# Patient Record
Sex: FEMALE | Race: WHITE | NOT HISPANIC OR LATINO | Employment: OTHER | URBAN - METROPOLITAN AREA
[De-identification: names, ages, dates, MRNs, and addresses within clinical notes are randomized per-mention and may not be internally consistent; named-entity substitution may affect disease eponyms.]

---

## 2017-02-09 DIAGNOSIS — I10 ESSENTIAL HYPERTENSION, BENIGN: Primary | ICD-10-CM

## 2017-02-09 RX ORDER — TRIAMTERENE/HYDROCHLOROTHIAZID 37.5-25 MG
1 TABLET ORAL EVERY MORNING
Qty: 90 TABLET | Refills: 0 | Status: SHIPPED | OUTPATIENT
Start: 2017-02-09 | End: 2017-05-08

## 2017-02-09 NOTE — TELEPHONE ENCOUNTER
triamterene-hydrochlorothiazide (MAXZIDE-25) 37.5-25 MG per tablet      Last Written Prescription Date: 5/16/16  Last Fill Quantity: 90, # refills: 1  Last Office Visit with [facility], P or [facility] prescribing provider: 6/24/16       POTASSIUM   Date Value Ref Range Status   05/16/2016 3.8 3.4 - 5.3 mmol/L Final     CREATININE   Date Value Ref Range Status   05/16/2016 0.86 0.52 - 1.04 mg/dL Final     BP Readings from Last 3 Encounters:   09/29/16 135/81   06/24/16 128/82   06/16/16 137/89

## 2017-05-08 DIAGNOSIS — I10 ESSENTIAL HYPERTENSION, BENIGN: ICD-10-CM

## 2017-05-09 RX ORDER — TRIAMTERENE/HYDROCHLOROTHIAZID 37.5-25 MG
TABLET ORAL
Qty: 90 TABLET | Refills: 0 | Status: SHIPPED | OUTPATIENT
Start: 2017-05-09 | End: 2017-08-10

## 2017-05-09 NOTE — TELEPHONE ENCOUNTER
HCTZ      Last Written Prescription Date: 2/9/17  Last Fill Quantity: 90, # refills: 0  Last Office Visit with [facility], [facility] or [facility] prescribing provider: 6/24/16       Potassium   Date Value Ref Range Status   05/16/2016 3.8 3.4 - 5.3 mmol/L Final     Creatinine   Date Value Ref Range Status   05/16/2016 0.86 0.52 - 1.04 mg/dL Final     BP Readings from Last 3 Encounters:   09/29/16 135/81   06/24/16 128/82   06/16/16 137/89

## 2017-08-10 DIAGNOSIS — I10 ESSENTIAL HYPERTENSION, BENIGN: ICD-10-CM

## 2017-08-10 RX ORDER — TRIAMTERENE/HYDROCHLOROTHIAZID 37.5-25 MG
TABLET ORAL
Qty: 90 TABLET | Refills: 0 | Status: SHIPPED | OUTPATIENT
Start: 2017-08-10 | End: 2017-08-16

## 2017-08-10 NOTE — TELEPHONE ENCOUNTER
Writer called and assisted patient with setting up office visit because patient is past due.     Maxzide 37.5-25mg      Last Written Prescription Date: 5/9/17  Last Fill Quantity: 90, # refills: 0  Last Office Visit with G, P or [facility] prescribing provider: 6/24/116  Next 5 appointments (look out 90 days)     Aug 16, 2017 10:20 AM CDT   SHORT with Alan Almeida MD   [facility] ([facility])    [address] [place] [id number] [phone]                  Potassium   Date Value Ref Range Status   05/16/2016 3.8 3.4 - 5.3 mmol/L Final     Creatinine   Date Value Ref Range Status   05/16/2016 0.86 0.52 - 1.04 mg/dL Final     BP Readings from Last 3 Encounters:   09/29/16 135/81   06/24/16 128/82   06/16/16 137/89     Medication is being filled for 1 time refill only due to:  due for an office visit with PCP

## 2017-08-16 ENCOUNTER — OFFICE VISIT (OUTPATIENT)
Dept: INTERNAL MEDICINE | Facility: CLINIC | Age: 62
End: 2017-08-16
Payer: MEDICARE

## 2017-08-16 VITALS
OXYGEN SATURATION: 98 % | BODY MASS INDEX: 36.29 KG/M2 | HEART RATE: 78 BPM | DIASTOLIC BLOOD PRESSURE: 84 MMHG | WEIGHT: 245 LBS | SYSTOLIC BLOOD PRESSURE: 112 MMHG | TEMPERATURE: 98.2 F | HEIGHT: 69 IN

## 2017-08-16 DIAGNOSIS — E66.01 MORBID OBESITY, UNSPECIFIED OBESITY TYPE (H): ICD-10-CM

## 2017-08-16 DIAGNOSIS — Z12.11 SCREENING FOR COLON CANCER: ICD-10-CM

## 2017-08-16 DIAGNOSIS — Z00.00 WELCOME TO MEDICARE PREVENTIVE VISIT: Primary | ICD-10-CM

## 2017-08-16 DIAGNOSIS — Z12.4 SCREENING FOR CERVICAL CANCER: ICD-10-CM

## 2017-08-16 DIAGNOSIS — I48.0 PAROXYSMAL ATRIAL FIBRILLATION (H): ICD-10-CM

## 2017-08-16 DIAGNOSIS — Z13.6 CARDIOVASCULAR SCREENING; LDL GOAL LESS THAN 160: ICD-10-CM

## 2017-08-16 DIAGNOSIS — I10 ESSENTIAL HYPERTENSION, BENIGN: ICD-10-CM

## 2017-08-16 DIAGNOSIS — Z12.31 VISIT FOR SCREENING MAMMOGRAM: ICD-10-CM

## 2017-08-16 LAB
ANION GAP SERPL CALCULATED.3IONS-SCNC: 6 MMOL/L (ref 3–14)
BUN SERPL-MCNC: 18 MG/DL (ref 7–30)
CALCIUM SERPL-MCNC: 9 MG/DL (ref 8.5–10.1)
CHLORIDE SERPL-SCNC: 103 MMOL/L (ref 94–109)
CHOLEST SERPL-MCNC: 235 MG/DL
CO2 SERPL-SCNC: 30 MMOL/L (ref 20–32)
CREAT SERPL-MCNC: 0.93 MG/DL (ref 0.52–1.04)
GFR SERPL CREATININE-BSD FRML MDRD: 61 ML/MIN/1.7M2
GLUCOSE SERPL-MCNC: 102 MG/DL (ref 70–99)
HDLC SERPL-MCNC: 46 MG/DL
INR PPP: 0.99 (ref 0.86–1.14)
LDLC SERPL CALC-MCNC: 132 MG/DL
NONHDLC SERPL-MCNC: 189 MG/DL
POTASSIUM SERPL-SCNC: 3.8 MMOL/L (ref 3.4–5.3)
SODIUM SERPL-SCNC: 139 MMOL/L (ref 133–144)
TRIGL SERPL-MCNC: 283 MG/DL

## 2017-08-16 PROCEDURE — 36415 COLL VENOUS BLD VENIPUNCTURE: CPT | Performed by: INTERNAL MEDICINE

## 2017-08-16 PROCEDURE — 99213 OFFICE O/P EST LOW 20 MIN: CPT | Mod: 25 | Performed by: INTERNAL MEDICINE

## 2017-08-16 PROCEDURE — 85610 PROTHROMBIN TIME: CPT | Performed by: INTERNAL MEDICINE

## 2017-08-16 PROCEDURE — G0402 INITIAL PREVENTIVE EXAM: HCPCS | Performed by: INTERNAL MEDICINE

## 2017-08-16 PROCEDURE — 80061 LIPID PANEL: CPT | Performed by: INTERNAL MEDICINE

## 2017-08-16 PROCEDURE — 80048 BASIC METABOLIC PNL TOTAL CA: CPT | Performed by: INTERNAL MEDICINE

## 2017-08-16 RX ORDER — METOPROLOL TARTRATE 50 MG
50 TABLET ORAL 2 TIMES DAILY
Qty: 180 TABLET | Refills: 2 | Status: SHIPPED | OUTPATIENT
Start: 2017-08-16 | End: 2018-06-04

## 2017-08-16 RX ORDER — TRIAMTERENE/HYDROCHLOROTHIAZID 37.5-25 MG
TABLET ORAL
Qty: 90 TABLET | Refills: 3 | Status: SHIPPED | OUTPATIENT
Start: 2017-08-16 | End: 2018-06-25

## 2017-08-16 NOTE — PATIENT INSTRUCTIONS
Service Typically covered by medicare Recommended Completed   Vaccines    Pneumococcal     Once for patients after age 65       Influenza   Yearly       Hepatitis B           (if medium/high risk) Medium/high risk factors:    End Stage Kidney Disease    Hemophiliacs who received Factor XIII or IX concentrates    Clients of institutions for developmentally disabled    Persons who live in same house as a Hepatitis B carrier    Homosexual men    Illicit injectable drug users    Health care workers    Staff of institutions for developmentally disabled     Mammogram Covered: One-time screen between age 35-39, annually for age 40+     Pap and Pelvic Exam Covered: Annually if  high risk,  or childbearing age with abnormal Pap in last 3 years.  Q24 months for all other women     Prostate Cancer Screening    Digital rectal exam    PSA Covered: Annually for all men > age 50     Colorectal Cancer Screening Screening colonoscopy every 10 years, more often for high risk patients     Diabetes self-management training Requires referral by treating physician for patient with diabetes     Diabetes screening    Fasting blood sugar or glucose tolerance test Once yearly, twice yearly if prediabetic     Cardiovascular screening blood tests    Total cholesterol    HDL    Triglycerides Every 5 years     Medical nutrition therapy for diabetes or renal disease Requires referral by treating physician for patient with diabetes or kidney disease     Glaucoma screening Annually for patients with one of the following risk factors:    Diabetes mellitus    Family history of glaucoma    -American age 50 and over    -American age 65 and over     Bone mass measurement Every 24 months if one of the following risk factors:    Estrogen deficiency    Vertebral abnormalities on x-ray indicative of osteoporosis, osteopenia, or vertebral fracture    Receiving/expected to receive the equivalent of at least 5 mg of Prednisone per day for > 3  months.    Hyperparathyroidism    Patient being monitored for response to osteoporosis therapy     One-time AAA screen  Must be ordered as part of Medicare IPPE   Any patient with a family history of AAA    Males Age 65-75, with history of smoking at least 100 cigarettes in lifetime     Smoking Cessation Counseling Beneficiaries who use tobacco are eligible to receive 2 cessation attempts per year; each attempt includes maximum of 4 sessions.     HIV Screening Annually for beneficiaries at increased risk:       Increased risk for HIV infection is defined in the  National Coverage Determinations (NCD) Manual,  Publication 100-03 Sections 190.14 (diagnostic) and 210.7 (screening). See http://www.cms.gov/manuals/downloads/rjy007b2_Ncma2.pdf and http://www.cms.gov/manuals/downloads/zmn701n2_Xvkd1.pdf on the Internet.  Three times per pregnancy for beneficiaries who are pregnant.       Future Annual Wellness Visit Annually, for all beneficiaries.                   Preventive Health Recommendations  Female Ages 50 - 64    Yearly exam: See your health care provider every year in order to  o Review health changes.   o Discuss preventive care.    o Review your medicines if your doctor has prescribed any.      Get a Pap test every three years (unless you have an abnormal result and your provider advises testing more often).    If you get Pap tests with HPV test, you only need to test every 5 years, unless you have an abnormal result.     You do not need a Pap test if your uterus was removed (hysterectomy) and you have not had cancer.    You should be tested each year for STDs (sexually transmitted diseases) if you're at risk.     Have a mammogram every 1 to 2 years.    Have a colonoscopy at age 50, or have a yearly FIT test (stool test). These exams screen for colon cancer.      Have a cholesterol test every 5 years, or more often if advised.    Have a diabetes test (fasting glucose) every three years. If you are at risk for  diabetes, you should have this test more often.     If you are at risk for osteoporosis (brittle bone disease), think about having a bone density scan (DEXA).    Shots: Get a flu shot each year. Get a tetanus shot every 10 years.    Nutrition:     Eat at least 5 servings of fruits and vegetables each day.    Eat whole-grain bread, whole-wheat pasta and brown rice instead of white grains and rice.    Talk to your provider about Calcium and Vitamin D.     Lifestyle    Exercise at least 150 minutes a week (30 minutes a day, 5 days a week). This will help you control your weight and prevent disease.    Limit alcohol to one drink per day.    No smoking.     Wear sunscreen to prevent skin cancer.     See your dentist every six months for an exam and cleaning.    See your eye doctor every 1 to 2 years.    Preventive Health Recommendations    Female Ages 65 +    Yearly exam:     See your health care provider every year in order to  o Review health changes.   o Discuss preventive care.    o Review your medicines if your doctor has prescribed any.      You no longer need a yearly Pap test unless you've had an abnormal Pap test in the past 10 years. If you have vaginal symptoms, such as bleeding or discharge, be sure to talk with your provider about a Pap test.      Every 1 to 2 years, have a mammogram.  If you are over 69, talk with your health care provider about whether or not you want to continue having screening mammograms.      Every 10 years, have a colonoscopy. Or, have a yearly FIT test (stool test). These exams will check for colon cancer.       Have a cholesterol test every 5 years, or more often if your doctor advises it.       Have a diabetes test (fasting glucose) every three years. If you are at risk for diabetes, you should have this test more often.       At age 65, have a bone density scan (DEXA) to check for osteoporosis (brittle bone disease).    Shots:    Get a flu shot each year.    Get a tetanus shot  every 10 years.    Talk to your doctor about your pneumonia vaccines. There are now two you should receive - Pneumovax (PPSV 23) and Prevnar (PCV 13).    Talk to your doctor about the shingles vaccine.    Talk to your doctor about the hepatitis B vaccine.    Nutrition:     Eat at least 5 servings of fruits and vegetables each day.      Eat whole-grain bread, whole-wheat pasta and brown rice instead of white grains and rice.      Talk to your provider about Calcium and Vitamin D.     Lifestyle    Exercise at least 150 minutes a week (30 minutes a day, 5 days a week). This will help you control your weight and prevent disease.      Limit alcohol to one drink per day.      No smoking.       Wear sunscreen to prevent skin cancer.       See your dentist twice a year for an exam and cleaning.      See your eye doctor every 1 to 2 years to screen for conditions such as glaucoma, macular degeneration and cataracts.

## 2017-08-16 NOTE — NURSING NOTE
"Chief Complaint   Patient presents with     Physical       Initial /84  Pulse 78  Temp 98.2  F (36.8  C) (Oral)  Ht 5' 8.5\" (1.74 m)  Wt 245 lb (111.1 kg)  LMP 12/31/2012  SpO2 98%  BMI 36.71 kg/m2 Estimated body mass index is 36.71 kg/(m^2) as calculated from the following:    Height as of this encounter: 5' 8.5\" (1.74 m).    Weight as of this encounter: 245 lb (111.1 kg).  Medication Reconciliation: complete    "

## 2017-08-16 NOTE — MR AVS SNAPSHOT
After Visit Summary   8/16/2017    Amparo Mccray    MRN: [id number]           Patient Information     Date Of Birth          1955        Visit Information        Provider Department      8/16/2017 10:20 AM Alan Almeida MD [facility]        Today's Diagnoses     Encounter for routine adult medical exam with abnormal findings    -  1    Essential hypertension, benign        Morbid obesity, unspecified obesity type (H)        CARDIOVASCULAR SCREENING; LDL GOAL LESS THAN 160        Screening for colon cancer        Screening for cervical cancer        Visit for screening mammogram        Paroxysmal atrial fibrillation (H)          Care Instructions    Service Typically covered by medicare Recommended Completed   Vaccines    Pneumococcal     Once for patients after age 65       Influenza   Yearly       Hepatitis B           (if medium/high risk) Medium/high risk factors:    End Stage Kidney Disease    Hemophiliacs who received Factor XIII or IX concentrates    Clients of institutions for developmentally disabled    Persons who live in same house as a Hepatitis B carrier    Homosexual men    Illicit injectable drug users    Health care workers    Staff of institutions for developmentally disabled     Mammogram Covered: One-time screen between age 35-39, annually for age 40+     Pap and Pelvic Exam Covered: Annually if  high risk,  or childbearing age with abnormal Pap in last 3 years.  Q24 months for all other women     Prostate Cancer Screening    Digital rectal exam    PSA Covered: Annually for all men > age 50     Colorectal Cancer Screening Screening colonoscopy every 10 years, more often for high risk patients     Diabetes self-management training Requires referral by treating physician for patient with diabetes     Diabetes screening    Fasting blood sugar or glucose tolerance test Once yearly, twice yearly if prediabetic     Cardiovascular screening blood  tests    Total cholesterol    HDL    Triglycerides Every 5 years     Medical nutrition therapy for diabetes or renal disease Requires referral by treating physician for patient with diabetes or kidney disease     Glaucoma screening Annually for patients with one of the following risk factors:    Diabetes mellitus    Family history of glaucoma    -American age 50 and over    -American age 65 and over     Bone mass measurement Every 24 months if one of the following risk factors:    Estrogen deficiency    Vertebral abnormalities on x-ray indicative of osteoporosis, osteopenia, or vertebral fracture    Receiving/expected to receive the equivalent of at least 5 mg of Prednisone per day for > 3 months.    Hyperparathyroidism    Patient being monitored for response to osteoporosis therapy     One-time AAA screen  Must be ordered as part of Medicare IPPE   Any patient with a family history of AAA    Males Age 65-75, with history of smoking at least 100 cigarettes in lifetime     Smoking Cessation Counseling Beneficiaries who use tobacco are eligible to receive 2 cessation attempts per year; each attempt includes maximum of 4 sessions.     HIV Screening Annually for beneficiaries at increased risk:       Increased risk for HIV infection is defined in the  National Coverage Determinations (NCD) Manual,  Publication 100-03 Sections 190.14 (diagnostic) and 210.7 (screening). See http://www.cms.gov/manuals/downloads/rzd808m3_Qvul6.pdf and http://www.cms.gov/manuals/downloads/ysq123g8_Ixbz7.pdf on the Internet.  Three times per pregnancy for beneficiaries who are pregnant.       Future Annual Wellness Visit Annually, for all beneficiaries.                   Preventive Health Recommendations  Female Ages 50 - 64    Yearly exam: See your health care provider every year in order to  o Review health changes.   o Discuss preventive care.    o Review your medicines if your doctor has prescribed any.      Get a Pap test  every three years (unless you have an abnormal result and your provider advises testing more often).    If you get Pap tests with HPV test, you only need to test every 5 years, unless you have an abnormal result.     You do not need a Pap test if your uterus was removed (hysterectomy) and you have not had cancer.    You should be tested each year for STDs (sexually transmitted diseases) if you're at risk.     Have a mammogram every 1 to 2 years.    Have a colonoscopy at age 50, or have a yearly FIT test (stool test). These exams screen for colon cancer.      Have a cholesterol test every 5 years, or more often if advised.    Have a diabetes test (fasting glucose) every three years. If you are at risk for diabetes, you should have this test more often.     If you are at risk for osteoporosis (brittle bone disease), think about having a bone density scan (DEXA).    Shots: Get a flu shot each year. Get a tetanus shot every 10 years.    Nutrition:     Eat at least 5 servings of fruits and vegetables each day.    Eat whole-grain bread, whole-wheat pasta and brown rice instead of white grains and rice.    Talk to your provider about Calcium and Vitamin D.     Lifestyle    Exercise at least 150 minutes a week (30 minutes a day, 5 days a week). This will help you control your weight and prevent disease.    Limit alcohol to one drink per day.    No smoking.     Wear sunscreen to prevent skin cancer.     See your dentist every six months for an exam and cleaning.    See your eye doctor every 1 to 2 years.    Preventive Health Recommendations    Female Ages 65 +    Yearly exam:     See your health care provider every year in order to  o Review health changes.   o Discuss preventive care.    o Review your medicines if your doctor has prescribed any.      You no longer need a yearly Pap test unless you've had an abnormal Pap test in the past 10 years. If you have vaginal symptoms, such as bleeding or discharge, be sure to talk  with your provider about a Pap test.      Every 1 to 2 years, have a mammogram.  If you are over 69, talk with your health care provider about whether or not you want to continue having screening mammograms.      Every 10 years, have a colonoscopy. Or, have a yearly FIT test (stool test). These exams will check for colon cancer.       Have a cholesterol test every 5 years, or more often if your doctor advises it.       Have a diabetes test (fasting glucose) every three years. If you are at risk for diabetes, you should have this test more often.       At age 65, have a bone density scan (DEXA) to check for osteoporosis (brittle bone disease).    Shots:    Get a flu shot each year.    Get a tetanus shot every 10 years.    Talk to your doctor about your pneumonia vaccines. There are now two you should receive - Pneumovax (PPSV 23) and Prevnar (PCV 13).    Talk to your doctor about the shingles vaccine.    Talk to your doctor about the hepatitis B vaccine.    Nutrition:     Eat at least 5 servings of fruits and vegetables each day.      Eat whole-grain bread, whole-wheat pasta and brown rice instead of white grains and rice.      Talk to your provider about Calcium and Vitamin D.     Lifestyle    Exercise at least 150 minutes a week (30 minutes a day, 5 days a week). This will help you control your weight and prevent disease.      Limit alcohol to one drink per day.      No smoking.       Wear sunscreen to prevent skin cancer.       See your dentist twice a year for an exam and cleaning.      See your eye doctor every 1 to 2 years to screen for conditions such as glaucoma, macular degeneration and cataracts.          Follow-ups after your visit        Additional Services     INR CLINIC REFERRAL       Your provider has referred you to INR Services.    Please be aware that coverage of these services is subject to the terms and limitations of your health insurance plan.  Call member services at your health plan with any  "benefit or coverage questions.    Indication for Anticoagulation: Atrial Fibrillation  If nonstandard INR is desired, indicate goal range and explanation:   Expected Duration of Therapy: Lifetime                  Future tests that were ordered for you today     Open Future Orders        Priority Expected Expires Ordered    Fecal colorectal cancer screen FIT Routine 9/6/2017 11/8/2017 8/16/2017    Mammo Screening digital (bilat) Routine  8/16/2018 8/16/2017            Who to contact     If you have questions or need follow up information about today's clinic visit or your schedule please contact [facility] directly at [phone].  Normal or non-critical lab and imaging results will be communicated to you by [employer], letter or phone within 4 business days after the clinic has received the results. If you do not hear from us within 7 days, please contact the clinic through [employer] or phone. If you have a critical or abnormal lab result, we will notify you by phone as soon as possible.  Submit refill requests through [employer] or call your pharmacy and they will forward the refill request to us. Please allow 3 business days for your refill to be completed.          Additional Information About Your Visit        [employer] Information     [employer] gives you secure access to your electronic health record. If you see a primary care provider, you can also send messages to your care team and make appointments. If you have questions, please call your primary care clinic.  If you do not have a primary care provider, please call [phone] and they will assist you.        Care EveryWhere ID     This is your Care EveryWhere ID. This could be used by other organizations to access your [place] medical records  [id number]        Your Vitals Were     Pulse Temperature Height Last Period Pulse Oximetry BMI (Body Mass Index)    78 98.2  F (36.8  C) (Oral) 5' 8.5\" (1.74 m) 12/31/2012 98% 36.71 kg/m2       " Blood Pressure from Last 3 Encounters:   08/16/17 112/84   09/29/16 135/81   06/24/16 128/82    Weight from Last 3 Encounters:   08/16/17 245 lb (111.1 kg)   06/24/16 241 lb 4.8 oz (109.5 kg)   05/16/16 237 lb 8 oz (107.7 kg)              We Performed the Following     Basic metabolic panel     INR CLINIC REFERRAL     Lipid panel reflex to direct LDL        Primary Care Provider Office Phone # Fax #    Alan Almeida [phone]       [address] [facility] [address]        Equal Access to Services     [facility] CASPER : Hadii jermaine waters hadsathish Somarnie, waaxda luqadaha, qaybta kaalmada adealberyajohnny, hardeep myles . So [facility] [phone].    ATENCIÓN: Si habla español, tiene a reyes disposición servicios gratuitos de asistencia lingüística. [facility] [phone].    We comply with applicable federal civil rights laws and [place] laws. We do not discriminate on the basis of race, color, national origin, age, disability sex, sexual orientation or gender identity.            Thank you!     Thank you for choosing [facility]  for your care. Our goal is always to provide you with excellent care. Hearing back from our patients is one way we can continue to improve our services. Please take a few minutes to complete the written survey that you may receive in the mail after your visit with us. Thank you!             Your Updated Medication List - Protect others around you: Learn how to safely use, store and throw away your medicines at www.disposemymeds.org.          This list is accurate as of: 8/16/17 10:48 AM.  Always use your most recent med list.                   Brand Name Dispense Instructions for use Diagnosis    B-12 1000 MCG Tbcr      Take by mouth daily Oral        metoprolol 50 MG tablet    LOPRESSOR    180 tablet    Take 1 tablet (50 mg) by mouth 2 times daily    Essential hypertension, benign       Potassium Chloride CR 8 MEQ Cpcr     60  capsule    Take 1 tablet by mouth daily as needed For leg cramps    Hypokalemia       triamterene-hydrochlorothiazide 37.5-25 MG per tablet    MAXZIDE-25    90 tablet    TAKE 1 TABLET BY MOUTH EVERY MORNING.    Essential hypertension, benign

## 2017-08-23 ENCOUNTER — ANTICOAGULATION THERAPY VISIT (OUTPATIENT)
Dept: NURSING | Facility: CLINIC | Age: 62
End: 2017-08-23
Payer: MEDICARE

## 2017-08-23 DIAGNOSIS — I49.8 VENTRICULAR BIGEMINY: ICD-10-CM

## 2017-08-23 DIAGNOSIS — Z79.01 LONG-TERM (CURRENT) USE OF ANTICOAGULANTS: ICD-10-CM

## 2017-08-23 DIAGNOSIS — I48.0 PAROXYSMAL ATRIAL FIBRILLATION (H): ICD-10-CM

## 2017-08-23 PROCEDURE — 99207 ZZC NO CHARGE NURSE ONLY: CPT

## 2017-08-23 RX ORDER — WARFARIN SODIUM 2.5 MG/1
2.5 TABLET ORAL DAILY
Qty: 90 TABLET | Refills: 1 | Status: SHIPPED | OUTPATIENT
Start: 2017-08-23 | End: 2018-03-04

## 2017-08-23 NOTE — MR AVS SNAPSHOT
Amparo Whittenmicki Mccray   8/23/2017 11:15 AM   Anticoagulation Therapy Visit    Description:  61 year old female   Provider:  FM RN VISITS   Department:  [facility] Nurse           INR as of 8/23/2017     Today's INR No new INR was available at the time of this encounter.      Anticoagulation Summary as of 8/23/2017     INR goal 2.0-3.0   Today's INR No new INR was available at the time of this encounter.   Full instructions 8/23: 5 mg; 8/24: 2.5 mg; 8/25: 2.5 mg; Otherwise No maintenance plan   Next INR check 8/25/2017    Indications   Long-term (current) use of anticoagulants [Z79.01] [Z79.01]  Atrial fibrillation (H) [I48.91]  Ventricular bigeminy [I49.9]         Your next Anticoagulation Clinic appointment(s)     Aug 25, 2017  9:30 AM CDT   [facility]ticoagulation [facility] with [facility]   [facility]    [address] [place] [address], [address] [address] [phone]              Contact Numbers     Clinic Number:         August 2017 Details    Sun Mon Tue Wed Thu Fri Sat       1               2               3               4               5                 6               7               8               9               10               11               12                 13               14               15               16               17               18               19                 20               21               22               23      5 mg   See details      24      2.5 mg         25            26                 27               28               29               30               31                  Date Details   08/23 This INR check       Date of next INR:  8/25/2017         How to take your warfarin dose     To take:  2.5 mg Take 1 of the 2.5 mg tablets.    To take:  5 mg Take 2 of the 2.5 mg tablets.

## 2017-08-23 NOTE — PROGRESS NOTES
ANTICOAGULATION INITIAL CLINIC VISIT    Patient Name:  Amparo Mccray  Date:  8/23/2017  Referred by: Dr. Almeida  Contact Type:  Face to Face     PATIENT HAS BEEN ON COUMADIN THERAPY IN THE PAST.  STOPPED FOR 2 YEARS AND IS NOW JUST GOING BACK ON IT PER PROVIDERS ORDERS.    SUBJECTIVE:  Coumadin education was completed today.  Topics covered include:  -Introduction to coumadin  -Proper Administration  -INR Testing  -Sign/Symptoms of Bleeding  -Signs/Symptoms of Clot Formation or Stroke  -Dietary Intake of Vitamin K  -Drug Interactions  -Anticoagulation Identification (bracelet, necklace or wallet card)  -Future Surgery  -Effects of Alcohol, Tobacco, and Exercise on Coumadin    Coumadin Education Booklet and Coumadin Identification Wallet Card were given to the patient.         OBJECTIVE    INR   Date Value Ref Range Status   08/16/2017 0.99 0.86 - 1.14 Final       ASSESSMENT / PLAN  No question data found.  Anticoagulation Summary as of 8/23/2017     INR goal 2.0-3.0   Today's INR No new INR was available at the time of this encounter.   Maintenance plan No maintenance plan   Full instructions 8/23: 5 mg; 8/24: 2.5 mg; 8/25: 2.5 mg; Otherwise No maintenance plan   Next INR check 8/25/2017   Target end date     Indications   Long-term (current) use of anticoagulants [Z79.01] [Z79.01]  Atrial fibrillation (H) [I48.91]  Ventricular bigeminy [I49.9]         Anticoagulation Episode Summary     INR check location     Preferred lab     Send INR reminders to  TRIAGE POOL    Comments       Anticoagulation Care Providers     Provider Role Specialty Phone number    Alan Almeida MD [facility] Internal Medicine [phone]            See the Encounter Report to view Anticoagulation Flowsheet and Dosing Calendar (Go to Encounters tab in chart review, and find the Anticoagulation Therapy Visit)    Willow Ellison RN

## 2017-08-25 ENCOUNTER — ANTICOAGULATION THERAPY VISIT (OUTPATIENT)
Dept: NURSING | Facility: CLINIC | Age: 62
End: 2017-08-25
Payer: MEDICARE

## 2017-08-25 DIAGNOSIS — I49.8 VENTRICULAR BIGEMINY: ICD-10-CM

## 2017-08-25 DIAGNOSIS — I48.0 PAROXYSMAL ATRIAL FIBRILLATION (H): ICD-10-CM

## 2017-08-25 DIAGNOSIS — Z79.01 LONG-TERM (CURRENT) USE OF ANTICOAGULANTS: ICD-10-CM

## 2017-08-25 LAB — INR POINT OF CARE: 1.3 (ref 0.86–1.14)

## 2017-08-25 PROCEDURE — 36416 COLLJ CAPILLARY BLOOD SPEC: CPT

## 2017-08-25 PROCEDURE — 85610 PROTHROMBIN TIME: CPT | Mod: QW

## 2017-08-25 PROCEDURE — 99207 ZZC NO CHARGE NURSE ONLY: CPT

## 2017-08-25 NOTE — MR AVS SNAPSHOT
Amparo Whittenmicki Mccray   8/25/2017 9:30 AM   Anticoagulation Therapy Visit    Description:  61 year old female   Provider:  FM RN VISITS   Department:  Fm Nurse           INR as of 8/25/2017     Today's INR 1.3!      Anticoagulation Summary as of 8/25/2017     INR goal 2.0-3.0   Today's INR 1.3!   Full instructions 8/25: 5 mg; 8/26: 5 mg; 8/27: 5 mg; 8/28: 2.5 mg; Otherwise No maintenance plan   Next INR check 8/28/2017    Indications   Long-term (current) use of anticoagulants [Z79.01] [Z79.01]  Atrial fibrillation (H) [I48.91]  Ventricular bigeminy [I49.9]         Your next Anticoagulation Clinic appointment(s)     Aug 28, 2017 10:00 AM CDT   Anticoagulation Visit with FM RN VISITS   [facility]    [address] [place] [address], [facility] [address]  [facility] [address] [phone]              Contact Numbers     Clinic Number:         August 2017 Details    Sun Mon Tue Wed Thu Fri Sat       1               2               3               4               5                 6               7               8               9               10               11               12                 13               14               15               16               17               18               19                 20               21               22               23               24               25      5 mg   See details      26      5 mg           27      5 mg         28            29               30               31                  Date Details   08/25 This INR check       Date of next INR:  8/28/2017         How to take your warfarin dose     To take:  2.5 mg Take 1 of the 2.5 mg tablets.    To take:  5 mg Take 2 of the 2.5 mg tablets.

## 2017-08-25 NOTE — PROGRESS NOTES
ANTICOAGULATION FOLLOW-UP CLINIC VISIT    Patient Name:  Amparo Mccray  Date:  8/25/2017  Contact Type:  Face to Face    SUBJECTIVE:     Patient Findings     Positives Initiation of therapy           OBJECTIVE    INR Protime   Date Value Ref Range Status   08/25/2017 1.3 (A) 0.86 - 1.14 Final       ASSESSMENT / PLAN  INR assessment SUB    Recheck INR In: 3 DAYS    INR Location Clinic      Anticoagulation Summary as of 8/25/2017     INR goal 2.0-3.0   Today's INR 1.3!   Maintenance plan No maintenance plan   Full instructions 8/25: 5 mg; 8/26: 5 mg; 8/27: 5 mg; 8/28: 2.5 mg; Otherwise No maintenance plan   Next INR check 8/28/2017   Priority INR   Target end date     Indications   Long-term (current) use of anticoagulants [Z79.01] [Z79.01]  Atrial fibrillation (H) [I48.91]  Ventricular bigeminy [I49.9]         Anticoagulation Episode Summary     INR check location     Preferred lab     Send INR reminders to  TRIAGE POOL    Comments       Anticoagulation Care Providers     Provider Role Specialty Phone number    Alan Almeida MD [facility] Internal Medicine [phone]            See the Encounter Report to view Anticoagulation Flowsheet and Dosing Calendar (Go to Encounters tab in chart review, and find the Anticoagulation Therapy Visit)        Willow Ellison RN

## 2017-08-27 ENCOUNTER — HEALTH MAINTENANCE LETTER (OUTPATIENT)
Age: 62
End: 2017-08-27

## 2017-08-28 ENCOUNTER — ANTICOAGULATION THERAPY VISIT (OUTPATIENT)
Dept: NURSING | Facility: CLINIC | Age: 62
End: 2017-08-28
Payer: MEDICARE

## 2017-08-28 DIAGNOSIS — I48.0 PAROXYSMAL ATRIAL FIBRILLATION (H): ICD-10-CM

## 2017-08-28 DIAGNOSIS — Z79.01 LONG-TERM (CURRENT) USE OF ANTICOAGULANTS: ICD-10-CM

## 2017-08-28 DIAGNOSIS — I49.8 VENTRICULAR BIGEMINY: ICD-10-CM

## 2017-08-28 LAB — INR POINT OF CARE: 3.3 (ref 0.86–1.14)

## 2017-08-28 PROCEDURE — 36416 COLLJ CAPILLARY BLOOD SPEC: CPT

## 2017-08-28 PROCEDURE — 85610 PROTHROMBIN TIME: CPT | Mod: QW

## 2017-08-28 PROCEDURE — 99207 ZZC NO CHARGE NURSE ONLY: CPT

## 2017-08-28 NOTE — PROGRESS NOTES
ANTICOAGULATION FOLLOW-UP CLINIC VISIT    Patient Name:  Amparo Mccray  Date:  8/28/2017  Contact Type:  Face to Face    SUBJECTIVE:     Patient Findings     Positives Initiation of therapy           OBJECTIVE    INR Protime   Date Value Ref Range Status   08/28/2017 3.3 (A) 0.86 - 1.14 Final       ASSESSMENT / PLAN  INR assessment SUPRA    Recheck INR In: 8 DAYS    INR Location Clinic      Anticoagulation Summary as of 8/28/2017     INR goal 2.0-3.0   Today's INR 3.3!   Maintenance plan 2.5 mg (2.5 mg x 1) every day   Full instructions 8/28: 2.5 mg; Otherwise 2.5 mg every day   Weekly total 17.5 mg   Plan last modified Willow Ellison RN (8/28/2017)   Next INR check 9/5/2017   Priority INR   Target end date     Indications   Long-term (current) use of anticoagulants [Z79.01] [Z79.01]  Atrial fibrillation (H) [I48.91]  Ventricular bigeminy [I49.9]         Anticoagulation Episode Summary     INR check location     Preferred lab     Send INR reminders to  TRIAGE POOL    Comments       Anticoagulation Care Providers     Provider Role Specialty Phone number    Alan Almeida MD [facility] Internal Medicine [phone]            See the Encounter Report to view Anticoagulation Flowsheet and Dosing Calendar (Go to Encounters tab in chart review, and find the Anticoagulation Therapy Visit)    Willow Ellison RN

## 2017-08-28 NOTE — MR AVS SNAPSHOT
Amparo Whittenmicki Mccray   8/28/2017 10:00 AM   Anticoagulation Therapy Visit    Description:  61 year old female   Provider:  FM RN VISITS   Department:  Fm Nurse           INR as of 8/28/2017     Today's INR 3.3!      Anticoagulation Summary as of 8/28/2017     INR goal 2.0-3.0   Today's INR 3.3!   Full instructions 8/28: 2.5 mg; Otherwise 2.5 mg every day   Next INR check 9/5/2017    Indications   Long-term (current) use of anticoagulants [Z79.01] [Z79.01]  Atrial fibrillation (H) [I48.91]  Ventricular bigeminy [I49.9]         Your next Anticoagulation Clinic appointment(s)     Sep 05, 2017 10:15 AM CDT   Anticoagulation Visit with FM RN VISITS   [facility]    [address] [place] [address], [address]  [facility] [address] [phone]              Contact Numbers     Clinic Number:         August 2017 Details    Sun Mon Tue Wed Thu Fri Sat       1               2               3               4               5                 6               7               8               9               10               11               12                 13               14               15               16               17               18               19                 20               21               22               23               24               25               26                 27               28      2.5 mg   See details      29      2.5 mg         30      2.5 mg         31      2.5 mg            Date Details   08/28 This INR check               How to take your warfarin dose     To take:  2.5 mg Take 1 of the 2.5 mg tablets.           September 2017 Details    Sun Mon Tue Wed Thu Fri Sat          1      2.5 mg         2      2.5 mg           3      2.5 mg         4      2.5 mg         5            6               7               8               9                 10               11               12               13               14               15               16                  17               18               19               20               21               22               23                 24               25               26               27               28               29               30                Date Details   No additional details    Date of next INR:  9/5/2017         How to take your warfarin dose     To take:  2.5 mg Take 1 of the 2.5 mg tablets.

## 2017-09-05 ENCOUNTER — ANTICOAGULATION THERAPY VISIT (OUTPATIENT)
Dept: NURSING | Facility: CLINIC | Age: 62
End: 2017-09-05
Payer: MEDICARE

## 2017-09-05 DIAGNOSIS — I49.8 VENTRICULAR BIGEMINY: ICD-10-CM

## 2017-09-05 DIAGNOSIS — I48.0 PAROXYSMAL ATRIAL FIBRILLATION (H): ICD-10-CM

## 2017-09-05 DIAGNOSIS — Z79.01 LONG-TERM (CURRENT) USE OF ANTICOAGULANTS: ICD-10-CM

## 2017-09-05 LAB — INR POINT OF CARE: 3.2 (ref 0.86–1.14)

## 2017-09-05 PROCEDURE — 99207 ZZC NO CHARGE NURSE ONLY: CPT

## 2017-09-05 PROCEDURE — 36416 COLLJ CAPILLARY BLOOD SPEC: CPT

## 2017-09-05 PROCEDURE — 85610 PROTHROMBIN TIME: CPT | Mod: QW

## 2017-09-05 NOTE — MR AVS SNAPSHOT
Amparo Whittenmicki Mccray   9/5/2017 10:15 AM   Anticoagulation Therapy Visit    Description:  61 year old female   Provider:  FM RN VISITS   Department:  Fm Nurse           INR as of 9/5/2017     Today's INR 3.2!      Anticoagulation Summary as of 9/5/2017     INR goal 2.0-3.0   Today's INR 3.2!   Full instructions 2.5 mg every day   Next INR check 9/15/2017    Indications   Long-term (current) use of anticoagulants [Z79.01] [Z79.01]  Atrial fibrillation (H) [I48.91]  Ventricular bigeminy [I49.9]         Your next Anticoagulation Clinic appointment(s)     Sep 15, 2017 10:15 AM CDT   Anticoagulation Visit with FM RN VISITS   [facility]    [address] [place] [address], [facility] [address]  [facility] [id number] [phone]              Contact Numbers     Clinic Number:         September 2017 Details    Sun Mon Tue Wed Thu Fri Sat          1               2                 3               4               5      2.5 mg   See details      6      2.5 mg         7      2.5 mg         8      2.5 mg         9      2.5 mg           10      2.5 mg         11      2.5 mg         12      2.5 mg         13      2.5 mg         14      2.5 mg         15            16                 17               18               19               20               21               22               23                 24               25               26               27               28               29               30                Date Details   09/05 This INR check       Date of next INR:  9/15/2017         How to take your warfarin dose     To take:  2.5 mg Take 1 of the 2.5 mg tablets.

## 2017-09-05 NOTE — PROGRESS NOTES
ANTICOAGULATION FOLLOW-UP CLINIC VISIT    Patient Name:  Amparo Mccray  Date:  9/5/2017  Contact Type:  Face to Face    SUBJECTIVE:     Patient Findings     Positives Initiation of therapy           OBJECTIVE    INR Protime   Date Value Ref Range Status   09/05/2017 3.2 (A) 0.86 - 1.14 Final       ASSESSMENT / PLAN  INR assessment SUPRA    Recheck INR In: 10 DAYS    INR Location Clinic      Anticoagulation Summary as of 9/5/2017     INR goal 2.0-3.0   Today's INR 3.2!   Maintenance plan 2.5 mg (2.5 mg x 1) every day   Full instructions 2.5 mg every day   Weekly total 17.5 mg   Plan last modified Willow Ellison, RN (9/5/2017)   Next INR check 9/15/2017   Priority INR   Target end date     Indications   Long-term (current) use of anticoagulants [Z79.01] [Z79.01]  Atrial fibrillation (H) [I48.91]  Ventricular bigeminy [I49.9]         Anticoagulation Episode Summary     INR check location     Preferred lab     Send INR reminders to  TRIAGE POOL    Comments       Anticoagulation Care Providers     Provider Role Specialty Phone number    Alan Almeida MD [facility] Internal Medicine [phone]            See the Encounter Report to view Anticoagulation Flowsheet and Dosing Calendar (Go to Encounters tab in chart review, and find the Anticoagulation Therapy Visit)      Willow Ellison RN

## 2017-09-16 PROCEDURE — 82274 ASSAY TEST FOR BLOOD FECAL: CPT | Performed by: INTERNAL MEDICINE

## 2017-09-19 ENCOUNTER — ANTICOAGULATION THERAPY VISIT (OUTPATIENT)
Dept: NURSING | Facility: CLINIC | Age: 62
End: 2017-09-19
Payer: MEDICARE

## 2017-09-19 DIAGNOSIS — Z79.01 LONG-TERM (CURRENT) USE OF ANTICOAGULANTS: ICD-10-CM

## 2017-09-19 DIAGNOSIS — I49.8 VENTRICULAR BIGEMINY: ICD-10-CM

## 2017-09-19 DIAGNOSIS — I48.0 PAROXYSMAL ATRIAL FIBRILLATION (H): ICD-10-CM

## 2017-09-19 LAB — INR POINT OF CARE: 3.6 (ref 0.86–1.14)

## 2017-09-19 PROCEDURE — 85610 PROTHROMBIN TIME: CPT | Mod: QW

## 2017-09-19 PROCEDURE — 36416 COLLJ CAPILLARY BLOOD SPEC: CPT

## 2017-09-19 PROCEDURE — 99207 ZZC NO CHARGE NURSE ONLY: CPT

## 2017-09-19 NOTE — MR AVS SNAPSHOT
Amparo Whittenmicki Sukhdeeptreasure   9/19/2017 10:15 AM   Anticoagulation Therapy Visit    Description:  61 year old female   Provider:  FM RN VISITS   Department:  Fm Nurse           INR as of 9/19/2017     Today's INR 3.6!      Anticoagulation Summary as of 9/19/2017     INR goal 2.0-3.0   Today's INR 3.6!   Full instructions 0 mg on Wed; 2.5 mg all other days   Next INR check 10/3/2017    Indications   Long-term (current) use of anticoagulants [Z79.01] [Z79.01]  Atrial fibrillation (H) [I48.91]  Ventricular bigeminy [I49.9]         Description     Patient has noticed more bruising.      Your next Anticoagulation Clinic appointment(s)     Oct 03, 2017 10:00 AM CDT   Anticoagulation Visit with FM RN VISITS   [facility]    [address] [facility], [address]  [facility] [address] [phone]              Contact Numbers     Clinic Number:         September 2017 Details    Sun Mon Tue Wed Thu Fri Sat          1               2                 3               4               5               6               7               8               9                 10               11               12               13               14               15               16                 17               18               19      2.5 mg   See details      20      0 mg         21      2.5 mg         22      2.5 mg         23      2.5 mg           24      2.5 mg         25      2.5 mg         26      2.5 mg         27      0 mg         28      2.5 mg         29      2.5 mg         30      2.5 mg          Date Details   09/19 This INR check               How to take your warfarin dose     To take:  2.5 mg Take 1 of the 2.5 mg tablets.           October 2017 Details    Sun Mon Tue Wed Thu Fri Sat     1      2.5 mg         2      2.5 mg         3            4               5               6               7                 8               9               10               11               12                13               14                 15               16               17               18               19               20               21                 22               23               24               25               26               27               28                 29               30               31                    Date Details   No additional details    Date of next INR:  10/3/2017         How to take your warfarin dose     To take:  2.5 mg Take 1 of the 2.5 mg tablets.

## 2017-09-19 NOTE — PROGRESS NOTES
ANTICOAGULATION FOLLOW-UP CLINIC VISIT    Patient Name:  Amparo Mccray  Date:  9/19/2017  Contact Type:  Face to Face    SUBJECTIVE:     Patient Findings     Positives Initiation of therapy    Comments Patient has noticed more bruising.           OBJECTIVE    INR Protime   Date Value Ref Range Status   09/19/2017 3.6 (A) 0.86 - 1.14 Final       ASSESSMENT / PLAN  INR assessment SUPRA    Recheck INR In: 2 WEEKS    INR Location Clinic      Anticoagulation Summary as of 9/19/2017     INR goal 2.0-3.0   Today's INR 3.6!   Maintenance plan 0 mg on Wed; 2.5 mg (2.5 mg x 1) all other days   Full instructions 0 mg on Wed; 2.5 mg all other days   Weekly total 15 mg   Plan last modified Willow Ellison RN (9/19/2017)   Next INR check 10/3/2017   Priority INR   Target end date     Indications   Long-term (current) use of anticoagulants [Z79.01] [Z79.01]  Atrial fibrillation (H) [I48.91]  Ventricular bigeminy [I49.9]         Anticoagulation Episode Summary     INR check location     Preferred lab     Send INR reminders to  TRIAGE POOL    Comments       Anticoagulation Care Providers     Provider Role Specialty Phone number    Alan Almeida MD Responsible Internal Medicine [phone]            See the Encounter Report to view Anticoagulation Flowsheet and Dosing Calendar (Go to Encounters tab in chart review, and find the Anticoagulation Therapy Visit)      Willow Ellison RN

## 2017-09-22 DIAGNOSIS — Z12.11 SCREENING FOR COLON CANCER: ICD-10-CM

## 2017-09-22 LAB — HEMOCCULT STL QL IA: NEGATIVE

## 2017-10-03 ENCOUNTER — ANTICOAGULATION THERAPY VISIT (OUTPATIENT)
Dept: NURSING | Facility: CLINIC | Age: 62
End: 2017-10-03
Payer: MEDICARE

## 2017-10-03 DIAGNOSIS — Z79.01 LONG-TERM (CURRENT) USE OF ANTICOAGULANTS: ICD-10-CM

## 2017-10-03 DIAGNOSIS — I49.8 VENTRICULAR BIGEMINY: ICD-10-CM

## 2017-10-03 DIAGNOSIS — I48.0 PAROXYSMAL ATRIAL FIBRILLATION (H): ICD-10-CM

## 2017-10-03 LAB — INR POINT OF CARE: 3.2 (ref 0.86–1.14)

## 2017-10-03 PROCEDURE — 85610 PROTHROMBIN TIME: CPT | Mod: QW

## 2017-10-03 PROCEDURE — 36416 COLLJ CAPILLARY BLOOD SPEC: CPT

## 2017-10-03 PROCEDURE — 99207 ZZC NO CHARGE NURSE ONLY: CPT

## 2017-10-03 NOTE — MR AVS SNAPSHOT
Amparo Whittenmicki Mccray   10/3/2017 10:00 AM   Anticoagulation Therapy Visit    Description:  61 year old female   Provider:  FM RN VISITS   Department:  Fm Nurse           INR as of 10/3/2017     Today's INR 3.2!      Anticoagulation Summary as of 10/3/2017     INR goal 2.0-3.0   Today's INR 3.2!   Full instructions 1.25 mg on Wed; 2.5 mg all other days   Next INR check 10/17/2017    Indications   Long-term (current) use of anticoagulants [Z79.01] [Z79.01]  Atrial fibrillation (H) [I48.91]  Ventricular bigeminy [I49.9]         Your next Anticoagulation Clinic appointment(s)     Oct 17, 2017 10:00 AM CDT   Anticoagulation Visit with FM RN VISITS   [facility]    [address] [place] [address], [address]  [facility] [address] [phone]              Contact Numbers     Clinic Number:         October 2017 Details    Sun Mon Tue Wed Thu Fri Sat     1               2               3      2.5 mg   See details      4      1.25 mg         5      2.5 mg         6      2.5 mg         7      2.5 mg           8      2.5 mg         9      2.5 mg         10      2.5 mg         11      1.25 mg         12      2.5 mg         13      2.5 mg         14      2.5 mg           15      2.5 mg         16      2.5 mg         17            18               19               20               21                 22               23               24               25               26               27               28                 29               30               31                    Date Details   10/03 This INR check       Date of next INR:  10/17/2017         How to take your warfarin dose     To take:  1.25 mg Take 0.5 of a 2.5 mg tablet.    To take:  2.5 mg Take 1 of the 2.5 mg tablets.

## 2017-10-03 NOTE — PROGRESS NOTES
ANTICOAGULATION FOLLOW-UP CLINIC VISIT    Patient Name:  Amparo Mccray  Date:  10/3/2017  Contact Type:  Face to Face    SUBJECTIVE:     Patient Findings     Positives Initiation of therapy           OBJECTIVE    INR Protime   Date Value Ref Range Status   10/03/2017 3.2 (A) 0.86 - 1.14 Final       ASSESSMENT / PLAN  INR assessment SUPRA    Recheck INR In: 2 WEEKS    INR Location Clinic      Anticoagulation Summary as of 10/3/2017     INR goal 2.0-3.0   Today's INR 3.2!   Maintenance plan 1.25 mg (2.5 mg x 0.5) on Wed; 2.5 mg (2.5 mg x 1) all other days   Full instructions 1.25 mg on Wed; 2.5 mg all other days   Weekly total 16.25 mg   Plan last modified Willow Ellison RN (10/3/2017)   Next INR check 10/17/2017   Priority INR   Target end date     Indications   Long-term (current) use of anticoagulants [Z79.01] [Z79.01]  Atrial fibrillation (H) [I48.91]  Ventricular bigeminy [I49.9]         Anticoagulation Episode Summary     INR check location     Preferred lab     Send INR reminders to  TRIAGE POOL    Comments       Anticoagulation Care Providers     Provider Role Specialty Phone number    Alan Almeida MD Responsible Internal Medicine [phone]            See the Encounter Report to view Anticoagulation Flowsheet and Dosing Calendar (Go to Encounters tab in chart review, and find the Anticoagulation Therapy Visit)    Willow Ellison RN

## 2017-10-17 ENCOUNTER — ANTICOAGULATION THERAPY VISIT (OUTPATIENT)
Dept: NURSING | Facility: CLINIC | Age: 62
End: 2017-10-17
Payer: MEDICARE

## 2017-10-17 DIAGNOSIS — I48.0 PAROXYSMAL ATRIAL FIBRILLATION (H): ICD-10-CM

## 2017-10-17 DIAGNOSIS — I49.8 VENTRICULAR BIGEMINY: ICD-10-CM

## 2017-10-17 DIAGNOSIS — Z79.01 LONG-TERM (CURRENT) USE OF ANTICOAGULANTS: ICD-10-CM

## 2017-10-17 LAB — INR POINT OF CARE: 3.2 (ref 0.86–1.14)

## 2017-10-17 PROCEDURE — 36416 COLLJ CAPILLARY BLOOD SPEC: CPT

## 2017-10-17 PROCEDURE — 85610 PROTHROMBIN TIME: CPT | Mod: QW

## 2017-10-17 PROCEDURE — 99207 ZZC NO CHARGE NURSE ONLY: CPT

## 2017-10-17 NOTE — MR AVS SNAPSHOT
Amparo Whittenmicki Mccray   10/17/2017 10:00 AM   Anticoagulation Therapy Visit    Description:  61 year old female   Provider:  FM RN VISITS   Department:  Fm Nurse           INR as of 10/17/2017     Today's INR 3.2!      Anticoagulation Summary as of 10/17/2017     INR goal 2.0-3.0   Today's INR 3.2!   Full instructions 1.25 mg on Mon, Wed, Fri; 2.5 mg all other days   Next INR check 10/31/2017    Indications   Long-term (current) use of anticoagulants [Z79.01] [Z79.01]  Atrial fibrillation (H) [I48.91]  Ventricular bigeminy [I49.9]         Your next Anticoagulation Clinic appointment(s)     Oct 31, 2017 10:00 AM CDT   Anticoagulation Visit with FM RN VISITS   [facility]    [address] [place] [address], [address]  [facility] [id number] [phone]              Contact Numbers     Clinic Number:         October 2017 Details    Sun Mon Tue Wed Thu Fri Sat     1               2               3               4               5               6               7                 8               9               10               11               12               13               14                 15               16               17      2.5 mg   See details      18      1.25 mg         19      2.5 mg         20      1.25 mg         21      2.5 mg           22      2.5 mg         23      1.25 mg         24      2.5 mg         25      1.25 mg         26      2.5 mg         27      1.25 mg         28      2.5 mg           29      2.5 mg         30      1.25 mg         31                 Date Details   10/17 This INR check       Date of next INR:  10/31/2017         How to take your warfarin dose     To take:  1.25 mg Take 0.5 of a 2.5 mg tablet.    To take:  2.5 mg Take 1 of the 2.5 mg tablets.

## 2017-10-17 NOTE — PROGRESS NOTES
ANTICOAGULATION FOLLOW-UP CLINIC VISIT    Patient Name:  Amparo Mccray  Date:  10/17/2017  Contact Type:  Face to Face    SUBJECTIVE:        OBJECTIVE    INR Protime   Date Value Ref Range Status   10/17/2017 3.2 (A) 0.86 - 1.14 Final       ASSESSMENT / PLAN  INR assessment SUPRA    Recheck INR In: 2 WEEKS    INR Location Clinic      Anticoagulation Summary as of 10/17/2017     INR goal 2.0-3.0   Today's INR 3.2!   Maintenance plan 1.25 mg (2.5 mg x 0.5) on Mon, Wed, Fri; 2.5 mg (2.5 mg x 1) all other days   Full instructions 1.25 mg on Mon, Wed, Fri; 2.5 mg all other days   Weekly total 13.75 mg   Plan last modified Willow Ellison RN (10/17/2017)   Next INR check 10/31/2017   Priority INR   Target end date     Indications   Long-term (current) use of anticoagulants [Z79.01] [Z79.01]  Atrial fibrillation (H) [I48.91]  Ventricular bigeminy [I49.9]         Anticoagulation Episode Summary     INR check location     Preferred lab     Send INR reminders to  TRIAGE POOL    Comments       Anticoagulation Care Providers     Provider Role Specialty Phone number    Alan Almeida MD [facility] Internal Medicine [phone]            See the Encounter Report to view Anticoagulation Flowsheet and Dosing Calendar (Go to Encounters tab in chart review, and find the Anticoagulation Therapy Visit)      Willow Ellison RN

## 2017-10-27 ENCOUNTER — OFFICE VISIT (OUTPATIENT)
Dept: CARDIOLOGY | Facility: CLINIC | Age: 62
End: 2017-10-27
Attending: INTERNAL MEDICINE
Payer: MEDICARE

## 2017-10-27 VITALS
WEIGHT: 244 LBS | DIASTOLIC BLOOD PRESSURE: 70 MMHG | BODY MASS INDEX: 36.14 KG/M2 | SYSTOLIC BLOOD PRESSURE: 124 MMHG | HEART RATE: 72 BPM | HEIGHT: 69 IN

## 2017-10-27 DIAGNOSIS — R07.9 CHEST PAIN, UNSPECIFIED TYPE: ICD-10-CM

## 2017-10-27 DIAGNOSIS — I48.0 PAROXYSMAL A-FIB (H): Primary | ICD-10-CM

## 2017-10-27 PROCEDURE — 99215 OFFICE O/P EST HI 40 MIN: CPT | Performed by: INTERNAL MEDICINE

## 2017-10-27 PROCEDURE — 93000 ELECTROCARDIOGRAM COMPLETE: CPT | Performed by: INTERNAL MEDICINE

## 2017-10-27 NOTE — PROGRESS NOTES
"2017             Alan Almeida MD   [facility]    [address] [address]      RE: Amparo Mccray   MRN: [id number]   : 1955      Dear Dr. Almeida:       Thank you for allowing me to participate in the care of this very delightful patient.  As you know, Amparo is a 61-year-old female with a history of symptomatic paroxysmal atrial fibrillation refractory to flecainide, status post catheter-based ablation more than 4 years ago.  She has been doing quite well until the past several months where she had more episodes of palpitations along with chest discomfort.  These episodes were documented with \"a live cor\" application that was attached to her smart phone.  I was able to review all these tracings, and they suggested atrial fibrillation with controlled ventricular response.  She was started on warfarin at your urging.        Amparo mentioned that the chest pain can come on anytime.  She described it as very sharp.  She did have a stress echocardiogram done during 2014, which was unremarkable.  I reassured Amparo that the chest discomfort is unlikely from coronary ischemia and may be related to atrial fibrillation itself, but given that she does have risk factors of coronary artery disease, namely a family history and having hypertension and hyperlipidemia, I would recommend a stress test with an MRI to look at her pulmonary veins as well in preparation for a repeat ablation.  Amparo is quite interested in having a repeat procedure given that her atrial fibrillation was refractory to flecainide in the past.  Most of the time, the reason for recurrence is because of the gaps from the lines that we did.  I went over the procedure in detail with the risks including, but not limited to, vascular injury, cardiac perforation and CVA.  The cardiac MRI will be helpful for me to look at the left atrial appendage as well as demonstrate there are no clots prior to the " procedure.  In the meantime, I asked her to continue the warfarin for the procedure as well.      Sincerely,      MD JAROD De La Paz MD             D: 10/27/2017 08:55   T: 10/27/2017 11:35   MT: ellen      Name:     DANY SHELBY   MRN:      [id number]        Account:      [id number]   :      1955           Service Date: 10/27/2017      Document: [id number]

## 2017-10-27 NOTE — LETTER
"2017             Alan Almeida MD   [facility]    [address] [address]      RE: Amparo Mccray   MRN: [id number]   : 1955      Dear Dr. Almeida:       Thank you for allowing me to participate in the care of this very delightful patient.  As you know, Amparo is a 61-year-old female with a history of symptomatic paroxysmal atrial fibrillation refractory to flecainide, status post catheter-based ablation more than 4 years ago.  She has been doing quite well until the past several months where she had more episodes of palpitations along with chest discomfort.  These episodes were documented with \"a live cor\" application that was attached to her smart phone.  I was able to review all these tracings, and they suggested atrial fibrillation with controlled ventricular response.  She was started on warfarin at your urging.        Amparo mentioned that the chest pain can come on anytime.  She described it as very sharp.  She did have a stress echocardiogram done during 2014, which was unremarkable.  I reassured Amparo that the chest discomfort is unlikely from coronary ischemia and may be related to atrial fibrillation itself, but given that she does have risk factors of coronary artery disease, namely a family history and having hypertension and hyperlipidemia, I would recommend a stress test with an MRI to look at her pulmonary veins as well in preparation for a repeat ablation.  Amparo is quite interested in having a repeat procedure given that her atrial fibrillation was refractory to flecainide in the past.  Most of the time, the reason for recurrence is because of the gaps from the lines that we did.  I went over the procedure in detail with the risks including, but not limited to, vascular injury, cardiac perforation and CVA.  The cardiac MRI will be helpful for me to look at the left atrial appendage as well as demonstrate there are no clots prior to the " procedure.  In the meantime, I asked her to continue the warfarin for the procedure as well.      Sincerely,      Wilbert Thomas MD

## 2017-10-27 NOTE — MR AVS SNAPSHOT
After Visit Summary   10/27/2017    Amparo Mccray    MRN: [id number]           Patient Information     Date Of Birth          1955        Visit Information        Provider Department      10/27/2017 7:45 AM Wilbert Thomas MD [facility] [place]        Today's Diagnoses     Paroxysmal a-fib (H)    -  1    Chest pain, unspecified type           Follow-ups after your visit        Your next 10 appointments already scheduled     Oct 31, 2017 10:00 AM CDT   [facility]agulation Visit with FM RN VISITS   [facility]    [address] [place] [address], [address]  [facility] [address] [phone]            Oct 31, 2017  1:00 PM CDT   MR CARDIAC W CONTRAST AND STRESS with SCIMR1   [facility] ([facility] at [facility])    [address] [place] [address]  [address]  [facility] [address] [phone]           Take your medicines as usual, unless your doctor tells you not to.   If you take Viagra, Levitra or Cialis, stop taking it 48 hours before your test.   If you take Aggrenox or dipyridamole (Persantine, Permole), stop taking it 48 hours before your test.   If you take Theophylline or Aminophylline, stop taking it 12 hours before your test.   If you are diabetic and take oral hypoglycemics, do not take them on the day of your test.  The day before your exam, drink extra fluids at least six 8-ounce glasses (unless your doctor wants you to limit your fluids).  Stop all caffeine 12 hours before the test. This includes coffee, tea, soda, chocolate and certain medicines (such as Anacin, Excedrin and NoDoz). Also avoid decaf coffee and tea, as these contain small amounts of caffeine.  Do not eat or drink for 3 hours before your exam. You may drink water and take your morning medicines.  You may need a blood test (creatinine test) within 30 days of your exam. Follow your doctor s orders if this  is arranged before your exam.  If you are very claustrophobic, please let you doctor know. You may get a sedative medicine from your doctor before you arrive. Bring the medicine to the exam. Do not take it at home. Arrive one hour early. Bring someone who can take you home after the test. Your medicine will make you sleepy. After the exam, you may not drive, take a bus or take a taxi by yourself.  The MRI machine uses a strong magnet. Please wear clothes without metal (snaps, zippers). A sweatsuit works well, or we may give you a hospital gown.  Please remove any body piercings and hair extensions before you arrive. You will remove watches, jewelry, hairpins, wallets, dentures, partial dental plates and hearing aids. You may wear contact lenses, and you may be able to wear your rings. We have a safe place to keep your personal items, but it is safer to leave them at home.   **IMPORTANT** THE INSTRUCTIONS BELOW ARE ONLY FOR THOSE PATIENTS WHO HAVE BEEN TOLD THEY WILL RECEIVE SEDATION OR GENERAL ANESTHESIA DURING THEIR MRI PROCEDURE:  IF YOU WILL RECEIVE SEDATION (take medicine to help you relax during your exam):   You must get the medicine from your doctor before you arrive. Bring the medicine to the exam. Do not take it at home.   Arrive one hour early. Bring someone who can take you home after the test. Your medicine will make you sleepy. After the exam, you may not drive, take a bus or take a taxi by yourself.   No eating 8 hours before your exam. You may have clear liquids up until 4 hours before your exam. (Clear liquids include water, clear tea, black coffee and fruit juice without pulp.)  IF YOU WILL RECEIVE ANESTHESIA (be asleep for your exam):   Arrive 1 1/2 hours early. Bring someone who can take you home after the test. You may not drive, take a bus or take a taxi by yourself.   No eating 8 hours before your exam. You may have clear liquids up until 4 hours before your exam. (Clear liquids include water,  clear tea, black coffee and fruit juice without pulp.)  If you have any questions, please contact your Imaging Department exam site.            Nov 02, 2017  1:00 PM CDT   Ep 90 Minute with SHCVR4   [facility] ([facility] [place])    [address]  [place] [address] [phone]            Nov 10, 2017  1:30 PM CST   [facility] EP RETURN with Lala Le PA-C   [facility] [place] ([facility])    [address] [facility] [address]  [place] [address] [phone]              Future tests that were ordered for you today     Open Future Orders        Priority Expected Expires Ordered    EP Ablation Procedures Routine 11/3/2017 10/27/2018 10/27/2017    MRI Cardiac w/contrast and stress Routine 10/28/2017 10/27/2018 10/27/2017            Who to contact     If you have questions or need follow up information about today's clinic visit or your schedule please contact [facility] [place] directly at [phone].  Normal or non-critical lab and imaging results will be communicated to you by [employer], letter or phone within 4 business days after the clinic has received the results. If you do not hear from us within 7 days, please contact the clinic through [employer] or phone. If you have a critical or abnormal lab result, we will notify you by phone as soon as possible.  Submit refill requests through [employer] or call your pharmacy and they will forward the refill request to us. Please allow 3 business days for your refill to be completed.          Additional Information About Your Visit        [employer] Information     [employer] gives you secure access to your electronic health record. If you see a primary care provider, you can also send messages to your care team and make appointments. If you have questions, please call your primary care clinic.  If you do not have a primary care provider, please  "call [phone] and they will assist you.        Care EveryWhere ID     This is your Care EveryWhere ID. This could be used by other organizations to access your [facility] medical records  [id number]        Your Vitals Were     Pulse Height Last Period BMI (Body Mass Index)          72 1.74 m (5' 8.5\") 12/31/2012 36.56 kg/m2         Blood Pressure from Last 3 Encounters:   10/27/17 124/70   08/16/17 112/84   09/29/16 135/81    Weight from Last 3 Encounters:   10/27/17 110.7 kg (244 lb)   08/16/17 111.1 kg (245 lb)   06/24/16 109.5 kg (241 lb 4.8 oz)              We Performed the Following     EKG 12-lead complete w/read - Clinics (performed today)        Primary Care Provider Office Phone # Fax #    Alan Almeida [phone]       [address] [place] [address]        Equal Access to Services     JOAQUINA SHIRLEY : Hadii jermaine waters hadasho Soomaali, waaxda luqadaha, qaybta kaalmada adeegyada, hardeep myles . So [facility] [phone].    ATENCIÓN: Si habla español, tiene a reyes disposición servicios gratuitos de asistencia lingüística. Llame al [phone].    We comply with applicable federal civil rights laws and [place] laws. We do not discriminate on the basis of race, color, national origin, age, disability, sex, sexual orientation, or gender identity.            Thank you!     Thank you for choosing [facility] [place]  for your care. Our goal is always to provide you with excellent care. Hearing back from our patients is one way we can continue to improve our services. Please take a few minutes to complete the written survey that you may receive in the mail after your visit with us. Thank you!             Your Updated Medication List - Protect others around you: Learn how to safely use, store and throw away your medicines at www.disposemymeds.org.          This list is accurate as of: 10/27/17  8:24 AM.  Always use your most " recent med list.                   Brand Name Dispense Instructions for use Diagnosis    metoprolol 50 MG tablet    LOPRESSOR    180 tablet    Take 1 tablet (50 mg) by mouth 2 times daily    Essential hypertension, benign       Potassium Chloride CR 8 MEQ Cpcr     60 capsule    Take 1 tablet by mouth daily as needed For leg cramps    Hypokalemia       triamterene-hydrochlorothiazide 37.5-25 MG per tablet    MAXZIDE-25    90 tablet    TAKE 1 TABLET BY MOUTH EVERY MORNING.    Essential hypertension, benign       warfarin 2.5 MG tablet    COUMADIN    90 tablet    Take 1 tablet (2.5 mg) by mouth daily    Ventricular bigeminy, Long-term (current) use of anticoagulants, Paroxysmal atrial fibrillation (H)

## 2017-10-27 NOTE — PROGRESS NOTES
HPI and Plan:   See dictation  [id number]  Orders Placed This Encounter   Procedures     MRI Cardiac w/contrast and stress     EKG 12-lead complete w/read - Clinics (performed today)     EP Ablation Procedures       No orders of the defined types were placed in this encounter.      Medications Discontinued During This Encounter   Medication Reason     Cyanocobalamin (B-12) 1000 MCG TBCR Stopped by Patient         Encounter Diagnoses   Name Primary?     Paroxysmal a-fib (H) Yes     Chest pain, unspecified type        CURRENT MEDICATIONS:  Current Outpatient Prescriptions   Medication Sig Dispense Refill     warfarin (COUMADIN) 2.5 MG tablet Take 1 tablet (2.5 mg) by mouth daily 90 tablet 1     triamterene-hydrochlorothiazide (MAXZIDE-25) 37.5-25 MG per tablet TAKE 1 TABLET BY MOUTH EVERY MORNING. 90 tablet 3     metoprolol (LOPRESSOR) 50 MG tablet Take 1 tablet (50 mg) by mouth 2 times daily 180 tablet 2     Potassium Chloride CR 8 MEQ CPCR Take 1 tablet by mouth daily as needed For leg cramps 60 capsule 5       ALLERGIES     Allergies   Allergen Reactions     Celebrex [Celecoxib] Hives     Hands itching then hives     Ibuprofen Swelling     Face swelling       PAST MEDICAL HISTORY:  Past Medical History:   Diagnosis Date     Arthritis      Atrial fibrillation (H)      Chest pain, unspecified     neg stress test, put on PPI- no help     Essential hypertension, benign     Hypertension, Benign     Irregular heart beat     afib      Obesity (BMI 30-39.9) 7/1/13    BMI 33.6     Superficial thrombophlebitis      Thrombosis of leg     superficial not dvt's     Ventricular bigeminy     bigeminy- nrml LV fxn       PAST SURGICAL HISTORY:  Past Surgical History:   Procedure Laterality Date     ABDOMEN SURGERY       ARTHROPLASTY KNEE Right 3/2/2015    Procedure: ARTHROPLASTY KNEE;  Surgeon: Cuco Baugh MD;  Location:  OR     C NONSPECIFIC PROCEDURE      left hand      CARDIAC SURGERY  1/15/13    cardiac ablation  "    CL AFF SURGICAL PATHOLOGY  1976    HGSIL     ORTHOPEDIC SURGERY      l tka  and wrist      TUBAL LIGATION      Uterine fibroids removed       FAMILY HISTORY:  Family History   Problem Relation Age of Onset     C.A.ROBBI. Paternal Grandmother      Hypertension Paternal Grandmother      C.A.D. Paternal Grandfather      Connective Tissue Disorder Paternal Grandfather      Hypertension Paternal Grandfather      C.A.D. Father      Hypertension Father      CANCER Maternal Grandmother      uterine     Colon Cancer Maternal Grandmother      CANCER Maternal Grandfather      leukemia     Other Cancer Maternal Grandfather      CANCER Mother      uterine     GASTROINTESTINAL DISEASE Mother      ulcers     Hypertension Mother      Other - See Comments Sister      essential tremors     C.A.ROBBI. Brother      stent- 47     Hypertension Brother      Depression Daughter      Eye Disorder Son      Hypertension Brother      Depression Daughter      Asthma Son        SOCIAL HISTORY:  Social History     Social History     Marital status:      Spouse name: N/A     Number of children: N/A     Years of education: N/A     Social History Main Topics     Smoking status: Never Smoker     Smokeless tobacco: Never Used     Alcohol use Yes      Comment: 1-2 per month     Drug use: No     Sexual activity: Yes     Partners: Male     Birth control/ protection: None     Other Topics Concern     Parent/Sibling W/ Cabg, Mi Or Angioplasty Before 65f 55m? Yes     Social History Narrative       Review of Systems:  Skin:  Negative       Eyes:  Negative      ENT:  Negative      Respiratory:  Negative       Cardiovascular:    Positive for afib  Gastroenterology: Negative      Genitourinary:  Negative      Musculoskeletal:  Negative      Neurologic:  Negative      Psychiatric:  Negative      Heme/Lymph/Imm:  Negative      Endocrine:  Negative        Physical Exam:  Vitals: /70  Pulse 72  Ht 1.74 m (5' 8.5\")  Wt 110.7 kg (244 lb)  LMP 12/31/2012 "  BMI 36.56 kg/m2    Constitutional:  cooperative, alert and oriented, well developed, well nourished, in no acute distress overweight      Skin:  warm and dry to the touch, no apparent skin lesions or masses noted        Head:  normocephalic, no masses or lesions        Eyes:  pupils equal and round, conjunctivae and lids unremarkable, sclera white, no xanthalasma, EOMS intact, no nystagmus        ENT:  no pallor or cyanosis, dentition good        Neck:  carotid pulses are full and equal bilaterally, JVP normal, no carotid bruit, no thyromegaly        Chest:  normal breath sounds, clear to auscultation, normal A-P diameter, normal symmetry, normal respiratory excursion, no use of accessory muscles          Cardiac: regular rhythm, normal S1/S2, no S3 or S4, apical impulse not displaced, no murmurs, gallops or rubs     no presence of murmur            Abdomen:  abdomen soft, non-tender, BS normoactive, no mass, no HSM, no bruits        Vascular: pulses full and equal, no bruits auscultated                                        Extremities and Back:  no deformities, clubbing, cyanosis, erythema observed              Neurological:  affect appropriate, oriented to time, person and place              CC  Alan Almeida MD  [address] [place] [phone]

## 2017-11-01 ENCOUNTER — TELEPHONE (OUTPATIENT)
Dept: NURSING | Facility: CLINIC | Age: 62
End: 2017-11-01

## 2017-11-01 ENCOUNTER — HOSPITAL ENCOUNTER (OUTPATIENT)
Dept: CARDIOLOGY | Facility: CLINIC | Age: 62
Discharge: HOME | End: 2017-11-01
Attending: INTERNAL MEDICINE | Admitting: INTERNAL MEDICINE
Payer: MEDICARE

## 2017-11-01 ENCOUNTER — ANTICOAGULATION THERAPY VISIT (OUTPATIENT)
Dept: NURSING | Facility: CLINIC | Age: 62
End: 2017-11-01
Payer: MEDICARE

## 2017-11-01 ENCOUNTER — TELEPHONE (OUTPATIENT)
Dept: CARDIOLOGY | Facility: CLINIC | Age: 62
End: 2017-11-01

## 2017-11-01 VITALS — DIASTOLIC BLOOD PRESSURE: 57 MMHG | SYSTOLIC BLOOD PRESSURE: 110 MMHG | OXYGEN SATURATION: 97 % | HEART RATE: 77 BPM

## 2017-11-01 DIAGNOSIS — I48.91 ATRIAL FIBRILLATION (H): ICD-10-CM

## 2017-11-01 DIAGNOSIS — Z79.01 LONG-TERM (CURRENT) USE OF ANTICOAGULANTS: ICD-10-CM

## 2017-11-01 DIAGNOSIS — Z79.01 LONG-TERM (CURRENT) USE OF ANTICOAGULANTS: Primary | ICD-10-CM

## 2017-11-01 DIAGNOSIS — I49.8 VENTRICULAR BIGEMINY: ICD-10-CM

## 2017-11-01 LAB
CREAT BLD-MCNC: 0.9 MG/DL (ref 0.52–1.04)
GFR SERPL CREATININE-BSD FRML MDRD: 64 ML/MIN/1.7M2
INR PPP: 1.6 (ref 0.86–1.14)

## 2017-11-01 PROCEDURE — 75563 CARD MRI W/STRESS IMG & DYE: CPT

## 2017-11-01 PROCEDURE — A9585 GADOBUTROL INJECTION: HCPCS | Performed by: INTERNAL MEDICINE

## 2017-11-01 PROCEDURE — 99207 ZZC NO CHARGE NURSE ONLY: CPT | Performed by: FAMILY MEDICINE

## 2017-11-01 PROCEDURE — 75563 CARD MRI W/STRESS IMG & DYE: CPT | Mod: 26 | Performed by: INTERNAL MEDICINE

## 2017-11-01 PROCEDURE — 25000128 H RX IP 250 OP 636: Performed by: INTERNAL MEDICINE

## 2017-11-01 PROCEDURE — 71555 MRI ANGIO CHEST W OR W/O DYE: CPT | Mod: 26 | Performed by: INTERNAL MEDICINE

## 2017-11-01 PROCEDURE — 93016 CV STRESS TEST SUPVJ ONLY: CPT | Performed by: INTERNAL MEDICINE

## 2017-11-01 PROCEDURE — 36416 COLLJ CAPILLARY BLOOD SPEC: CPT | Performed by: FAMILY MEDICINE

## 2017-11-01 PROCEDURE — 82565 ASSAY OF CREATININE: CPT

## 2017-11-01 PROCEDURE — 93018 CV STRESS TEST I&R ONLY: CPT | Performed by: INTERNAL MEDICINE

## 2017-11-01 PROCEDURE — 93017 CV STRESS TEST TRACING ONLY: CPT

## 2017-11-01 PROCEDURE — 85610 PROTHROMBIN TIME: CPT | Performed by: FAMILY MEDICINE

## 2017-11-01 RX ORDER — ALBUTEROL SULFATE 90 UG/1
2 AEROSOL, METERED RESPIRATORY (INHALATION) EVERY 5 MIN PRN
Status: DISCONTINUED | OUTPATIENT
Start: 2017-11-01 | End: 2017-11-02 | Stop reason: HOSPADM

## 2017-11-01 RX ORDER — SODIUM CHLORIDE 450 MG/100ML
INJECTION, SOLUTION INTRAVENOUS CONTINUOUS
Status: CANCELLED | OUTPATIENT
Start: 2017-11-01

## 2017-11-01 RX ORDER — ACYCLOVIR 200 MG/1
0-1 CAPSULE ORAL
Status: DISCONTINUED | OUTPATIENT
Start: 2017-11-01 | End: 2017-11-02 | Stop reason: HOSPADM

## 2017-11-01 RX ORDER — GADOBUTROL 604.72 MG/ML
5-65 INJECTION INTRAVENOUS ONCE
Status: COMPLETED | OUTPATIENT
Start: 2017-11-01 | End: 2017-11-01

## 2017-11-01 RX ORDER — LIDOCAINE 40 MG/G
CREAM TOPICAL
Status: CANCELLED | OUTPATIENT
Start: 2017-11-01

## 2017-11-01 RX ORDER — AMINOPHYLLINE 25 MG/ML
50-100 INJECTION, SOLUTION INTRAVENOUS
Status: COMPLETED | OUTPATIENT
Start: 2017-11-01 | End: 2017-11-01

## 2017-11-01 RX ORDER — REGADENOSON 0.08 MG/ML
0.4 INJECTION, SOLUTION INTRAVENOUS ONCE
Status: COMPLETED | OUTPATIENT
Start: 2017-11-01 | End: 2017-11-01

## 2017-11-01 RX ADMIN — GADOBUTROL 36 ML: 604.72 INJECTION INTRAVENOUS at 12:51

## 2017-11-01 RX ADMIN — AMINOPHYLLINE 100 MG: 25 INJECTION, SOLUTION INTRAVENOUS at 12:15

## 2017-11-01 RX ADMIN — REGADENOSON 0.4 MG: 0.08 INJECTION, SOLUTION INTRAVENOUS at 12:12

## 2017-11-01 NOTE — TELEPHONE ENCOUNTER
Spoke to patient to review instructions for afib ablation scheduled for 11/2 .  Patient aware that she is to be NPO after midnight and may take sips of water with am medications. Instructed patient to hold maxiide.  Patient to arrive at 11:00am.   Patient aware that she will be staying overnight after procedure.  SHAKA Miller

## 2017-11-01 NOTE — TELEPHONE ENCOUNTER
Message left with patient to review and answer any question regarding afib ablation procedure scheduled for 11/2/17.  Awaiting return call.  SHAKA Miller

## 2017-11-01 NOTE — MR AVS SNAPSHOT
Amparo Chan Shazia   11/1/2017   Anticoagulation Therapy Visit    Description:  61 year old female   Provider:  Alan Almeida MD   Department:   Nurse           INR as of 11/1/2017     Today's INR 1.60!      Anticoagulation Summary as of 11/1/2017     INR goal 2.0-3.0   Today's INR 1.60!   Full instructions 11/1: 3.75 mg; Otherwise 1.25 mg on Mon, Wed, Fri; 2.5 mg all other days   Next INR check 11/16/2017    Indications   Long-term (current) use of anticoagulants [Z79.01] [Z79.01]  Atrial fibrillation (H) [I48.91]  Ventricular bigeminy [I49.9]         Description     Patient having ablation 11/2/2017.  Unexplained reason for low INR.      Contact Numbers     Clinic Number:         November 2017 Details    Sun Mon Tue Wed Thu Fri Sat        1      3.75 mg   See details      2      2.5 mg         3      1.25 mg         4      2.5 mg           5      2.5 mg         6      1.25 mg         7      2.5 mg         8      1.25 mg         9      2.5 mg         10      1.25 mg         11      2.5 mg           12      2.5 mg         13      1.25 mg         14      2.5 mg         15      1.25 mg         16            17               18                 19               20               21               22               23               24               25                 26               27               28               29               30                  Date Details   11/01 This INR check       Date of next INR:  11/16/2017         How to take your warfarin dose     To take:  1.25 mg Take 0.5 of a 2.5 mg tablet.    To take:  2.5 mg Take 1 of the 2.5 mg tablets.    To take:  3.75 mg Take 1.5 of the 2.5 mg tablets.

## 2017-11-02 ENCOUNTER — ANESTHESIA (OUTPATIENT)
Dept: SURGERY | Facility: CLINIC | Age: 62
End: 2017-11-02
Payer: MEDICARE

## 2017-11-02 ENCOUNTER — SURGERY (OUTPATIENT)
Age: 62
End: 2017-11-02

## 2017-11-02 ENCOUNTER — APPOINTMENT (OUTPATIENT)
Dept: CARDIOLOGY | Facility: CLINIC | Age: 62
End: 2017-11-02
Attending: INTERNAL MEDICINE
Payer: MEDICARE

## 2017-11-02 ENCOUNTER — HOSPITAL ENCOUNTER (OUTPATIENT)
Facility: CLINIC | Age: 62
Discharge: HOME | End: 2017-11-03
Attending: INTERNAL MEDICINE | Admitting: INTERNAL MEDICINE
Payer: MEDICARE

## 2017-11-02 ENCOUNTER — ANESTHESIA EVENT (OUTPATIENT)
Dept: SURGERY | Facility: CLINIC | Age: 62
End: 2017-11-02
Payer: MEDICARE

## 2017-11-02 DIAGNOSIS — I48.0 PAROXYSMAL A-FIB (H): ICD-10-CM

## 2017-11-02 LAB
ANION GAP SERPL CALCULATED.3IONS-SCNC: 10 MMOL/L (ref 3–14)
BUN SERPL-MCNC: 12 MG/DL (ref 7–30)
CALCIUM SERPL-MCNC: 9 MG/DL (ref 8.5–10.1)
CHLORIDE SERPL-SCNC: 100 MMOL/L (ref 94–109)
CO2 SERPL-SCNC: 28 MMOL/L (ref 20–32)
CREAT SERPL-MCNC: 0.87 MG/DL (ref 0.52–1.04)
ERYTHROCYTE [DISTWIDTH] IN BLOOD BY AUTOMATED COUNT: 13.1 % (ref 10–15)
GFR SERPL CREATININE-BSD FRML MDRD: 67 ML/MIN/1.7M2
GLUCOSE SERPL-MCNC: 86 MG/DL (ref 70–99)
HCT VFR BLD AUTO: 39.3 % (ref 35–47)
HGB BLD-MCNC: 13.7 G/DL (ref 11.7–15.7)
INR PPP: 1.55 (ref 0.86–1.14)
KCT BLD-ACNC: 149 SEC (ref 105–167)
KCT BLD-ACNC: 303 SEC (ref 105–167)
KCT BLD-ACNC: 316 SEC (ref 105–167)
MCH RBC QN AUTO: 30.8 PG (ref 26.5–33)
MCHC RBC AUTO-ENTMCNC: 34.9 G/DL (ref 31.5–36.5)
MCV RBC AUTO: 88 FL (ref 78–100)
PLATELET # BLD AUTO: 222 10E9/L (ref 150–450)
POTASSIUM SERPL-SCNC: 3.7 MMOL/L (ref 3.4–5.3)
RBC # BLD AUTO: 4.45 10E12/L (ref 3.8–5.2)
SODIUM SERPL-SCNC: 138 MMOL/L (ref 133–144)
WBC # BLD AUTO: 4.9 10E9/L (ref 4–11)

## 2017-11-02 PROCEDURE — 93656 COMPRE EP EVAL ABLTJ ATR FIB: CPT

## 2017-11-02 PROCEDURE — 93655 ICAR CATH ABLTJ DSCRT ARRHYT: CPT | Performed by: INTERNAL MEDICINE

## 2017-11-02 PROCEDURE — 02583ZZ DESTRUCTION OF CONDUCTION MECHANISM, PERCUTANEOUS APPROACH: ICD-10-PCS | Performed by: INTERNAL MEDICINE

## 2017-11-02 PROCEDURE — 36415 COLL VENOUS BLD VENIPUNCTURE: CPT | Performed by: INTERNAL MEDICINE

## 2017-11-02 PROCEDURE — 93656 COMPRE EP EVAL ABLTJ ATR FIB: CPT | Performed by: INTERNAL MEDICINE

## 2017-11-02 PROCEDURE — 40000936 ZZH STATISTIC OUTPATIENT (NON-OBS) NIGHT

## 2017-11-02 PROCEDURE — 93623 PRGRMD STIMJ&PACG IV RX NFS: CPT

## 2017-11-02 PROCEDURE — 02K83ZZ MAP CONDUCTION MECHANISM, PERCUTANEOUS APPROACH: ICD-10-PCS | Performed by: INTERNAL MEDICINE

## 2017-11-02 PROCEDURE — 93662 INTRACARDIAC ECG (ICE): CPT | Mod: 26 | Performed by: INTERNAL MEDICINE

## 2017-11-02 PROCEDURE — 40000065 ZZH STATISTIC EKG NON-CHARGEABLE

## 2017-11-02 PROCEDURE — 25000125 ZZHC RX 250: Performed by: NURSE ANESTHETIST, CERTIFIED REGISTERED

## 2017-11-02 PROCEDURE — 40000852 ZZH STATISTIC HEART CATH LAB OR EP LAB

## 2017-11-02 PROCEDURE — 93655 ICAR CATH ABLTJ DSCRT ARRHYT: CPT

## 2017-11-02 PROCEDURE — 40000935 ZZH STATISTIC OUTPATIENT (NON-OBS) EVE

## 2017-11-02 PROCEDURE — 27211287 ZZ H NEEDLE BRK TRANSEPTAL CR10

## 2017-11-02 PROCEDURE — C1732 CATH, EP, DIAG/ABL, 3D/VECT: HCPCS

## 2017-11-02 PROCEDURE — 25000128 H RX IP 250 OP 636: Performed by: NURSE ANESTHETIST, CERTIFIED REGISTERED

## 2017-11-02 PROCEDURE — 25000128 H RX IP 250 OP 636: Performed by: INTERNAL MEDICINE

## 2017-11-02 PROCEDURE — 4A023FZ MEASUREMENT OF CARDIAC RHYTHM, PERCUTANEOUS APPROACH: ICD-10-PCS | Performed by: INTERNAL MEDICINE

## 2017-11-02 PROCEDURE — 25000566 ZZH SEVOFLURANE, EA 15 MIN

## 2017-11-02 PROCEDURE — 71000012 ZZH RECOVERY PHASE 1 LEVEL 1 FIRST HR

## 2017-11-02 PROCEDURE — 25000132 ZZH RX MED GY IP 250 OP 250 PS 637: Mod: GY | Performed by: INTERNAL MEDICINE

## 2017-11-02 PROCEDURE — C1759 CATH, INTRA ECHOCARDIOGRAPHY: HCPCS

## 2017-11-02 PROCEDURE — 80048 BASIC METABOLIC PNL TOTAL CA: CPT | Performed by: INTERNAL MEDICINE

## 2017-11-02 PROCEDURE — 93005 ELECTROCARDIOGRAM TRACING: CPT

## 2017-11-02 PROCEDURE — 93613 INTRACARDIAC EPHYS 3D MAPG: CPT

## 2017-11-02 PROCEDURE — 93010 ELECTROCARDIOGRAM REPORT: CPT | Performed by: INTERNAL MEDICINE

## 2017-11-02 PROCEDURE — B246ZZZ ULTRASONOGRAPHY OF RIGHT AND LEFT HEART: ICD-10-PCS | Performed by: INTERNAL MEDICINE

## 2017-11-02 PROCEDURE — 85610 PROTHROMBIN TIME: CPT | Performed by: INTERNAL MEDICINE

## 2017-11-02 PROCEDURE — C1893 INTRO/SHEATH, FIXED,NON-PEEL: HCPCS

## 2017-11-02 PROCEDURE — 27210782 ZZH KIT EP TOTES DISP CR7

## 2017-11-02 PROCEDURE — 93613 INTRACARDIAC EPHYS 3D MAPG: CPT | Performed by: INTERNAL MEDICINE

## 2017-11-02 PROCEDURE — 3E033KZ INTRODUCTION OF OTHER DIAGNOSTIC SUBSTANCE INTO PERIPHERAL VEIN, PERCUTANEOUS APPROACH: ICD-10-PCS | Performed by: INTERNAL MEDICINE

## 2017-11-02 PROCEDURE — 27210795 ZZH PAD DEFIB QUICK CR4

## 2017-11-02 PROCEDURE — 27210964 ZZH ACCESS EP PROC CR8

## 2017-11-02 PROCEDURE — 93662 INTRACARDIAC ECG (ICE): CPT

## 2017-11-02 PROCEDURE — 27210796 ZZH PAD EXTRNAL REFRENCE CARDIAC MAPPING CR14

## 2017-11-02 PROCEDURE — A9270 NON-COVERED ITEM OR SERVICE: HCPCS | Mod: GY | Performed by: INTERNAL MEDICINE

## 2017-11-02 PROCEDURE — 37000008 ZZH ANESTHESIA TECHNICAL FEE, 1ST 30 MIN

## 2017-11-02 PROCEDURE — 85027 COMPLETE CBC AUTOMATED: CPT | Performed by: INTERNAL MEDICINE

## 2017-11-02 PROCEDURE — 40000235 ZZH STATISTIC TELEMETRY

## 2017-11-02 PROCEDURE — 27210995 ZZH RX 272: Performed by: INTERNAL MEDICINE

## 2017-11-02 PROCEDURE — 25000125 ZZHC RX 250: Performed by: INTERNAL MEDICINE

## 2017-11-02 PROCEDURE — 85347 COAGULATION TIME ACTIVATED: CPT | Mod: 91

## 2017-11-02 PROCEDURE — 37000009 ZZH ANESTHESIA TECHNICAL FEE, EACH ADDTL 15 MIN

## 2017-11-02 RX ORDER — NEOSTIGMINE METHYLSULFATE 1 MG/ML
VIAL (ML) INJECTION PRN
Status: DISCONTINUED | OUTPATIENT
Start: 2017-11-02 | End: 2017-11-02

## 2017-11-02 RX ORDER — IBUTILIDE FUMARATE 1 MG/10ML
1 INJECTION, SOLUTION INTRAVENOUS
Status: DISCONTINUED | OUTPATIENT
Start: 2017-11-02 | End: 2017-11-02 | Stop reason: HOSPADM

## 2017-11-02 RX ORDER — KETOROLAC TROMETHAMINE 30 MG/ML
15-30 INJECTION, SOLUTION INTRAMUSCULAR; INTRAVENOUS
Status: DISCONTINUED | OUTPATIENT
Start: 2017-11-02 | End: 2017-11-02 | Stop reason: HOSPADM

## 2017-11-02 RX ORDER — FENTANYL CITRATE 50 UG/ML
25-50 INJECTION, SOLUTION INTRAMUSCULAR; INTRAVENOUS
Status: DISCONTINUED | OUTPATIENT
Start: 2017-11-02 | End: 2017-11-02 | Stop reason: HOSPADM

## 2017-11-02 RX ORDER — DOBUTAMINE HYDROCHLORIDE 200 MG/100ML
5-40 INJECTION INTRAVENOUS CONTINUOUS PRN
Status: DISCONTINUED | OUTPATIENT
Start: 2017-11-02 | End: 2017-11-02 | Stop reason: HOSPADM

## 2017-11-02 RX ORDER — ATROPINE SULFATE 0.1 MG/ML
.5-1 INJECTION INTRAVENOUS
Status: DISCONTINUED | OUTPATIENT
Start: 2017-11-02 | End: 2017-11-02 | Stop reason: HOSPADM

## 2017-11-02 RX ORDER — ONDANSETRON 2 MG/ML
4 INJECTION INTRAMUSCULAR; INTRAVENOUS EVERY 4 HOURS PRN
Status: DISCONTINUED | OUTPATIENT
Start: 2017-11-02 | End: 2017-11-02 | Stop reason: HOSPADM

## 2017-11-02 RX ORDER — TRIAMTERENE/HYDROCHLOROTHIAZID 37.5-25 MG
1 TABLET ORAL DAILY
Status: DISCONTINUED | OUTPATIENT
Start: 2017-11-03 | End: 2017-11-03 | Stop reason: HOSPADM

## 2017-11-02 RX ORDER — NALOXONE HYDROCHLORIDE 0.4 MG/ML
.1-.4 INJECTION, SOLUTION INTRAMUSCULAR; INTRAVENOUS; SUBCUTANEOUS
Status: DISCONTINUED | OUTPATIENT
Start: 2017-11-02 | End: 2017-11-03 | Stop reason: HOSPADM

## 2017-11-02 RX ORDER — ONDANSETRON 2 MG/ML
4 INJECTION INTRAMUSCULAR; INTRAVENOUS EVERY 30 MIN PRN
Status: DISCONTINUED | OUTPATIENT
Start: 2017-11-02 | End: 2017-11-02 | Stop reason: HOSPADM

## 2017-11-02 RX ORDER — ADENOSINE 3 MG/ML
6-12 INJECTION, SOLUTION INTRAVENOUS EVERY 5 MIN PRN
Status: DISCONTINUED | OUTPATIENT
Start: 2017-11-02 | End: 2017-11-02 | Stop reason: HOSPADM

## 2017-11-02 RX ORDER — DEXAMETHASONE SODIUM PHOSPHATE 4 MG/ML
INJECTION, SOLUTION INTRA-ARTICULAR; INTRALESIONAL; INTRAMUSCULAR; INTRAVENOUS; SOFT TISSUE PRN
Status: DISCONTINUED | OUTPATIENT
Start: 2017-11-02 | End: 2017-11-02

## 2017-11-02 RX ORDER — LIDOCAINE HYDROCHLORIDE 20 MG/ML
INJECTION, SOLUTION INFILTRATION; PERINEURAL PRN
Status: DISCONTINUED | OUTPATIENT
Start: 2017-11-02 | End: 2017-11-02

## 2017-11-02 RX ORDER — FLUMAZENIL 0.1 MG/ML
0.2 INJECTION, SOLUTION INTRAVENOUS
Status: DISCONTINUED | OUTPATIENT
Start: 2017-11-02 | End: 2017-11-02 | Stop reason: HOSPADM

## 2017-11-02 RX ORDER — NALOXONE HYDROCHLORIDE 0.4 MG/ML
0.4 INJECTION, SOLUTION INTRAMUSCULAR; INTRAVENOUS; SUBCUTANEOUS EVERY 5 MIN PRN
Status: DISCONTINUED | OUTPATIENT
Start: 2017-11-02 | End: 2017-11-02 | Stop reason: HOSPADM

## 2017-11-02 RX ORDER — PROPOFOL 10 MG/ML
INJECTION, EMULSION INTRAVENOUS PRN
Status: DISCONTINUED | OUTPATIENT
Start: 2017-11-02 | End: 2017-11-02

## 2017-11-02 RX ORDER — ONDANSETRON 4 MG/1
4 TABLET, ORALLY DISINTEGRATING ORAL EVERY 30 MIN PRN
Status: DISCONTINUED | OUTPATIENT
Start: 2017-11-02 | End: 2017-11-02 | Stop reason: HOSPADM

## 2017-11-02 RX ORDER — FENTANYL CITRATE 50 UG/ML
50-100 INJECTION, SOLUTION INTRAMUSCULAR; INTRAVENOUS
Status: DISCONTINUED | OUTPATIENT
Start: 2017-11-02 | End: 2017-11-02

## 2017-11-02 RX ORDER — PROTAMINE SULFATE 10 MG/ML
5-40 INJECTION, SOLUTION INTRAVENOUS EVERY 10 MIN PRN
Status: DISCONTINUED | OUTPATIENT
Start: 2017-11-02 | End: 2017-11-02 | Stop reason: HOSPADM

## 2017-11-02 RX ORDER — HEPARIN SODIUM 1000 [USP'U]/ML
1000-10000 INJECTION, SOLUTION INTRAVENOUS; SUBCUTANEOUS EVERY 5 MIN PRN
Status: DISCONTINUED | OUTPATIENT
Start: 2017-11-02 | End: 2017-11-02 | Stop reason: HOSPADM

## 2017-11-02 RX ORDER — FUROSEMIDE 10 MG/ML
20-100 INJECTION INTRAMUSCULAR; INTRAVENOUS
Status: DISCONTINUED | OUTPATIENT
Start: 2017-11-02 | End: 2017-11-02 | Stop reason: HOSPADM

## 2017-11-02 RX ORDER — PROMETHAZINE HYDROCHLORIDE 25 MG/ML
6.25-25 INJECTION, SOLUTION INTRAMUSCULAR; INTRAVENOUS EVERY 4 HOURS PRN
Status: DISCONTINUED | OUTPATIENT
Start: 2017-11-02 | End: 2017-11-02 | Stop reason: HOSPADM

## 2017-11-02 RX ORDER — ACETAMINOPHEN 325 MG/1
650 TABLET ORAL EVERY 4 HOURS PRN
Status: DISCONTINUED | OUTPATIENT
Start: 2017-11-02 | End: 2017-11-03 | Stop reason: HOSPADM

## 2017-11-02 RX ORDER — ONDANSETRON 2 MG/ML
INJECTION INTRAMUSCULAR; INTRAVENOUS PRN
Status: DISCONTINUED | OUTPATIENT
Start: 2017-11-02 | End: 2017-11-02

## 2017-11-02 RX ORDER — LORAZEPAM 2 MG/ML
.5-2 INJECTION INTRAMUSCULAR EVERY 10 MIN PRN
Status: DISCONTINUED | OUTPATIENT
Start: 2017-11-02 | End: 2017-11-02 | Stop reason: HOSPADM

## 2017-11-02 RX ORDER — HYDROMORPHONE HYDROCHLORIDE 1 MG/ML
.3-.5 INJECTION, SOLUTION INTRAMUSCULAR; INTRAVENOUS; SUBCUTANEOUS EVERY 5 MIN PRN
Status: DISCONTINUED | OUTPATIENT
Start: 2017-11-02 | End: 2017-11-02 | Stop reason: HOSPADM

## 2017-11-02 RX ORDER — PROTAMINE SULFATE 10 MG/ML
1-5 INJECTION, SOLUTION INTRAVENOUS
Status: COMPLETED | OUTPATIENT
Start: 2017-11-02 | End: 2017-11-02

## 2017-11-02 RX ORDER — BUPIVACAINE HYDROCHLORIDE 2.5 MG/ML
10-30 INJECTION, SOLUTION EPIDURAL; INFILTRATION; INTRACAUDAL
Status: DISCONTINUED | OUTPATIENT
Start: 2017-11-02 | End: 2017-11-02 | Stop reason: HOSPADM

## 2017-11-02 RX ORDER — METOPROLOL TARTRATE 50 MG
50 TABLET ORAL 2 TIMES DAILY
Status: DISCONTINUED | OUTPATIENT
Start: 2017-11-02 | End: 2017-11-03 | Stop reason: HOSPADM

## 2017-11-02 RX ORDER — FENTANYL CITRATE 50 UG/ML
INJECTION, SOLUTION INTRAMUSCULAR; INTRAVENOUS PRN
Status: DISCONTINUED | OUTPATIENT
Start: 2017-11-02 | End: 2017-11-02

## 2017-11-02 RX ORDER — DIPHENHYDRAMINE HYDROCHLORIDE 50 MG/ML
25-50 INJECTION INTRAMUSCULAR; INTRAVENOUS
Status: DISCONTINUED | OUTPATIENT
Start: 2017-11-02 | End: 2017-11-02 | Stop reason: HOSPADM

## 2017-11-02 RX ORDER — LIDOCAINE HYDROCHLORIDE 10 MG/ML
10-30 INJECTION, SOLUTION EPIDURAL; INFILTRATION; INTRACAUDAL; PERINEURAL
Status: DISCONTINUED | OUTPATIENT
Start: 2017-11-02 | End: 2017-11-02 | Stop reason: HOSPADM

## 2017-11-02 RX ORDER — OXYCODONE AND ACETAMINOPHEN 5; 325 MG/1; MG/1
1 TABLET ORAL EVERY 4 HOURS PRN
Status: DISCONTINUED | OUTPATIENT
Start: 2017-11-02 | End: 2017-11-03 | Stop reason: HOSPADM

## 2017-11-02 RX ORDER — IBUTILIDE FUMARATE 1 MG/10ML
0.01 INJECTION, SOLUTION INTRAVENOUS
Status: DISCONTINUED | OUTPATIENT
Start: 2017-11-02 | End: 2017-11-02 | Stop reason: HOSPADM

## 2017-11-02 RX ORDER — SODIUM CHLORIDE 450 MG/100ML
INJECTION, SOLUTION INTRAVENOUS CONTINUOUS
Status: DISCONTINUED | OUTPATIENT
Start: 2017-11-02 | End: 2017-11-02 | Stop reason: HOSPADM

## 2017-11-02 RX ORDER — MORPHINE SULFATE 2 MG/ML
1-2 INJECTION, SOLUTION INTRAMUSCULAR; INTRAVENOUS EVERY 5 MIN PRN
Status: DISCONTINUED | OUTPATIENT
Start: 2017-11-02 | End: 2017-11-02 | Stop reason: HOSPADM

## 2017-11-02 RX ORDER — WARFARIN SODIUM 2.5 MG/1
2.5 TABLET ORAL DAILY
Status: DISCONTINUED | OUTPATIENT
Start: 2017-11-03 | End: 2017-11-03

## 2017-11-02 RX ORDER — SODIUM CHLORIDE, SODIUM LACTATE, POTASSIUM CHLORIDE, CALCIUM CHLORIDE 600; 310; 30; 20 MG/100ML; MG/100ML; MG/100ML; MG/100ML
INJECTION, SOLUTION INTRAVENOUS CONTINUOUS
Status: DISCONTINUED | OUTPATIENT
Start: 2017-11-02 | End: 2017-11-02 | Stop reason: HOSPADM

## 2017-11-02 RX ORDER — LIDOCAINE 40 MG/G
CREAM TOPICAL
Status: DISCONTINUED | OUTPATIENT
Start: 2017-11-02 | End: 2017-11-03 | Stop reason: HOSPADM

## 2017-11-02 RX ORDER — GLYCOPYRROLATE 0.2 MG/ML
INJECTION, SOLUTION INTRAMUSCULAR; INTRAVENOUS PRN
Status: DISCONTINUED | OUTPATIENT
Start: 2017-11-02 | End: 2017-11-02

## 2017-11-02 RX ORDER — LIDOCAINE 40 MG/G
CREAM TOPICAL
Status: DISCONTINUED | OUTPATIENT
Start: 2017-11-02 | End: 2017-11-02 | Stop reason: HOSPADM

## 2017-11-02 RX ORDER — LIDOCAINE HYDROCHLORIDE 10 MG/ML
10-30 INJECTION, SOLUTION EPIDURAL; INFILTRATION; INTRACAUDAL; PERINEURAL
Status: COMPLETED | OUTPATIENT
Start: 2017-11-02 | End: 2017-11-02

## 2017-11-02 RX ORDER — LIDOCAINE HYDROCHLORIDE AND EPINEPHRINE 10; 10 MG/ML; UG/ML
10-30 INJECTION, SOLUTION INFILTRATION; PERINEURAL
Status: DISCONTINUED | OUTPATIENT
Start: 2017-11-02 | End: 2017-11-02 | Stop reason: HOSPADM

## 2017-11-02 RX ADMIN — SODIUM CHLORIDE: 4.5 INJECTION, SOLUTION INTRAVENOUS at 11:15

## 2017-11-02 RX ADMIN — PROTAMINE SULFATE 70 MG: 10 INJECTION, SOLUTION INTRAVENOUS at 14:45

## 2017-11-02 RX ADMIN — HEPARIN SODIUM 200 UNITS: 200 INJECTION, SOLUTION INTRAVENOUS at 14:03

## 2017-11-02 RX ADMIN — PHENYLEPHRINE HYDROCHLORIDE 0.3 MCG/KG/MIN: 10 INJECTION, SOLUTION INTRAMUSCULAR; INTRAVENOUS; SUBCUTANEOUS at 13:18

## 2017-11-02 RX ADMIN — ROCURONIUM BROMIDE 50 MG: 10 INJECTION INTRAVENOUS at 13:10

## 2017-11-02 RX ADMIN — Medication 10 MCG/MIN: at 14:14

## 2017-11-02 RX ADMIN — NEOSTIGMINE METHYLSULFATE 5 MG: 1 INJECTION INTRAMUSCULAR; INTRAVENOUS; SUBCUTANEOUS at 15:08

## 2017-11-02 RX ADMIN — METOPROLOL TARTRATE 50 MG: 50 TABLET ORAL at 19:27

## 2017-11-02 RX ADMIN — MIDAZOLAM HYDROCHLORIDE 2 MG: 1 INJECTION, SOLUTION INTRAMUSCULAR; INTRAVENOUS at 13:04

## 2017-11-02 RX ADMIN — Medication 10000 UNITS: at 13:52

## 2017-11-02 RX ADMIN — ONDANSETRON 4 MG: 2 INJECTION INTRAMUSCULAR; INTRAVENOUS at 15:04

## 2017-11-02 RX ADMIN — FENTANYL CITRATE 100 MCG: 50 INJECTION, SOLUTION INTRAMUSCULAR; INTRAVENOUS at 13:10

## 2017-11-02 RX ADMIN — PROTAMINE SULFATE 5 MG: 10 INJECTION, SOLUTION INTRAVENOUS at 14:39

## 2017-11-02 RX ADMIN — DEXAMETHASONE SODIUM PHOSPHATE 4 MG: 4 INJECTION, SOLUTION INTRA-ARTICULAR; INTRALESIONAL; INTRAMUSCULAR; INTRAVENOUS; SOFT TISSUE at 13:20

## 2017-11-02 RX ADMIN — GLYCOPYRROLATE 0.8 MG: 0.2 INJECTION, SOLUTION INTRAMUSCULAR; INTRAVENOUS at 15:08

## 2017-11-02 RX ADMIN — Medication 4000 UNITS: at 14:02

## 2017-11-02 RX ADMIN — ACETAMINOPHEN 650 MG: 325 TABLET, FILM COATED ORAL at 22:24

## 2017-11-02 RX ADMIN — Medication 1 LOZENGE: at 22:42

## 2017-11-02 RX ADMIN — PROPOFOL 200 MG: 10 INJECTION, EMULSION INTRAVENOUS at 13:10

## 2017-11-02 RX ADMIN — LIDOCAINE HYDROCHLORIDE 100 MG: 20 INJECTION, SOLUTION INFILTRATION; PERINEURAL at 13:10

## 2017-11-02 RX ADMIN — LIDOCAINE HYDROCHLORIDE 200 MG: 10 INJECTION, SOLUTION EPIDURAL; INFILTRATION; INTRACAUDAL; PERINEURAL at 14:00

## 2017-11-02 ASSESSMENT — ENCOUNTER SYMPTOMS: DYSRHYTHMIAS: 1

## 2017-11-02 ASSESSMENT — COPD QUESTIONNAIRES: COPD: 0

## 2017-11-02 ASSESSMENT — LIFESTYLE VARIABLES: TOBACCO_USE: 0

## 2017-11-02 NOTE — ANESTHESIA POSTPROCEDURE EVALUATION
Patient: Amparo Mccray    Procedure(s):  AFIB ABLATION    Diagnosis:AFIB   Diagnosis Additional Information: No value filed.    Anesthesia Type:  General, ETT    Note:  Anesthesia Post Evaluation    Patient location during evaluation: PACU  Patient participation: Able to fully participate in evaluation  Level of consciousness: awake and alert  Pain management: adequate  Airway patency: patent  Cardiovascular status: acceptable  Respiratory status: acceptable  Hydration status: acceptable  PONV: none     Anesthetic complications: None          Last vitals:  Vitals:    11/02/17 1620 11/02/17 1630 11/02/17 1640   BP: 136/78 143/81 133/79   Pulse:      Resp: 16 13 11   Temp: 36.9  C (98.4  F) 36.5  C (97.7  F) 36.7  C (98.1  F)   SpO2: 98% 98% 98%         Electronically Signed By: Joe Jernigan MD  November 2, 2017  5:14 PM

## 2017-11-02 NOTE — IP AVS SNAPSHOT
[facility] Observation Unit    [address] [place] [address]    Phone:  [phone]                                       After Visit Summary   11/2/2017    Amparo Mccray    MRN: [id number]           After Visit Summary Signature Page     I have received my discharge instructions, and my questions have been answered. I have discussed any challenges I see with this plan with the nurse or doctor.    ..........................................................................................................................................  Patient/Patient Representative Signature      ..........................................................................................................................................  Patient Representative Print Name and Relationship to Patient    ..................................................               ................................................  Date                                            Time    ..........................................................................................................................................  Reviewed by Signature/Title    ...................................................              ..............................................  Date                                                            Time

## 2017-11-02 NOTE — ANESTHESIA CARE TRANSFER NOTE
Patient: Amparo Mccray    Procedure(s):  AFIB ABLATION    Diagnosis: AFIB   Diagnosis Additional Information: No value filed.    Anesthesia Type:   General, ETT     Note:  Airway :Face Mask  Patient transferred to:PACU  Comments: Anesthesia Care Note    Patient: Amparo Mccray    Transferred to: PACU    Patient vital signs: Stable    Airway:FM    Monitors placed. VSS. PIV patent. No change in dentition. Report given to DON Flores CRNA   11/2/2017  Handoff Report: Identifed the Patient, Identified the Reponsible Provider, Reviewed the pertinent medical history, Discussed the surgical course, Reviewed Intra-OP anesthesia mangement and issues during anesthesia, Set expectations for post-procedure period and Allowed opportunity for questions and acknowledgement of understanding      Vitals: (Last set prior to Anesthesia Care Transfer)    CRNA VITALS  11/2/2017 1452 - 11/2/2017 1529      11/2/2017             Resp Rate (set): 10                Electronically Signed By: ARMIN Sullivan CRNA  November 2, 2017  3:29 PM

## 2017-11-02 NOTE — PROGRESS NOTES
ANTICOAGULATION FOLLOW-UP CLINIC VISIT    Patient Name:  Amparo Mccray  Date:  11/2/2017  Contact Type:  Lab only appointment.  INR nurse out of office.    SUBJECTIVE:     Patient Findings     Positives Unexplained INR or factor level change           OBJECTIVE    INR   Date Value Ref Range Status   11/01/2017 1.60 (H) 0.86 - 1.14 Final     Comment:     This test is intended for monitoring Coumadin therapy.  Results are not   accurate in patients with prolonged INR due to factor deficiency.         ASSESSMENT / PLAN  INR assessment SUB    Recheck INR In: 2 WEEKS    INR Location Clinic      Anticoagulation Summary as of 11/1/2017     INR goal 2.0-3.0   Today's INR 1.60!   Maintenance plan 1.25 mg (2.5 mg x 0.5) on Mon, Wed, Fri; 2.5 mg (2.5 mg x 1) all other days   Full instructions 11/1: 3.75 mg; Otherwise 1.25 mg on Mon, Wed, Fri; 2.5 mg all other days   Weekly total 13.75 mg   Plan last modified Willow Ellison RN (10/17/2017)   Next INR check 11/16/2017   Priority INR   Target end date     Indications   Long-term (current) use of anticoagulants [Z79.01] [Z79.01]  Atrial fibrillation (H) [I48.91]  Ventricular bigeminy [I49.9]         Anticoagulation Episode Summary     INR check location     Preferred lab     Send INR reminders to FM TRIAGE POOL    Comments       Anticoagulation Care Providers     Provider Role Specialty Phone number    Alan Almeida MD [facility] Internal Medicine [phone]            See the Encounter Report to view Anticoagulation Flowsheet and Dosing Calendar (Go to Encounters tab in chart review, and find the Anticoagulation Therapy Visit)      Willow Ellison RN

## 2017-11-02 NOTE — ANESTHESIA PREPROCEDURE EVALUATION
Anesthesia Evaluation     . Pt has had prior anesthetic.     No history of anesthetic complications          ROS/MED HX    ENT/Pulmonary:      (-) tobacco use, asthma, COPD and sleep apnea   Neurologic:       Cardiovascular:     (+) hypertension----. : . . . :. dysrhythmias a-fib, Irregular Heartbeat/Palpitations, .      (-) CAD   METS/Exercise Tolerance:  >4 METS   Hematologic: Comments: No DVT's, just thrombophlebitis    (+) History of blood clots -      Musculoskeletal:         GI/Hepatic:        (-) GERD and liver disease   Renal/Genitourinary:      (-) renal disease   Endo:     (+) Obesity, .   (-) Type I DM and Type II DM   Psychiatric:         Infectious Disease:         Malignancy:         Other:                     Physical Exam  Normal systems: dental    Airway   Mallampati: II  TM distance: >3 FB  Neck ROM: full    Dental     Cardiovascular   Rhythm and rate: regular and normal  (-) no murmur    Pulmonary    breath sounds clear to auscultation                    Anesthesia Plan      History & Physical Review  History and physical reviewed and following examination; no interval change.    ASA Status:  2 .    NPO Status:  > 8 hours    Plan for General and ETT with Intravenous induction. Maintenance will be Inhalation.    PONV prophylaxis:  Ondansetron (or other 5HT-3)       Postoperative Care  Postoperative pain management:  IV analgesics.      Consents  Anesthetic plan, risks, benefits and alternatives discussed with:  Patient..                          .

## 2017-11-02 NOTE — PROGRESS NOTES
All pvs were still isolated from previous ablation except the left superior on which was re-isolated. There was a small branch nearby the RIPV. Ablation was targeted at the perimeter of this branch. No inducible atrial arrhythmias post ablation. SVC was still isolated from before. Conduction across CTI present, however. Ablation was targeted at the CTI with isthmus block demonstrated. Resume warfarin and observe overnight.

## 2017-11-02 NOTE — IP AVS SNAPSHOT
MRN:[id number]                      After Visit Summary   11/2/2017    Amparo Mccray    MRN: [id number]           Thank you!     Thank you for choosing [facility] for your care. Our goal is always to provide you with excellent care. Hearing back from our patients is one way we can continue to improve our services. Please take a few minutes to complete the written survey that you may receive in the mail after you visit with us. Thank you!        Patient Information     Date Of Birth          1955        About your hospital stay     You were [facility] on:  November 2, 2017 [facility]    You were discharged on:  November 3, 2017       Who to Call     For medical emergencies, please call 911.  For non-urgent questions about your medical care, please call your primary care provider or clinic, [phone]  For questions related to your surgery, please call your surgery clinic        Attending Provider     Provider Wilbert Butler MD Cardiology       Primary Care Provider Office Phone # Fax #    Alan Almeida [phone]      Follow-up Appointments     Follow-up and recommended labs and tests        INR Monday. Please make the appointment. Needs to stay >2 for 1 month                  Your next 10 appointments already scheduled     Nov 10, 2017  1:30 PM CST   P EP RETURN with Lala Le PA-C   [facility] ([facility])    [address] [place] [id number] [phone]            Dec 14, 2017  3:15 PM CST   UMP EP RETURN with Wilbert Hernandez MD   [facility] ([facility])    [address] [place] [id number] [phone]              Further instructions from your care team       A Fib Ablation Discharge Instructions  Take 2.5 mg of warfarin daily until Monday. INR  Monday    After you go home:    Have an adult stay with you until tomorrow.    You may eat your normal diet, unless your doctor tells you otherwise.    Relax and take it easy for 3 days.        For 24-48 hours (due to the sedation you received):    DO NOT DRIVE FOR 2 DAYS!     Do NOT make any important or legal decisions.    Do NOT drive or operate machines at home or at work.    Do NOT drink alcohol.    Care of Puncture Site:    Check the puncture site every 1-2 hours while awake.    For 2-3 days, when you cough, sneeze, laugh or move your bowels, hold your hand over the puncture site and press firmly.    Remove the band aid after 24 hours. If there is minor oozing, apply another band aid and remove it after 12 hours.    It is normal to have a small bruise or pea size lump at the site.    You may shower. Do NOT take a bath, or use a hot tub or pool until groin site heals, which may take up to a week.  Do NOT scrub the site. Do not use lotion or powder near the puncture site.    Activity:    Do NOT lift, push or pull more than 10 pounds for 3 days.    NO repetitive motions such as loading  or vacuuming.     Bleeding:    If you start bleeding from the groin site, lie down flat and press firmly on the site for 10 minutes or until bleeding stops.     Once bleeding stops, lay flat for 2 hours.    Call your A Fib nurse if bleeding does not stop or after hours will need to go to ER.       Go to ER or Call 911 right away if you have heavy bleeding or bleeding that does not stop.    Medications:    Take your medications, including blood thinners, unless your doctor tells you not to.    If you have stopped any other medicines, check with your nurse or provider about when to restart them.    If you have pain, you may take Advil (ibuprofen) or Tylenol (acetaminophen).    Call the A Fib RN if:    Chest pain not relieved by tylenol or ibuprofen    Difficulty swallowing and/or coughing up blood    Shortness of  "breath    increased pain or a large or growing hard lump around the site.    Groin site is red, swollen, hot or tender.    Blood or fluid is draining from the groin site.    You have chills or a fever greater than 101 F (38 C).    Your leg feels numb, cool or changes color.    If groin pain is not relieved by Tylenol or Ibuprofen.    Recurrent irregular or fast heart rate lasting over 2-3 hours.    Any questions or concerns.    Heart rhythms:  You may have some irregular heartbeats. These feel very strong. They may make you feel that the A Fib is going to start again.  Give it time. The irregular beats should occur less often.    Follow Up Appointments:  11/10 Friday Gely 1:30 Janelle Le    12/14 Thursday Gely 3:15 Dr. Thomas     [facility]: A Fib clinic RN's Ira Casanova [phone] (Mon-Fri, 8:00-5:00)                                                                                          [phone] (7 days a week) after hours for on call Cardiologist.       Pending Results     Date and Time Order Name Status Description    11/2/2017 1101 EKG 12-lead, tracing only Preliminary             Statement of Approval     Ordered          11/03/17 0949  I have reviewed and agree with all the recommendations and orders detailed in this document.  EFFECTIVE NOW     Approved and electronically signed by:  Casie Garcia APRN CNP             Admission Information     Date & Time Provider Department Dept. Phone    11/2/2017 Wilbert Thomas MD [facility] [phone]      Your Vitals Were     Blood Pressure Pulse Temperature Respirations Height Weight    126/67 (BP Location: Right arm) 70 96.4  F (35.8  C) (Oral) 16 1.753 m (5' 9\") 109.7 kg (241 lb 14.4 oz)    Last Period Pulse Oximetry BMI (Body Mass Index)             12/31/2012 97% 35.72 kg/m2         MyChart Information     [employer] gives you secure access to your electronic health record. If you see a primary care provider, " you can also send messages to your care team and make appointments. If you have questions, please call your primary care clinic.  If you do not have a primary care provider, please call [phone] and they will assist you.        Care EveryWhere ID     This is your Care EveryWhere ID. This could be used by other organizations to access your [place] medical records  [id number]        Equal Access to Services     RAUL SHIRLEY AH: Hadii aad ku hadasho Soomaali, waaxda luqadaha, qaybta kaalmada adealbermarthada, hardeep frenchjoseomar العراقي. So [facility] [phone].    ATENCIÓN: Si habla español, tiene a reyes disposición servicios gratuitos de asistencia lingüística. Ortega al [phone].    We comply with applicable federal civil rights laws and [place] laws. We do not discriminate on the basis of race, color, national origin, age, disability, sex, sexual orientation, or gender identity.               Review of your medicines      CONTINUE these medicines which have NOT CHANGED        Dose / Directions    metoprolol 50 MG tablet   Commonly known as:  LOPRESSOR   Used for:  Essential hypertension, benign        Dose:  50 mg   Take 1 tablet (50 mg) by mouth 2 times daily   Quantity:  180 tablet   Refills:  2       Potassium Chloride CR 8 MEQ Cpcr   Used for:  Hypokalemia        Dose:  1 tablet   Take 1 tablet by mouth daily as needed For leg cramps   Quantity:  60 capsule   Refills:  5       triamterene-hydrochlorothiazide 37.5-25 MG per tablet   Commonly known as:  MAXZIDE-25   Used for:  Essential hypertension, benign        TAKE 1 TABLET BY MOUTH EVERY MORNING.   Quantity:  90 tablet   Refills:  3       warfarin 2.5 MG tablet   Commonly known as:  COUMADIN   Used for:  Ventricular bigeminy, Long-term (current) use of anticoagulants, Paroxysmal atrial fibrillation (H)        Dose:  2.5 mg   Take 1 tablet (2.5 mg) by mouth daily   Quantity:  90 tablet   Refills:  1                Protect others around you: Learn  how to safely use, store and throw away your medicines at www.disposemymeds.org.             Medication List: This is a list of all your medications and when to take them. Check marks below indicate your daily home schedule. Keep this list as a reference.      Medications           Morning Afternoon Evening Bedtime As Needed    metoprolol 50 MG tablet   Commonly known as:  LOPRESSOR   Take 1 tablet (50 mg) by mouth 2 times daily   Last time this was given:  50 mg on 11/3/2017  7:51 AM   Next Dose Due:  Tomorrow, 11/4 in the morning                                   Potassium Chloride CR 8 MEQ Cpcr   Take 1 tablet by mouth daily as needed For leg cramps   Next Dose Due:  Resume home schedule                                triamterene-hydrochlorothiazide 37.5-25 MG per tablet   Commonly known as:  MAXZIDE-25   TAKE 1 TABLET BY MOUTH EVERY MORNING.   Last time this was given:  1 tablet on 11/3/2017  7:51 AM   Next Dose Due:  Tomorrow, 11/4 in the morning                                   warfarin 2.5 MG tablet   Commonly known as:  COUMADIN   Take 1 tablet (2.5 mg) by mouth daily   Next Dose Due:  Take 2.5 mg today, 11/3

## 2017-11-03 VITALS
HEART RATE: 70 BPM | SYSTOLIC BLOOD PRESSURE: 126 MMHG | WEIGHT: 241.9 LBS | OXYGEN SATURATION: 97 % | BODY MASS INDEX: 35.83 KG/M2 | DIASTOLIC BLOOD PRESSURE: 67 MMHG | TEMPERATURE: 96.4 F | HEIGHT: 69 IN | RESPIRATION RATE: 16 BRPM

## 2017-11-03 LAB — INR PPP: 1.81 (ref 0.86–1.14)

## 2017-11-03 PROCEDURE — 99213 OFFICE O/P EST LOW 20 MIN: CPT | Performed by: NURSE PRACTITIONER

## 2017-11-03 PROCEDURE — 40000934 ZZH STATISTIC OUTPATIENT (NON-OBS) DAY

## 2017-11-03 PROCEDURE — 85610 PROTHROMBIN TIME: CPT | Performed by: INTERNAL MEDICINE

## 2017-11-03 PROCEDURE — 36415 COLL VENOUS BLD VENIPUNCTURE: CPT | Performed by: INTERNAL MEDICINE

## 2017-11-03 PROCEDURE — 25000132 ZZH RX MED GY IP 250 OP 250 PS 637: Mod: GY | Performed by: INTERNAL MEDICINE

## 2017-11-03 RX ADMIN — Medication 2 LOZENGE: at 10:02

## 2017-11-03 RX ADMIN — METOPROLOL TARTRATE 50 MG: 50 TABLET ORAL at 07:51

## 2017-11-03 RX ADMIN — Medication 1 LOZENGE: at 07:51

## 2017-11-03 RX ADMIN — TRIAMTERENE AND HYDROCHLOROTHIAZIDE 1 TABLET: 37.5; 25 TABLET ORAL at 07:51

## 2017-11-03 NOTE — PROGRESS NOTES
"EP Progress Note          Assessment and Plan:   This is a 61 year-old patient with symptomatic paroxysmal atrial fibrillation s/p ablation more than 4  years ago and has been doing quite well until this past several months when she's been experiencing recurrent palpitations due to AF as documented by the [email or web address] marah. Patient opted for repeat EP study and ablation. MRI performed before the procedure showed no clots in the LISSY.     1. PAF  All PV were still isolated except the left superior PV which was re-isolated. CTI ablation was completed and demonstrated block post procedure.  INR 1.8 this morning. Instructed to take 2.5 mg of warfarin daily until Monday. (was recently decreased to taking 1.25 mg 3 times a week). INR Monday    Wt stable. Restrictions and appointments reviewed.  OK to d/c home    Casie Garcia CNP        Interval History:   Pt feeling well and offers no complaints at this time. VSSA, voiding since galeano removed. Tele shows sinus.       Medications:       triamterene-hydrochlorothiazide  1 tablet Oral Daily     metoprolol  50 mg Oral BID     sodium chloride (PF)  3 mL Intracatheter Q8H             Review of Systems: If done, described below  The Review of Systems is negative other than noted in the HPI         Physical Exam:   Blood pressure 126/67, pulse 70, temperature 96.4  F (35.8  C), temperature source Oral, resp. rate 16, height 1.753 m (5' 9\"), weight 109.7 kg (241 lb 14.4 oz), last menstrual period 2012, SpO2 97 %, not currently breastfeeding.        Vital Sign Ranges  Temperature Temp  Av.7  F (36.5  C)  Min: 95.5  F (35.3  C)  Max: 98.4  F (36.9  C)   Blood pressure Systolic (24hrs), Av , Min:126 , Max:172        Diastolic (24hrs), Av, Min:59, Max:96      Pulse Pulse  Av  Min: 70  Max: 70   Respirations Resp  Av.6  Min: 9  Max: 18   Pulse oximetry SpO2  Av.9 %  Min: 96 %  Max: 100 %         Intake/Output Summary (Last 24 hours) at 17 0907  Last " data filed at 11/03/17 0134   Gross per 24 hour   Intake             1153 ml   Output             1500 ml   Net             -347 ml       Constitutional:   in no apparent distress. Redness noted on back from patches.     Lungs:   symmetric, clear to auscultation     Cardiovascular:   regular rate and rhythm, normal S1 and S2, no S3, no S4 and no murmur noted     Extremities and Back:   No edema, bilateral femoral sites without hematoma or bruit              Data:     Lab Results   Component Value Date    WBC 4.9 11/02/2017    HGB 13.7 11/02/2017    HCT 39.3 11/02/2017     11/02/2017     11/02/2017    POTASSIUM 3.7 11/02/2017    CHLORIDE 100 11/02/2017    CO2 28 11/02/2017    BUN 12 11/02/2017    CR 0.87 11/02/2017    GLC 86 11/02/2017    SED 10 12/11/2014    DD 0.3 04/15/2013    NTBNPI 168 12/05/2012    TROPONIN 0.01 07/23/2014    TROPI <0.012 07/23/2014    AST 15 12/11/2014    ALT 27 12/11/2014    ALKPHOS 81 12/11/2014    BILITOTAL 0.4 12/11/2014    INR 1.81 (H) 11/03/2017

## 2017-11-03 NOTE — DISCHARGE INSTRUCTIONS
A Fib Ablation Discharge Instructions  Take 2.5 mg of warfarin daily until Monday. INR Monday    After you go home:    Have an adult stay with you until tomorrow.    You may eat your normal diet, unless your doctor tells you otherwise.    Relax and take it easy for 3 days.        For 24-48 hours (due to the sedation you received):    DO NOT DRIVE FOR 2 DAYS!     Do NOT make any important or legal decisions.    Do NOT drive or operate machines at home or at work.    Do NOT drink alcohol.    Care of Puncture Site:    Check the puncture site every 1-2 hours while awake.    For 2-3 days, when you cough, sneeze, laugh or move your bowels, hold your hand over the puncture site and press firmly.    Remove the band aid after 24 hours. If there is minor oozing, apply another band aid and remove it after 12 hours.    It is normal to have a small bruise or pea size lump at the site.    You may shower. Do NOT take a bath, or use a hot tub or pool until groin site heals, which may take up to a week.  Do NOT scrub the site. Do not use lotion or powder near the puncture site.    Activity:    Do NOT lift, push or pull more than 10 pounds for 3 days.    NO repetitive motions such as loading  or vacuuming.     Bleeding:    If you start bleeding from the groin site, lie down flat and press firmly on the site for 10 minutes or until bleeding stops.     Once bleeding stops, lay flat for 2 hours.    Call your A Fib nurse if bleeding does not stop or after hours will need to go to ER.       Go to ER or Call 911 right away if you have heavy bleeding or bleeding that does not stop.    Medications:    Take your medications, including blood thinners, unless your doctor tells you not to.    If you have stopped any other medicines, check with your nurse or provider about when to restart them.    If you have pain, you may take Advil (ibuprofen) or Tylenol (acetaminophen).    Call the A Fib RN if:    Chest pain not relieved by tylenol  or ibuprofen    Difficulty swallowing and/or coughing up blood    Shortness of breath    increased pain or a large or growing hard lump around the site.    Groin site is red, swollen, hot or tender.    Blood or fluid is draining from the groin site.    You have chills or a fever greater than 101 F (38 C).    Your leg feels numb, cool or changes color.    If groin pain is not relieved by Tylenol or Ibuprofen.    Recurrent irregular or fast heart rate lasting over 2-3 hours.    Any questions or concerns.    Heart rhythms:  You may have some irregular heartbeats. These feel very strong. They may make you feel that the A Fib is going to start again.  Give it time. The irregular beats should occur less often.    Follow Up Appointments:  11/10 Friday Gely 1:30 Janelle Le    12/14 Thursday Gely 3:15 Dr. Thomas     [facility]: A Fib clinic RN's Ira Casanova [phone] (Mon-Fri, 8:00-5:00)                                                                                          [phone] (7 days a week) after hours for on call Cardiologist.

## 2017-11-03 NOTE — PLAN OF CARE
"Problem: Patient Care Overview  Goal: Discharge Needs Assessment  Outcome: Improving  A fib ablation. A&Ox4, VSS on RA. Denies pain or numbness. Complaints of tingling on hands up to elbow and the tops of feet. Hx of sciatica and pinched nerve that has been \"going on for 3 years.\" Pt stated she gets tingling on occasion but has never been this bad. Pt states tingling ins relieved when she is walking and may be d/t being in bed. Groin site Incisions CDI. CMS intact, pedal pulses 2+. Reg diet, voiding adequately. Tele NSR. IV SL. Up independently. Plan to DC 11/3/17. Will continue to monitor.       "

## 2017-11-03 NOTE — PROGRESS NOTES
Spoke to patient regarding discharge following afib ablation. I made her aware of appointments and phone numbers. I reminded her that she is not to drive for two days and no lifting, pulling or pushing of more than 10#. I advised her to call if she has problems swallowing,coughing up blood groin swelling or a fever greater than 101. I also advised her to call if she gets an afib episode that lasts longer than 2 hours. She is feeling fine and voiding w/o difficulty. She had no questions for me at this time. Sami

## 2017-11-03 NOTE — PROVIDER NOTIFICATION
MD Notification    Notified Person:  MD Dodd    Notified Persons Name: MD Dodd    Notification Date/Time: 11/2/2017      Notification Interaction:  Phone    Purpose of Notification: Pt headache and needs to take Tylenol, refuses narcs    Orders Received: Awaiting for orders    Comments:

## 2017-11-03 NOTE — PROGRESS NOTES
VSS, tele NSR. Groin sites CDI. CMS intact. Up independently, voiding in BR. Reg diet. Discharged home with son.

## 2017-11-03 NOTE — PLAN OF CARE
Problem: Patient Care Overview  Goal: Plan of Care/Patient Progress Review  Arrived from PACU around 1700. Groin sites clean, dry and flat, CMS intact. A&O.  VSS. Tele NSR. Tolerating regular diet. Has been walking to bathroom since off bedrest at 1930 with standby assist. Lyons removed at 2030, voiding without problem. Got order for tylenol for headache.

## 2017-11-04 LAB — INTERPRETATION ECG - MUSE: NORMAL

## 2017-11-07 ENCOUNTER — ANTICOAGULATION THERAPY VISIT (OUTPATIENT)
Dept: NURSING | Facility: CLINIC | Age: 62
End: 2017-11-07
Payer: MEDICARE

## 2017-11-07 DIAGNOSIS — Z79.01 LONG-TERM (CURRENT) USE OF ANTICOAGULANTS: ICD-10-CM

## 2017-11-07 DIAGNOSIS — I49.8 VENTRICULAR BIGEMINY: ICD-10-CM

## 2017-11-07 DIAGNOSIS — I48.91 ATRIAL FIBRILLATION (H): ICD-10-CM

## 2017-11-07 LAB — INR POINT OF CARE: 2.3 (ref 0.86–1.14)

## 2017-11-07 PROCEDURE — 36416 COLLJ CAPILLARY BLOOD SPEC: CPT

## 2017-11-07 PROCEDURE — 85610 PROTHROMBIN TIME: CPT | Mod: QW

## 2017-11-07 PROCEDURE — 99207 ZZC NO CHARGE NURSE ONLY: CPT

## 2017-11-07 NOTE — MR AVS SNAPSHOT
Amparo Whittenmicki Mccray   11/7/2017 10:30 AM   Anticoagulation Therapy Visit    Description:  61 year old female   Provider:  FM RN VISITS   Department:   Nurse           INR as of 11/7/2017     Today's INR 2.3      Anticoagulation Summary as of 11/7/2017     INR goal 2.0-3.0   Today's INR 2.3   Full instructions 1.25 mg on Wed, Sat; 2.5 mg all other days   Next INR check 11/22/2017    Indications   Long-term (current) use of anticoagulants [Z79.01] [Z79.01]  Atrial fibrillation (H) [I48.91]  Ventricular bigeminy [I49.9]         Your next Anticoagulation Clinic appointment(s)     Nov 22, 2017 10:30 AM CST   Anticoagulation Visit with FM RN VISITS   [facility]    [address] [place] [address], [address]  [facility] [id number] [phone]              Contact Numbers     Clinic Number:         November 2017 Details    Sun Mon Tue Wed Thu Fri Sat        1               2               3               4                 5               6               7      2.5 mg   See details      8      1.25 mg         9      2.5 mg         10      2.5 mg         11      1.25 mg           12      2.5 mg         13      2.5 mg         14      2.5 mg         15      1.25 mg         16      2.5 mg         17      2.5 mg         18      1.25 mg           19      2.5 mg         20      2.5 mg         21      2.5 mg         22            23               24               25                 26               27               28               29               30                  Date Details   11/07 This INR check       Date of next INR:  11/22/2017         How to take your warfarin dose     To take:  1.25 mg Take 0.5 of a 2.5 mg tablet.    To take:  2.5 mg Take 1 of the 2.5 mg tablets.

## 2017-11-07 NOTE — PROGRESS NOTES
ANTICOAGULATION FOLLOW-UP CLINIC VISIT    Patient Name:  Amparo Mccray  Date:  11/7/2017  Contact Type:  Face to Face    SUBJECTIVE:        OBJECTIVE    INR Protime   Date Value Ref Range Status   11/07/2017 2.3 (A) 0.86 - 1.14 Final       ASSESSMENT / PLAN  INR assessment THER    Recheck INR In: 2 WEEKS    INR Location Clinic      Anticoagulation Summary as of 11/7/2017     INR goal 2.0-3.0   Today's INR 2.3   Maintenance plan 1.25 mg (2.5 mg x 0.5) on Wed, Sat; 2.5 mg (2.5 mg x 1) all other days   Full instructions 1.25 mg on Wed, Sat; 2.5 mg all other days   Weekly total 15 mg   Plan last modified Willow Ellison RN (11/7/2017)   Next INR check 11/22/2017   Priority INR   Target end date     Indications   Long-term (current) use of anticoagulants [Z79.01] [Z79.01]  Atrial fibrillation (H) [I48.91]  Ventricular bigeminy [I49.9]         Anticoagulation Episode Summary     INR check location     Preferred lab     Send INR reminders to  TRIAGE POOL    Comments       Anticoagulation Care Providers     Provider Role Specialty Phone number    Alan Almeida MD Responsible Internal Medicine [phone]            See the Encounter Report to view Anticoagulation Flowsheet and Dosing Calendar (Go to Encounters tab in chart review, and find the Anticoagulation Therapy Visit)      Willow Ellison RN

## 2017-11-10 ENCOUNTER — OFFICE VISIT (OUTPATIENT)
Dept: CARDIOLOGY | Facility: CLINIC | Age: 62
End: 2017-11-10
Payer: MEDICARE

## 2017-11-10 VITALS
SYSTOLIC BLOOD PRESSURE: 124 MMHG | BODY MASS INDEX: 35.4 KG/M2 | HEIGHT: 69 IN | HEART RATE: 85 BPM | WEIGHT: 239 LBS | DIASTOLIC BLOOD PRESSURE: 74 MMHG

## 2017-11-10 DIAGNOSIS — I10 ESSENTIAL HYPERTENSION, BENIGN: ICD-10-CM

## 2017-11-10 DIAGNOSIS — I48.0 PAROXYSMAL A-FIB (H): Primary | ICD-10-CM

## 2017-11-10 PROCEDURE — 99213 OFFICE O/P EST LOW 20 MIN: CPT | Mod: 25 | Performed by: PHYSICIAN ASSISTANT

## 2017-11-10 PROCEDURE — 93000 ELECTROCARDIOGRAM COMPLETE: CPT | Performed by: PHYSICIAN ASSISTANT

## 2017-11-10 NOTE — MR AVS SNAPSHOT
After Visit Summary   11/10/2017    Amparo Mccray    MRN: [id number]           Patient Information     Date Of Birth          1955        Visit Information        Provider Department      11/10/2017 1:30 PM Lala Le PA-C [facility]        Today's Diagnoses     Paroxysmal a-fib (H)    -  1    Essential hypertension, benign        Atrial fibrillation (H)          Care Instructions    1. Reviewed repeat ablation - things went well!    2. EKG today looks super!    3. Cardiac MRI showed normal pumping function and     4. OK to get back into exercise gradually! Stop if you feel your heart is going too fast or having issues with more skipped beats.    5. Keep track of BP - initially high but then dropped nicely          Follow-ups after your visit        Your next 10 appointments already scheduled     Nov 22, 2017 10:30 AM CST   [facility] with [facility] RN [facility]ISITS   [facility]    [address] [place] [address], [address]  [facility] [id number] [phone]            Dec 14, 2017  3:15 PM CST   [facility] EP RETURN with Wilbert Hernandez MD   [facility] ([facility])    [address] [place] [address] [address]  [facility] [id number] [phone]              Who to contact     If you have questions or need follow up information about today's clinic visit or your schedule please contact [facility] directly at [phone].  Normal or non-critical lab and imaging results will be communicated to you by MyChart, letter or phone within 4 business days after the clinic has received the results. If you do not hear from us within 7 days, please contact the clinic through MyChart or phone. If you have a critical or abnormal lab result, we will notify you by phone as soon as possible.  Submit refill requests through [employer] or call your  "pharmacy and they will forward the refill request to us. Please allow 3 business days for your refill to be completed.          Additional Information About Your Visit        MyChart Information     [employer] gives you secure access to your electronic health record. If you see a primary care provider, you can also send messages to your care team and make appointments. If you have questions, please call your primary care clinic.  If you do not have a primary care provider, please call [phone] and they will assist you.        Care EveryWhere ID     This is your Care EveryWhere ID. This could be used by other organizations to access your [facility] medical records  [id number]        Your Vitals Were     Pulse Height Last Period BMI (Body Mass Index)          85 1.74 m (5' 8.5\") 12/31/2012 35.81 kg/m2         Blood Pressure from Last 3 Encounters:   11/10/17 124/74   11/03/17 126/67   11/01/17 110/57    Weight from Last 3 Encounters:   11/10/17 108.4 kg (239 lb)   11/03/17 109.7 kg (241 lb 14.4 oz)   10/27/17 110.7 kg (244 lb)              We Performed the Following     EKG 12-lead complete w/read - Clinics (performed today)        Primary Care Provider Office Phone # Fax #    Alan Almeida [phone]       [address] [place] [address]        Equal Access to Services     [facility]: Hadii jermaine waters hadasho Sochristianaali, waaxda luqadaha, qaybta kaalmada lavelle, hardeep myles . So [facility] [phone].    ATENCIÓN: Si habla español, tiene a reyes disposición servicios gratuitos de asistencia lingüística. Llame al [phone].    We comply with applicable federal civil rights laws and [place] laws. We do not discriminate on the basis of race, color, national origin, age, disability, sex, sexual orientation, or gender identity.            Thank you!     Thank you for choosing [facility]  for your care. Our goal is always to " provide you with excellent care. Hearing back from our patients is one way we can continue to improve our services. Please take a few minutes to complete the written survey that you may receive in the mail after your visit with us. Thank you!             Your Updated Medication List - Protect others around you: Learn how to safely use, store and throw away your medicines at www.disposemymeds.org.          This list is accurate as of: 11/10/17  1:43 PM.  Always use your most recent med list.                   Brand Name Dispense Instructions for use Diagnosis    metoprolol 50 MG tablet    LOPRESSOR    180 tablet    Take 1 tablet (50 mg) by mouth 2 times daily    Essential hypertension, benign       Potassium Chloride CR 8 MEQ Cpcr     60 capsule    Take 1 tablet by mouth daily as needed For leg cramps    Hypokalemia       triamterene-hydrochlorothiazide 37.5-25 MG per tablet    MAXZIDE-25    90 tablet    TAKE 1 TABLET BY MOUTH EVERY MORNING.    Essential hypertension, benign       warfarin 2.5 MG tablet    COUMADIN    90 tablet    Take 1 tablet (2.5 mg) by mouth daily    Ventricular bigeminy, Long-term (current) use of anticoagulants, Paroxysmal atrial fibrillation (H)

## 2017-11-10 NOTE — PROGRESS NOTES
"HPI:   I had the pleasure of seeing Amparo today when she came for follow-up of her recent repeat pulmonary vein isolation. She is a pleasant 61-year-old who underwent her first pulmonary vein isolation 1/2013 after flecainide failed to maintain sinus rhythm. She saw Dr. Thomas 10/2017 and was noted to have increasing palpitations, and an marah on her phone showed tracing is consistent with atrial fibrillation. Dr. Thomas with her 10/27 and recommended cardiac MRI with stress for episodes of chest discomfort and for evaluation of atrial appendage to rule out clot, as well as pulmonary vein sizes.    She underwent repeat ablation 11/2/17 with Dr. Thomas. All pulmonary veins remained ablated with the exception of the left superior pulmonary vein. She also had an accessory pulmonary vein near the right in inferior pulmonary vein, which was also ablated. Potentials in the SVC area were still ablated. Cavotricuspid isthmus ablation was redone.    She was discharged home in sinus rhythm on her PTA medications. Since then, Amparo tells me she is\" really well.\" She denies any prolonged palpitations, but notes an occasional \"skipped beat.\" She continues to use the marah on her phone and she has not had any problems with arrhythmias. She denies chest pain, pressure or tightness. She denies trouble swallowing, right headedness, fever, chills, groin site discomfort or edema.    EKG today showed sinus rhythm at 80 bpm with nonspecific ST-T wave changes.    Stress CMR showed EF 61% with no WMA. No evidence of ischemia.    Assessment & Plan:    1. Recurrent persistent atrial fibrillation - she is now status post repeat ablation, with 3 isolation of the left superior pulmonary vein, ablation of an accessory pulmonary vein near the right inferior pulmonary vein and redo CTI. EKG today   * Continue metoprolol tartrate 50 mg BID   * Continue warfarin. Last INR 11/7 was therapeutic at 2.3      2. HTN - BP was initially quite high today, but I " rechecked this and it looks great. It has always been under good control while on Maxzide 37.5/25 and lopressor 50 mg BID.   * No changes. Keep track at home.    She will see Dr. Thomas as previously arranged, but will contact us in the interim with any issues.    Janelle Le PA-C, MSPAS      Orders Placed This Encounter   Procedures     EKG 12-lead complete w/read - Clinics (performed today)     No orders of the defined types were placed in this encounter.    There are no discontinued medications.      Encounter Diagnoses   Name Primary?     Paroxysmal a-fib (H) Yes     Essential hypertension, benign      Atrial fibrillation (H)        CURRENT MEDICATIONS:  Current Outpatient Prescriptions   Medication Sig Dispense Refill     warfarin (COUMADIN) 2.5 MG tablet Take 1 tablet (2.5 mg) by mouth daily 90 tablet 1     triamterene-hydrochlorothiazide (MAXZIDE-25) 37.5-25 MG per tablet TAKE 1 TABLET BY MOUTH EVERY MORNING. 90 tablet 3     metoprolol (LOPRESSOR) 50 MG tablet Take 1 tablet (50 mg) by mouth 2 times daily 180 tablet 2     Potassium Chloride CR 8 MEQ CPCR Take 1 tablet by mouth daily as needed For leg cramps 60 capsule 5       ALLERGIES     Allergies   Allergen Reactions     Celebrex [Celecoxib] Hives     Hands itching then hives     Ibuprofen Swelling     Face swelling       PAST MEDICAL HISTORY:  Past Medical History:   Diagnosis Date     Arthritis      Atrial fibrillation (H)      Chest pain, unspecified     neg stress test, put on PPI- no help     Essential hypertension, benign     Hypertension, Benign     Irregular heart beat     afib      Obesity (BMI 30-39.9) 7/1/13    BMI 33.6     S/P ablation of atrial fibrillation 11/02/2017     Superficial thrombophlebitis      Thrombosis of leg     superficial not dvt's     Ventricular bigeminy     bigeminy- nrml LV fxn       PAST SURGICAL HISTORY:  Past Surgical History:   Procedure Laterality Date     ABDOMEN SURGERY       ARTHROPLASTY KNEE Right 3/2/2015     Procedure: ARTHROPLASTY KNEE;  Surgeon: Cuco Baugh MD;  Location: [facility] OR     C NONSPECIFIC PROCEDURE      left hand      CARDIAC SURGERY  1/15/13    cardiac ablation     [facility]FF SURGICAL PATHOLOGY  1976    HGSIL     ORTHOPEDIC SURGERY      l tka  and wrist      TUBAL LIGATION      Uterine fibroids removed       FAMILY HISTORY:  Family History   Problem Relation Age of Onset     C.A.D. Paternal Grandmother      Hypertension Paternal Grandmother      C.A.D. Paternal Grandfather      Connective Tissue Disorder Paternal Grandfather      Hypertension Paternal Grandfather      C.A.D. Father      Hypertension Father      CANCER Maternal Grandmother      uterine     Colon Cancer Maternal Grandmother      CANCER Maternal Grandfather      leukemia     Other Cancer Maternal Grandfather      CANCER Mother      uterine     GASTROINTESTINAL DISEASE Mother      ulcers     Hypertension Mother      Other - See Comments Sister      essential tremors     C.A.D. Brother      stent- 47     Hypertension Brother      Depression Daughter      Eye Disorder Son      Hypertension Brother      Depression Daughter      Asthma Son        SOCIAL HISTORY:  Social History     Social History     Marital status:      Spouse name: N/A     Number of children: N/A     Years of education: N/A     Social History Main Topics     Smoking status: Never Smoker     Smokeless tobacco: Never Used     Alcohol use Yes      Comment: once a year     Drug use: No     Sexual activity: Yes     Partners: Male     Birth control/ protection: None     Other Topics Concern     Parent/Sibling W/ Cabg, Mi Or Angioplasty Before 65f 55m? Yes     Social History Narrative       Review of Systems:  Skin:  Negative     Eyes:  Negative    ENT:  Negative    Respiratory:  Negative for shortness of breath;dyspnea on exertion;cough  Cardiovascular:  Negative for;palpitations;chest pain;dizziness;lightheadedness;edema;exercise intolerance;fatigue   "  Gastroenterology: Negative for heartburn;melena;hematochezia  Genitourinary:  Negative    Musculoskeletal:  Negative    Neurologic:  Negative    Psychiatric:  Negative    Heme/Lymph/Imm:  Negative    Endocrine:  Negative      Physical Exam:  Vitals: /74  Pulse 85  Ht 1.74 m (5' 8.5\")  Wt 108.4 kg (239 lb)  LMP 12/31/2012  BMI 35.81 kg/m2    Constitutional:  cooperative, alert and oriented, well developed, well nourished, in no acute distress overweight      Skin:  warm and dry to the touch        Head:  normocephalic, no masses or lesions        Eyes:  conjunctivae and lids unremarkable;sclera white;no xanthalasma;pupils equal and round        ENT:  no pallor or cyanosis, dentition good        Neck:           Chest:  normal breath sounds, clear to auscultation, normal A-P diameter, normal symmetry, normal respiratory excursion, no use of accessory muscles        Cardiac: regular rhythm;no murmurs, gallops or rubs detected                  Abdomen:  abdomen soft obese      Vascular: pulses full and equal   2+             2+             right femoral bruit (-)      Extremities and Back:  no deformities, clubbing, cyanosis, erythema observed;no edema        Neurological:  no gross motor deficits          Recent Lab Results:  LIPID RESULTS:  Lab Results   Component Value Date    CHOL 235 (H) 08/16/2017    HDL 46 (L) 08/16/2017     (H) 08/16/2017    TRIG 283 (H) 08/16/2017    CHOLHDLRATIO 4.9 07/24/2014       LIVER ENZYME RESULTS:  Lab Results   Component Value Date    AST 15 12/11/2014    ALT 27 12/11/2014       CBC RESULTS:  Lab Results   Component Value Date    WBC 4.9 11/02/2017    RBC 4.45 11/02/2017    HGB 13.7 11/02/2017    HCT 39.3 11/02/2017    MCV 88 11/02/2017    MCH 30.8 11/02/2017    MCHC 34.9 11/02/2017    RDW 13.1 11/02/2017     11/02/2017       BMP RESULTS:  Lab Results   Component Value Date     11/02/2017    POTASSIUM 3.7 11/02/2017    CHLORIDE 100 11/02/2017    CO2 28 " 11/02/2017    ANIONGAP 10 11/02/2017    GLC 86 11/02/2017    BUN 12 11/02/2017    CR 0.87 11/02/2017    GFRESTIMATED 67 11/02/2017    GFRESTBLACK 81 11/02/2017    AVILA 9.0 11/02/2017        A1C RESULTS:  Lab Results   Component Value Date    A1C 5.5 08/08/2006       INR RESULTS:  Lab Results   Component Value Date    INR 2.3 (A) 11/07/2017    INR 1.81 (H) 11/03/2017    INR 1.55 (H) 11/02/2017

## 2017-11-10 NOTE — LETTER
"11/10/2017    Alan Almeida MD  [address] [facility] [address]    RE: Amparo Mccray       Dear Colleague,    I had the pleasure of seeing Amparo Mccray in the [facility].  I had the pleasure of seeing Amparo today when she came for follow-up of her recent repeat pulmonary vein isolation. She is a pleasant 61-year-old who underwent her first pulmonary vein isolation 1/2013 after flecainide failed to maintain sinus rhythm. She saw Dr. Thomas 10/2017 and was noted to have increasing palpitations, and an marah on her phone showed tracing is consistent with atrial fibrillation. Dr. Thomas with her 10/27 and recommended cardiac MRI with stress for episodes of chest discomfort and for evaluation of atrial appendage to rule out clot, as well as pulmonary vein sizes.    She underwent repeat ablation 11/2/17 with Dr. Thomas. All pulmonary veins remained ablated with the exception of the left superior pulmonary vein. She also had an accessory pulmonary vein near the right in inferior pulmonary vein, which was also ablated. Potentials in the SVC area were still ablated. Cavotricuspid isthmus ablation was redone.    She was discharged home in sinus rhythm on her PTA medications. Since then, Amparo tells me she is\" really well.\" She denies any prolonged palpitations, but notes an occasional \"skipped beat.\" She continues to use the marah on her phone and she has not had any problems with arrhythmias. She denies chest pain, pressure or tightness. She denies trouble swallowing, right headedness, fever, chills, groin site discomfort or edema.    EKG today showed sinus rhythm at 80 bpm with nonspecific ST-T wave changes.    Stress CMR showed EF 61% with no WMA. No evidence of ischemia.    Assessment & Plan:    1. Recurrent persistent atrial fibrillation - she is now status post repeat ablation, with 3 isolation of the left superior pulmonary vein, ablation of an accessory pulmonary vein " near the right inferior pulmonary vein and redo CTI. EKG today   * Continue metoprolol tartrate 50 mg BID   * Continue warfarin. Last INR 11/7 was therapeutic at 2.3      2. HTN - BP was initially quite high today, but I rechecked this and it looks great. It has always been under good control while on Maxzide 37.5/25 and lopressor 50 mg BID.   * No changes. Keep track at home.    She will see Dr. Thomas as previously arranged, but will contact us in the interim with any issues.    Janelle Le PA-C, MSPAS      Orders Placed This Encounter   Procedures     EKG 12-lead complete w/read - Clinics (performed today)     No orders of the defined types were placed in this encounter.    There are no discontinued medications.      Encounter Diagnoses   Name Primary?     Paroxysmal a-fib (H) Yes     Essential hypertension, benign      Atrial fibrillation (H)        CURRENT MEDICATIONS:  Current Outpatient Prescriptions   Medication Sig Dispense Refill     warfarin (COUMADIN) 2.5 MG tablet Take 1 tablet (2.5 mg) by mouth daily 90 tablet 1     triamterene-hydrochlorothiazide (MAXZIDE-25) 37.5-25 MG per tablet TAKE 1 TABLET BY MOUTH EVERY MORNING. 90 tablet 3     metoprolol (LOPRESSOR) 50 MG tablet Take 1 tablet (50 mg) by mouth 2 times daily 180 tablet 2     Potassium Chloride CR 8 MEQ CPCR Take 1 tablet by mouth daily as needed For leg cramps 60 capsule 5       ALLERGIES     Allergies   Allergen Reactions     Celebrex [Celecoxib] Hives     Hands itching then hives     Ibuprofen Swelling     Face swelling       PAST MEDICAL HISTORY:  Past Medical History:   Diagnosis Date     Arthritis      Atrial fibrillation (H)      Chest pain, unspecified     neg stress test, put on PPI- no help     Essential hypertension, benign     Hypertension, Benign     Irregular heart beat     afib      Obesity (BMI 30-39.9) 7/1/13    BMI 33.6     S/P ablation of atrial fibrillation 11/02/2017     Superficial thrombophlebitis      Thrombosis of leg      superficial not dvt's     Ventricular bigeminy     bigeminy- nrml LV fxn       PAST SURGICAL HISTORY:  Past Surgical History:   Procedure Laterality Date     ABDOMEN SURGERY       ARTHROPLASTY KNEE Right 3/2/2015    Procedure: ARTHROPLASTY KNEE;  Surgeon: Cuco Baugh MD;  Location: [facility] OR     C NONSPECIFIC PROCEDURE      left hand      CARDIAC SURGERY  1/15/13    cardiac ablation     [facility] AFF SURGICAL PATHOLOGY  1976    HGSIL     ORTHOPEDIC SURGERY      l tka  and wrist      TUBAL LIGATION      Uterine fibroids removed       FAMILY HISTORY:  Family History   Problem Relation Age of Onset     C.A.D. Paternal Grandmother      Hypertension Paternal Grandmother      C.A.D. Paternal Grandfather      Connective Tissue Disorder Paternal Grandfather      Hypertension Paternal Grandfather      C.A.D. Father      Hypertension Father      CANCER Maternal Grandmother      uterine     Colon Cancer Maternal Grandmother      CANCER Maternal Grandfather      leukemia     Other Cancer Maternal Grandfather      CANCER Mother      uterine     GASTROINTESTINAL DISEASE Mother      ulcers     Hypertension Mother      Other - See Comments Sister      essential tremors     C.A.D. Brother      stent- 47     Hypertension Brother      Depression Daughter      Eye Disorder Son      Hypertension Brother      Depression Daughter      Asthma Son        SOCIAL HISTORY:  Social History     Social History     Marital status:      Spouse name: N/A     Number of children: N/A     Years of education: N/A     Social History Main Topics     Smoking status: Never Smoker     Smokeless tobacco: Never Used     Alcohol use Yes      Comment: once a year     Drug use: No     Sexual activity: Yes     Partners: Male     Birth control/ protection: None     Other Topics Concern     Parent/Sibling W/ Cabg, Mi Or Angioplasty Before 65f 55m? Yes     Social History Narrative       Review of Systems:  Skin:  Negative     Eyes:  Negative    ENT:   "Negative    Respiratory:  Negative for shortness of breath;dyspnea on exertion;cough  Cardiovascular:  Negative for;palpitations;chest pain;dizziness;lightheadedness;edema;exercise intolerance;fatigue    Gastroenterology: Negative for heartburn;melena;hematochezia  Genitourinary:  Negative    Musculoskeletal:  Negative    Neurologic:  Negative    Psychiatric:  Negative    Heme/Lymph/Imm:  Negative    Endocrine:  Negative      Physical Exam:  Vitals: /74  Pulse 85  Ht 1.74 m (5' 8.5\")  Wt 108.4 kg (239 lb)  LMP 12/31/2012  BMI 35.81 kg/m2    Constitutional:  cooperative, alert and oriented, well developed, well nourished, in no acute distress overweight      Skin:  warm and dry to the touch        Head:  normocephalic, no masses or lesions        Eyes:  conjunctivae and lids unremarkable;sclera white;no xanthalasma;pupils equal and round        ENT:  no pallor or cyanosis, dentition good        Neck:           Chest:  normal breath sounds, clear to auscultation, normal A-P diameter, normal symmetry, normal respiratory excursion, no use of accessory muscles        Cardiac: regular rhythm;no murmurs, gallops or rubs detected                  Abdomen:  abdomen soft obese      Vascular: pulses full and equal   2+             2+             right femoral bruit (-)      Extremities and Back:  no deformities, clubbing, cyanosis, erythema observed;no edema        Neurological:  no gross motor deficits          Recent Lab Results:  LIPID RESULTS:  Lab Results   Component Value Date    CHOL 235 (H) 08/16/2017    HDL 46 (L) 08/16/2017     (H) 08/16/2017    TRIG 283 (H) 08/16/2017    CHOLHDLRATIO 4.9 07/24/2014       LIVER ENZYME RESULTS:  Lab Results   Component Value Date    AST 15 12/11/2014    ALT 27 12/11/2014     CBC RESULTS:  Lab Results   Component Value Date    WBC 4.9 11/02/2017    RBC 4.45 11/02/2017    HGB 13.7 11/02/2017    HCT 39.3 11/02/2017    MCV 88 11/02/2017    MCH 30.8 11/02/2017    MCHC " 34.9 11/02/2017    RDW 13.1 11/02/2017     11/02/2017     BMP RESULTS:  Lab Results   Component Value Date     11/02/2017    POTASSIUM 3.7 11/02/2017    CHLORIDE 100 11/02/2017    CO2 28 11/02/2017    ANIONGAP 10 11/02/2017    GLC 86 11/02/2017    BUN 12 11/02/2017    CR 0.87 11/02/2017    GFRESTIMATED 67 11/02/2017    GFRESTBLACK 81 11/02/2017    AVILA 9.0 11/02/2017      A1C RESULTS:  Lab Results   Component Value Date    A1C 5.5 08/08/2006     INR RESULTS:  Lab Results   Component Value Date    INR 2.3 (A) 11/07/2017    INR 1.81 (H) 11/03/2017    INR 1.55 (H) 11/02/2017     Thank you for allowing me to participate in the care of your patient.    Sincerely,     Lala Le PA-C     [facility]

## 2017-11-10 NOTE — PATIENT INSTRUCTIONS
1. Reviewed repeat ablation - things went well!    2. EKG today looks super!    3. Cardiac MRI showed normal pumping function and     4. OK to get back into exercise gradually! Stop if you feel your heart is going too fast or having issues with more skipped beats.    5. Keep track of BP - initially high but then dropped nicely

## 2017-11-22 ENCOUNTER — ANTICOAGULATION THERAPY VISIT (OUTPATIENT)
Dept: NURSING | Facility: CLINIC | Age: 62
End: 2017-11-22
Payer: MEDICARE

## 2017-11-22 DIAGNOSIS — I49.8 VENTRICULAR BIGEMINY: ICD-10-CM

## 2017-11-22 DIAGNOSIS — I48.91 ATRIAL FIBRILLATION (H): ICD-10-CM

## 2017-11-22 DIAGNOSIS — Z79.01 LONG-TERM (CURRENT) USE OF ANTICOAGULANTS: ICD-10-CM

## 2017-11-22 LAB — INR POINT OF CARE: 2.4 (ref 0.86–1.14)

## 2017-11-22 PROCEDURE — 36416 COLLJ CAPILLARY BLOOD SPEC: CPT

## 2017-11-22 PROCEDURE — 99207 ZZC NO CHARGE NURSE ONLY: CPT

## 2017-11-22 PROCEDURE — 85610 PROTHROMBIN TIME: CPT | Mod: QW

## 2017-11-22 NOTE — PROGRESS NOTES
ANTICOAGULATION FOLLOW-UP CLINIC VISIT    Patient Name:  Amparo Mccray  Date:  11/22/2017  Contact Type:  Face to Face    SUBJECTIVE:     Patient Findings     Positives No Problem Findings    Comments Recent ablation           OBJECTIVE    INR Protime   Date Value Ref Range Status   11/22/2017 2.4 (A) 0.86 - 1.14 Final       ASSESSMENT / PLAN  INR assessment THER    Recheck INR In: 4 WEEKS    INR Location Clinic      Anticoagulation Summary as of 11/22/2017     INR goal 2.0-3.0   Today's INR 2.4   Maintenance plan 1.25 mg (2.5 mg x 0.5) on Wed, Sat; 2.5 mg (2.5 mg x 1) all other days   Full instructions 1.25 mg on Wed, Sat; 2.5 mg all other days   Weekly total 15 mg   No change documented Willow Ellison RN   Plan last modified Willow Ellison RN (11/7/2017)   Next INR check 12/20/2017   Priority INR   Target end date     Indications   Long-term (current) use of anticoagulants [Z79.01] [Z79.01]  Atrial fibrillation (H) [I48.91]  Ventricular bigeminy [I49.9]         Anticoagulation Episode Summary     INR check location     Preferred lab     Send INR reminders to FM TRIAGE POOL    Comments       Anticoagulation Care Providers     Provider Role Specialty Phone number    Alan Almeida MD [facility] Internal Medicine [phone]            See the Encounter Report to view Anticoagulation Flowsheet and Dosing Calendar (Go to Encounters tab in chart review, and find the Anticoagulation Therapy Visit)      Willow Ellison RN

## 2017-11-22 NOTE — MR AVS SNAPSHOT
Amparo Whittenmicki Mccray   11/22/2017 10:30 AM   Anticoagulation Therapy Visit    Description:  62 year old female   Provider:  FM RN VISITS   Department:  Fm Nurse           INR as of 11/22/2017     Today's INR 2.4      Anticoagulation Summary as of 11/22/2017     INR goal 2.0-3.0   Today's INR 2.4   Full instructions 1.25 mg on Wed, Sat; 2.5 mg all other days   Next INR check 12/20/2017    Indications   Long-term (current) use of anticoagulants [Z79.01] [Z79.01]  Atrial fibrillation (H) [I48.91]  Ventricular bigeminy [I49.9]         Your next Anticoagulation Clinic appointment(s)     Dec 20, 2017 10:30 AM CST   Anticoagulation Visit with FM RN VISITS   [facility]    [address] [place] [address], [address]  [facility] [address] [phone]              Contact Numbers     Clinic Number:         November 2017 Details    Sun Mon Tue Wed Thu Fri Sat        1               2               3               4                 5               6               7               8               9               10               11                 12               13               14               15               16               17               18                 19               20               21               22      1.25 mg   See details      23      2.5 mg         24      2.5 mg         25      1.25 mg           26      2.5 mg         27      2.5 mg         28      2.5 mg         29      1.25 mg         30      2.5 mg            Date Details   11/22 This INR check               How to take your warfarin dose     To take:  1.25 mg Take 0.5 of a 2.5 mg tablet.    To take:  2.5 mg Take 1 of the 2.5 mg tablets.           December 2017 Details    Sun Mon Tue Wed Thu Fri Sat          1      2.5 mg         2      1.25 mg           3      2.5 mg         4      2.5 mg         5      2.5 mg         6      1.25 mg         7      2.5 mg         8      2.5 mg         9      1.25 mg            10      2.5 mg         11      2.5 mg         12      2.5 mg         13      1.25 mg         14      2.5 mg         15      2.5 mg         16      1.25 mg           17      2.5 mg         18      2.5 mg         19      2.5 mg         20            21               22               23                 24               25               26               27               28               29               30                 31                      Date Details   No additional details    Date of next INR:  12/20/2017         How to take your warfarin dose     To take:  1.25 mg Take 0.5 of a 2.5 mg tablet.    To take:  2.5 mg Take 1 of the 2.5 mg tablets.

## 2017-12-20 ENCOUNTER — ANTICOAGULATION THERAPY VISIT (OUTPATIENT)
Dept: NURSING | Facility: CLINIC | Age: 62
End: 2017-12-20
Payer: MEDICARE

## 2017-12-20 DIAGNOSIS — I49.8 VENTRICULAR BIGEMINY: ICD-10-CM

## 2017-12-20 DIAGNOSIS — Z23 NEED FOR PROPHYLACTIC VACCINATION AND INOCULATION AGAINST INFLUENZA: Primary | ICD-10-CM

## 2017-12-20 DIAGNOSIS — I48.91 ATRIAL FIBRILLATION (H): ICD-10-CM

## 2017-12-20 DIAGNOSIS — Z79.01 LONG-TERM (CURRENT) USE OF ANTICOAGULANTS: ICD-10-CM

## 2017-12-20 LAB — INR POINT OF CARE: 2.6 (ref 0.86–1.14)

## 2017-12-20 PROCEDURE — 85610 PROTHROMBIN TIME: CPT | Mod: QW

## 2017-12-20 PROCEDURE — 99207 ZZC NO CHARGE NURSE ONLY: CPT

## 2017-12-20 PROCEDURE — 36416 COLLJ CAPILLARY BLOOD SPEC: CPT

## 2017-12-20 PROCEDURE — G0008 ADMIN INFLUENZA VIRUS VAC: HCPCS

## 2017-12-20 PROCEDURE — 90686 IIV4 VACC NO PRSV 0.5 ML IM: CPT

## 2017-12-20 NOTE — MR AVS SNAPSHOT
Amparo Chan Shazia   12/20/2017 10:30 AM   Anticoagulation Therapy Visit    Description:  62 year old female   Provider:  FM RN VISITS   Department:  Fm Nurse           INR as of 12/20/2017     Today's INR 2.6      Anticoagulation Summary as of 12/20/2017     INR goal 2.0-3.0   Today's INR 2.6   Full instructions 1.25 mg on Wed, Sat; 2.5 mg all other days   Next INR check 1/25/2018    Indications   Long-term (current) use of anticoagulants [Z79.01] [Z79.01]  Atrial fibrillation (H) [I48.91]  Ventricular bigeminy [I49.9]         Your next Anticoagulation Clinic appointment(s)     Jan 25, 2018 10:30 AM CST   Anticoagulation Visit with FM RN VISITS   [facility] ([facility])    [address] [place] [address], [address]  [facility] [address] [phone]              Contact Numbers     Clinic Number:         December 2017 Details    Sun Mon Tue Wed Thu Fri Sat          1               2                 3               4               5               6               7               8               9                 10               11               12               13               14               15               16                 17               18               19               20      1.25 mg   See details      21      2.5 mg         22      2.5 mg         23      1.25 mg           24      2.5 mg         25      2.5 mg         26      2.5 mg         27      1.25 mg         28      2.5 mg         29      2.5 mg         30      1.25 mg           31      2.5 mg                Date Details   12/20 This INR check               How to take your warfarin dose     To take:  1.25 mg Take 0.5 of a 2.5 mg tablet.    To take:  2.5 mg Take 1 of the 2.5 mg tablets.           January 2018 Details    Sun Mon Tue Wed Thu Fri Sat      1      2.5 mg         2      2.5 mg         3      1.25 mg         4      2.5 mg         5      2.5 mg         6      1.25 mg           7      2.5 mg         8       2.5 mg         9      2.5 mg         10      1.25 mg         11      2.5 mg         12      2.5 mg         13      1.25 mg           14      2.5 mg         15      2.5 mg         16      2.5 mg         17      1.25 mg         18      2.5 mg         19      2.5 mg         20      1.25 mg           21      2.5 mg         22      2.5 mg         23      2.5 mg         24      1.25 mg         25            26               27                 28               29               30               31                   Date Details   No additional details    Date of next INR:  1/25/2018         How to take your warfarin dose     To take:  1.25 mg Take 0.5 of a 2.5 mg tablet.    To take:  2.5 mg Take 1 of the 2.5 mg tablets.

## 2017-12-20 NOTE — PROGRESS NOTES
ANTICOAGULATION FOLLOW-UP CLINIC VISIT    Patient Name:  Amparo Mccray  Date:  12/20/2017  Contact Type:  Face to Face    SUBJECTIVE:     Patient Findings     Positives No Problem Findings           OBJECTIVE    INR Protime   Date Value Ref Range Status   12/20/2017 2.6 (A) 0.86 - 1.14 Final       ASSESSMENT / PLAN  No question data found.  Anticoagulation Summary as of 12/20/2017     INR goal 2.0-3.0   Today's INR 2.6   Maintenance plan 1.25 mg (2.5 mg x 0.5) on Wed, Sat; 2.5 mg (2.5 mg x 1) all other days   Full instructions 1.25 mg on Wed, Sat; 2.5 mg all other days   Weekly total 15 mg   No change documented Willow Ellison RN   Plan last modified Willow Ellison RN (11/7/2017)   Next INR check 1/25/2018   Priority INR   Target end date     Indications   Long-term (current) use of anticoagulants [Z79.01] [Z79.01]  Atrial fibrillation (H) [I48.91]  Ventricular bigeminy [I49.9]         Anticoagulation Episode Summary     INR check location     Preferred lab     Send INR reminders to  TRIAGE POOL    Comments       Anticoagulation Care Providers     Provider Role Specialty Phone number    Alan Almeida MD [facility] Internal Medicine [phone]            See the Encounter Report to view Anticoagulation Flowsheet and Dosing Calendar (Go to Encounters tab in chart review, and find the Anticoagulation Therapy Visit)      Willow Ellison RN               Injectable Influenza Immunization Documentation    1.  Is the person to be vaccinated sick today?   No    2. Does the person to be vaccinated have an allergy to a component   of the vaccine?   No  Egg Allergy Algorithm Link    3. Has the person to be vaccinated ever had a serious reaction   to influenza vaccine in the past?   No    4. Has the person to be vaccinated ever had Guillain-Barré syndrome?   No    Form completed by patient.    Willow Ellison RN

## 2018-01-24 ENCOUNTER — OFFICE VISIT (OUTPATIENT)
Dept: CARDIOLOGY | Facility: CLINIC | Age: 63
End: 2018-01-24
Payer: MEDICARE

## 2018-01-24 VITALS
HEIGHT: 69 IN | WEIGHT: 240.5 LBS | DIASTOLIC BLOOD PRESSURE: 86 MMHG | HEART RATE: 66 BPM | SYSTOLIC BLOOD PRESSURE: 142 MMHG | BODY MASS INDEX: 35.62 KG/M2

## 2018-01-24 DIAGNOSIS — I48.0 PAROXYSMAL A-FIB (H): Primary | ICD-10-CM

## 2018-01-24 PROCEDURE — 93000 ELECTROCARDIOGRAM COMPLETE: CPT | Performed by: INTERNAL MEDICINE

## 2018-01-24 PROCEDURE — 99213 OFFICE O/P EST LOW 20 MIN: CPT | Mod: 25 | Performed by: INTERNAL MEDICINE

## 2018-01-24 NOTE — MR AVS SNAPSHOT
After Visit Summary   1/24/2018    Amparo Mccray    MRN: [id number]           Patient Information     Date Of Birth          1955        Visit Information        Provider Department      1/24/2018 3:15 PM Wilbert Tohmas MD [facility]        Today's Diagnoses     Paroxysmal a-fib (H)    -  1       Follow-ups after your visit        Additional Services     Follow-Up with Cardiac Advanced Practice Provider                 Your next 10 appointments already scheduled     Jan 25, 2018 10:30 AM CST   Anticoagulation Visit with FM RN VISITS   [facility]    [address] [place] [address], [address]  [facility] [address] [phone]              Future tests that were ordered for you today     Open Future Orders        Priority Expected Expires Ordered    Follow-Up with Cardiac Advanced Practice Provider Routine 4/24/2018 1/24/2019 1/24/2018            Who to contact     If you have questions or need follow up information about today's clinic visit or your schedule please contact [facility] directly at [phone].  Normal or non-critical lab and imaging results will be communicated to you by [employer] MXRhart, letter or phone within 4 business days after the clinic has received the results. If you do not hear from us within 7 days, please contact the clinic through [employer] MXRhart or phone. If you have a critical or abnormal lab result, we will notify you by phone as soon as possible.  Submit refill requests through Get10 or call your pharmacy and they will forward the refill request to us. Please allow 3 business days for your refill to be completed.          Additional Information About Your Visit        MyChart Information     Get10 gives you secure access to your electronic health record. If you see a primary care provider, you can also send messages to your care team and make  "appointments. If you have questions, please call your primary care clinic.  If you do not have a primary care provider, please call [phone] and they will assist you.        Care EveryWhere ID     This is your Care EveryWhere ID. This could be used by other organizations to access your [facility] medical records  [id number]        Your Vitals Were     Pulse Height Last Period BMI (Body Mass Index)          66 1.74 m (5' 8.5\") 12/31/2012 36.03 kg/m2         Blood Pressure from Last 3 Encounters:   01/24/18 142/86   11/10/17 124/74   11/03/17 126/67    Weight from Last 3 Encounters:   01/24/18 109.1 kg (240 lb 8 oz)   11/10/17 108.4 kg (239 lb)   11/03/17 109.7 kg (241 lb 14.4 oz)              We Performed the Following     EKG 12-lead complete w/read - Clinics (performed today)        Primary Care Provider Office Phone # Fax #    Alan Almeida [phone]       [address] [place] [phone]        Equal Access to Services     [facility] : Hadii aad ku hadasho Sochristianaali, waaxda luqadaha, qaybta kaalmada adetasha, hardeep myles . So [facility] [phone].    ATENCIÓN: Si habla español, tiene a reyes disposición servicios gratuitos de asistencia lingüística. [facility] [phone].    We comply with applicable federal civil rights laws and [place] laws. We do not discriminate on the basis of race, color, national origin, age, disability, sex, sexual orientation, or gender identity.            Thank you!     Thank you for choosing [facility]  for your care. Our goal is always to provide you with excellent care. Hearing back from our patients is one way we can continue to improve our services. Please take a few minutes to complete the written survey that you may receive in the mail after your visit with us. Thank you!             Your Updated Medication List - Protect others around you: Learn how to safely use, store and " throw away your medicines at www.disposemymeds.org.          This list is accurate as of 1/24/18  3:26 PM.  Always use your most recent med list.                   Brand Name Dispense Instructions for use Diagnosis    metoprolol tartrate 50 MG tablet    LOPRESSOR    180 tablet    Take 1 tablet (50 mg) by mouth 2 times daily    Essential hypertension, benign       Potassium Chloride CR 8 MEQ Cpcr     60 capsule    Take 1 tablet by mouth daily as needed For leg cramps    Hypokalemia       triamterene-hydrochlorothiazide 37.5-25 MG per tablet    MAXZIDE-25    90 tablet    TAKE 1 TABLET BY MOUTH EVERY MORNING.    Essential hypertension, benign       warfarin 2.5 MG tablet    COUMADIN    90 tablet    Take 1 tablet (2.5 mg) by mouth daily    Ventricular bigeminy, Long-term (current) use of anticoagulants, Paroxysmal atrial fibrillation (H)

## 2018-01-24 NOTE — PROGRESS NOTES
HPI and Plan:   See dictation  [id number]  Orders Placed This Encounter   Procedures     Follow-Up with Cardiac Advanced Practice Provider     EKG 12-lead complete w/read - Clinics (performed today)       No orders of the defined types were placed in this encounter.      There are no discontinued medications.      Encounter Diagnosis   Name Primary?     Paroxysmal a-fib (H) Yes       CURRENT MEDICATIONS:  Current Outpatient Prescriptions   Medication Sig Dispense Refill     warfarin (COUMADIN) 2.5 MG tablet Take 1 tablet (2.5 mg) by mouth daily 90 tablet 1     triamterene-hydrochlorothiazide (MAXZIDE-25) 37.5-25 MG per tablet TAKE 1 TABLET BY MOUTH EVERY MORNING. 90 tablet 3     metoprolol (LOPRESSOR) 50 MG tablet Take 1 tablet (50 mg) by mouth 2 times daily 180 tablet 2     Potassium Chloride CR 8 MEQ CPCR Take 1 tablet by mouth daily as needed For leg cramps 60 capsule 5       ALLERGIES     Allergies   Allergen Reactions     Celebrex [Celecoxib] Hives     Hands itching then hives     Ibuprofen Swelling     Face swelling       PAST MEDICAL HISTORY:  Past Medical History:   Diagnosis Date     Arthritis      Chest pain, unspecified     neg stress test, put on PPI- no help     Essential hypertension, benign     Hypertension, Benign     Obesity (BMI 30-39.9) 7/1/13    BMI 33.6     Paroxysmal atrial fibrillation (H) 03/2010     Superficial thrombophlebitis      Thrombosis of leg     superficial not dvt's     Ventricular bigeminy     bigeminy- nrml LV fxn       PAST SURGICAL HISTORY:  Past Surgical History:   Procedure Laterality Date     ABDOMEN SURGERY       ARTHROPLASTY KNEE Right 3/2/2015    Procedure: ARTHROPLASTY KNEE;  Surgeon: Cuco Baugh MD;  Location: [facility] OR     C NONSPECIFIC PROCEDURE      left hand      [facility] AFF SURGICAL PATHOLOGY  1976    HGSIL     H ABLATION FOCAL AFIB  11/02/2017     H ABLATION FOCAL AFIB  01/15/2013     ORTHOPEDIC SURGERY      l tka  and wrist      TUBAL LIGATION      Uterine  "fibroids removed       FAMILY HISTORY:  Family History   Problem Relation Age of Onset     C.A.JEWELL Paternal Grandmother      Hypertension Paternal Grandmother      CTeraARAKESH Paternal Grandfather      Connective Tissue Disorder Paternal Grandfather      Hypertension Paternal Grandfather      [place] Father      Hypertension Father      CANCER Maternal Grandmother      uterine     Colon Cancer Maternal Grandmother      CANCER Maternal Grandfather      leukemia     Other Cancer Maternal Grandfather      CANCER Mother      uterine     GASTROINTESTINAL DISEASE Mother      ulcers     Hypertension Mother      Other - See Comments Sister      essential tremors     C.A.JEWELL Brother      stent- 47     Hypertension Brother      Depression Daughter      Eye Disorder Son      Hypertension Brother      Depression Daughter      Asthma Son        SOCIAL HISTORY:  Social History     Social History     Marital status:      Spouse name: N/A     Number of children: N/A     Years of education: N/A     Social History Main Topics     Smoking status: Never Smoker     Smokeless tobacco: Never Used     Alcohol use Yes      Comment: once a year     Drug use: No     Sexual activity: Yes     Partners: Male     Birth control/ protection: None     Other Topics Concern     Parent/Sibling W/ Cabg, Mi Or Angioplasty Before 65f 55m? Yes     Social History Narrative       Review of Systems:  Skin:  Negative       Eyes:  Negative      ENT:  Negative      Respiratory:  Negative for shortness of breath;dyspnea on exertion;cough     Cardiovascular:  Negative for;palpitations;chest pain;dizziness;lightheadedness;edema;exercise intolerance;fatigue      Gastroenterology: Negative for heartburn;melena;hematochezia    Genitourinary:  Negative      Musculoskeletal:  Negative      Neurologic:  Negative      Psychiatric:  Negative      Heme/Lymph/Imm:  Negative      Endocrine:  Negative        Physical Exam:  Vitals: /86  Pulse 66  Ht 1.74 m (5' 8.5\")  " Wt 109.1 kg (240 lb 8 oz)  LMP 12/31/2012  BMI 36.03 kg/m2    Constitutional:  cooperative, alert and oriented, well developed, well nourished, in no acute distress        Skin:  warm and dry to the touch, no apparent skin lesions or masses noted          Head:  normocephalic, no masses or lesions        Eyes:  pupils equal and round, conjunctivae and lids unremarkable, sclera white, no xanthalasma, EOMS intact, no nystagmus        Lymph:No Cervical lymphadenopathy present     ENT:           Neck:  carotid pulses are full and equal bilaterally, JVP normal, no carotid bruit        Respiratory:  normal breath sounds, clear to auscultation, normal A-P diameter, normal symmetry, normal respiratory excursion, no use of accessory muscles         Cardiac: regular rhythm, normal S1/S2, no S3 or S4, apical impulse not displaced, no murmurs, gallops or rubs                pulses full and equal, no bruits auscultated                                        GI:  abdomen soft, non-tender, BS normoactive, no mass, no HSM, no bruits        Extremities and Muscular Skeletal:  no deformities, clubbing, cyanosis, erythema observed              Neurological:  no gross motor deficits        Psych:  Alert and Oriented x 3        CC  Alan Almeida MD  [address] [place] [phone]

## 2018-01-24 NOTE — LETTER
1/24/2018      Alan Almeida MD  [address] [facility] [address]      RE: Amparo Mccray       Dear Colleague,    I had the pleasure of seeing Amparo Mccray in the [facility].    Service Date: 01/24/2018      HISTORY OF PRESENT ILLNESS:  Thank you for allowing me to participate in the care of your delightful patient.  As you know, Amparo is a 62-year-old female with a history of symptomatic paroxysmal atrial fibrillation refractory to flecainide and therefore underwent a catheter-based ablation initially more than 4 years ago.  She was doing quite well until this past year, when she had more episodes of similar palpitations.  She is very sensitive when she is in atrial fibrillation.  This was documented by an marah called [employer], which is actually quite accurate.  As she had tried atrial fibrillation ablation in the past without success, I recommended a repeat procedure which was performed 11/02 of last year.  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  I re-isolated these veins along with isolation of accessory pulmonary veins that we did not find in the past near the right inferior pulmonary vein.  Additionally, I also re-ablated the reconnected CTI.  The SVC was isolated from previously as well.      Since then, she has been doing quite well, free of any palpitations.  Overall, she is quite pleased with the results so far.  I told Amparo, unfortunately, it is still too early to know and I will wait for at least another 3 months to reassess.  I will have her continue warfarin at this point in time and may have another reassessment in about 3 months.  If she is doing quite well at that time, we may consider having her off warfarin at that time, but preferably I would like her to be on it at least a year.     Outpatient Encounter Prescriptions as of 1/24/2018   Medication Sig  Dispense Refill     warfarin (COUMADIN) 2.5 MG tablet Take 1 tablet (2.5 mg) by mouth daily 90 tablet 1     triamterene-hydrochlorothiazide (MAXZIDE-25) 37.5-25 MG per tablet TAKE 1 TABLET BY MOUTH EVERY MORNING. 90 tablet 3     metoprolol (LOPRESSOR) 50 MG tablet Take 1 tablet (50 mg) by mouth 2 times daily 180 tablet 2     Potassium Chloride CR 8 MEQ CPCR Take 1 tablet by mouth daily as needed For leg cramps 60 capsule 5     No facility-administered encounter medications on file as of 1/24/2018.        Again, thank you for allowing me to participate in the care of your patient.      Sincerely,    Wilbert Hernandez MD     [facility]

## 2018-01-25 ENCOUNTER — ANTICOAGULATION THERAPY VISIT (OUTPATIENT)
Dept: NURSING | Facility: CLINIC | Age: 63
End: 2018-01-25
Payer: MEDICARE

## 2018-01-25 DIAGNOSIS — I49.8 VENTRICULAR BIGEMINY: ICD-10-CM

## 2018-01-25 DIAGNOSIS — I48.91 ATRIAL FIBRILLATION (H): ICD-10-CM

## 2018-01-25 DIAGNOSIS — Z79.01 LONG-TERM (CURRENT) USE OF ANTICOAGULANTS: ICD-10-CM

## 2018-01-25 LAB — INR POINT OF CARE: 2.8 (ref 0.86–1.14)

## 2018-01-25 PROCEDURE — 36416 COLLJ CAPILLARY BLOOD SPEC: CPT

## 2018-01-25 PROCEDURE — 99207 ZZC NO CHARGE NURSE ONLY: CPT

## 2018-01-25 PROCEDURE — 85610 PROTHROMBIN TIME: CPT | Mod: QW

## 2018-01-25 NOTE — MR AVS SNAPSHOT
Amparo Whittenmicki Mccray   1/25/2018 10:30 AM   Anticoagulation Therapy Visit    Description:  62 year old female   Provider:  FM RN VISITS   Department:  Fm Nurse           INR as of 1/25/2018     Today's INR 2.8      Anticoagulation Summary as of 1/25/2018     INR goal 2.0-3.0   Today's INR 2.8   Full instructions 1.25 mg on Wed, Sat; 2.5 mg all other days   Next INR check 3/22/2018    Indications   Long-term (current) use of anticoagulants [Z79.01] [Z79.01]  Atrial fibrillation (H) [I48.91]  Ventricular bigeminy [I49.9]         Your next Anticoagulation Clinic appointment(s)     Mar 22, 2018 10:30 AM CDT   [facility] with FM RN VISITS   [facility]    [address] [place] [address], [address]  [facility] [address] [phone]              Contact Numbers     Clinic Number:         January 2018 Details    Sun Mon Tue Wed Thu Fri Sat      1               2               3               4               5               6                 7               8               9               10               11               12               13                 14               15               16               17               18               19               20                 21               22               23               24               25      2.5 mg   See details      26      2.5 mg         27      1.25 mg           28      2.5 mg         29      2.5 mg         30      2.5 mg         31      1.25 mg             Date Details   01/25 This INR check               How to take your warfarin dose     To take:  1.25 mg Take 0.5 of a 2.5 mg tablet.    To take:  2.5 mg Take 1 of the 2.5 mg tablets.           February 2018 Details    Sun Mon Tue Wed Thu Fri Sat         1      2.5 mg         2      2.5 mg         3      1.25 mg           4      2.5 mg         5      2.5 mg         6      2.5 mg         7      1.25 mg         8      2.5 mg         9      2.5 mg         10       1.25 mg           11      2.5 mg         12      2.5 mg         13      2.5 mg         14      1.25 mg         15      2.5 mg         16      2.5 mg         17      1.25 mg           18      2.5 mg         19      2.5 mg         20      2.5 mg         21      1.25 mg         22      2.5 mg         23      2.5 mg         24      1.25 mg           25      2.5 mg         26      2.5 mg         27      2.5 mg         28      1.25 mg             Date Details   No additional details            How to take your warfarin dose     To take:  1.25 mg Take 0.5 of a 2.5 mg tablet.    To take:  2.5 mg Take 1 of the 2.5 mg tablets.           March 2018 Details    Sun Mon Tue Wed Thu Fri Sat         1      2.5 mg         2      2.5 mg         3      1.25 mg           4      2.5 mg         5      2.5 mg         6      2.5 mg         7      1.25 mg         8      2.5 mg         9      2.5 mg         10      1.25 mg           11      2.5 mg         12      2.5 mg         13      2.5 mg         14      1.25 mg         15      2.5 mg         16      2.5 mg         17      1.25 mg           18      2.5 mg         19      2.5 mg         20      2.5 mg         21      1.25 mg         22            23               24                 25               26               27               28               29               30               31                Date Details   No additional details    Date of next INR:  3/22/2018         How to take your warfarin dose     To take:  1.25 mg Take 0.5 of a 2.5 mg tablet.    To take:  2.5 mg Take 1 of the 2.5 mg tablets.

## 2018-01-25 NOTE — PROGRESS NOTES
Service Date: 2018      HISTORY OF PRESENT ILLNESS:  Thank you for allowing me to participate in the care of your delightful patient.  As you know, Dany is a 62-year-old female with a history of symptomatic paroxysmal atrial fibrillation refractory to flecainide and therefore underwent a catheter-based ablation initially more than 4 years ago.  She was doing quite well until this past year, when she had more episodes of similar palpitations.  She is very sensitive when she is in atrial fibrillation.  This was documented by an marah called [employer], which is actually quite accurate.  As she had tried atrial fibrillation ablation in the past without success, I recommended a repeat procedure which was performed  of last year.  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  I re-isolated these veins along with isolation of accessory pulmonary veins that we did not find in the past near the right inferior pulmonary vein.  Additionally, I also re-ablated the reconnected CTI.  The SVC was isolated from previously as well.      Since then, she has been doing quite well, free of any palpitations.  Overall, she is quite pleased with the results so far.  I told Dany, unfortunately, it is still too early to know and I will wait for at least another 3 months to reassess.  I will have her continue warfarin at this point in time and may have another reassessment in about 3 months.  If she is doing quite well at that time, we may consider having her off warfarin at that time, but preferably I would like her to be on it at least a year.      cc:   Alan Almeida MD    [address]  [address]         JAROD MURRAY MD             D: 2018   T: 2018   MT: MERLY      Name:     DANY SHELBY   MRN:      [id number]        Account:      [id number]   :      1955           Service  Date: 01/24/2018      Document: [id number]

## 2018-01-25 NOTE — PROGRESS NOTES
ANTICOAGULATION FOLLOW-UP CLINIC VISIT    Patient Name:  Amparo Mccray  Date:  1/25/2018  Contact Type:  Face to Face    SUBJECTIVE:     Patient Findings     Positives No Problem Findings           OBJECTIVE    INR Protime   Date Value Ref Range Status   01/25/2018 2.8 (A) 0.86 - 1.14 Final       ASSESSMENT / PLAN  INR assessment THER    Recheck INR In: 8 WEEKS    INR Location Clinic      Anticoagulation Summary as of 1/25/2018     INR goal 2.0-3.0   Today's INR 2.8   Maintenance plan 1.25 mg (2.5 mg x 0.5) on Wed, Sat; 2.5 mg (2.5 mg x 1) all other days   Full instructions 1.25 mg on Wed, Sat; 2.5 mg all other days   Weekly total 15 mg   No change documented Willow Ellison RN   Plan last modified Willow Ellison RN (11/7/2017)   Next INR check 3/22/2018   Priority INR   Target end date     Indications   Long-term (current) use of anticoagulants [Z79.01] [Z79.01]  Atrial fibrillation (H) [I48.91]  Ventricular bigeminy [I49.9]         Anticoagulation Episode Summary     INR check location     Preferred lab     Send INR reminders to FM TRIAGE POOL    Comments       Anticoagulation Care Providers     Provider Role Specialty Phone number    Alan Almeida MD [facility] Internal Medicine [phone]            See the Encounter Report to view Anticoagulation Flowsheet and Dosing Calendar (Go to Encounters tab in chart review, and find the Anticoagulation Therapy Visit)        Willow Ellison RN

## 2018-03-04 DIAGNOSIS — I49.8 VENTRICULAR BIGEMINY: ICD-10-CM

## 2018-03-04 DIAGNOSIS — I48.0 PAROXYSMAL ATRIAL FIBRILLATION (H): ICD-10-CM

## 2018-03-04 DIAGNOSIS — Z79.01 LONG-TERM (CURRENT) USE OF ANTICOAGULANTS: ICD-10-CM

## 2018-03-05 RX ORDER — WARFARIN SODIUM 2.5 MG/1
TABLET ORAL
Qty: 90 TABLET | Refills: 0 | Status: SHIPPED | OUTPATIENT
Start: 2018-03-05 | End: 2018-06-04

## 2018-03-05 NOTE — TELEPHONE ENCOUNTER
"Requested Prescriptions   Pending Prescriptions Disp Refills     warfarin (COUMADIN) 2.5 MG tablet [Pharmacy Med Name: WARFARIN SOD 2.5MG TABLETS (GREEN)] 90 tablet 0      Last Written Prescription Date:  08/23/17  Last Fill Quantity: 90,  # refills: 1   Last office visit: 8/16/2017 with prescribing provider:  08/16/17   Future Office Visit:  0 Sig: TAKE 1 TABLET BY MOUTH DAILY    Vitamin K Antagonists Failed    3/4/2018 12:04 PM       Failed - INR is within goal in the past 6 weeks    Confirm INR is within goal in the past 6 weeks.     Recent Labs   Lab Test 01/25/18   INR  2.8*                      Passed - Recent (12 mo) or future (30 days) visit within the authorizing provider's specialty    Patient had office visit in the last year or has a visit in the next 30 days with authorizing provider.  See \"Patient Info\" tab in inbasket, or \"Choose Columns\" in Meds & Orders section of the refill encounter.            Passed - Patient is 18 years of age or older       Passed - Patient is not pregnant       Passed - No positive pregnancy on file in past 12 months        "

## 2018-03-06 NOTE — TELEPHONE ENCOUNTER
monica  Last MD visit 8/25/2017  Lat INR 1/25/18  Last refill 8/16/2017 #90 1 RF    Prescription approved per [facility] Refill Protocol.

## 2018-03-21 ENCOUNTER — ANTICOAGULATION THERAPY VISIT (OUTPATIENT)
Dept: NURSING | Facility: CLINIC | Age: 63
End: 2018-03-21
Payer: MEDICARE

## 2018-03-21 DIAGNOSIS — Z79.01 LONG-TERM (CURRENT) USE OF ANTICOAGULANTS: ICD-10-CM

## 2018-03-21 DIAGNOSIS — I48.91 ATRIAL FIBRILLATION (H): ICD-10-CM

## 2018-03-21 DIAGNOSIS — I49.8 VENTRICULAR BIGEMINY: ICD-10-CM

## 2018-03-21 LAB — INR POINT OF CARE: 2.5 (ref 0.86–1.14)

## 2018-03-21 PROCEDURE — 99207 ZZC NO CHARGE NURSE ONLY: CPT

## 2018-03-21 PROCEDURE — 85610 PROTHROMBIN TIME: CPT | Mod: QW

## 2018-03-21 PROCEDURE — 36416 COLLJ CAPILLARY BLOOD SPEC: CPT

## 2018-03-21 NOTE — MR AVS SNAPSHOT
Amparo Tarangotreasure   3/21/2018 10:30 AM   Anticoagulation Therapy Visit    Description:  62 year old female   Provider:  FM RN VISITS   Department:  Fm Nurse           INR as of 3/21/2018     Today's INR 2.5      Anticoagulation Summary as of 3/21/2018     INR goal 2.0-3.0   Today's INR 2.5   Full instructions 1.25 mg on Wed, Sat; 2.5 mg all other days   Next INR check 5/16/2018    Indications   Long-term (current) use of anticoagulants [Z79.01] [Z79.01]  Atrial fibrillation (H) [I48.91]  Ventricular bigeminy [I49.9]         Your next Anticoagulation Clinic appointment(s)     May 16, 2018 10:30 AM CDT   [facility]ticoagulation Visit with FM RN VISITS   [facility]    [address] [place] [address], [address]  [facility] [address] [phone]              Contact Numbers     Clinic Number:         March 2018 Details    Sun Mon Tue Wed Thu Fri Sat         1               2               3                 4               5               6               7               8               9               10                 11               12               13               14               15               16               17                 18               19               20               21      1.25 mg   See details      22      2.5 mg         23      2.5 mg         24      1.25 mg           25      2.5 mg         26      2.5 mg         27      2.5 mg         28      1.25 mg         29      2.5 mg         30      2.5 mg         31      1.25 mg          Date Details   03/21 This INR check               How to take your warfarin dose     To take:  1.25 mg Take 0.5 of a 2.5 mg tablet.    To take:  2.5 mg Take 1 of the 2.5 mg tablets.           April 2018 Details    Sun Mon Tue Wed Thu Fri Sat     1      2.5 mg         2      2.5 mg         3      2.5 mg         4      1.25 mg         5      2.5 mg         6      2.5 mg         7      1.25 mg           8      2.5 mg         9       2.5 mg         10      2.5 mg         11      1.25 mg         12      2.5 mg         13      2.5 mg         14      1.25 mg           15      2.5 mg         16      2.5 mg         17      2.5 mg         18      1.25 mg         19      2.5 mg         20      2.5 mg         21      1.25 mg           22      2.5 mg         23      2.5 mg         24      2.5 mg         25      1.25 mg         26      2.5 mg         27      2.5 mg         28      1.25 mg           29      2.5 mg         30      2.5 mg               Date Details   No additional details            How to take your warfarin dose     To take:  1.25 mg Take 0.5 of a 2.5 mg tablet.    To take:  2.5 mg Take 1 of the 2.5 mg tablets.           May 2018 Details    Sun Mon Tue Wed Thu Fri Sat       1      2.5 mg         2      1.25 mg         3      2.5 mg         4      2.5 mg         5      1.25 mg           6      2.5 mg         7      2.5 mg         8      2.5 mg         9      1.25 mg         10      2.5 mg         11      2.5 mg         12      1.25 mg           13      2.5 mg         14      2.5 mg         15      2.5 mg         16            17               18               19                 20               21               22               23               24               25               26                 27               28               29               30               31                  Date Details   No additional details    Date of next INR:  5/16/2018         How to take your warfarin dose     To take:  1.25 mg Take 0.5 of a 2.5 mg tablet.    To take:  2.5 mg Take 1 of the 2.5 mg tablets.

## 2018-03-21 NOTE — PROGRESS NOTES
ANTICOAGULATION FOLLOW-UP CLINIC VISIT    Patient Name:  Amparo Mccray  Date:  3/21/2018  Contact Type:  Face to Face    SUBJECTIVE:     Patient Findings     Positives No Problem Findings           OBJECTIVE    INR Protime   Date Value Ref Range Status   03/21/2018 2.5 (A) 0.86 - 1.14 Final       ASSESSMENT / PLAN  No question data found.  Anticoagulation Summary as of 3/21/2018     INR goal 2.0-3.0   Today's INR 2.5   Maintenance plan 1.25 mg (2.5 mg x 0.5) on Wed, Sat; 2.5 mg (2.5 mg x 1) all other days   Full instructions 1.25 mg on Wed, Sat; 2.5 mg all other days   Weekly total 15 mg   No change documented Willow Ellison RN   Plan last modified Willow Ellison RN (11/7/2017)   Next INR check 5/16/2018   Priority INR   Target end date     Indications   Long-term (current) use of anticoagulants [Z79.01] [Z79.01]  Atrial fibrillation (H) [I48.91]  Ventricular bigeminy [I49.9]         Anticoagulation Episode Summary     INR check location     Preferred lab     Send INR reminders to  TRIAGE POOL    Comments       Anticoagulation Care Providers     Provider Role Specialty Phone number    Alan Almeida MD [facility] Internal Medicine [phone]            See the Encounter Report to view Anticoagulation Flowsheet and Dosing Calendar (Go to Encounters tab in chart review, and find the Anticoagulation Therapy Visit)        Willow Ellison RN

## 2018-05-18 ENCOUNTER — ANTICOAGULATION THERAPY VISIT (OUTPATIENT)
Dept: NURSING | Facility: CLINIC | Age: 63
End: 2018-05-18
Payer: MEDICARE

## 2018-05-18 DIAGNOSIS — Z79.01 LONG-TERM (CURRENT) USE OF ANTICOAGULANTS: ICD-10-CM

## 2018-05-18 DIAGNOSIS — I48.91 ATRIAL FIBRILLATION (H): ICD-10-CM

## 2018-05-18 DIAGNOSIS — I49.8 VENTRICULAR BIGEMINY: ICD-10-CM

## 2018-05-18 LAB — INR POINT OF CARE: 2.2 (ref 0.86–1.14)

## 2018-05-18 PROCEDURE — 99207 ZZC NO CHARGE NURSE ONLY: CPT

## 2018-05-18 PROCEDURE — 36416 COLLJ CAPILLARY BLOOD SPEC: CPT

## 2018-05-18 PROCEDURE — 85610 PROTHROMBIN TIME: CPT | Mod: QW

## 2018-05-18 NOTE — MR AVS SNAPSHOT
Amparo Tarangotreasure   5/18/2018 10:30 AM   Anticoagulation Therapy Visit    Description:  62 year old female   Provider:  FM RN VISITS   Department:  Fm Nurse           INR as of 5/18/2018     Today's INR 2.2      Anticoagulation Summary as of 5/18/2018     INR goal 2.0-3.0   Today's INR 2.2   Full instructions 1.25 mg on Wed, Sat; 2.5 mg all other days   Next INR check 7/12/2018    Indications   Long-term (current) use of anticoagulants [Z79.01] [Z79.01]  Atrial fibrillation (H) [I48.91]  Ventricular bigeminy [I49.9]         Your next Anticoagulation Clinic appointment(s)     Jul 12, 2018 10:30 AM CDT   Anticoagulation Visit with FM RN VISITS   [facility]    [address] [place] [address], [address]  [facility] [address] [phone]              Contact Numbers     Clinic Number:         May 2018 Details    Sun Mon Tue Wed Thu Fri Sat       1               2               3               4               5                 6               7               8               9               10               11               12                 13               14               15               16               17               18      2.5 mg   See details      19      1.25 mg           20      2.5 mg         21      2.5 mg         22      2.5 mg         23      1.25 mg         24      2.5 mg         25      2.5 mg         26      1.25 mg           27      2.5 mg         28      2.5 mg         29      2.5 mg         30      1.25 mg         31      2.5 mg            Date Details   05/18 This INR check               How to take your warfarin dose     To take:  1.25 mg Take 0.5 of a 2.5 mg tablet.    To take:  2.5 mg Take 1 of the 2.5 mg tablets.           June 2018 Details    Sun Mon Tue Wed Thu Fri Sat          1      2.5 mg         2      1.25 mg           3      2.5 mg         4      2.5 mg         5      2.5 mg         6      1.25 mg         7      2.5 mg         8       2.5 mg         9      1.25 mg           10      2.5 mg         11      2.5 mg         12      2.5 mg         13      1.25 mg         14      2.5 mg         15      2.5 mg         16      1.25 mg           17      2.5 mg         18      2.5 mg         19      2.5 mg         20      1.25 mg         21      2.5 mg         22      2.5 mg         23      1.25 mg           24      2.5 mg         25      2.5 mg         26      2.5 mg         27      1.25 mg         28      2.5 mg         29      2.5 mg         30      1.25 mg          Date Details   No additional details            How to take your warfarin dose     To take:  1.25 mg Take 0.5 of a 2.5 mg tablet.    To take:  2.5 mg Take 1 of the 2.5 mg tablets.           July 2018 Details    Sun Mon Tue Wed Thu Fri Sat     1      2.5 mg         2      2.5 mg         3      2.5 mg         4      1.25 mg         5      2.5 mg         6      2.5 mg         7      1.25 mg           8      2.5 mg         9      2.5 mg         10      2.5 mg         11      1.25 mg         12            13               14                 15               16               17               18               19               20               21                 22               23               24               25               26               27               28                 29               30               31                    Date Details   No additional details    Date of next INR:  7/12/2018         How to take your warfarin dose     To take:  1.25 mg Take 0.5 of a 2.5 mg tablet.    To take:  2.5 mg Take 1 of the 2.5 mg tablets.

## 2018-05-18 NOTE — PROGRESS NOTES
ANTICOAGULATION FOLLOW-UP CLINIC VISIT    Patient Name:  Amparo Mccray  Date:  5/18/2018  Contact Type:  Face to Face    SUBJECTIVE:        OBJECTIVE    INR Protime   Date Value Ref Range Status   05/18/2018 2.2 (A) 0.86 - 1.14 Final       ASSESSMENT / PLAN  No question data found.  Anticoagulation Summary as of 5/18/2018     INR goal 2.0-3.0   Today's INR 2.2   Maintenance plan 1.25 mg (2.5 mg x 0.5) on Wed, Sat; 2.5 mg (2.5 mg x 1) all other days   Full instructions 1.25 mg on Wed, Sat; 2.5 mg all other days   Weekly total 15 mg   No change documented Willow Ellison RN   Plan last modified Willow Ellison RN (11/7/2017)   Next INR check 7/12/2018   Priority INR   Target end date     Indications   Long-term (current) use of anticoagulants [Z79.01] [Z79.01]  Atrial fibrillation (H) [I48.91]  Ventricular bigeminy [I49.9]         Anticoagulation Episode Summary     INR check location     Preferred lab     Send INR reminders to  TRIAGE POOL    Comments       Anticoagulation Care Providers     Provider Role Specialty Phone number    Alan Almeida MD Responsible Internal Medicine [phone]            See the Encounter Report to view Anticoagulation Flowsheet and Dosing Calendar (Go to Encounters tab in chart review, and find the Anticoagulation Therapy Visit)        Willow Ellison RN

## 2018-06-04 DIAGNOSIS — I49.8 VENTRICULAR BIGEMINY: ICD-10-CM

## 2018-06-04 DIAGNOSIS — Z79.01 LONG-TERM (CURRENT) USE OF ANTICOAGULANTS: ICD-10-CM

## 2018-06-04 DIAGNOSIS — I10 ESSENTIAL HYPERTENSION, BENIGN: ICD-10-CM

## 2018-06-04 DIAGNOSIS — I48.0 PAROXYSMAL ATRIAL FIBRILLATION (H): ICD-10-CM

## 2018-06-04 NOTE — LETTER
[facility]  [address] [address]  [phone]            Amparo Chan Shazia  [address] [place] [address]        June 6, 2018    Dear Amparo,    While refilling your metoprolol tartrate (LOPRESSOR) 50 MG tablet prescription today, we noticed that you are due for an appointment with your provider.  We will refill your prescription for 30 days, but a follow-up appointment must be made before any additional refills can be approved.     Taking care of your health is important to us and we look forward to seeing you in the near future.  Please call us at [phone] or [phone] (or use Wordlock) to schedule an appointment.     Please disregard this notice if you have already made an appointment.    Sincerely,        [facility]

## 2018-06-05 RX ORDER — METOPROLOL TARTRATE 50 MG
TABLET ORAL
Qty: 60 TABLET | Refills: 0 | Status: SHIPPED | OUTPATIENT
Start: 2018-06-05 | End: 2018-06-25

## 2018-06-05 RX ORDER — WARFARIN SODIUM 2.5 MG/1
TABLET ORAL
Qty: 90 TABLET | Refills: 0 | Status: SHIPPED | OUTPATIENT
Start: 2018-06-05 | End: 2018-09-06

## 2018-06-05 NOTE — TELEPHONE ENCOUNTER
Routing refill request to provider for review/approval because:  Elevated BP     Warfarin Prescription approved per [facility]ill Protocol.

## 2018-06-05 NOTE — TELEPHONE ENCOUNTER
"Requested Prescriptions   Pending Prescriptions Disp Refills     metoprolol tartrate (LOPRESSOR) 50 MG tablet [Pharmacy Med Name: METOPROLOL TARTRATE 50MG TABLETS] 180 tablet 0      Last Written Prescription Date:  08/16  Last Fill Quantity: 17,  # refills: 180   Last office visit: 8/16/2017 with prescribing provider:  2   Future Office Visit:  08/16/17 Sig: TAKE 1 TABLET BY MOUTH TWICE DAILY    Beta-Blockers Protocol Failed    6/4/2018  9:10 AM       Failed - Blood pressure under 140/90 in past 12 months    BP Readings from Last 3 Encounters:   01/24/18 142/86   11/10/17 124/74   11/03/17 126/67                Passed - Patient is age 6 or older       Passed - Recent (12 mo) or future (30 days) visit within the authorizing provider's specialty    Patient had office visit in the last 12 months or has a visit in the next 30 days with authorizing provider or within the authorizing provider's specialty.  See \"Patient Info\" tab in inbasket, or \"Choose Columns\" in Meds & Orders section of the refill encounter.            warfarin (COUMADIN) 2.5 MG tablet [Pharmacy Med Name: WARFARIN SOD 2.5MG TABLETS (GREEN)] 90 tablet 0      Last Written Prescription Date:  03/05/18  Last Fill Quantity: 90,  # refills: 0   Last office visit: 8/16/2017 with prescribing provider:  08/16/17   Future Office Visit:  antig appt 07/12/18 Sig: TAKE 1 TABLET BY MOUTH DAILY    Vitamin K Antagonists Failed    6/4/2018  9:10 AM       Failed - INR is within goal in the past 6 weeks    Confirm INR is within goal in the past 6 weeks.     Recent Labs   Lab Test 05/18/18   INR  2.2*                      Passed - Recent (12 mo) or future (30 days) visit within the authorizing provider's specialty    Patient had office visit in the last 12 months or has a visit in the next 30 days with authorizing provider or within the authorizing provider's specialty.  See \"Patient Info\" tab in inbasket, or \"Choose Columns\" in Meds & Orders section of the refill " encounter.           Passed - Patient is 18 years of age or older       Passed - Patient is not pregnant       Passed - No positive pregnancy on file in past 12 months

## 2018-06-25 ENCOUNTER — OFFICE VISIT (OUTPATIENT)
Dept: INTERNAL MEDICINE | Facility: CLINIC | Age: 63
End: 2018-06-25
Payer: MEDICARE

## 2018-06-25 VITALS
BODY MASS INDEX: 35.63 KG/M2 | RESPIRATION RATE: 20 BRPM | TEMPERATURE: 97.7 F | SYSTOLIC BLOOD PRESSURE: 142 MMHG | OXYGEN SATURATION: 98 % | HEART RATE: 72 BPM | DIASTOLIC BLOOD PRESSURE: 98 MMHG | WEIGHT: 237.8 LBS

## 2018-06-25 DIAGNOSIS — I10 ESSENTIAL HYPERTENSION, BENIGN: ICD-10-CM

## 2018-06-25 DIAGNOSIS — Z12.11 COLON CANCER SCREENING: ICD-10-CM

## 2018-06-25 DIAGNOSIS — I48.0 PAROXYSMAL ATRIAL FIBRILLATION (H): Primary | ICD-10-CM

## 2018-06-25 LAB
ANION GAP SERPL CALCULATED.3IONS-SCNC: 8 MMOL/L (ref 3–14)
BUN SERPL-MCNC: 17 MG/DL (ref 7–30)
CALCIUM SERPL-MCNC: 9 MG/DL (ref 8.5–10.1)
CHLORIDE SERPL-SCNC: 104 MMOL/L (ref 94–109)
CO2 SERPL-SCNC: 28 MMOL/L (ref 20–32)
CREAT SERPL-MCNC: 0.92 MG/DL (ref 0.52–1.04)
GFR SERPL CREATININE-BSD FRML MDRD: 62 ML/MIN/1.7M2
GLUCOSE SERPL-MCNC: 96 MG/DL (ref 70–99)
POTASSIUM SERPL-SCNC: 4.5 MMOL/L (ref 3.4–5.3)
SODIUM SERPL-SCNC: 140 MMOL/L (ref 133–144)

## 2018-06-25 PROCEDURE — 99214 OFFICE O/P EST MOD 30 MIN: CPT | Performed by: INTERNAL MEDICINE

## 2018-06-25 PROCEDURE — 80048 BASIC METABOLIC PNL TOTAL CA: CPT | Performed by: INTERNAL MEDICINE

## 2018-06-25 PROCEDURE — 36415 COLL VENOUS BLD VENIPUNCTURE: CPT | Performed by: INTERNAL MEDICINE

## 2018-06-25 RX ORDER — METOPROLOL TARTRATE 50 MG
50 TABLET ORAL 2 TIMES DAILY
Qty: 180 TABLET | Refills: 1 | Status: SHIPPED | OUTPATIENT
Start: 2018-06-25 | End: 2018-12-27

## 2018-06-25 RX ORDER — LISINOPRIL 10 MG/1
10 TABLET ORAL DAILY
Qty: 90 TABLET | Refills: 0 | Status: SHIPPED | OUTPATIENT
Start: 2018-06-25 | End: 2018-09-16

## 2018-06-25 RX ORDER — TRIAMTERENE/HYDROCHLOROTHIAZID 37.5-25 MG
TABLET ORAL
Qty: 90 TABLET | Refills: 1 | Status: SHIPPED | OUTPATIENT
Start: 2018-06-25 | End: 2019-04-24

## 2018-06-25 NOTE — MR AVS SNAPSHOT
After Visit Summary   6/25/2018    Amparo Mccray    MRN: [id number]           Patient Information     Date Of Birth          1955        Visit Information        Provider Department      6/25/2018 2:00 PM Alan Almeida MD [facility]        Today's Diagnoses     Essential hypertension, benign           Follow-ups after your visit        Your next 10 appointments already scheduled     Jul 12, 2018 10:30 AM CDT   Anticoagulation Visit with FM RN VISITS   [facility]    [address] [place] [address], [address]  [facility] [id number] [phone]            Aug 16, 2018 11:15 AM CDT   MyChart Dermatology General with Khang Agustin MD   [facility] ([facility])    [address] [address] [phone]           This appointment is used for a first time dermatology visit only to determine the best course of treatment.  There is not a guarantee the provider will be able to perform any procedures on the first visit.                Future tests that were ordered for you today     Open Future Orders        Priority Expected Expires Ordered    Basic metabolic panel Routine  6/25/2019 6/25/2018            Who to contact     If you have questions or need follow up information about today's clinic visit or your schedule please contact [facility] directly at [phone].  Normal or non-critical lab and imaging results will be communicated to you by MyChart, letter or phone within 4 business days after the clinic has received the results. If you do not hear from us within 7 days, please contact the clinic through MyChart or phone. If you have a critical or abnormal lab result, we will notify you by phone as soon as possible.  Submit refill requests through [employer] or call your pharmacy and they will forward the refill request  to us. Please allow 3 business days for your refill to be completed.          Additional Information About Your Visit        Alleantiahart Information     [employer] gives you secure access to your electronic health record. If you see a primary care provider, you can also send messages to your care team and make appointments. If you have questions, please call your primary care clinic.  If you do not have a primary care provider, please call [phone] and they will assist you.        Care EveryWhere ID     This is your Care EveryWhere ID. This could be used by other organizations to access your [place] medical records  [id number]        Your Vitals Were     Pulse Temperature Respirations Last Period Pulse Oximetry BMI (Body Mass Index)    72 97.7  F (36.5  C) (Oral) 20 12/31/2012 98% 35.63 kg/m2       Blood Pressure from Last 3 Encounters:   06/25/18 (!) 142/98   01/24/18 142/86   11/10/17 124/74    Weight from Last 3 Encounters:   06/25/18 237 lb 12.8 oz (107.9 kg)   01/24/18 240 lb 8 oz (109.1 kg)   11/10/17 239 lb (108.4 kg)              We Performed the Following     Basic metabolic panel          Today's Medication Changes          These changes are accurate as of 6/25/18  2:37 PM.  If you have any questions, ask your nurse or doctor.               Start taking these medicines.        Dose/Directions    lisinopril 10 MG tablet   Commonly known as:  PRINIVIL/ZESTRIL   Used for:  Essential hypertension, benign   Started by:  Alan Almeida MD        Dose:  10 mg   Take 1 tablet (10 mg) by mouth daily   Quantity:  90 tablet   Refills:  0         These medicines have changed or have updated prescriptions.        Dose/Directions    metoprolol tartrate 50 MG tablet   Commonly known as:  LOPRESSOR   This may have changed:  See the new instructions.   Used for:  Essential hypertension, benign   Changed by:  Alan Almeida MD        Dose:  50 mg   Take 1 tablet (50 mg) by mouth 2 times daily   Quantity:  180  tablet   Refills:  1         Stop taking these medicines if you haven't already. Please contact your care team if you have questions.     Potassium Chloride CR 8 MEQ Cpcr   Stopped by:  Alan Almeida MD                Where to get your medicines      These medications were sent to [facility] [address] [facility] [address] [address] [facility]  [address] [place], [facility] [address]     Phone:  [phone]     lisinopril 10 MG tablet    metoprolol tartrate 50 MG tablet    triamterene-hydrochlorothiazide 37.5-25 MG per tablet                Primary Care Provider Office Phone # Fax #    Alan Almeida [phone]       [address] [place] [address]        Equal Access to Services     [facility]: Hadii aad ku hadasho Soomaali, waaxda luqadaha, qaybta kaalmada adeegyada, waxchirag gaffney hayangel myles . So [facility] [phone].    ATENCIÓN: Si habla español, tiene a reyes disposición servicios gratuitos de asistencia lingüística. [facility] [phone].    We comply with applicable federal civil rights laws and [place] laws. We do not discriminate on the basis of race, color, national origin, age, disability, sex, sexual orientation, or gender identity.            Thank you!     Thank you for choosing [facility]  for your care. Our goal is always to provide you with excellent care. Hearing back from our patients is one way we can continue to improve our services. Please take a few minutes to complete the written survey that you may receive in the mail after your visit with us. Thank you!             Your Updated Medication List - Protect others around you: Learn how to safely use, store and throw away your medicines at www.disposemymeds.org.          This list is accurate as of 6/25/18  2:37 PM.  Always use your most recent med list.                   Brand Name Dispense Instructions for use Diagnosis     lisinopril 10 MG tablet    PRINIVIL/ZESTRIL    90 tablet    Take 1 tablet (10 mg) by mouth daily    Essential hypertension, benign       metoprolol tartrate 50 MG tablet    LOPRESSOR    180 tablet    Take 1 tablet (50 mg) by mouth 2 times daily    Essential hypertension, benign       triamterene-hydrochlorothiazide 37.5-25 MG per tablet    MAXZIDE-25    90 tablet    TAKE 1 TABLET BY MOUTH EVERY MORNING.    Essential hypertension, benign       warfarin 2.5 MG tablet    COUMADIN    90 tablet    Take one tablet daily except 1/2 tablets on Wed/Sat as directed by the anticoagulation clinic    Ventricular bigeminy, Long-term (current) use of anticoagulants, Paroxysmal atrial fibrillation (H)

## 2018-06-25 NOTE — PROGRESS NOTES
SUBJECTIVE:   Amparo Mccray is a 62 year old female who presents to clinic today for the following health issues:    Hypertension Follow-up  BP Readings from Last 3 Encounters:   06/25/18 (!) 142/98   01/24/18 142/86   11/10/17 124/74        Outpatient blood pressures are not being checked.    Low Salt Diet: low salt      Amount of exercise or physical activity: None    Problems taking medications regularly: No    Medication side effects: none    Diet: regular (no restrictions)    Problem list and histories reviewed & adjusted, as indicated.  Additional history: as documented    Labs reviewed in EPIC    Reviewed and updated as needed this visit by clinical staff  Allergies  Meds       Reviewed and updated as needed this visit by Provider         ROS:  Constitutional, HEENT, cardiovascular, pulmonary, gi and gu systems are negative, except as otherwise noted.    OBJECTIVE:                                                    BP (!) 142/98  Pulse 72  Temp 97.7  F (36.5  C) (Oral)  Resp 20  Wt 237 lb 12.8 oz (107.9 kg)  LMP 12/31/2012  SpO2 98%  BMI 35.63 kg/m2  Body mass index is 35.63 kg/(m^2).  GENERAL APPEARANCE: alert, no distress and over weight  HENT: nose and mouth without ulcers or lesions  NECK: no adenopathy, no asymmetry, masses, or scars and thyroid normal to palpation  RESP: lungs clear to auscultation - no rales, rhonchi or wheezes  CV: regular rates and rhythm, normal S1 S2, no S3 or S4 and no murmur, click or rub  MS: varicose veins bilaterally with some hyperpigmentation on the right lower extremityDiagnostic test results:  SKIN: no suspicious lesions or rashes    Diagnostic test results:  No results found for this or any previous visit (from the past 24 hour(s)).     ASSESSMENT/PLAN:                                                    1. Essential hypertension, benign  Add lisinopril  Follow-up in 1-2 months      The 10-year ASCVD risk score (Rebecca CAITY Jr, et al., 2013) is: 7.5%    Values  used to calculate the score:      Age: 62 years      Sex: Female      Is Non- : No      Diabetic: No      Tobacco smoker: No      Systolic Blood Pressure: 142 mmHg      Is BP treated: Yes      HDL Cholesterol: 46 mg/dL      Total Cholesterol: 235 mg/dL       Alan Almeida MD  [facility]

## 2018-07-12 ENCOUNTER — ANTICOAGULATION THERAPY VISIT (OUTPATIENT)
Dept: NURSING | Facility: CLINIC | Age: 63
End: 2018-07-12
Payer: MEDICARE

## 2018-07-12 DIAGNOSIS — I48.0 PAROXYSMAL ATRIAL FIBRILLATION (H): ICD-10-CM

## 2018-07-12 DIAGNOSIS — I49.8 VENTRICULAR BIGEMINY: ICD-10-CM

## 2018-07-12 DIAGNOSIS — Z79.01 LONG-TERM (CURRENT) USE OF ANTICOAGULANTS: ICD-10-CM

## 2018-07-12 LAB — INR POINT OF CARE: 2.3 (ref 0.86–1.14)

## 2018-07-12 PROCEDURE — 99207 ZZC NO CHARGE NURSE ONLY: CPT

## 2018-07-12 PROCEDURE — 85610 PROTHROMBIN TIME: CPT | Mod: QW

## 2018-07-12 PROCEDURE — 36416 COLLJ CAPILLARY BLOOD SPEC: CPT

## 2018-07-12 NOTE — MR AVS SNAPSHOT
Amparo Tarangotreasure   7/12/2018 2:00 PM   Anticoagulation Therapy Visit    Description:  62 year old female   Provider:  FM RN VISITS   Department:  Fm Nurse           INR as of 7/12/2018     Today's INR 2.3      Anticoagulation Summary as of 7/12/2018     INR goal 2.0-3.0   Today's INR 2.3   Full warfarin instructions 1.25 mg on Wed, Sat; 2.5 mg all other days   Next INR check 9/6/2018    Indications   Long-term (current) use of anticoagulants [Z79.01] [Z79.01]  Atrial fibrillation (H) [I48.91]  Ventricular bigeminy [I49.9]         Your next Anticoagulation Clinic appointment(s)     Sep 06, 2018  2:00 PM CDT   [facility] with FM RN [facility]ITS   [facility]    [address] [place] [address], [address]  [facility] [address] [phone]              Contact Numbers     Clinic Number:         July 2018 Details    Sun Mon Tue Wed Thu Fri Sat     1               2               3               4               5               6               7                 8               9               10               11               12      2.5 mg   See details      13      2.5 mg         14      1.25 mg           15      2.5 mg         16      2.5 mg         17      2.5 mg         18      1.25 mg         19      2.5 mg         20      2.5 mg         21      1.25 mg           22      2.5 mg         23      2.5 mg         24      2.5 mg         25      1.25 mg         26      2.5 mg         27      2.5 mg         28      1.25 mg           29      2.5 mg         30      2.5 mg         31      2.5 mg              Date Details   07/12 This INR check               How to take your warfarin dose     To take:  1.25 mg Take 0.5 of a 2.5 mg tablet.    To take:  2.5 mg Take 1 of the 2.5 mg tablets.           August 2018 Details    Sun Mon Tue Wed Thu Fri Sat        1      1.25 mg         2      2.5 mg         3      2.5 mg         4      1.25 mg           5      2.5 mg         6       2.5 mg         7      2.5 mg         8      1.25 mg         9      2.5 mg         10      2.5 mg         11      1.25 mg           12      2.5 mg         13      2.5 mg         14      2.5 mg         15      1.25 mg         16      2.5 mg         17      2.5 mg         18      1.25 mg           19      2.5 mg         20      2.5 mg         21      2.5 mg         22      1.25 mg         23      2.5 mg         24      2.5 mg         25      1.25 mg           26      2.5 mg         27      2.5 mg         28      2.5 mg         29      1.25 mg         30      2.5 mg         31      2.5 mg           Date Details   No additional details            How to take your warfarin dose     To take:  1.25 mg Take 0.5 of a 2.5 mg tablet.    To take:  2.5 mg Take 1 of the 2.5 mg tablets.           September 2018 Details    Sun Mon Tue Wed Thu Fri Sat           1      1.25 mg           2      2.5 mg         3      2.5 mg         4      2.5 mg         5      1.25 mg         6            7               8                 9               10               11               12               13               14               15                 16               17               18               19               20               21               22                 23               24               25               26               27               28               29                 30                      Date Details   No additional details    Date of next INR:  9/6/2018         How to take your warfarin dose     To take:  1.25 mg Take 0.5 of a 2.5 mg tablet.    To take:  2.5 mg Take 1 of the 2.5 mg tablets.

## 2018-07-12 NOTE — PROGRESS NOTES
ANTICOAGULATION FOLLOW-UP CLINIC VISIT    Patient Name:  Amparo Mccray  Date:  7/12/2018  Contact Type:  Face to Face    SUBJECTIVE:        OBJECTIVE    INR Protime   Date Value Ref Range Status   07/12/2018 2.3 (A) 0.86 - 1.14 Final       ASSESSMENT / PLAN  No question data found.  Anticoagulation Summary as of 7/12/2018     INR goal 2.0-3.0   Today's INR 2.3   Warfarin maintenance plan 1.25 mg (2.5 mg x 0.5) on Wed, Sat; 2.5 mg (2.5 mg x 1) all other days   Full warfarin instructions 1.25 mg on Wed, Sat; 2.5 mg all other days   Weekly warfarin total 15 mg   No change documented Willow Ellison RN   Plan last modified Willow Ellison RN (11/7/2017)   Next INR check 9/6/2018   Priority INR   Target end date     Indications   Long-term (current) use of anticoagulants [Z79.01] [Z79.01]  Atrial fibrillation (H) [I48.91]  Ventricular bigeminy [I49.9]         Anticoagulation Episode Summary     INR check location     Preferred lab     Send INR reminders to  TRIAGE POOL    Comments       Anticoagulation Care Providers     Provider Role Specialty Phone number    Alan Almeida MD [facility] Internal Medicine [phone]            See the Encounter Report to view Anticoagulation Flowsheet and Dosing Calendar (Go to Encounters tab in chart review, and find the Anticoagulation Therapy Visit)      Willow Ellison RN

## 2018-08-02 ENCOUNTER — TELEPHONE (OUTPATIENT)
Dept: INTERNAL MEDICINE | Facility: CLINIC | Age: 63
End: 2018-08-02

## 2018-08-16 ENCOUNTER — OFFICE VISIT (OUTPATIENT)
Dept: DERMATOLOGY | Facility: CLINIC | Age: 63
End: 2018-08-16
Payer: MEDICARE

## 2018-08-16 VITALS — SYSTOLIC BLOOD PRESSURE: 120 MMHG | OXYGEN SATURATION: 98 % | HEART RATE: 68 BPM | DIASTOLIC BLOOD PRESSURE: 77 MMHG

## 2018-08-16 DIAGNOSIS — Z85.828 HISTORY OF SKIN CANCER: Primary | ICD-10-CM

## 2018-08-16 DIAGNOSIS — L91.0 HYPERTROPHIC SCAR: ICD-10-CM

## 2018-08-16 PROCEDURE — 11900 INJECT SKIN LESIONS </W 7: CPT | Performed by: DERMATOLOGY

## 2018-08-16 PROCEDURE — 99212 OFFICE O/P EST SF 10 MIN: CPT | Mod: 25 | Performed by: DERMATOLOGY

## 2018-08-16 NOTE — MR AVS SNAPSHOT
After Visit Summary   8/16/2018    Amparo Mccray    MRN: [id number]           Patient Information     Date Of Birth          1955        Visit Information        Provider Department      8/16/2018 11:15 AM Khang Agustin MD [facility]        Today's Diagnoses     History of skin cancer    -  1    Hypertrophic scar           Follow-ups after your visit        Your next 10 appointments already scheduled     Aug 16, 2018 11:15 AM CDT   MyChart Dermatology General with Khang Agustin MD   [facility] ([facility])    [address] [id number] [phone]           This appointment is used for a first time dermatology visit only to determine the best course of treatment.  There is not a guarantee the provider will be able to perform any procedures on the first visit.              Sep 06, 2018  2:00 PM CDT   Anticoagulation Visit with FM RN VISITS   [facility]    [address] [place] [address], [address]  [facility] [address] [phone]              Who to contact     If you have questions or need follow up information about today's clinic visit or your schedule please contact [facility] directly at [phone].  Normal or non-critical lab and imaging results will be communicated to you by MyChart, letter or phone within 4 business days after the clinic has received the results. If you do not hear from us within 7 days, please contact the clinic through MyChart or phone. If you have a critical or abnormal lab result, we will notify you by phone as soon as possible.  Submit refill requests through [employer] or call your pharmacy and they will forward the refill request to us. Please allow 3 business days for your refill to be completed.          Additional Information About Your Visit        MyChart Information      [employer] gives you secure access to your electronic health record. If you see a primary care provider, you can also send messages to your care team and make appointments. If you have questions, please call your primary care clinic.  If you do not have a primary care provider, please call [phone] and they will assist you.        Care EveryWhere ID     This is your Care EveryWhere ID. This could be used by other organizations to access your [place] medical records  [id number]        Your Vitals Were     Pulse Last Period Pulse Oximetry             68 12/31/2012 98%          Blood Pressure from Last 3 Encounters:   08/16/18 120/77   06/25/18 (!) 142/98   01/24/18 142/86    Weight from Last 3 Encounters:   06/25/18 107.9 kg (237 lb 12.8 oz)   01/24/18 109.1 kg (240 lb 8 oz)   11/10/17 108.4 kg (239 lb)              We Performed the Following     INJECTION INTO SKIN LESIONS <=7     TRIAMCINOLONE ACET INJ NOS        Primary Care Provider Office Phone # Fax #    Alan Almeida [phone]       [address] [facility] [address]        Equal Access to Services     RAUL SHIRLEY [facility]: Hadii aad ku hadasho Soomaali, waaxda luqadaha, qaybta kaalmada adeegyada, waxay yeimy morenon sofia العراقي. So [facility] [phone].    ATENCIÓN: Si habla español, tiene a reyes disposición servicios gratuitos de asistencia lingüística. [facility] [phone].    We comply with applicable federal civil rights laws and [place] laws. We do not discriminate on the basis of race, color, national origin, age, disability, sex, sexual orientation, or gender identity.            Thank you!     Thank you for choosing [facility]  for your care. Our goal is always to provide you with excellent care. Hearing back from our patients is one way we can continue to improve our services. Please take a few minutes to complete the written survey that you may receive in the mail after your visit with  us. Thank you!             Your Updated Medication List - Protect others around you: Learn how to safely use, store and throw away your medicines at www.disposemymeds.org.          This list is accurate as of 8/16/18 11:13 AM.  Always use your most recent med list.                   Brand Name Dispense Instructions for use Diagnosis    lisinopril 10 MG tablet    PRINIVIL/ZESTRIL    90 tablet    Take 1 tablet (10 mg) by mouth daily    Essential hypertension, benign       metoprolol tartrate 50 MG tablet    LOPRESSOR    180 tablet    Take 1 tablet (50 mg) by mouth 2 times daily    Essential hypertension, benign       triamterene-hydrochlorothiazide 37.5-25 MG per tablet    MAXZIDE-25    90 tablet    TAKE 1 TABLET BY MOUTH EVERY MORNING.    Essential hypertension, benign       warfarin 2.5 MG tablet    COUMADIN    90 tablet    Take one tablet daily except 1/2 tablets on Wed/Sat as directed by the anticoagulation clinic    Ventricular bigeminy, Long-term (current) use of anticoagulants, Paroxysmal atrial fibrillation (H)

## 2018-08-16 NOTE — PROGRESS NOTES
Amparo Mccray is a 62 year old year old female patient here today for hx of non-melanoma skin cancer.   . She notes spot on chest itching after surgery.  Patient has no other skin complaints today.  Remainder of the HPI, Meds, PMH, Allergies, FH, and SH was reviewed in chart.    Pertinent Hx:   Non-melanoma skin cancer   Past Medical History:   Diagnosis Date     Arthritis      Chest pain, unspecified     neg stress test, put on PPI- no help     Essential hypertension, benign     Hypertension, Benign     Obesity (BMI 30-39.9) 7/1/13    BMI 33.6     Paroxysmal atrial fibrillation (H) 03/2010     Superficial thrombophlebitis      Thrombosis of leg     superficial not dvt's     Ventricular bigeminy     bigeminy- nrml LV fxn       Past Surgical History:   Procedure Laterality Date     ABDOMEN SURGERY       ARTHROPLASTY KNEE Right 3/2/2015    Procedure: ARTHROPLASTY KNEE;  Surgeon: Cuco Baugh MD;  Location: [facility] OR     C NONSPECIFIC PROCEDURE      left hand      CL AFF SURGICAL PATHOLOGY  1976    HGSIL     H ABLATION FOCAL AFIB  11/02/2017     H ABLATION FOCAL AFIB  01/15/2013     ORTHOPEDIC SURGERY      l tka  and wrist      TUBAL LIGATION      Uterine fibroids removed        Family History   Problem Relation Age of Onset     C.A.D. Paternal Grandmother      Hypertension Paternal Grandmother      C.A.D. Paternal Grandfather      Connective Tissue Disorder Paternal Grandfather      Hypertension Paternal Grandfather      C.A.D. Father      Hypertension Father      Cancer Maternal Grandmother      uterine     Colon Cancer Maternal Grandmother      Cancer Maternal Grandfather      leukemia     Other Cancer Maternal Grandfather      Cancer Mother      uterine     GASTROINTESTINAL DISEASE Mother      ulcers     Hypertension Mother      Other - See Comments Sister      essential tremors     C.A.D. Brother      stent- 47     Hypertension Brother      Depression Daughter      Eye Disorder Son       Hypertension Brother      Depression Daughter      Asthma Son        Social History     Social History     Marital status:      Spouse name: N/A     Number of children: N/A     Years of education: N/A     Occupational History     Not on file.     Social History Main Topics     Smoking status: Never Smoker     Smokeless tobacco: Never Used     Alcohol use Yes      Comment: once a year     Drug use: No     Sexual activity: Yes     Partners: Male     Birth control/ protection: None     Other Topics Concern     Parent/Sibling W/ Cabg, Mi Or Angioplasty Before 65f 55m? Yes     Social History Narrative       Outpatient Encounter Prescriptions as of 8/16/2018   Medication Sig Dispense Refill     lisinopril (PRINIVIL/ZESTRIL) 10 MG tablet Take 1 tablet (10 mg) by mouth daily 90 tablet 0     metoprolol tartrate (LOPRESSOR) 50 MG tablet Take 1 tablet (50 mg) by mouth 2 times daily 180 tablet 1     triamterene-hydrochlorothiazide (MAXZIDE-25) 37.5-25 MG per tablet TAKE 1 TABLET BY MOUTH EVERY MORNING. 90 tablet 1     warfarin (COUMADIN) 2.5 MG tablet Take one tablet daily except 1/2 tablets on Wed/Sat as directed by the anticoagulation clinic 90 tablet 0     No facility-administered encounter medications on file as of 8/16/2018.              Review Of Systems  Skin: As above  Eyes: negative  Ears/Nose/Throat: negative  Respiratory: No shortness of breath, dyspnea on exertion, cough, or hemoptysis  Cardiovascular: negative  Gastrointestinal: negative  Genitourinary: negative  Musculoskeletal: negative  Neurologic: negative  Psychiatric: negative  Hematologic/Lymphatic/Immunologic: negative  Endocrine: negative      O:   NAD, WDWN, Alert & Oriented, Mood & Affect wnl, Vitals stable   Here today alone   /77  Pulse 68  LMP 12/31/2012  SpO2 98%   General appearance normal   Vitals stable   Alert, oriented and in no acute distress     Chest firm scar      Eyes: Conjunctivae/lids:Normal     ENT: Lips, buccal mucosa,  tongue: normal    MSK:Normal    Cardiovascular: peripheral edema none    Pulm: Breathing Normal    Neuro/Psych: Orientation:Normal; Mood/Affect:Normal      A/P:  1. Hx of non-melanoma skin cancer, hypertrophic scar  IL TAC: PGACAC discussed.  Risks including but not limited to injection site reaction, bruising, no resolution.  All questions answered and entertained to patient s satisfaction.  Informed consent obtained.  IL TAC in concentration of 40mg/ml was injected ID to chest.  Total injected was  0.1 ml.  Patient tolerated without complications and given wound care instructions, including not to move product around.  Return in 4 weeks for follow-up and possible additional IL TAC.    [id number]  1/2020    BENIGN LESIONS DISCUSSED WITH PATIENT:  I discussed the specifics of tumor, prognosis, and genetics of benign lesions.  I explained that treatment of these lesions would be purely cosmetic and not medically neccessary.  I discussed with patient different removal options including excision, cautery and /or laser.      Nature and genetics of benign skin lesions dicussed with patient.  Signs and Symptoms of skin cancer discussed with patient.  Patient encouraged to perform monthly skin exams.  UV precautions reviewed with patient.  Patient to follow up with Primary Care provider regarding elevated blood pressure.  Skin care regimen reviewed with patient: Eliminate harsh soaps, i.e. Dial, zest, irsih spring; Mild soaps such as Cetaphil or Dove sensitive skin, avoid hot or cold showers, aggressive use of emollients including vanicream, cetaphil or cerave discussed with patient.    Risks of non-melanoma skin cancer discussed with patient   Return to clinic 6 months

## 2018-08-16 NOTE — LETTER
8/16/2018         RE: Amparo Mccray  [address]  [facility] [address]        Dear Colleague,    Thank you for referring your patient, Amparo Mccray, to the [facility]. Please see a copy of my visit note below.    Amparo Mccray is a 62 year old year old female patient here today for hx of non-melanoma skin cancer.   . She notes spot on chest itching after surgery.  Patient has no other skin complaints today.  Remainder of the HPI, Meds, PMH, Allergies, FH, and SH was reviewed in chart.    Pertinent Hx:   Non-melanoma skin cancer   Past Medical History:   Diagnosis Date     Arthritis      Chest pain, unspecified     neg stress test, put on PPI- no help     Essential hypertension, benign     Hypertension, Benign     Obesity (BMI 30-39.9) 7/1/13    BMI 33.6     Paroxysmal atrial fibrillation (H) 03/2010     Superficial thrombophlebitis      Thrombosis of leg     superficial not dvt's     Ventricular bigeminy     bigeminy- nrml LV fxn       Past Surgical History:   Procedure Laterality Date     ABDOMEN SURGERY       ARTHROPLASTY KNEE Right 3/2/2015    Procedure: ARTHROPLASTY KNEE;  Surgeon: Cuco Baugh MD;  Location: [facility]     C NONSPECIFIC PROCEDURE      left hand      CL AFF SURGICAL PATHOLOGY  1976    HGSIL     H ABLATION FOCAL AFIB  11/02/2017     H ABLATION FOCAL AFIB  01/15/2013     ORTHOPEDIC SURGERY      l tka  and wrist      TUBAL LIGATION      Uterine fibroids removed        Family History   Problem Relation Age of Onset     C.A.D. Paternal Grandmother      Hypertension Paternal Grandmother      C.A.D. Paternal Grandfather      Connective Tissue Disorder Paternal Grandfather      Hypertension Paternal Grandfather      C.A.D. Father      Hypertension Father      Cancer Maternal Grandmother      uterine     Colon Cancer Maternal Grandmother      Cancer Maternal Grandfather      leukemia     Other Cancer Maternal Grandfather      Cancer Mother       uterine     GASTROINTESTINAL DISEASE Mother      ulcers     Hypertension Mother      Other - See Comments Sister      essential tremors     C.A.D. Brother      stent- 47     Hypertension Brother      Depression Daughter      Eye Disorder Son      Hypertension Brother      Depression Daughter      Asthma Son        Social History     Social History     Marital status:      Spouse name: N/A     Number of children: N/A     Years of education: N/A     Occupational History     Not on file.     Social History Main Topics     Smoking status: Never Smoker     Smokeless tobacco: Never Used     Alcohol use Yes      Comment: once a year     Drug use: No     Sexual activity: Yes     Partners: Male     Birth control/ protection: None     Other Topics Concern     Parent/Sibling W/ Cabg, Mi Or Angioplasty Before 65f 55m? Yes     Social History Narrative       Outpatient Encounter Prescriptions as of 8/16/2018   Medication Sig Dispense Refill     lisinopril (PRINIVIL/ZESTRIL) 10 MG tablet Take 1 tablet (10 mg) by mouth daily 90 tablet 0     metoprolol tartrate (LOPRESSOR) 50 MG tablet Take 1 tablet (50 mg) by mouth 2 times daily 180 tablet 1     triamterene-hydrochlorothiazide (MAXZIDE-25) 37.5-25 MG per tablet TAKE 1 TABLET BY MOUTH EVERY MORNING. 90 tablet 1     warfarin (COUMADIN) 2.5 MG tablet Take one tablet daily except 1/2 tablets on Wed/Sat as directed by the anticoagulation clinic 90 tablet 0     No facility-administered encounter medications on file as of 8/16/2018.              Review Of Systems  Skin: As above  Eyes: negative  Ears/Nose/Throat: negative  Respiratory: No shortness of breath, dyspnea on exertion, cough, or hemoptysis  Cardiovascular: negative  Gastrointestinal: negative  Genitourinary: negative  Musculoskeletal: negative  Neurologic: negative  Psychiatric: negative  Hematologic/Lymphatic/Immunologic: negative  Endocrine: negative      O:   NAD, WDWN, Alert & Oriented, Mood & Affect wnl, Vitals  stable   Here today alone   /77  Pulse 68  LMP 12/31/2012  SpO2 98%   General appearance normal   Vitals stable   Alert, oriented and in no acute distress     Chest firm scar      Eyes: Conjunctivae/lids:Normal     ENT: Lips, buccal mucosa, tongue: normal    MSK:Normal    Cardiovascular: peripheral edema none    Pulm: Breathing Normal    Neuro/Psych: Orientation:Normal; Mood/Affect:Normal      A/P:  1. Hx of non-melanoma skin cancer, hypertrophic scar  IL TAC: PGACAC discussed.  Risks including but not limited to injection site reaction, bruising, no resolution.  All questions answered and entertained to patient s satisfaction.  Informed consent obtained.  IL TAC in concentration of 40mg/ml was injected ID to chest.  Total injected was  0.1 ml.  Patient tolerated without complications and given wound care instructions, including not to move product around.  Return in 4 weeks for follow-up and possible additional IL TAC.    [id number]  1/2020    BENIGN LESIONS DISCUSSED WITH PATIENT:  I discussed the specifics of tumor, prognosis, and genetics of benign lesions.  I explained that treatment of these lesions would be purely cosmetic and not medically neccessary.  I discussed with patient different removal options including excision, cautery and /or laser.      Nature and genetics of benign skin lesions dicussed with patient.  Signs and Symptoms of skin cancer discussed with patient.  Patient encouraged to perform monthly skin exams.  UV precautions reviewed with patient.  Patient to follow up with Primary Care provider regarding elevated blood pressure.  Skin care regimen reviewed with patient: Eliminate harsh soaps, i.e. Dial, zest, irsih spring; Mild soaps such as Cetaphil or Dove sensitive skin, avoid hot or cold showers, aggressive use of emollients including vanicream, cetaphil or cerave discussed with patient.    Risks of non-melanoma skin cancer discussed with patient   Return to clinic 6  months      Again, thank you for allowing me to participate in the care of your patient.        Sincerely,        Khang Agustin MD

## 2018-09-03 ENCOUNTER — HEALTH MAINTENANCE LETTER (OUTPATIENT)
Age: 63
End: 2018-09-03

## 2018-09-06 ENCOUNTER — ANTICOAGULATION THERAPY VISIT (OUTPATIENT)
Dept: NURSING | Facility: CLINIC | Age: 63
End: 2018-09-06
Payer: MEDICARE

## 2018-09-06 DIAGNOSIS — I49.8 VENTRICULAR BIGEMINY: ICD-10-CM

## 2018-09-06 DIAGNOSIS — Z79.01 LONG-TERM (CURRENT) USE OF ANTICOAGULANTS: ICD-10-CM

## 2018-09-06 DIAGNOSIS — I48.0 PAROXYSMAL ATRIAL FIBRILLATION (H): ICD-10-CM

## 2018-09-06 LAB — INR POINT OF CARE: 2.5 (ref 0.86–1.14)

## 2018-09-06 PROCEDURE — 36416 COLLJ CAPILLARY BLOOD SPEC: CPT

## 2018-09-06 PROCEDURE — 85610 PROTHROMBIN TIME: CPT | Mod: QW

## 2018-09-06 PROCEDURE — 99207 ZZC NO CHARGE NURSE ONLY: CPT

## 2018-09-06 RX ORDER — WARFARIN SODIUM 2.5 MG/1
TABLET ORAL
Qty: 90 TABLET | Refills: 1 | Status: SHIPPED | OUTPATIENT
Start: 2018-09-06 | End: 2019-04-24

## 2018-09-06 NOTE — PROGRESS NOTES
ANTICOAGULATION FOLLOW-UP CLINIC VISIT    Patient Name:  Amparo Mccray  Date:  9/6/2018  Contact Type:  Face to Face    SUBJECTIVE:     Patient Findings     Positives No Problem Findings           OBJECTIVE    INR Protime   Date Value Ref Range Status   09/06/2018 2.5 (A) 0.86 - 1.14 Final       ASSESSMENT / PLAN  No question data found.  Anticoagulation Summary as of 9/6/2018     INR goal 2.0-3.0   Today's INR 2.5   Warfarin maintenance plan 1.25 mg (2.5 mg x 0.5) on Wed, Sat; 2.5 mg (2.5 mg x 1) all other days   Full warfarin instructions 1.25 mg on Wed, Sat; 2.5 mg all other days   Weekly warfarin total 15 mg   No change documented Willow lElison RN   Plan last modified Willow Ellison RN (11/7/2017)   Next INR check 11/1/2018   Priority INR   Target end date     Indications   Long-term (current) use of anticoagulants [Z79.01] [Z79.01]  Atrial fibrillation (H) [I48.91]  Ventricular bigeminy [I49.9]         Anticoagulation Episode Summary     INR check location     Preferred lab     Send INR reminders to  TRIAGE POOL    Comments       Anticoagulation Care Providers     Provider Role Specialty Phone number    Alan Almeida MD [facility] Internal Medicine [phone]            See the Encounter Report to view Anticoagulation Flowsheet and Dosing Calendar (Go to Encounters tab in chart review, and find the Anticoagulation Therapy Visit)      Willow Ellison RN

## 2018-09-06 NOTE — MR AVS SNAPSHOT
Amparo Tarangotreasure   9/6/2018 2:00 PM   Anticoagulation Therapy Visit    Description:  62 year old female   Provider:  FM RN VISITS   Department:  Fm Nurse           INR as of 9/6/2018     Today's INR 2.5      Anticoagulation Summary as of 9/6/2018     INR goal 2.0-3.0   Today's INR 2.5   Full warfarin instructions 1.25 mg on Wed, Sat; 2.5 mg all other days   Next INR check 11/1/2018    Indications   Long-term (current) use of anticoagulants [Z79.01] [Z79.01]  Atrial fibrillation (H) [I48.91]  Ventricular bigeminy [I49.9]         Your next Anticoagulation Clinic appointment(s)     Nov 01, 2018  2:00 PM CDT   Anticoagulation Visit with FM RN VISITS   [facility]    [address] [place] [address], [address]  [facility] [address] [phone]              Contact Numbers     Clinic Number:         September 2018 Details    Sun Mon Tue Wed Thu Fri Sat           1                 2               3               4               5               6      2.5 mg   See details      7      2.5 mg         8      1.25 mg           9      2.5 mg         10      2.5 mg         11      2.5 mg         12      1.25 mg         13      2.5 mg         14      2.5 mg         15      1.25 mg           16      2.5 mg         17      2.5 mg         18      2.5 mg         19      1.25 mg         20      2.5 mg         21      2.5 mg         22      1.25 mg           23      2.5 mg         24      2.5 mg         25      2.5 mg         26      1.25 mg         27      2.5 mg         28      2.5 mg         29      1.25 mg           30      2.5 mg                Date Details   09/06 This INR check               How to take your warfarin dose     To take:  1.25 mg Take 0.5 of a 2.5 mg tablet.    To take:  2.5 mg Take 1 of the 2.5 mg tablets.           October 2018 Details    Sun Mon Tue Wed Thu Fri Sat      1      2.5 mg         2      2.5 mg         3      1.25 mg         4      2.5 mg         5       2.5 mg         6      1.25 mg           7      2.5 mg         8      2.5 mg         9      2.5 mg         10      1.25 mg         11      2.5 mg         12      2.5 mg         13      1.25 mg           14      2.5 mg         15      2.5 mg         16      2.5 mg         17      1.25 mg         18      2.5 mg         19      2.5 mg         20      1.25 mg           21      2.5 mg         22      2.5 mg         23      2.5 mg         24      1.25 mg         25      2.5 mg         26      2.5 mg         27      1.25 mg           28      2.5 mg         29      2.5 mg         30      2.5 mg         31      1.25 mg             Date Details   No additional details            How to take your warfarin dose     To take:  1.25 mg Take 0.5 of a 2.5 mg tablet.    To take:  2.5 mg Take 1 of the 2.5 mg tablets.           November 2018 Details    Sun Mon Tue Wed Thu Fri Sat         1            2               3                 4               5               6               7               8               9               10                 11               12               13               14               15               16               17                 18               19               20               21               22               23               24                 25               26               27               28               29               30                 Date Details   No additional details    Date of next INR:  11/1/2018         How to take your warfarin dose     To take:  2.5 mg Take 1 of the 2.5 mg tablets.

## 2018-09-16 DIAGNOSIS — I10 ESSENTIAL HYPERTENSION, BENIGN: ICD-10-CM

## 2018-09-16 NOTE — TELEPHONE ENCOUNTER
"Requested Prescriptions   Pending Prescriptions Disp Refills     lisinopril (PRINIVIL/ZESTRIL) 10 MG tablet [Pharmacy Med Name: LISINOPRIL 10MG TABLETS] 90 tablet 0    Last Written Prescription Date:  6/25/2018  Last Fill Quantity: 90,  # refills: 0   Last office visit: 6/25/2018 with prescribing provider:  6/25/2018   Future Office Visit:     Sig: TAKE 1 TABLET(10 MG) BY MOUTH DAILY    ACE Inhibitors (Including Combos) Protocol Passed    9/16/2018 12:22 PM       Passed - Blood pressure under 140/90 in past 12 months    BP Readings from Last 3 Encounters:   08/16/18 120/77   06/25/18 (!) 142/98   01/24/18 142/86                Passed - Recent (12 mo) or future (30 days) visit within the authorizing provider's specialty    Patient had office visit in the last 12 months or has a visit in the next 30 days with authorizing provider or within the authorizing provider's specialty.  See \"Patient Info\" tab in inbasket, or \"Choose Columns\" in Meds & Orders section of the refill encounter.           Passed - Patient is age 18 or older       Passed - No active pregnancy on record       Passed - Normal serum creatinine on file in past 12 months    Recent Labs   Lab Test  06/25/18   1438   11/01/17   1059   CR  0.92   < >   --    CREAT   --    --   0.9    < > = values in this interval not displayed.            Passed - Normal serum potassium on file in past 12 months    Recent Labs   Lab Test  06/25/18   1438   POTASSIUM  4.5            Passed - No positive pregnancy test in past 12 months          "

## 2018-09-18 RX ORDER — LISINOPRIL 10 MG/1
TABLET ORAL
Qty: 90 TABLET | Refills: 2 | Status: SHIPPED | OUTPATIENT
Start: 2018-09-18 | End: 2019-06-19

## 2018-11-01 ENCOUNTER — ANTICOAGULATION THERAPY VISIT (OUTPATIENT)
Dept: NURSING | Facility: CLINIC | Age: 63
End: 2018-11-01
Payer: MEDICARE

## 2018-11-01 DIAGNOSIS — I48.0 PAROXYSMAL ATRIAL FIBRILLATION (H): ICD-10-CM

## 2018-11-01 DIAGNOSIS — I49.8 VENTRICULAR BIGEMINY: ICD-10-CM

## 2018-11-01 DIAGNOSIS — Z79.01 LONG TERM CURRENT USE OF ANTICOAGULANTS WITH INR GOAL OF 2.0-3.0: ICD-10-CM

## 2018-11-01 LAB — INR POINT OF CARE: 2.4 (ref 0.86–1.14)

## 2018-11-01 PROCEDURE — 85610 PROTHROMBIN TIME: CPT | Mod: QW

## 2018-11-01 PROCEDURE — 36416 COLLJ CAPILLARY BLOOD SPEC: CPT

## 2018-11-01 NOTE — PROGRESS NOTES
ANTICOAGULATION FOLLOW-UP CLINIC VISIT    Patient Name:  Amparo Mccray  Date:  11/1/2018  Contact Type:  Face to Face    SUBJECTIVE:     Patient Findings     Positives No Problem Findings           OBJECTIVE    INR Protime   Date Value Ref Range Status   11/01/2018 2.4 (A) 0.86 - 1.14 Final       ASSESSMENT / PLAN  No question data found.  Anticoagulation Summary as of 11/1/2018     INR goal 2.0-3.0   Today's INR 2.4   Warfarin maintenance plan 1.25 mg (2.5 mg x 0.5) on Wed, Sat; 2.5 mg (2.5 mg x 1) all other days   Full warfarin instructions 1.25 mg on Wed, Sat; 2.5 mg all other days   Weekly warfarin total 15 mg   No change documented Willow Ellison RN   Plan last modified Willow Ellison RN (11/7/2017)   Next INR check 12/27/2018   Priority INR   Target end date     Indications   Atrial fibrillation (H) [I48.91]  Ventricular bigeminy [I49.9]  Long term current use of anticoagulants with INR goal of 2.0-3.0 [Z79.01]         Anticoagulation Episode Summary     INR check location     Preferred lab     Send INR reminders to  TRIAGE POOL    Comments       Anticoagulation Care Providers     Provider Role Specialty Phone number    Alan Almeida MD [facility] Internal Medicine [phone]            See the Encounter Report to view Anticoagulation Flowsheet and Dosing Calendar (Go to Encounters tab in chart review, and find the Anticoagulation Therapy Visit)      Willow Ellison RN

## 2018-11-01 NOTE — MR AVS SNAPSHOT
Amparo Tarangotreasure   11/1/2018 2:00 PM   Anticoagulation Therapy Visit    Description:  62 year old female   Provider:  FM RN VISITS   Department:  Fm Nurse           INR as of 11/1/2018     Today's INR 2.4      Anticoagulation Summary as of 11/1/2018     INR goal 2.0-3.0   Today's INR 2.4   Full warfarin instructions 1.25 mg on Wed, Sat; 2.5 mg all other days   Next INR check 12/27/2018    Indications   Atrial fibrillation (H) [I48.91]  Ventricular bigeminy [I49.9]  Long term current use of anticoagulants with INR goal of 2.0-3.0 [Z79.01]         Your next Anticoagulation Clinic appointment(s)     Dec 27, 2018  2:00 PM CST   Anticoagulation Visit with FM RN VISITS   [facility]    [address] [place] [address], [facility] [address]  [facility] [address] [phone]              Contact Numbers     Clinic Number:         November 2018 Details    Sun Mon Tue Wed Thu Fri Sat         1      2.5 mg   See details      2      2.5 mg         3      1.25 mg           4      2.5 mg         5      2.5 mg         6      2.5 mg         7      1.25 mg         8      2.5 mg         9      2.5 mg         10      1.25 mg           11      2.5 mg         12      2.5 mg         13      2.5 mg         14      1.25 mg         15      2.5 mg         16      2.5 mg         17      1.25 mg           18      2.5 mg         19      2.5 mg         20      2.5 mg         21      1.25 mg         22      2.5 mg         23      2.5 mg         24      1.25 mg           25      2.5 mg         26      2.5 mg         27      2.5 mg         28      1.25 mg         29      2.5 mg         30      2.5 mg           Date Details   11/01 This INR check               How to take your warfarin dose     To take:  1.25 mg Take 0.5 of a 2.5 mg tablet.    To take:  2.5 mg Take 1 of the 2.5 mg tablets.           December 2018 Details    Sun Mon Tue Wed Thu Fri Sat           1      1.25 mg           2      2.5 mg         3       2.5 mg         4      2.5 mg         5      1.25 mg         6      2.5 mg         7      2.5 mg         8      1.25 mg           9      2.5 mg         10      2.5 mg         11      2.5 mg         12      1.25 mg         13      2.5 mg         14      2.5 mg         15      1.25 mg           16      2.5 mg         17      2.5 mg         18      2.5 mg         19      1.25 mg         20      2.5 mg         21      2.5 mg         22      1.25 mg           23      2.5 mg         24      2.5 mg         25      2.5 mg         26      1.25 mg         27            28               29                 30               31                     Date Details   No additional details    Date of next INR:  12/27/2018         How to take your warfarin dose     To take:  1.25 mg Take 0.5 of a 2.5 mg tablet.    To take:  2.5 mg Take 1 of the 2.5 mg tablets.

## 2018-12-27 DIAGNOSIS — I10 ESSENTIAL HYPERTENSION, BENIGN: ICD-10-CM

## 2018-12-27 NOTE — TELEPHONE ENCOUNTER
"Requested Prescriptions   Pending Prescriptions Disp Refills     metoprolol tartrate (LOPRESSOR) 50 MG tablet [Pharmacy Med Name: METOPROLOL TARTRATE 50MG TABLETS] 180 tablet 0      Last Written Prescription Date:  06/25/18  Last Fill Quantity: 180,  # refills: 1   Last office visit: 6/25/2018 with prescribing provider:  06/25/18   Future Office Visit:  0 Sig: TAKE 1 TABLET(50 MG) BY MOUTH TWICE DAILY    Beta-Blockers Protocol Passed - 12/27/2018  8:44 AM       Passed - Blood pressure under 140/90 in past 12 months    BP Readings from Last 3 Encounters:   08/16/18 120/77   06/25/18 (!) 142/98   01/24/18 142/86                Passed - Patient is age 6 or older       Passed - Recent (12 mo) or future (30 days) visit within the authorizing provider's specialty    Patient had office visit in the last 12 months or has a visit in the next 30 days with authorizing provider or within the authorizing provider's specialty.  See \"Patient Info\" tab in inbasket, or \"Choose Columns\" in Meds & Orders section of the refill encounter.              "

## 2019-01-02 RX ORDER — METOPROLOL TARTRATE 50 MG
TABLET ORAL
Qty: 180 TABLET | Refills: 1 | Status: SHIPPED | OUTPATIENT
Start: 2019-01-02 | End: 2019-06-19

## 2019-01-03 ENCOUNTER — ANTICOAGULATION THERAPY VISIT (OUTPATIENT)
Dept: NURSING | Facility: CLINIC | Age: 64
End: 2019-01-03
Payer: MEDICARE

## 2019-01-03 DIAGNOSIS — I48.91 ATRIAL FIBRILLATION (H): ICD-10-CM

## 2019-01-03 DIAGNOSIS — Z79.01 LONG TERM CURRENT USE OF ANTICOAGULANTS WITH INR GOAL OF 2.0-3.0: ICD-10-CM

## 2019-01-03 DIAGNOSIS — I49.8 VENTRICULAR BIGEMINY: ICD-10-CM

## 2019-01-03 LAB — INR POINT OF CARE: 1.4 (ref 0.86–1.14)

## 2019-01-03 PROCEDURE — 85610 PROTHROMBIN TIME: CPT | Mod: QW

## 2019-01-03 PROCEDURE — 36416 COLLJ CAPILLARY BLOOD SPEC: CPT

## 2019-01-03 PROCEDURE — 99207 ZZC NO CHARGE NURSE ONLY: CPT

## 2019-01-03 NOTE — PROGRESS NOTES
ANTICOAGULATION FOLLOW-UP CLINIC VISIT    Patient Name:  Amparo Mccray  Date:  1/3/2019  Contact Type:  Face to Face    SUBJECTIVE:     Patient Findings     Positives:   Missed doses    Comments:   Patient missed 2 doses because she did not  her refill in time and the pharmacy was closed for the holidays.             OBJECTIVE    INR Protime   Date Value Ref Range Status   11/01/2018 2.4 (A) 0.86 - 1.14 Final       ASSESSMENT / PLAN  No question data found.  Anticoagulation Summary  As of 1/3/2019    INR goal:   2.0-3.0   TTR:   87.3 % (1.2 y)   INR used for dosing:      Warfarin maintenance plan:   1.25 mg (2.5 mg x 0.5) every Wed, Sat; 2.5 mg (2.5 mg x 1) all other days   Full warfarin instructions:   1/3: 5 mg; 1/5: 2.5 mg; Otherwise 1.25 mg every Wed, Sat; 2.5 mg all other days   Weekly warfarin total:   15 mg   Plan last modified:   Willow Ellison RN (11/7/2017)   Next INR check:   2/28/2019   Priority:   INR   Target end date:       Indications    Atrial fibrillation (H) [I48.91]  Ventricular bigeminy [I49.9]  Long term current use of anticoagulants with INR goal of 2.0-3.0 [Z79.01]             Anticoagulation Episode Summary     INR check location:       Preferred lab:       Send INR reminders to:    TRIAGE POOL    Comments:         Anticoagulation Care Providers     Provider Role Specialty Phone number    Alan Almeida MD [facility] [phone]            See the Encounter Report to view Anticoagulation Flowsheet and Dosing Calendar (Go to Encounters tab in chart review, and find the Anticoagulation Therapy Visit)    Dosage adjustment made based on physician directed care plan.    Willow Ellison RN

## 2019-02-28 ENCOUNTER — OFFICE VISIT (OUTPATIENT)
Dept: INTERNAL MEDICINE | Facility: CLINIC | Age: 64
End: 2019-02-28
Payer: MEDICARE

## 2019-02-28 ENCOUNTER — ANTICOAGULATION THERAPY VISIT (OUTPATIENT)
Dept: NURSING | Facility: CLINIC | Age: 64
End: 2019-02-28
Payer: MEDICARE

## 2019-02-28 VITALS
TEMPERATURE: 97.8 F | OXYGEN SATURATION: 98 % | SYSTOLIC BLOOD PRESSURE: 130 MMHG | HEART RATE: 75 BPM | HEIGHT: 69 IN | DIASTOLIC BLOOD PRESSURE: 84 MMHG | WEIGHT: 242 LBS | RESPIRATION RATE: 16 BRPM | BODY MASS INDEX: 35.84 KG/M2

## 2019-02-28 DIAGNOSIS — Z79.01 LONG TERM CURRENT USE OF ANTICOAGULANTS WITH INR GOAL OF 2.0-3.0: ICD-10-CM

## 2019-02-28 DIAGNOSIS — R13.12 OROPHARYNGEAL DYSPHAGIA: Primary | ICD-10-CM

## 2019-02-28 DIAGNOSIS — Z23 NEED FOR PROPHYLACTIC VACCINATION AND INOCULATION AGAINST INFLUENZA: ICD-10-CM

## 2019-02-28 DIAGNOSIS — I49.8 VENTRICULAR BIGEMINY: ICD-10-CM

## 2019-02-28 DIAGNOSIS — I48.91 ATRIAL FIBRILLATION (H): ICD-10-CM

## 2019-02-28 LAB — INR POINT OF CARE: 2.8 (ref 0.86–1.14)

## 2019-02-28 PROCEDURE — 99214 OFFICE O/P EST MOD 30 MIN: CPT | Performed by: INTERNAL MEDICINE

## 2019-02-28 PROCEDURE — 36416 COLLJ CAPILLARY BLOOD SPEC: CPT

## 2019-02-28 PROCEDURE — 99207 ZZC NO CHARGE NURSE ONLY: CPT

## 2019-02-28 PROCEDURE — 90682 RIV4 VACC RECOMBINANT DNA IM: CPT | Performed by: INTERNAL MEDICINE

## 2019-02-28 PROCEDURE — 85610 PROTHROMBIN TIME: CPT | Mod: QW

## 2019-02-28 PROCEDURE — G0008 ADMIN INFLUENZA VIRUS VAC: HCPCS | Performed by: INTERNAL MEDICINE

## 2019-02-28 ASSESSMENT — MIFFLIN-ST. JEOR: SCORE: 1726.04

## 2019-02-28 NOTE — PROGRESS NOTES
"  SUBJECTIVE:   Amparo Mccray is a 63 year old female who presents to clinic today for the following health issues:    Concern - Swallowing problems  Onset: 4 years but lately in last 2 weeks has gotten worse    Description:   Seeing a neurologist for this  Was no new symptoms for years but in the past 2-3 months has gotten worse  Food and liquids get stuck in throat  Lungs will start burning    Progression of Symptoms:  worsening    intermittent memory, recall problems  Muscle cramping  Jumbled words    Feels this is quite similar to symptoms she had several yrs ago and was seen in consulation by Dr. Miner. A large w/u was negative though the pt put off and never underwent any swallowing evaluation.   Sx seemed to be less noticeable last year or so until recently.     Therapies Tried and outcome: Augmentin - helps     Problem list and histories reviewed & adjusted, as indicated.  Additional history: as documented    Labs reviewed in EPIC    Reviewed and updated as needed this visit by clinical staff  Tobacco  Allergies  Meds  Med Hx  Surg Hx  Fam Hx  Soc Hx      Reviewed and updated as needed this visit by Provider         ROS:  Constitutional, HEENT, cardiovascular, pulmonary, gi and gu systems are negative, except as otherwise noted.    OBJECTIVE:                                                    /84   Pulse 75   Temp 97.8  F (36.6  C) (Oral)   Resp 16   Ht 1.759 m (5' 9.25\")   Wt 109.8 kg (242 lb)   LMP 12/31/2012   SpO2 98%   Breastfeeding? No   BMI 35.48 kg/m    Body mass index is 35.48 kg/m .  GENERAL APPEARANCE: alert, no distress and over weight  HENT: nose and mouth without ulcers or lesions, oral mucous membranes moist, oropharynx clear and normal cephalic/atraumatic  NECK: no adenopathy, no asymmetry, masses, or scars and thyroid normal to palpation  RESP: lungs clear to auscultation - no rales, rhonchi or wheezes  CV: regular rates and rhythm, normal S1 S2, no S3 or S4 and " no murmur, click or rub  ABDOMEN: soft, nontender, without hepatosplenomegaly or masses and bowel sounds normal  NEURO: Normal strength and tone, mentation intact and speech normal    Diagnostic test results:  none      ASSESSMENT/PLAN:                                                    1. Oropharyngeal dysphagia  Some of her sx seems very much like MG- though w/u in past was negative for this.   Get swallow eval first. Maybe useful then to have neuro see here with the results of the swallow eval in hand to help guide w/u further.   - SPEECH THERAPY REFERRAL; Future  - NEUROLOGY ADULT REFERRAL    2. Need for prophylactic vaccination and inoculation against influenza  - FLU VACCINE, (RIV4) RECOMBINANT HA  , IM (FluBlok, egg free) [12394]- >18 YRS ([facility] recommended  50-64 YRS)  - Vaccine Administration, Initial [85988]      Alan Almeida MD  [facility]

## 2019-02-28 NOTE — PROGRESS NOTES
ANTICOAGULATION FOLLOW-UP CLINIC VISIT    Patient Name:  Amparo Mccray  Date:  2019  Contact Type:  Face to Face    SUBJECTIVE:     Patient Findings     Positives:   No Problem Findings           OBJECTIVE    INR Protime   Date Value Ref Range Status   2019 2.8 (A) 0.86 - 1.14 Final       ASSESSMENT / PLAN  No question data found.  Anticoagulation Summary  As of 2019    INR goal:   2.0-3.0   TTR:   78.7 % (1.5 y)   INR used for dosin.8 (2019)   Warfarin maintenance plan:   1.25 mg (2.5 mg x 0.5) every Wed, Sat; 2.5 mg (2.5 mg x 1) all other days   Full warfarin instructions:   1.25 mg every Wed, Sat; 2.5 mg all other days   Weekly warfarin total:   15 mg   No change documented:   Willow Ellison RN   Plan last modified:   Willow Ellison RN (2017)   Next INR check:   2019   Priority:   INR   Target end date:       Indications    Atrial fibrillation (H) [I48.91]  Ventricular bigeminy [I49.9]  Long term current use of anticoagulants with INR goal of 2.0-3.0 [Z79.01]             Anticoagulation Episode Summary     INR check location:       Preferred lab:       Send INR reminders to:    TRIAGE POOL    Comments:         Anticoagulation Care Providers     Provider Role Specialty Phone number    Alan Almeida MD [facility] [phone]            See the Encounter Report to view Anticoagulation Flowsheet and Dosing Calendar (Go to Encounters tab in chart review, and find the Anticoagulation Therapy Visit)      Willow Ellison RN

## 2019-02-28 NOTE — PROGRESS NOTES
Injectable Influenza Immunization Documentation    1.  Is the person to be vaccinated sick today?   No    2. Does the person to be vaccinated have an allergy to a component   of the vaccine?   No  Egg Allergy Algorithm Link    3. Has the person to be vaccinated ever had a serious reaction   to influenza vaccine in the past?   No    4. Has the person to be vaccinated ever had Guillain-Barré syndrome?   No    Form completed by Lindsay Meadows, [facility]

## 2019-03-04 ENCOUNTER — DOCUMENTATION ONLY (OUTPATIENT)
Dept: CARE COORDINATION | Facility: CLINIC | Age: 64
End: 2019-03-04

## 2019-03-08 ENCOUNTER — TRANSFERRED RECORDS (OUTPATIENT)
Dept: HEALTH INFORMATION MANAGEMENT | Facility: CLINIC | Age: 64
End: 2019-03-08

## 2019-03-08 ENCOUNTER — HOSPITAL ENCOUNTER (OUTPATIENT)
Dept: SPEECH THERAPY | Facility: CLINIC | Age: 64
Setting detail: THERAPIES SERIES
End: 2019-03-08
Attending: INTERNAL MEDICINE
Payer: MEDICARE

## 2019-03-08 ENCOUNTER — HOSPITAL ENCOUNTER (OUTPATIENT)
Dept: GENERAL RADIOLOGY | Facility: CLINIC | Age: 64
Discharge: HOME | End: 2019-03-08
Attending: INTERNAL MEDICINE | Admitting: INTERNAL MEDICINE
Payer: MEDICARE

## 2019-03-08 DIAGNOSIS — R13.12 OROPHARYNGEAL DYSPHAGIA: ICD-10-CM

## 2019-03-08 DIAGNOSIS — R13.12 DYSPHAGIA, OROPHARYNGEAL PHASE: ICD-10-CM

## 2019-03-08 PROCEDURE — 74230 X-RAY XM SWLNG FUNCJ C+: CPT

## 2019-03-08 PROCEDURE — 92611 MOTION FLUOROSCOPY/SWALLOW: CPT | Mod: GN

## 2019-03-08 NOTE — PROGRESS NOTES
"Video Swallow Study:      03/08/19 1100   General Information   Type Of Visit Initial   Start Of Care Date 03/08/19   Referring Physician Alan Almeida MD   Orders Evaluate And Treat   Medical Diagnosis Dysphagia, oropharyngeal phase (R13.12)   Respiratory Status Room air   General Information Comments Pt referred for a video swallow study by PCP as pt has been complaining of difficulty swallowing. She reports this has been occurring for ~4-5 years however has gotten worse over the past 2-3 months. She reports foods/liquids sticking in throat, feeling of airway blockage at times, lungs \"burning,\" and difficulty swallowing occurring more so at end of meals vs beginning. Pt had similar symptoms several years ago (2015?) where she had neurological work up (no findings) and was referred for a video swallow study however did not have it completed as it was not covered by insurance. MD also consulted neurology as he is concerned Myastenia Gravis. Pt reported her brother was recently diagnosed with ALS.   VFSS Eval: Thin Liquid Texture Trial   Mode of Presentation, Thin Liquid cup;straw;self-fed   Order of Presentation 1, 2, 3, 7   Oral Phase, Thin Liquid Premature pharyngeal entry   Pharyngeal Phase, Thin Liquid Delayed swallow reflex  (trigger at pyriform sinuses)   Rosenbek's Penetration Aspiration Scale: Thin Liquid Trial Results 2 - contrast enters airway, remains above the vocal cords, no residue remains (penetration)   VFSS Eval: Puree Solid Texture Trial   Mode of Presentation, Puree spoon;self-fed   Order of Presentation 4   Oral Phase, Puree Premature pharyngeal entry   Pharyngeal Phase, Puree Delayed swallow reflex  (trigger just over epiglottis)   Rosenbek's Penetration Aspiration Scale: Puree Food Trial Results 1 - no aspiration, contrast does not enter airway   VFSS Eval: Solid Food Texture Trial   Mode of Presentation, Solid self-fed   Order of Presentation 5, 6   Oral Phase, Solid Premature pharyngeal " entry   Pharyngeal Phase, Solid Delayed swallow reflex  (vallecular/just over tip of epiglottis x1)   Rosenbek's Penetration Aspiration Scale: Solid Food Trial Results 1 - no aspiration, contrast does not enter airway   Swallow Eval: Clinical Impressions   Skilled Criteria for Therapy Intervention No problems identified which require skilled intervention   Functional Assessment Scale (FAS) 6   Treatment Diagnosis functional swallow   Diet texture recommendations Regular diet;Thin liquids  (softer/moister as needed)   Recommended Feeding/Eating Techniques alternate between small bites and sips of food/liquid;maintain upright posture during/after eating for 30 mins;small sips/bites   Anticipated Discharge Disposition home   Risks and Benefits of Treatment have been explained. Yes   Patient, family and/or staff in agreement with Plan of Care Yes   Clinical Impression Comments Video swallow evaluation completed with thin liquids (images 1, 2, 3, 7), puree solids (image 4), general solids (images 5, 6). Pt currently presents with funcitonal oropharyngeal swallow, minimal-mild deficits noted. Oral phase generally intact. Piecemeal swallows observed with all chewable solid trials. Premature spillage to the pyrifroms observed with liquids. Premature spillage occuring to the valleculae with 3/5 trials of solids, however on 2/5 bolus slipped over epiglottis - it is possible that at times this could cause bolus to propel towards airway but that was not observed during this study. Base of tongue retraction judged mildly reduced however no phayrngeal residuals observed. Hyolaryngeal elevation/excursion intact, epiglottic inversion intact though at times mildly delayed. Flash penetration noted with thin liquids via rapid consecutive sips, no other penetration or aspiration noted. Recommend: General solids (extra moisture, softer solids as needed), thin liquids. Strategies: small bites/sips, slow pace, alternate between  solids/liquids. She may also benefit from 5-6 smaller meals a day vs 3 large as she reports increased difficulty as meal progresses (MD questioning possible MG dx, neurology consulted - fatigue would be a factor with same).     Pt also reporting food moving slow, pointing to lower sternal region, and may benefit from a follow up esophagram for imaging for full esophagus/motility.     If sx persist and/or worsen, fiberoptic endoscopic evaluation of swallowing (FEES) may be beneficial to trial PO textures over a longer, more functional period of time (e.g., a  meal).     Pt educated on all, all questions answered.    Total Session Time   SLP Eval: VideoFluoroscopic Swallow function Minutes (05045) 40   Total Evaluation Time 40

## 2019-03-19 ENCOUNTER — OFFICE VISIT (OUTPATIENT)
Dept: NEUROLOGY | Facility: CLINIC | Age: 64
End: 2019-03-19
Attending: INTERNAL MEDICINE
Payer: MEDICARE

## 2019-03-19 ENCOUNTER — DOCUMENTATION ONLY (OUTPATIENT)
Dept: NEUROLOGY | Facility: CLINIC | Age: 64
End: 2019-03-19

## 2019-03-19 VITALS
HEART RATE: 69 BPM | WEIGHT: 242.6 LBS | BODY MASS INDEX: 35.93 KG/M2 | SYSTOLIC BLOOD PRESSURE: 138 MMHG | OXYGEN SATURATION: 99 % | DIASTOLIC BLOOD PRESSURE: 73 MMHG | TEMPERATURE: 97.9 F | HEIGHT: 69 IN

## 2019-03-19 DIAGNOSIS — G62.9 NEUROPATHY: ICD-10-CM

## 2019-03-19 DIAGNOSIS — M62.81 GENERALIZED MUSCLE WEAKNESS: ICD-10-CM

## 2019-03-19 DIAGNOSIS — R13.10 DYSPHAGIA, UNSPECIFIED TYPE: ICD-10-CM

## 2019-03-19 DIAGNOSIS — R25.1 TREMOR: ICD-10-CM

## 2019-03-19 DIAGNOSIS — R41.89 COGNITIVE CHANGES: ICD-10-CM

## 2019-03-19 DIAGNOSIS — R25.1 TREMOR: Primary | ICD-10-CM

## 2019-03-19 LAB
CK SERPL-CCNC: 98 U/L (ref 30–225)
CRP SERPL-MCNC: 6.1 MG/L (ref 0–8)
TSH SERPL DL<=0.005 MIU/L-ACNC: 3.58 MU/L (ref 0.4–4)
VIT B12 SERPL-MCNC: 385 PG/ML (ref 193–986)

## 2019-03-19 PROCEDURE — 84165 PROTEIN E-PHORESIS SERUM: CPT | Performed by: PSYCHIATRY & NEUROLOGY

## 2019-03-19 PROCEDURE — 00000402 ZZHCL STATISTIC TOTAL PROTEIN: Performed by: PSYCHIATRY & NEUROLOGY

## 2019-03-19 PROCEDURE — 82390 ASSAY OF CERULOPLASMIN: CPT | Performed by: PSYCHIATRY & NEUROLOGY

## 2019-03-19 ASSESSMENT — MIFFLIN-ST. JEOR: SCORE: 1728.77

## 2019-03-19 ASSESSMENT — ENCOUNTER SYMPTOMS
SINUS CONGESTION: 0
LOSS OF CONSCIOUSNESS: 0
SEIZURES: 0
SINUS PAIN: 0
HEADACHES: 0
TROUBLE SWALLOWING: 1
SMELL DISTURBANCE: 0
LEG PAIN: 0
BOWEL INCONTINENCE: 1
BACK PAIN: 1
NUMBNESS: 1
DIARRHEA: 1
TINGLING: 1
MYALGIAS: 1
ABDOMINAL PAIN: 0
TASTE DISTURBANCE: 0
POOR WOUND HEALING: 0
HYPERTENSION: 0
JAUNDICE: 0
HEARTBURN: 0
SPEECH CHANGE: 1
EXERCISE INTOLERANCE: 0
HEMATURIA: 0
DIZZINESS: 1
PARALYSIS: 0
NAIL CHANGES: 0
ARTHRALGIAS: 1
ORTHOPNEA: 0
TREMORS: 1
JOINT SWELLING: 0
NECK PAIN: 0
SORE THROAT: 0
RECTAL PAIN: 0
MEMORY LOSS: 1
NAUSEA: 0
WEAKNESS: 1
PALPITATIONS: 1
BLOATING: 0
DIFFICULTY URINATING: 0
HYPOTENSION: 0
SKIN CHANGES: 0
SLEEP DISTURBANCES DUE TO BREATHING: 0
MUSCLE WEAKNESS: 1
NECK MASS: 0
DYSURIA: 0
BLOOD IN STOOL: 0
SYNCOPE: 0
VOMITING: 0
LIGHT-HEADEDNESS: 1
DISTURBANCES IN COORDINATION: 1
MUSCLE CRAMPS: 1
HOARSE VOICE: 0
STIFFNESS: 1
CONSTIPATION: 1
FLANK PAIN: 0

## 2019-03-19 ASSESSMENT — PAIN SCALES - GENERAL: PAINLEVEL: NO PAIN (0)

## 2019-03-19 NOTE — NURSING NOTE
Chief Complaint   Patient presents with     New Patient     [facility] NEW OROPHARYNGEAL DYSPHAGIA     Memory Loss     BEEN PROGRESSIVE LAST 6 MONTH       Argentina Scott LPN

## 2019-03-19 NOTE — PROGRESS NOTES
[facility]    Amparo Mccray MRN# [id number]   Age: 63 year old YOB: 1955     Requesting physician: Alan Jackson     Chief Complaint:  Referred by Dr. Almeida for repeat assessment of dysphagia.    History of Present Illness:  Amparo Mccray is a 63-year-old woman who had previously seen back in 2015 at [facility], [facility] for numerous complaints including myalgias, cramping, dysphagia and cognitive problems.  She had an extensive workup at the time that really yielded no clear etiology for all her symptoms.  Because of the dysphagia she was recommended to undergoa video swallow study which she did not ever complete because initially she was told insurance would not pay for it.    The patient reports that her symptoms have never resolved and they have continued to progress.  Over the past 6 months or so they have become even more bothersome to her.  Her main concern right now is the dysphagia.  She reports that she notices as she completes a meal her swallowing difficulties become more obvious to her.  She does not think there is any worsening as the day goes on.  She does not think she has any vocal changes associated with her speech.  She has noticed swallowing problems with both solids and liquids and does not recognize that one is more problematic than the other.  She has had several choking episodes where the food seems to be blocking her airway including one while driving.  She was apparently eating a piece of toast and when she swallowed it went down the wrong way she could not breathe.  She had to pull over at the side of the road and eventually it dislodged itself.    The patient back in 2015 was complaining of cramping and myalgias.  She had she reports that those symptoms if anything are slightly better but she believes that is because she does much less activity.  She retired in 2015 and reports that she is quite in active on a  day-to-day basis.    The patient has noticed frequent visual floaters.  She has had eye examinations and ruled out retinal detachments.  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    The patient reports concern about her memory.  She is managing on a day-to-day basis but reports several instances of being disoriented.  For instance she cites one episode where she was driving in the middle of the intersection she could not remember where she was or where she was going to or even why she was trying to travel.  She is also been asked questions by people such as her address and cannot tell them.  It is not all the time but overall she does think it is getting worse.  Her mother has dementia with onset in her 80s.  It has not been further classified as to whether is Alzheimer's or not.    The patient continues to have a tremor.  This has been labeled an essential tremor.  She has a sister also with an essential tremor.  She has tried beta-blockers and is currently on metoprolol without much benefit.  She notes that the tremor can fluctuate and sometimes is really severe.  She has a video on her cell phone of her laying in a hospital bed after knee surgery and having full body shaking during which time she does not have much control.  These will happen for a few minutes and then come back down. Gabapentin was tried in the past and was of no help so she stopped.    Past work up included myasthenic antibodies (binding, blocking, modulating), MRI brain and EMG which was all essentially normal.    Past Medical History:   Diagnosis Date     Arthritis      Chest pain, unspecified     neg stress test, put on PPI- no help     Essential hypertension, benign     Hypertension, Benign     Obesity (BMI 30-39.9) 7/1/13    BMI 33.6     Paroxysmal atrial fibrillation (H) 03/2010     Superficial thrombophlebitis      Thrombosis of leg     superficial not dvt's     Ventricular bigeminy     bigeminy- nrml LV fxn       Patient Active  Problem List   Diagnosis     Essential hypertension, benign     CARDIOVASCULAR SCREENING; LDL GOAL LESS THAN 160     Atrial fibrillation (H)     Ventricular bigeminy     Chest pain     Aftercare following joint replacement     Knee joint replacement by other means     ACP (advance care planning)     Paroxysmal A-fib (H)     Morbid obesity (H)     Knee joint replacement status     Health Care Home     Long-term (current) use of anticoagulants [Z79.01]     Long term current use of anticoagulants with INR goal of 2.0-3.0       Past Surgical History:   Procedure Laterality Date     ABDOMEN SURGERY       ARTHROPLASTY KNEE Right 3/2/2015    Procedure: ARTHROPLASTY KNEE;  Surgeon: Cuco Baugh MD;  Location: [facility] OR     C NONSPECIFIC PROCEDURE      left hand      [facility] AFF SURGICAL PATHOLOGY  1976    HGSIL     H ABLATION FOCAL AFIB  11/02/2017     H ABLATION FOCAL AFIB  01/15/2013     ORTHOPEDIC SURGERY      l tka  and wrist      TUBAL LIGATION      Uterine fibroids removed       Social History     Socioeconomic History     Marital status:      Spouse name: Not on file     Number of children: Not on file     Years of education: Not on file     Highest education level: Not on file   Occupational History     Not on file   Social Needs     Financial resource strain: Not on file     Food insecurity:     Worry: Not on file     Inability: Not on file     Transportation needs:     Medical: Not on file     Non-medical: Not on file   Tobacco Use     Smoking status: Never Smoker     Smokeless tobacco: Never Used   Substance and Sexual Activity     Alcohol use: Yes     Comment: once a year     Drug use: No     Sexual activity: Yes     Partners: Male     Birth control/protection: None   Lifestyle     Physical activity:     Days per week: Not on file     Minutes per session: Not on file     Stress: Not on file   Relationships     Social connections:     Talks on phone: Not on file     Gets together: Not on file      Attends Jewish service: Not on file     Active member of club or organization: Not on file     Attends meetings of clubs or organizations: Not on file     Relationship status: Not on file     Intimate partner violence:     Fear of current or ex partner: Not on file     Emotionally abused: Not on file     Physically abused: Not on file     Forced sexual activity: Not on file   Other Topics Concern     Parent/sibling w/ CABG, MI or angioplasty before 65F 55M? Yes   Social History Narrative     Not on file       Family History   Problem Relation Age of Onset     C.A.D. Paternal Grandmother      Hypertension Paternal Grandmother      C.A.D. Paternal Grandfather      Connective Tissue Disorder Paternal Grandfather      Hypertension Paternal Grandfather      C.A.D. Father      Hypertension Father      Cancer Maternal Grandmother         uterine     Colon Cancer Maternal Grandmother      Cancer Maternal Grandfather         leukemia     Other Cancer Maternal Grandfather      Cancer Mother         uterine     Gastrointestinal Disease Mother         ulcers     Hypertension Mother      Other - See Comments Sister         essential tremors     C.A.D. Brother         stent- 47     Hypertension Brother      Depression Daughter      Eye Disorder Son      Hypertension Brother      Depression Daughter      Asthma Son      MATERNAL AUNT-PARKINSONS  BROTHER ALS DIAGNOSED    Current Outpatient Medications   Medication Sig     lisinopril (PRINIVIL/ZESTRIL) 10 MG tablet TAKE 1 TABLET(10 MG) BY MOUTH DAILY     metoprolol tartrate (LOPRESSOR) 50 MG tablet TAKE 1 TABLET(50 MG) BY MOUTH TWICE DAILY     triamterene-hydrochlorothiazide (MAXZIDE-25) 37.5-25 MG per tablet TAKE 1 TABLET BY MOUTH EVERY MORNING.     warfarin (COUMADIN) 2.5 MG tablet Take one tablet daily except 1/2 tablets on Wed/Sat as directed by the anticoagulation clinic     No current facility-administered medications for this visit.           Allergies   Allergen Reactions  "    Celebrex [Celecoxib] Hives     Hands itching then hives     Ibuprofen Swelling     Face swelling       ROS: Please see HPI all other systems review and negative.    Physical Examination:  /73 (BP Location: Right arm, Patient Position: Sitting, Cuff Size: Adult Large)   Pulse 69   Temp 97.9  F (36.6  C) (Oral)   Ht 1.759 m (5' 9.25\")   Wt 110 kg (242 lb 9.6 oz)   LMP 12/31/2012   SpO2 99%   BMI 35.57 kg/m    General Appearance:  The patient is well groomed and cooperative with examination. No acute distress.  Neurological Examination  Cognition: oriented x3, attention and recall intact. No aphasia or dysarthria. No deficits on Mini-cog  Cranial Nerves: 2-12 intact. Gag is intact and tongue strength is normal. No clear fasciculations present in tongue. Funduscopic examination is normal with sharp disc margins bilaterally.   General Motor Survey: Normal muscle bulk, tone and strength in all four ext. No fatigable weakness. Tremor is present in both hands and head, action tremor, fairly coarse.   Coordination: Finger to nose impaired by tremor. Heel knee shin is difficult for patient due to past knee surgeries and poor range of motion. Finger taps are normal speed  Reflexes: Upper extremity reflexes are normal range bilaterally. Knees are both post-surgical and no reflexes are elicited. Ankles jerks are absent bilaterally with reinforcement. Plantar responses are flexor bilaterally  Sensory Examination:   Vibration: absent at great toe, early extinction at ankles bilat. Normal at knee and in hands   Pinprick:Reduced from knee down to toes on left and right   Joint Position Sense: normal in all four ext   Light Touch: subjective decrease in feet bilaterally  Gait: Somewhat of an antalgic gait, post surgical knees and she has valgus deviation of knees. Unable to push up on toes, heels strength ok, difficulty with tandem walking  Cardiovascular Examination:  Heart is regular in rate and rhythm to " auscultation. No significant murmurs. No carotid bruits. No significant peripheral edema. Pedal pulses are palpable bilaterally.     Musculoskeletal Examination:  Neck is supple with full range of motion. No tenderness to palpation.        Impression/Recommendations:  Amparo presents with an unusual constellation of symptoms that have been progressive over the past 4-5 years. Those symptoms include myalgias, dysphagia, memory loss, and tremor. We will re-assess with further testing as follows. I can't tie all her symptom complaints together into one neurological disease. She does have findings of essential tremor and neuropathy on examination. Nothing clearly explains her dysphagia.  EMG-to rule out motor neuron disease in light of her brothers recent diagnosis, also rule out NMJ disorders such as MG  Lab work up to look for metabolic causes of neuropathy: B12, SPEP  Lab work up of tremor: TSH, CMP  Lab work up for weakness/dysphagia: repeat MG antibody screen, CK level, MuSK  Neuropsychological Testing for memory complaints  Movement disorder consult for tremor  GI referral to assess dysphagia from a GI perspective. Esophagram recommended by Speech.        Nikole Miner MD [facility]  Department of Neurology  Pager [phone]      Answers for HPI/ROS submitted by the patient on 3/19/2019   General Symptoms: No  Skin Symptoms: Yes  HENT Symptoms: Yes  EYE SYMPTOMS: No  HEART SYMPTOMS: Yes  LUNG SYMPTOMS: No  INTESTINAL SYMPTOMS: Yes  URINARY SYMPTOMS: Yes  GYNECOLOGIC SYMPTOMS: No  BREAST SYMPTOMS: No  SKELETAL SYMPTOMS: Yes  BLOOD SYMPTOMS: No  NERVOUS SYSTEM SYMPTOMS: Yes  MENTAL HEALTH SYMPTOMS: No  Changes in hair: No  Changes in moles/birth marks: No  Itching: Yes  Rashes: No  Changes in nails: No  Acne: No  Hair in places you don't want it: No  Change in facial hair: No  Warts: No  Non-healing sores: No  Scarring: No  Flaking of skin: No  Color changes of hands/feet in cold : No  Sun sensitivity: No  Skin  thickening: No  Ear pain: No  Ear discharge: No  Hearing loss: No  Tinnitus: No  Nosebleeds: No  Congestion: No  Sinus pain: No  Trouble swallowing: Yes   Voice hoarseness: No  Mouth sores: No  Sore throat: No  Tooth pain: No  Gum tenderness: No  Bleeding gums: No  Change in taste: No  Change in sense of smell: No  Dry mouth: No  Hearing aid used: No  Neck lump: No  Chest pain or pressure: No  Fast or irregular heartbeat: Yes  Pain in legs with walking: No  Trouble breathing while lying down: No  Fingers or toes appear blue: No  High blood pressure: No  Low blood pressure: No  Fainting: No  Murmurs: No  Pacemaker: No  Varicose veins: No  Edema or swelling: No  Wake up at night with shortness of breath: No  Light-headedness: Yes  Exercise intolerance: No  Heart burn or indigestion: No  Nausea: No  Vomiting: No  Abdominal pain: No  Bloating: No  Constipation: Yes  Diarrhea: Yes  Blood in stool: No  Black stools: No  Rectal or Anal pain: No  Fecal incontinence: Yes  Yellowing of skin or eyes: No  Vomit with blood: No  Change in stools: No  Trouble holding urine or incontinence: Yes  Pain or burning: No  Trouble starting or stopping: Yes  Increased frequency of urination: No  Blood in urine: No  Decreased frequency of urination: No  Frequent nighttime urination: No  Flank pain: No  Difficulty emptying bladder: No  Back pain: Yes  Muscle aches: Yes  Neck pain: No  Swollen joints: No  Joint pain: Yes  Bone pain: No  Muscle cramps: Yes  Muscle weakness: Yes  Joint stiffness: Yes  Bone fracture: No  Trouble with coordination: Yes  Dizziness or trouble with balance: Yes  Fainting or black-out spells: No  Memory loss: Yes  Headache: No  Seizures: No  Speech problems: Yes  Tingling: Yes  Tremor: Yes  Weakness: Yes  Difficulty walking: No  Paralysis: No  Numbness: Yes

## 2019-03-19 NOTE — LETTER
RE: Amparo Mccray  [address]  [address]  [place] [address]     Dear Colleague,    Thank you for referring your patient, Amparo Mccray, to the [facility]UROLOG[facility] at [facility]. Please see a copy of my visit note below.    [facility]    Amparo Mccray MRN# [id number]   Age: 63 year old YOB: 1955     Requesting physician: Alan Jackson     Chief Complaint:  Referred by Dr. Almeida for repeat assessment of dysphagia.    History of Present Illness:  Amparo Mccray is a 63-year-old woman who had previously seen back in 2015 at [facility], [facility] for numerous complaints including myalgias, cramping, dysphagia and cognitive problems.  She had an extensive workup at the time that really yielded no clear etiology for all her symptoms.  Because of the dysphagia she was recommended to undergoa video swallow study which she did not ever complete because initially she was told insurance would not pay for it.    The patient reports that her symptoms have never resolved and they have continued to progress.  Over the past 6 months or so they have become even more bothersome to her.  Her main concern right now is the dysphagia.  She reports that she notices as she completes a meal her swallowing difficulties become more obvious to her.  She does not think there is any worsening as the day goes on.  She does not think she has any vocal changes associated with her speech.  She has noticed swallowing problems with both solids and liquids and does not recognize that one is more problematic than the other.  She has had several choking episodes where the food seems to be blocking her airway including one while driving.  She was apparently eating a piece of toast and when she swallowed it went down the wrong way she could not breathe.  She had to pull over at the side of the road and eventually it dislodged itself.    The  patient back in 2015 was complaining of cramping and myalgias.  She had she reports that those symptoms if anything are slightly better but she believes that is because she does much less activity.  She retired in 2015 and reports that she is quite in active on a day-to-day basis.    The patient has noticed frequent visual floaters.  She has had eye examinations and ruled out retinal detachments.  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    The patient reports concern about her memory.  She is managing on a day-to-day basis but reports several instances of being disoriented.  For instance she cites one episode where she was driving in the middle of the intersection she could not remember where she was or where she was going to or even why she was trying to travel.  She is also been asked questions by people such as her address and cannot tell them.  It is not all the time but overall she does think it is getting worse.  Her mother has dementia with onset in her 80s.  It has not been further classified as to whether is Alzheimer's or not.    The patient continues to have a tremor.  This has been labeled an essential tremor.  She has a sister also with an essential tremor.  She has tried beta-blockers and is currently on metoprolol without much benefit.  She notes that the tremor can fluctuate and sometimes is really severe.  She has a video on her cell phone of her laying in a hospital bed after knee surgery and having full body shaking during which time she does not have much control.  These will happen for a few minutes and then come back down. Gabapentin was tried in the past and was of no help so she stopped.    Past work up included myasthenic antibodies (binding, blocking, modulating), MRI brain and EMG which was all essentially normal.    Past Medical History:   Diagnosis Date     Arthritis      Chest pain, unspecified     neg stress test, put on PPI- no help     Essential hypertension, benign      Hypertension, Benign     Obesity (BMI 30-39.9) 7/1/13    BMI 33.6     Paroxysmal atrial fibrillation (H) 03/2010     Superficial thrombophlebitis      Thrombosis of leg     superficial not dvt's     Ventricular bigeminy     bigeminy- nrml LV fxn       Patient Active Problem List   Diagnosis     Essential hypertension, benign     CARDIOVASCULAR SCREENING; LDL GOAL LESS THAN 160     Atrial fibrillation (H)     Ventricular bigeminy     Chest pain     Aftercare following joint replacement     Knee joint replacement by other means     ACP (advance care planning)     Paroxysmal A-fib (H)     Morbid obesity (H)     Knee joint replacement status     Health Care Home     Long-term (current) use of anticoagulants [Z79.01]     Long term current use of anticoagulants with INR goal of 2.0-3.0       Past Surgical History:   Procedure Laterality Date     ABDOMEN SURGERY       ARTHROPLASTY KNEE Right 3/2/2015    Procedure: ARTHROPLASTY KNEE;  Surgeon: Cuco Baugh MD;  Location: [facility] OR     C NONSPECIFIC PROCEDURE      left hand      [facility] AFF SURGICAL PATHOLOGY  1976    HGSIL     H ABLATION FOCAL AFIB  11/02/2017     H ABLATION FOCAL AFIB  01/15/2013     ORTHOPEDIC SURGERY      l tka  and wrist      TUBAL LIGATION      Uterine fibroids removed       Social History     Socioeconomic History     Marital status:      Spouse name: Not on file     Number of children: Not on file     Years of education: Not on file     Highest education level: Not on file   Occupational History     Not on file   Social Needs     Financial resource strain: Not on file     Food insecurity:     Worry: Not on file     Inability: Not on file     Transportation needs:     Medical: Not on file     Non-medical: Not on file   Tobacco Use     Smoking status: Never Smoker     Smokeless tobacco: Never Used   Substance and Sexual Activity     Alcohol use: Yes     Comment: once a year     Drug use: No     Sexual activity: Yes     Partners: Male      Birth control/protection: None   Lifestyle     Physical activity:     Days per week: Not on file     Minutes per session: Not on file     Stress: Not on file   Relationships     Social connections:     Talks on phone: Not on file     Gets together: Not on file     Attends Quaker service: Not on file     Active member of club or organization: Not on file     Attends meetings of clubs or organizations: Not on file     Relationship status: Not on file     Intimate partner violence:     Fear of current or ex partner: Not on file     Emotionally abused: Not on file     Physically abused: Not on file     Forced sexual activity: Not on file   Other Topics Concern     Parent/sibling w/ CABG, MI or angioplasty before 65F 55M? Yes   Social History Narrative     Not on file       Family History   Problem Relation Age of Onset     C.A.D. Paternal Grandmother      Hypertension Paternal Grandmother      C.A.D. Paternal Grandfather      Connective Tissue Disorder Paternal Grandfather      Hypertension Paternal Grandfather      C.A.D. Father      Hypertension Father      Cancer Maternal Grandmother         uterine     Colon Cancer Maternal Grandmother      Cancer Maternal Grandfather         leukemia     Other Cancer Maternal Grandfather      Cancer Mother         uterine     Gastrointestinal Disease Mother         ulcers     Hypertension Mother      Other - See Comments Sister         essential tremors     C.A.D. Brother         stent- 47     Hypertension Brother      Depression Daughter      Eye Disorder Son      Hypertension Brother      Depression Daughter      Asthma Son      MATERNAL AUNT-PARKINSONS  BROTHER ALS DIAGNOSED    Current Outpatient Medications   Medication Sig     lisinopril (PRINIVIL/ZESTRIL) 10 MG tablet TAKE 1 TABLET(10 MG) BY MOUTH DAILY     metoprolol tartrate (LOPRESSOR) 50 MG tablet TAKE 1 TABLET(50 MG) BY MOUTH TWICE DAILY     triamterene-hydrochlorothiazide (MAXZIDE-25) 37.5-25 MG per tablet TAKE 1  "TABLET BY MOUTH EVERY MORNING.     warfarin (COUMADIN) 2.5 MG tablet Take one tablet daily except 1/2 tablets on Wed/Sat as directed by the anticoagulation clinic     No current facility-administered medications for this visit.      Allergies   Allergen Reactions     Celebrex [Celecoxib] Hives     Hands itching then hives     Ibuprofen Swelling     Face swelling     Physical Examination:  /73 (BP Location: Right arm, Patient Position: Sitting, Cuff Size: Adult Large)   Pulse 69   Temp 97.9  F (36.6  C) (Oral)   Ht 1.759 m (5' 9.25\")   Wt 110 kg (242 lb 9.6 oz)   LMP 12/31/2012   SpO2 99%   BMI 35.57 kg/m     General Appearance:  The patient is well groomed and cooperative with examination. No acute distress.  Neurological Examination  Cognition: oriented x3, attention and recall intact. No aphasia or dysarthria. No deficits on Mini-cog  Cranial Nerves: 2-12 intact. Gag is intact and tongue strength is normal. No clear fasciculations present in tongue. Funduscopic examination is normal with sharp disc margins bilaterally.   General Motor Survey: Normal muscle bulk, tone and strength in all four ext. No fatigable weakness. Tremor is present in both hands and head, action tremor, fairly coarse.   Coordination: Finger to nose impaired by tremor. Heel knee shin is difficult for patient due to past knee surgeries and poor range of motion. Finger taps are normal speed  Reflexes: Upper extremity reflexes are normal range bilaterally. Knees are both post-surgical and no reflexes are elicited. Ankles jerks are absent bilaterally with reinforcement. Plantar responses are flexor bilaterally  Sensory Examination:   Vibration: absent at great toe, early extinction at ankles bilat. Normal at knee and in hands   Pinprick:Reduced from knee down to toes on left and right   Joint Position Sense: normal in all four ext   Light Touch: subjective decrease in feet bilaterally  Gait: Somewhat of an antalgic gait, post surgical " knees and she has valgus deviation of knees. Unable to push up on toes, heels strength ok, difficulty with tandem walking  Cardiovascular Examination:  Heart is regular in rate and rhythm to auscultation. No significant murmurs. No carotid bruits. No significant peripheral edema. Pedal pulses are palpable bilaterally.     Musculoskeletal Examination:  Neck is supple with full range of motion. No tenderness to palpation.        Impression/Recommendations:  Apmaro presents with an unusual constellation of symptoms that have been progressive over the past 4-5 years. Those symptoms include myalgias, dysphagia, memory loss, and tremor. We will re-assess with further testing as follows. I can't tie all her symptom complaints together into one neurological disease. She does have findings of essential tremor and neuropathy on examination. Nothing clearly explains her dysphagia.  EMG-to rule out motor neuron disease in light of her brothers recent diagnosis, also rule out NMJ disorders such as MG  Lab work up to look for metabolic causes of neuropathy: B12, SPEP  Lab work up of tremor: TSH, CMP  Lab work up for weakness/dysphagia: repeat MG antibody screen, CK level, MuSK  Neuropsychological Testing for memory complaints  Movement disorder consult for tremor  GI referral to assess dysphagia from a GI perspective. Esophagram recommended by Speech.    Nikole Miner MD [facility]  Department of Neurology  Pager [phone]

## 2019-03-19 NOTE — PROGRESS NOTES
Phone Call:     Contact Name [facility]   Outcome Spoke with Julissa at [facility], no records request was received on their end. Faxed new records request to [facility]. Notified Julissa that patient's appt is today and that we need records STAT.

## 2019-03-20 LAB
ALBUMIN SERPL ELPH-MCNC: 4.4 G/DL (ref 3.7–5.1)
ALPHA1 GLOB SERPL ELPH-MCNC: 0.3 G/DL (ref 0.2–0.4)
ALPHA2 GLOB SERPL ELPH-MCNC: 0.7 G/DL (ref 0.5–0.9)
B-GLOBULIN SERPL ELPH-MCNC: 0.9 G/DL (ref 0.6–1)
GAMMA GLOB SERPL ELPH-MCNC: 1 G/DL (ref 0.7–1.6)
M PROTEIN SERPL ELPH-MCNC: 0 G/DL
PROT PATTERN SERPL ELPH-IMP: NORMAL

## 2019-03-21 ENCOUNTER — TELEPHONE (OUTPATIENT)
Dept: GASTROENTEROLOGY | Facility: CLINIC | Age: 64
End: 2019-03-21

## 2019-03-21 LAB
ACHR BIND AB SER-SCNC: 0 NMOL/L (ref 0–0.4)
ACHR BLOCK AB/ACHR TOTAL SFR SER: 24 % (ref 0–26)
ACHR MOD AB/ACHR TOTAL SFR SER: 0 %
CERULOPLASMIN SERPL-MCNC: 29 MG/DL (ref 23–53)

## 2019-03-21 NOTE — TELEPHONE ENCOUNTER
Called and left message for patient reminding of appointment scheduled on 3/29/19 at 0800 with [place] [facility]. Patient to arrive 15 min early. If need to be reschedule, patient to call [phone].    DAMON Fonseca

## 2019-03-22 NOTE — TELEPHONE ENCOUNTER
FUTURE VISIT INFORMATION      FUTURE VISIT INFORMATION:    Date: 3/29/19    Time: 8AM    [place] [facility]  REFERRAL INFORMATION:    Referring provider:  Dr. Nikole Miner    Referring providers clinic:  [facility]    Reason for visit/diagnosis:  Dysphagia, unspecified type     NOTES STATUS DETAILS   OFFICE NOTE from referring provider  Internal 3/19/19   OFFICE NOTE from other specialist   Internal 2/28/19   DISCHARGE SUMMARY FROM HOSPITAL N/A    DISCHARGE REPORT FROM ED Internal    OPERATIVE REPORT  N/A    PFC REPORT N/A    MEDICATION LIST Internal    LABS     FIT/STOOL TESTING Internal FIT test 6/25/18, 9/16/17, and 5/19/16   PERTINENT LABS Internal    PATHOLOGY REPORTS RELATED TO DIAGNOSIS N/A    DIAGNOSTIC PROCEDURES     COLONOSCOPY N/A    ENDOSCOPY (EGD) N/A    ERCP N/A    EUS N/A    FLEX SIGMOIDOSCOPY Received 6/2/04   IMAGING & REPORT      CT, MRI, US, XR Internal

## 2019-03-23 LAB
STRIA MUS IGG SER QL IF: DETECTED
STRIA MUS IGG TITR SER IF: ABNORMAL {TITER}

## 2019-03-25 LAB — MUSK AB SER-SCNC: 0 NMOL/L (ref 0–0.02)

## 2019-03-28 NOTE — TELEPHONE ENCOUNTER
RECORDS RECEIVED FROM: Internal - Dr. Isidra VAUGHAN [facility]   DATE RECEIVED: 4/3/19   NOTES (FOR ALL VISITS) STATUS DETAILS   OFFICE NOTE from referring provider Internal 3/19/19   OFFICE NOTE from other specialist Received [facility]:  5/22/15  1/29/15    Neuropsych @ [facility]:  5/10/19 *scheduled*     DISCHARGE SUMMARY from hospital N/A    DISCHARGE REPORT from the ER N/A    OPERATIVE REPORT N/A    MEDICATION LIST N/A    IMAGING  (FOR ALL VISITS)     EMG In process 4/8/19 *scheduled*   EEG N/A    ECT Internal 1/24/18   MRI (HEAD, NECK, SPINE) Internal [facility]:  MRI Brain 7/16/13   CT (HEAD, NECK, SPINE) N/A

## 2019-03-29 ENCOUNTER — PRE VISIT (OUTPATIENT)
Dept: GASTROENTEROLOGY | Facility: CLINIC | Age: 64
End: 2019-03-29

## 2019-03-29 ENCOUNTER — TELEPHONE (OUTPATIENT)
Dept: GASTROENTEROLOGY | Facility: CLINIC | Age: 64
End: 2019-03-29

## 2019-03-29 ENCOUNTER — OFFICE VISIT (OUTPATIENT)
Dept: GASTROENTEROLOGY | Facility: CLINIC | Age: 64
End: 2019-03-29
Payer: MEDICARE

## 2019-03-29 VITALS
OXYGEN SATURATION: 98 % | SYSTOLIC BLOOD PRESSURE: 126 MMHG | BODY MASS INDEX: 35.6 KG/M2 | DIASTOLIC BLOOD PRESSURE: 76 MMHG | WEIGHT: 240.4 LBS | HEART RATE: 69 BPM | HEIGHT: 69 IN

## 2019-03-29 DIAGNOSIS — R13.10 DYSPHAGIA, UNSPECIFIED TYPE: Primary | ICD-10-CM

## 2019-03-29 ASSESSMENT — ENCOUNTER SYMPTOMS
TINGLING: 1
JAUNDICE: 0
MEMORY LOSS: 1
SMELL DISTURBANCE: 0
VOMITING: 0
EYE REDNESS: 0
TASTE DISTURBANCE: 0
HEADACHES: 0
WHEEZING: 0
NECK MASS: 0
SPEECH CHANGE: 1
DYSPNEA ON EXERTION: 0
DOUBLE VISION: 0
DIARRHEA: 1
BLOATING: 0
SORE THROAT: 0
SPUTUM PRODUCTION: 0
NECK PAIN: 0
POSTURAL DYSPNEA: 0
SHORTNESS OF BREATH: 0
MUSCLE WEAKNESS: 1
RECTAL PAIN: 0
NAUSEA: 0
SNORES LOUDLY: 0
ABDOMINAL PAIN: 0
COUGH: 1
NUMBNESS: 1
HOARSE VOICE: 0
JOINT SWELLING: 0
ARTHRALGIAS: 1
HEMOPTYSIS: 0
HEARTBURN: 0
DIZZINESS: 1
SINUS PAIN: 0
MUSCLE CRAMPS: 1
TREMORS: 1
TROUBLE SWALLOWING: 1
EYE PAIN: 0
BLOOD IN STOOL: 0
SEIZURES: 0
BOWEL INCONTINENCE: 0
CONSTIPATION: 0
PARALYSIS: 0
MYALGIAS: 1
BACK PAIN: 1
DISTURBANCES IN COORDINATION: 1
EYE WATERING: 1
EYE IRRITATION: 0
STIFFNESS: 1
SINUS CONGESTION: 0
LOSS OF CONSCIOUSNESS: 0
WEAKNESS: 1
COUGH DISTURBING SLEEP: 0

## 2019-03-29 ASSESSMENT — PAIN SCALES - GENERAL: PAINLEVEL: NO PAIN (0)

## 2019-03-29 ASSESSMENT — MIFFLIN-ST. JEOR: SCORE: 1718.79

## 2019-03-29 NOTE — PROGRESS NOTES
I performed a history and physical examination of the above patient and discussed the management with Dr. Terrazas on 3/29/2019. I reviewed the note and there are no changes to the past medical, family or social history.  A complete 10 point review of systems was obtained. Please see the HPI for pertinent positives and negatives. All other systems were reviewed and were found to be negative. I agree with the documented findings and plan of care as outlined.    Stewart Moralez MD  GI Attending  Pager: [phone]

## 2019-03-29 NOTE — NURSING NOTE
"Chief Complaint   Patient presents with     New Patient     New consult, dysphagia       Vitals:    03/29/19 0744   BP: 126/76   Pulse: 69   SpO2: 98%   Weight: 109 kg (240 lb 6.4 oz)   Height: 1.759 m (5' 9.25\")       Body mass index is 35.25 kg/m .    Charity Camargo CMA    "

## 2019-03-29 NOTE — PATIENT INSTRUCTIONS
It was a pleasure taking care of you today.  I've included a brief summary of our discussion and care plan from today's visit below.  Please review this information with your primary care provider.  _______________________________________________________________________    My recommendations are summarized as follows:    -- Upper endoscopy (EGD) to evaluate esophagus for cause of difficulty swallowing  -- Esophageal manometry (pressure testing) to evaluate muscles of esophagus   -- If you do not hear by next Wednesday to schedule appointment, call [phone]      Will wait to schedule follow-up until after testing, if testing is normal - no need for follow-up unless as needed.    _______________________________________________________________________    Who do I call with any questions after my visit?  Please be in touch if there are any further questions that arise following today's visit.  There are multiple ways to contact your gastroenterology care team.        During business hours, you may reach a Gastroenterology nurse at [phone].       To schedule or reschedule an appointment, please call [phone].       You can always send a secure message through [employer].  Okeo messages are answered by your nurse or doctor typically within 24 hours.  Please allow extra time on weekends and holidays.        For urgent/emergent questions after business hours, you may reach the on-call GI Fellow by contacting the [facility] at [phone].     How will I get the results of any tests ordered?    You will receive all of your results.  If you have signed up for [employer], any tests ordered at your visit will be available to you after your physician reviews them.  Typically this takes 1-2 weeks.  If there are urgent results that require a change in your care plan, your physician or nurse will call you to discuss the next steps.      What is [employer]o?  Okeo is a secure way for you to access all  of your healthcare records from the [facility].  It is a web based computer program, so you can sign on to it from any location.  It also allows you to send secure messages to your care team.  I recommend signing up for [employer]Ch[employer] access if you have not already done so and are comfortable with using a computer.      How to I schedule a follow-up visit?  If you did not schedule a follow-up visit today, please call [phone] to schedule a follow-up office visit.        Sincerely,    Hansa Terrazas MD  Gastroenterology Fellow  [facility]

## 2019-03-29 NOTE — LETTER
3/29/2019       RE: Amparo Mccray  [address]  [facility] [id number]     Dear Colleague,    Thank you for referring your patient, Amparo Mccray, to the [facility] GASTROENTEROLOGY AND IBD CLINIC at [facility]. Please see a copy of my visit note below.    GI CLINIC VISIT  03/29/19    CC/REFERRING MD:  Nikole Miner  REASON FOR CONSULTATION: Dysphagia    ASSESSMENT/PLAN:  Amparo Mccray is a 63 year old female with past medical history of hypertension and atrial fibrillation on warfarin anti-coagulation presenting for evaluation of dysphagia.     1. Dysphagia  Onset five years ago and progressively more frequent. Both liquids and solids, but worse with foods that require more mastication. Overall clinical symptoms consistent with oropharyngeal dysphagia. Tiring out of at end of meal consistent with underlying neuromuscular disorder. Ongoing evaluation with neurology. Less likely, esophageal dysphagia, however, cannot rule this out. Video swallow looked at cervical esophagus, but has not had full anatomic evaluation of esophagus. DDx of dysphagia can include neuromuscular disorders, peptic stricture, mass, EoE, motility disorder. Recommend EGD for further evaluation.    -- Upper endoscopy (EGD) to evaluate esophagus for cause of difficulty swallowing  -- Esophageal manometry (pressure testing) to evaluate muscles of esophagus   -- If you do not hear by next Wednesday to schedule appointment, call [phone]    RTC: After testing - if normal, no need for follow-up    Thank you for this consultation.  It was a pleasure to participate in the care of this patient; please contact us with any further questions.      Plan of care discussed with Dr. Moralez, gastroenterology attending physician.     Hansa Terrazas MD  Gastroenterology Fellow  Division of Gastroenterology, Hepatology and Nutrition  [facility]    HPI:  Amparo Mccray is a  63 year old female with past medical history of hypertension and atrial fibrillation on warfarin anti-coagulation presenting for evaluation of dysphagia.     Patient reports issues with swallowing dating back to 2014. Prior to this she does not remember having symptoms. She reports the symptoms were sporadic, happening about twice per week, sometimes just one monthly. She never felt like she choked on anything at that point, but did feel difficulty initiating the swallow and occasionally feeling like food or liquids would get held up in her chest (more rarely). She reports these symptoms have gotten worse over the past 4-5 months and describes an episode when she was driving while eating a piece of toast and it became lodged in her airway. She could not breathe and eventually the food dislodged. She ended up with a subsequent lung infection she attributes to the aspiration.     She continues to have daily symptoms with both liquids and solids. She notes the symptoms are worse with breads and biscuit-type foods and definitely worse later in the meal when she feels she has used the muscles too much such as in the second half of a sandwich.     She denies odynophagia, chest pain, shortness of breath, fevers, chills, heartburn, acid reflux, weight loss, night sweats, abdominal pain. She has one formed bowel movement daily, no hematochezia or melena. She has never had an upper endoscopy or colonoscopy. She undergoes yearly FIT screening for colorectal cancer.     ROS:    No fevers or chills  No weight loss  No blurry vision, double vision or change in vision  No sore throat  No lymphadenopathy  No headache, paraesthesias, or weakness in a limb  No shortness of breath or wheezing  No chest pain or pressure  No arthralgias or myalgias  No rashes or skin changes  No odynophagia or dysphagia  No BRBPR, hematochezia, melena  No dysuria, frequency or urgency  No hot/cold intolerance or polyria  No anxiety or  depression    PROBLEM LIST  Patient Active Problem List    Diagnosis Date Noted     Atrial fibrillation (H)      Priority: High     questionable TIA- now on coumadin       Essential hypertension, benign 03/20/2006     Priority: High     Long term current use of anticoagulants with INR goal of 2.0-3.0 11/01/2018     Priority: Medium     Long-term (current) use of anticoagulants [Z79.01] 08/23/2017     Priority: Medium     Health Care Home 03/06/2015     Priority: Medium     Status:  Declined    See Letters for Emergency  Care Plan  Date:  March 6, 2015         Knee joint replacement status 03/02/2015     Priority: Medium     Morbid obesity (H) 07/01/2013     Priority: Medium     BMI 33.6       Paroxysmal A-fib (H) 12/05/2012     Priority: Medium     ACP (advance care planning) 08/23/2012     Priority: Medium     Discussed advance care planning with patient; information given to patient to review. 8/23/2012          Aftercare following joint replacement 06/08/2011     Priority: Medium     Knee joint replacement by other means 06/08/2011     Priority: Medium     Ventricular bigeminy      Priority: Medium     bigeminy- nrml LV fxn       Chest pain      Priority: Medium     neg stress test, put on PPI- no help  Problem list name updated by automated process. Provider to review       CARDIOVASCULAR SCREENING; LDL GOAL LESS THAN 160 10/31/2010     Priority: Medium       PERTINENT PAST MEDICAL HISTORY:  Past Medical History:   Diagnosis Date     Arthritis      Chest pain, unspecified     neg stress test, put on PPI- no help     Essential hypertension, benign     Hypertension, Benign     Obesity (BMI 30-39.9) 7/1/13    BMI 33.6     Paroxysmal atrial fibrillation (H) 03/2010     Superficial thrombophlebitis      Thrombosis of leg     superficial not dvt's     Ventricular bigeminy     bigeminy- nrml LV fxn       PREVIOUS SURGERIES:  Past Surgical History:   Procedure Laterality Date     ABDOMEN SURGERY       ARTHROPLASTY KNEE  Right 3/2/2015    Procedure: ARTHROPLASTY KNEE;  Surgeon: Cuco Baugh MD;  Location: [facility] OR     C NONSPECIFIC PROCEDURE      left hand      CL AFF SURGICAL PATHOLOGY  1976    HGSIL     H ABLATION FOCAL AFIB  11/02/2017     H ABLATION FOCAL AFIB  01/15/2013     ORTHOPEDIC SURGERY      l tka  and wrist      TUBAL LIGATION      Uterine fibroids removed       PREVIOUS ENDOSCOPY:  Flex sig in 2004    ALLERGIES:     Allergies   Allergen Reactions     Celebrex [Celecoxib] Hives     Hands itching then hives     Ibuprofen Swelling     Face swelling       PERTINENT MEDICATIONS:  Current Outpatient Medications:      lisinopril (PRINIVIL/ZESTRIL) 10 MG tablet, TAKE 1 TABLET(10 MG) BY MOUTH DAILY, Disp: 90 tablet, Rfl: 2     metoprolol tartrate (LOPRESSOR) 50 MG tablet, TAKE 1 TABLET(50 MG) BY MOUTH TWICE DAILY, Disp: 180 tablet, Rfl: 1     triamterene-hydrochlorothiazide (MAXZIDE-25) 37.5-25 MG per tablet, TAKE 1 TABLET BY MOUTH EVERY MORNING., Disp: 90 tablet, Rfl: 1     warfarin (COUMADIN) 2.5 MG tablet, Take one tablet daily except 1/2 tablets on Wed/Sat as directed by the anticoagulation clinic, Disp: 90 tablet, Rfl: 1    SOCIAL HISTORY:  Social History     Socioeconomic History     Marital status:      Spouse name: Not on file     Number of children: Not on file     Years of education: Not on file     Highest education level: Not on file   Occupational History     Not on file   Social Needs     Financial resource strain: Not on file     Food insecurity:     Worry: Not on file     Inability: Not on file     Transportation needs:     Medical: Not on file     Non-medical: Not on file   Tobacco Use     Smoking status: Never Smoker     Smokeless tobacco: Never Used   Substance and Sexual Activity     Alcohol use: Yes     Comment: once a year     Drug use: No     Sexual activity: Yes     Partners: Male     Birth control/protection: None   Lifestyle     Physical activity:     Days per week: Not on file      Minutes per session: Not on file     Stress: Not on file   Relationships     Social connections:     Talks on phone: Not on file     Gets together: Not on file     Attends Synagogue service: Not on file     Active member of club or organization: Not on file     Attends meetings of clubs or organizations: Not on file     Relationship status: Not on file     Intimate partner violence:     Fear of current or ex partner: Not on file     Emotionally abused: Not on file     Physically abused: Not on file     Forced sexual activity: Not on file   Other Topics Concern     Parent/sibling w/ CABG, MI or angioplasty before 65F 55M? Yes   Social History Narrative     Not on file   Non-smoker (never has). Rare alcohol use (once yearly). No recreational drugs.     FAMILY HISTORY:  Family History   Problem Relation Age of Onset     C.A.D. Paternal Grandmother      Hypertension Paternal Grandmother      C.A.D. Paternal Grandfather      Connective Tissue Disorder Paternal Grandfather      Hypertension Paternal Grandfather      C.A.D. Father      Hypertension Father      Cancer Maternal Grandmother         uterine     Colon Cancer Maternal Grandmother      Cancer Maternal Grandfather         leukemia     Other Cancer Maternal Grandfather      Cancer Mother         uterine     Gastrointestinal Disease Mother         ulcers     Hypertension Mother      Other - See Comments Sister         essential tremors     C.A.D. Brother         stent- 47     Hypertension Brother      Depression Daughter      Eye Disorder Son      Hypertension Brother      Depression Daughter      Asthma Son    Grandmother had colorectal cancer at age 50, brother recently diagnosed with ALS - now on ventilator.     Past/family/social history reviewed and no changes    PHYSICAL EXAMINATION:  Vitals reviewed: LMP 12/31/2012   Wt:   Wt Readings from Last 2 Encounters:   03/19/19 110 kg (242 lb 9.6 oz)   02/28/19 109.8 kg (242 lb)   Constitutional: aaox3, cooperative,  pleasant, not dyspneic/diaphoretic, no acute distress  Eyes: Sclera anicteric/injected  Ears/nose/mouth/throat: Normal oropharynx without ulcers or exudate, mucus membranes moist, hearing intact  Neck: supple, thyroid normal size  CV: No edema  Respiratory: Unlabored breathing  Lymph: No axillary, submandibular, supraclavicular lymphadenopathy  Abd: Nondistended, +bs, no hepatosplenomegaly, nontender, no peritoneal signs  Skin: warm, perfused, no jaundice  Psych: Normal affect  MSK: Normal gait      PERTINENT STUDIES:  Most recent CBC:  Recent Labs   Lab Test 11/02/17  1111 03/05/15  0559 03/04/15  0619  12/11/14  1225   WBC 4.9  --   --   --  4.3   HGB 13.7  --  11.0*   < > 13.1   HCT 39.3  --   --   --  40.9    159  --    < > 222    < > = values in this interval not displayed.     Most recent hepatic panel:  Recent Labs   Lab Test 12/11/14  1225 12/05/12  1715   ALT 27 27   AST 15 28     Most recent creatinine:  Recent Labs   Lab Test 06/25/18  1438 11/02/17  1111   CR 0.92 0.87     XR Video Swallow 3/2019  1. Mild flash penetration was observed when the patient took  consecutive swallows of thin barium from a cup.  2. Otherwise unremarkable video fluoroscopic swallowing examination.  No evidence for aspiration.     I performed a history and physical examination of the above patient and discussed the management with Dr. Terrazas on 3/29/2019. I reviewed the note and there are no changes to the past medical, family or social history.  A complete 10 point review of systems was obtained. Please see the HPI for pertinent positives and negatives. All other systems were reviewed and were found to be negative. I agree with the documented findings and plan of care as outlined.    Stewart Moralez MD  GI Attending  Pager: [phone]

## 2019-03-29 NOTE — PROGRESS NOTES
GI CLINIC VISIT  03/29/19    CC/REFERRING MD:  Nikole Miner  REASON FOR CONSULTATION: Dysphagia    ASSESSMENT/PLAN:  Amparo Mccray is a 63 year old female with past medical history of hypertension and atrial fibrillation on warfarin anti-coagulation presenting for evaluation of dysphagia.     1. Dysphagia  Onset five years ago and progressively more frequent. Both liquids and solids, but worse with foods that require more mastication. Overall clinical symptoms consistent with oropharyngeal dysphagia. Tiring out of at end of meal consistent with underlying neuromuscular disorder. Ongoing evaluation with neurology. Less likely, esophageal dysphagia, however, cannot rule this out. Video swallow looked at cervical esophagus, but has not had full anatomic evaluation of esophagus. DDx of dysphagia can include neuromuscular disorders, peptic stricture, mass, EoE, motility disorder. Recommend EGD for further evaluation.    -- Upper endoscopy (EGD) to evaluate esophagus for cause of difficulty swallowing  -- Esophageal manometry (pressure testing) to evaluate muscles of esophagus   -- If you do not hear by next Wednesday to schedule appointment, call [phone]    RTC: After testing - if normal, no need for follow-up    Thank you for this consultation.  It was a pleasure to participate in the care of this patient; please contact us with any further questions.      Plan of care discussed with Dr. Moralez, gastroenterology attending physician.     Hansa Terrazas MD  Gastroenterology Fellow  Division of Gastroenterology, Hepatology and Nutrition  [facility]    HPI:  Amparo Mccray is a 63 year old female with past medical history of hypertension and atrial fibrillation on warfarin anti-coagulation presenting for evaluation of dysphagia.     Patient reports issues with swallowing dating back to 2014. Prior to this she does not remember having symptoms. She reports the symptoms were sporadic,  happening about twice per week, sometimes just one monthly. She never felt like she choked on anything at that point, but did feel difficulty initiating the swallow and occasionally feeling like food or liquids would get held up in her chest (more rarely). She reports these symptoms have gotten worse over the past 4-5 months and describes an episode when she was driving while eating a piece of toast and it became lodged in her airway. She could not breathe and eventually the food dislodged. She ended up with a subsequent lung infection she attributes to the aspiration.     She continues to have daily symptoms with both liquids and solids. She notes the symptoms are worse with breads and biscuit-type foods and definitely worse later in the meal when she feels she has used the muscles too much such as in the second half of a sandwich.     She denies odynophagia, chest pain, shortness of breath, fevers, chills, heartburn, acid reflux, weight loss, night sweats, abdominal pain. She has one formed bowel movement daily, no hematochezia or melena. She has never had an upper endoscopy or colonoscopy. She undergoes yearly FIT screening for colorectal cancer.     ROS:    No fevers or chills  No weight loss  No blurry vision, double vision or change in vision  No sore throat  No lymphadenopathy  No headache, paraesthesias, or weakness in a limb  No shortness of breath or wheezing  No chest pain or pressure  No arthralgias or myalgias  No rashes or skin changes  No odynophagia or dysphagia  No BRBPR, hematochezia, melena  No dysuria, frequency or urgency  No hot/cold intolerance or polyria  No anxiety or depression    PROBLEM LIST  Patient Active Problem List    Diagnosis Date Noted     Atrial fibrillation (H)      Priority: High     questionable TIA- now on coumadin       Essential hypertension, benign 03/20/2006     Priority: High     Long term current use of anticoagulants with INR goal of 2.0-3.0 11/01/2018     Priority:  Medium     Long-term (current) use of anticoagulants [Z79.01] 08/23/2017     Priority: Medium     Health Care Home 03/06/2015     Priority: Medium     Status:  Declined    See Letters for Emergency  Care Plan  Date:  March 6, 2015         Knee joint replacement status 03/02/2015     Priority: Medium     Morbid obesity (H) 07/01/2013     Priority: Medium     BMI 33.6       Paroxysmal A-fib (H) 12/05/2012     Priority: Medium     ACP (advance care planning) 08/23/2012     Priority: Medium     Discussed advance care planning with patient; information given to patient to review. 8/23/2012          Aftercare following joint replacement 06/08/2011     Priority: Medium     Knee joint replacement by other means 06/08/2011     Priority: Medium     Ventricular bigeminy      Priority: Medium     bigeminy- nrml LV fxn       Chest pain      Priority: Medium     neg stress test, put on PPI- no help  Problem list name updated by automated process. Provider to review       CARDIOVASCULAR SCREENING; LDL GOAL LESS THAN 160 10/31/2010     Priority: Medium       PERTINENT PAST MEDICAL HISTORY:  Past Medical History:   Diagnosis Date     Arthritis      Chest pain, unspecified     neg stress test, put on PPI- no help     Essential hypertension, benign     Hypertension, Benign     Obesity (BMI 30-39.9) 7/1/13    BMI 33.6     Paroxysmal atrial fibrillation (H) 03/2010     Superficial thrombophlebitis      Thrombosis of leg     superficial not dvt's     Ventricular bigeminy     bigeminy- nrml LV fxn       PREVIOUS SURGERIES:  Past Surgical History:   Procedure Laterality Date     ABDOMEN SURGERY       ARTHROPLASTY KNEE Right 3/2/2015    Procedure: ARTHROPLASTY KNEE;  Surgeon: Cuco Baugh MD;  Location: [facility] OR     C NONSPECIFIC PROCEDURE      left hand      CL AFF SURGICAL PATHOLOGY  1976    HGSIL     H ABLATION FOCAL AFIB  11/02/2017     H ABLATION FOCAL AFIB  01/15/2013     ORTHOPEDIC SURGERY      l tka  and wrist      TUBAL  LIGATION      Uterine fibroids removed       PREVIOUS ENDOSCOPY:  Flex sig in 2004    ALLERGIES:     Allergies   Allergen Reactions     Celebrex [Celecoxib] Hives     Hands itching then hives     Ibuprofen Swelling     Face swelling       PERTINENT MEDICATIONS:    Current Outpatient Medications:      lisinopril (PRINIVIL/ZESTRIL) 10 MG tablet, TAKE 1 TABLET(10 MG) BY MOUTH DAILY, Disp: 90 tablet, Rfl: 2     metoprolol tartrate (LOPRESSOR) 50 MG tablet, TAKE 1 TABLET(50 MG) BY MOUTH TWICE DAILY, Disp: 180 tablet, Rfl: 1     triamterene-hydrochlorothiazide (MAXZIDE-25) 37.5-25 MG per tablet, TAKE 1 TABLET BY MOUTH EVERY MORNING., Disp: 90 tablet, Rfl: 1     warfarin (COUMADIN) 2.5 MG tablet, Take one tablet daily except 1/2 tablets on Wed/Sat as directed by the anticoagulation clinic, Disp: 90 tablet, Rfl: 1    SOCIAL HISTORY:  Social History     Socioeconomic History     Marital status:      Spouse name: Not on file     Number of children: Not on file     Years of education: Not on file     Highest education level: Not on file   Occupational History     Not on file   Social Needs     Financial resource strain: Not on file     Food insecurity:     Worry: Not on file     Inability: Not on file     Transportation needs:     Medical: Not on file     Non-medical: Not on file   Tobacco Use     Smoking status: Never Smoker     Smokeless tobacco: Never Used   Substance and Sexual Activity     Alcohol use: Yes     Comment: once a year     Drug use: No     Sexual activity: Yes     Partners: Male     Birth control/protection: None   Lifestyle     Physical activity:     Days per week: Not on file     Minutes per session: Not on file     Stress: Not on file   Relationships     Social connections:     Talks on phone: Not on file     Gets together: Not on file     Attends [employer] service: Not on file     Active member of club or organization: Not on file     Attends meetings of clubs or organizations: Not on file      Relationship status: Not on file     Intimate partner violence:     Fear of current or ex partner: Not on file     Emotionally abused: Not on file     Physically abused: Not on file     Forced sexual activity: Not on file   Other Topics Concern     Parent/sibling w/ CABG, MI or angioplasty before 65F 55M? Yes   Social History Narrative     Not on file   Non-smoker (never has). Rare alcohol use (once yearly). No recreational drugs.     FAMILY HISTORY:  Family History   Problem Relation Age of Onset     C.A.D. Paternal Grandmother      Hypertension Paternal Grandmother      C.A.D. Paternal Grandfather      Connective Tissue Disorder Paternal Grandfather      Hypertension Paternal Grandfather      C.A.D. Father      Hypertension Father      Cancer Maternal Grandmother         uterine     Colon Cancer Maternal Grandmother      Cancer Maternal Grandfather         leukemia     Other Cancer Maternal Grandfather      Cancer Mother         uterine     Gastrointestinal Disease Mother         ulcers     Hypertension Mother      Other - See Comments Sister         essential tremors     C.A.D. Brother         stent- 47     Hypertension Brother      Depression Daughter      Eye Disorder Son      Hypertension Brother      Depression Daughter      Asthma Son    Grandmother had colorectal cancer at age 50, brother recently diagnosed with ALS - now on ventilator.     Past/family/social history reviewed and no changes    PHYSICAL EXAMINATION:  Vitals reviewed: LMP 12/31/2012   Wt:   Wt Readings from Last 2 Encounters:   03/19/19 110 kg (242 lb 9.6 oz)   02/28/19 109.8 kg (242 lb)   Constitutional: aaox3, cooperative, pleasant, not dyspneic/diaphoretic, no acute distress  Eyes: Sclera anicteric/injected  Ears/nose/mouth/throat: Normal oropharynx without ulcers or exudate, mucus membranes moist, hearing intact  Neck: supple, thyroid normal size  CV: No edema  Respiratory: Unlabored breathing  Lymph: No axillary, submandibular,  supraclavicular lymphadenopathy  Abd: Nondistended, +bs, no hepatosplenomegaly, nontender, no peritoneal signs  Skin: warm, perfused, no jaundice  Psych: Normal affect  MSK: Normal gait      PERTINENT STUDIES:  Most recent CBC:  Recent Labs   Lab Test 11/02/17  1111 03/05/15  0559 03/04/15  0619  12/11/14  1225   WBC 4.9  --   --   --  4.3   HGB 13.7  --  11.0*   < > 13.1   HCT 39.3  --   --   --  40.9    159  --    < > 222    < > = values in this interval not displayed.     Most recent hepatic panel:  Recent Labs   Lab Test 12/11/14  1225 12/05/12  1715   ALT 27 27   AST 15 28     Most recent creatinine:  Recent Labs   Lab Test 06/25/18  1438 11/02/17  1111   CR 0.92 0.87     XR Video Swallow 3/2019  1. Mild flash penetration was observed when the patient took  consecutive swallows of thin barium from a cup.  2. Otherwise unremarkable video fluoroscopic swallowing examination.  No evidence for aspiration.

## 2019-03-30 ASSESSMENT — ENCOUNTER SYMPTOMS
SEIZURES: 0
SMELL DISTURBANCE: 0
TASTE DISTURBANCE: 0
WEAKNESS: 1
SPEECH CHANGE: 1
HOARSE VOICE: 0
PARALYSIS: 0
MEMORY LOSS: 1
MUSCLE WEAKNESS: 1
EYE WATERING: 0
NECK PAIN: 0
MUSCLE CRAMPS: 1
DISTURBANCES IN COORDINATION: 1
NECK MASS: 0
ARTHRALGIAS: 1
NUMBNESS: 1
MYALGIAS: 1
SORE THROAT: 0
STIFFNESS: 1
JOINT SWELLING: 0
TROUBLE SWALLOWING: 1
TREMORS: 1
SINUS CONGESTION: 0
BACK PAIN: 1
DIZZINESS: 1
EYE IRRITATION: 0
TINGLING: 1
EYE PAIN: 0
DOUBLE VISION: 0
SINUS PAIN: 0
HEADACHES: 0
LOSS OF CONSCIOUSNESS: 0
EYE REDNESS: 0

## 2019-04-03 ENCOUNTER — PRE VISIT (OUTPATIENT)
Dept: NEUROLOGY | Facility: CLINIC | Age: 64
End: 2019-04-03

## 2019-04-03 ENCOUNTER — OFFICE VISIT (OUTPATIENT)
Dept: NEUROLOGY | Facility: CLINIC | Age: 64
End: 2019-04-03
Attending: PSYCHIATRY & NEUROLOGY
Payer: MEDICARE

## 2019-04-03 VITALS
OXYGEN SATURATION: 98 % | BODY MASS INDEX: 35.84 KG/M2 | WEIGHT: 242 LBS | HEART RATE: 70 BPM | SYSTOLIC BLOOD PRESSURE: 136 MMHG | HEIGHT: 69 IN | DIASTOLIC BLOOD PRESSURE: 67 MMHG

## 2019-04-03 DIAGNOSIS — G25.0 ESSENTIAL TREMOR: Primary | ICD-10-CM

## 2019-04-03 ASSESSMENT — MIFFLIN-ST. JEOR: SCORE: 1726.04

## 2019-04-03 ASSESSMENT — PAIN SCALES - GENERAL: PAINLEVEL: NO PAIN (0)

## 2019-04-03 NOTE — LETTER
4/3/2019       RE: Amparo Mccray  [address]  [address]  [facility] [address]     Dear Colleague,    Thank you for referring your patient, Amparo Mccray, to the [facility] NEUROLOGY at [facility]. Please see a copy of my visit note below.    [facility] Disorders [facility] - New Patient    CC: Tremor, referred by Dr. Miner    HPI: Amparo Mccray is a 63 year old woman who presents with longstanding tremor that started in her 40s. She reports her tremor is mostly in her hands, and occasionally will spread to her legs. No tremor in head or voice. Her tremor has gradually worsened over time. It is present every day, but is worse especially after exercise or if she has not eaten in a long time. For example, yesterday she was cleaning a bathroom and afterwards she had severe hand tremor. Beta blockers make her tremor better. She does not believe alcohol makes her tremor better - she rarely drinks alcohol. She has a half sister (related through her mom), who has essential tremor that affects her sister's head and voice.     Of note, she also reports she is seeing Dr. Miner for trouble swallowing, cognitive changes, episodes of memory loss, and hand and leg weakness. She is wondering if the tremor is part of a larger neurological diagnosis.       Past Medical History:   Diagnosis Date     Arthritis      Chest pain, unspecified     neg stress test, put on PPI- no help     Essential hypertension, benign     Hypertension, Benign     Obesity (BMI 30-39.9) 7/1/13    BMI 33.6     Paroxysmal atrial fibrillation (H) 03/2010     Superficial thrombophlebitis      Thrombosis of leg     superficial not dvt's     Ventricular bigeminy     bigeminy- nrml LV fxn     Past Surgical History:   Procedure Laterality Date     ABDOMEN SURGERY       ARTHROPLASTY KNEE Right 3/2/2015    Procedure: ARTHROPLASTY KNEE;  Surgeon: Cuco Baugh MD;  Location:  OR       "NONSPECIFIC PROCEDURE      left hand      [facility] AFF SURGICAL PATHOLOGY  1976    HGSIL     H ABLATION FOCAL AFIB  11/02/2017     H ABLATION FOCAL AFIB  01/15/2013     ORTHOPEDIC SURGERY      l tka  and wrist      TUBAL LIGATION      Uterine fibroids removed       Current Outpatient Medications   Medication     lisinopril (PRINIVIL/ZESTRIL) 10 MG tablet     metoprolol tartrate (LOPRESSOR) 50 MG tablet     triamterene-hydrochlorothiazide (MAXZIDE-25) 37.5-25 MG per tablet     warfarin (COUMADIN) 2.5 MG tablet     No current facility-administered medications for this visit.        Social History     Social History Narrative     Not on file       Family History   Problem Relation Age of Onset     C.A.D. Paternal Grandmother      Hypertension Paternal Grandmother      C.A.D. Paternal Grandfather      Connective Tissue Disorder Paternal Grandfather      Hypertension Paternal Grandfather      C.A.D. Father      Hypertension Father      Cancer Maternal Grandmother         uterine     Colon Cancer Maternal Grandmother      Cancer Maternal Grandfather         leukemia     Other Cancer Maternal Grandfather      Cancer Mother         uterine     Gastrointestinal Disease Mother         ulcers     Hypertension Mother      Other - See Comments Sister         essential tremors     C.A.D. Brother         stent- 47     Hypertension Brother      Depression Daughter      Eye Disorder Son      Hypertension Brother      Depression Daughter      Asthma Son        Remainder of complete ROS negative    Exam:  /67 (BP Location: Left arm, Patient Position: Sitting, Cuff Size: Adult Large)   Pulse 70   Ht 1.759 m (5' 9.25\")   Wt 109.8 kg (242 lb)   LMP 12/31/2012   SpO2 98%   BMI 35.48 kg/m     No acute distress  Breathing comfortably  No peripheral edema    Neurological Examination:  Mental Status: Awake, alert. Fluent. Affect normal.  Cranial Nerves: Visual fields intact to confrontation. Extraocular movements full. Tongue protrudes " midline.  Motor: Symmetric, low amplitude, medium frequency postural tremor in hands with a regular rhythm. No resting tremor. Pronator drift was absent. Finger tapping and toe tapping without bradykinesia or hesitations. Tone was normal. Strength was full in the upper and lower extremities.  Sensory: Light touch intact in all four extremities. Romberg negative.  Reflexes: Reflexes 2+ throughout. Toes were downgoing bilaterally.  Coordination: Finger to nose and heel to shin without dysmetria.  Gait: Can rise from chair with arms crossed. Gait narrow-based with good posture, arm swing, and stride length. Does have valgus deformity. Can heel and toe walk. Tandem intact.     Assessment:   63 year old woman with longstanding tremor which is consistent with essential tremor. Although her symptoms do fluctuate, particularly with exercise, her tremor is most consistent with essential tremor. She responds to metoprolol and has a positive family history. Explained that catecholamine release from exercise or recent surgery can worsen the tremor. It is unlikely that her essential tremor is contributing to or part of her other neurological symptoms.     Recommendations:   -Follow up with her cardiologist regarding increasing her metoprolol for better symptomatic management of tremor. If she is able to tolerate an increase of her metoprolol, then this would improve her tremor.   -Follow up with movement disorders as needed.   -Can consider adding primidone if her essential tremor cannot be managed with metoprolol      Milton Richter MS4    I, Sheldon Johnson, personally interviewed, examined and evaluated this patient on 4/3/2019.  I discussed the patient with BAR Arellano and agree with the assessment, examiantion and plan of care documented by Mr. Richter.  I personally reviewed the vital signs, medications and labs/imaging.  I reviewed the documentation above which accurately describes the services that I provided and  corrected it where necessary.      This pleasant patient has long time bilateral upper extremity tremor with action.  It is not disabling.  It is worse with exercise.  She takes metoprolol and noticed when she stopped this her tremor was worse.  A half-sister has essential tremor.  She has no alcohol effect.    We feel she has essential tremor.  The best course would be increasing metoprolol to 75 mg twice a day if tolerated and approved by her cardiologist.    If beta blockade is not effective she will call us and we institute therapy with Primidone.    Again, thank you for allowing me to participate in the care of your patient.      Sincerely,    Sheldon Johnson MD

## 2019-04-03 NOTE — NURSING NOTE
Chief Complaint   Patient presents with     RECHECK     COMPLETED MV TREMOR       Preventive Care:    Breast Cancer Screening: During our visit today, we discussed that it is recommended you receive breast cancer screening. Please call or make an appointment with your primary care provider to discuss this with them. You may also call the [facility] scheduling line ([phone]) to set up a mammography appointment at the Breast [facility] within the [facility].      Alexandria Vazquez MA

## 2019-04-03 NOTE — PROGRESS NOTES
[facility] Movement Disorders Clinic - New Patient    CC: Tremor, referred by Dr. Miner    HPI: Amparo Mccray is a 63 year old woman who presents with longstanding tremor that started in her 40s. She reports her tremor is mostly in her hands, and occasionally will spread to her legs. No tremor in head or voice. Her tremor has gradually worsened over time. It is present every day, but is worse especially after exercise or if she has not eaten in a long time. For example, yesterday she was cleaning a bathroom and afterwards she had severe hand tremor. Beta blockers make her tremor better. She does not believe alcohol makes her tremor better - she rarely drinks alcohol. She has a half sister (related through her mom), who has essential tremor that affects her sister's head and voice.     Of note, she also reports she is seeing Dr. Miner for trouble swallowing, cognitive changes, episodes of memory loss, and hand and leg weakness. She is wondering if the tremor is part of a larger neurological diagnosis.       Past Medical History:   Diagnosis Date     Arthritis      Chest pain, unspecified     neg stress test, put on PPI- no help     Essential hypertension, benign     Hypertension, Benign     Obesity (BMI 30-39.9) 7/1/13    BMI 33.6     Paroxysmal atrial fibrillation (H) 03/2010     Superficial thrombophlebitis      Thrombosis of leg     superficial not dvt's     Ventricular bigeminy     bigeminy- nrml LV fxn     Past Surgical History:   Procedure Laterality Date     ABDOMEN SURGERY       ARTHROPLASTY KNEE Right 3/2/2015    Procedure: ARTHROPLASTY KNEE;  Surgeon: Cuco Baugh MD;  Location: [facility] OR     C NONSPECIFIC PROCEDURE      left hand      [facility] AFF SURGICAL PATHOLOGY  1976    HGSIL     H ABLATION FOCAL AFIB  11/02/2017     H ABLATION FOCAL AFIB  01/15/2013     ORTHOPEDIC SURGERY      l tka  and wrist      TUBAL LIGATION      Uterine fibroids removed       Current Outpatient Medications    Medication     lisinopril (PRINIVIL/ZESTRIL) 10 MG tablet     metoprolol tartrate (LOPRESSOR) 50 MG tablet     triamterene-hydrochlorothiazide (MAXZIDE-25) 37.5-25 MG per tablet     warfarin (COUMADIN) 2.5 MG tablet     No current facility-administered medications for this visit.        Social History     Social History Narrative     Not on file       Family History   Problem Relation Age of Onset     C.A.D. Paternal Grandmother      Hypertension Paternal Grandmother      C.A.D. Paternal Grandfather      Connective Tissue Disorder Paternal Grandfather      Hypertension Paternal Grandfather      C.A.D. Father      Hypertension Father      Cancer Maternal Grandmother         uterine     Colon Cancer Maternal Grandmother      Cancer Maternal Grandfather         leukemia     Other Cancer Maternal Grandfather      Cancer Mother         uterine     Gastrointestinal Disease Mother         ulcers     Hypertension Mother      Other - See Comments Sister         essential tremors     C.A.ROBBI. Brother         stent- 47     Hypertension Brother      Depression Daughter      Eye Disorder Son      Hypertension Brother      Depression Daughter      Asthma Son        Remainder of complete ROS negative  Answers for HPI/ROS submitted by the patient on 3/30/2019   General Symptoms: No  Skin Symptoms: No  HENT Symptoms: Yes  EYE SYMPTOMS: Yes  HEART SYMPTOMS: No  LUNG SYMPTOMS: No  INTESTINAL SYMPTOMS: No  URINARY SYMPTOMS: No  GYNECOLOGIC SYMPTOMS: No  BREAST SYMPTOMS: No  SKELETAL SYMPTOMS: Yes  BLOOD SYMPTOMS: No  NERVOUS SYSTEM SYMPTOMS: Yes  MENTAL HEALTH SYMPTOMS: No  Ear pain: No  Ear discharge: No  Hearing loss: No  Tinnitus: No  Nosebleeds: No  Congestion: No  Sinus pain: No  Trouble swallowing: Yes   Voice hoarseness: No  Mouth sores: No  Sore throat: No  Tooth pain: No  Gum tenderness: No  Bleeding gums: No  Change in taste: No  Change in sense of smell: No  Dry mouth: No  Hearing aid used: No  Neck lump: No  Eye pain:  "No  Dry eyes: No  Watery eyes: No  Eye bulging: No  Double vision: No  Flashing of lights: No  Spots: No  Floaters: Yes  Redness: No  Crossed eyes: No  Tunnel Vision: No  Yellowing of eyes: No  Eye irritation: No  Back pain: Yes  Muscle aches: Yes  Neck pain: No  Swollen joints: No  Joint pain: Yes  Bone pain: No  Muscle cramps: Yes  Muscle weakness: Yes  Joint stiffness: Yes  Bone fracture: No  Trouble with coordination: Yes  Dizziness or trouble with balance: Yes  Fainting or black-out spells: No  Memory loss: Yes  Headache: No  Seizures: No  Speech problems: Yes  Tingling: Yes  Tremor: Yes  Weakness: Yes  Difficulty walking: No  Paralysis: No  Numbness: Yes    Exam:  /67 (BP Location: Left arm, Patient Position: Sitting, Cuff Size: Adult Large)   Pulse 70   Ht 1.759 m (5' 9.25\")   Wt 109.8 kg (242 lb)   LMP 12/31/2012   SpO2 98%   BMI 35.48 kg/m    No acute distress  Breathing comfortably  No peripheral edema    Neurological Examination:  Mental Status: Awake, alert. Fluent. Affect normal.  Cranial Nerves: Visual fields intact to confrontation. Extraocular movements full. Tongue protrudes midline.  Motor: Symmetric, low amplitude, medium frequency postural tremor in hands with a regular rhythm. No resting tremor. Pronator drift was absent. Finger tapping and toe tapping without bradykinesia or hesitations. Tone was normal. Strength was full in the upper and lower extremities.  Sensory: Light touch intact in all four extremities. Romberg negative.  Reflexes: Reflexes 2+ throughout. Toes were downgoing bilaterally.  Coordination: Finger to nose and heel to shin without dysmetria.  Gait: Can rise from chair with arms crossed. Gait narrow-based with good posture, arm swing, and stride length. Does have valgus deformity. Can heel and toe walk. Tandem intact.     Assessment:   63 year old woman with longstanding tremor which is consistent with essential tremor. Although her symptoms do fluctuate, " particularly with exercise, her tremor is most consistent with essential tremor. She responds to metoprolol and has a positive family history. Explained that catecholamine release from exercise or recent surgery can worsen the tremor. It is unlikely that her essential tremor is contributing to or part of her other neurological symptoms.     Recommendations:   -Follow up with her cardiologist regarding increasing her metoprolol for better symptomatic management of tremor. If she is able to tolerate an increase of her metoprolol, then this would improve her tremor.   -Follow up with movement disorders as needed.   -Can consider adding primidone if her essential tremor cannot be managed with metoprolol      Milton Richter, MS4    I, Sheldon Johnson, personally interviewed, examined and evaluated this patient on 4/3/2019.  I discussed the patient with Milton Richter MS4 and agree with the assessment, examiantion and plan of care documented by Mr. Richter.  I personally reviewed the vital signs, medications and labs/imaging.  I reviewed the documentation above which accurately describes the services that I provided and corrected it where necessary.      This pleasant patient has long time bilateral upper extremity tremor with action.  It is not disabling.  It is worse with exercise.  She takes metoprolol and noticed when she stopped this her tremor was worse.  A half-sister has essential tremor.  She has no alcohol effect.    We feel she has essential tremor.  The best course would be increasing metoprolol to 75 mg twice a day if tolerated and approved by her cardiologist.    If beta blockade is not effective she will call us and we institute therapy with Primidone.    Sheldon Johnson MD

## 2019-04-08 ENCOUNTER — OFFICE VISIT (OUTPATIENT)
Dept: NEUROLOGY | Facility: CLINIC | Age: 64
End: 2019-04-08
Attending: PSYCHIATRY & NEUROLOGY
Payer: MEDICARE

## 2019-04-08 DIAGNOSIS — M62.81 GENERALIZED MUSCLE WEAKNESS: ICD-10-CM

## 2019-04-08 DIAGNOSIS — G62.9 NEUROPATHY: ICD-10-CM

## 2019-04-08 NOTE — LETTER
4/8/2019       RE: Amparo Mccray  [address]  [facility] [place] [address]     Dear Colleague,    Thank you for referring your patient, Amparo Mccray, to the [facility] EMG at [facility]. Please see a copy of my visit note below.        [facility]  Physical Medicine and Rehabilitation Clinic      Nerve Conduction & EMG Report      Patient:       Amparo Mccray  Patient ID:    [id number]  Gender:        Female  YOB: 1955  Age:           63 Years 4 Months    History & Examination:  Dr Miner referred the patient for EMG/NCS to look into symptoms of dysphagia with fatigue. Symptoms have been present for 4-5 years progressively getting worse. Evaluation for neuromuscular junction disorder.    Techniques: Sensory and motor conduction studies were done with surface recording electrodes. EMG was done with a concentric needle electrode.     Results:  Sensory studies:  Median and  ulnar antidromic studies in the left upper extremity are within normal limits.  Left sural and left superficial peroneal studies are within normal limits.    Motor studies:  Left median and ulnar motor nerve conduction studies are within normal limits.  Left deep peroneal motor study recording over the extensor digitorum brevis reveals a normal distal latency, low amplitude and normal conduction velocity.  Left tibial motor study is within normal limits.    Left tibial and ulnar F waves are within normal limits.    Repetitive nerve stimulation of both the left abductor digiti minimi and left nasalis muscle are within normal limits.    Needle examination reveals some mild neurogenic changes distally in the left lower extremity that normalized proximally.  No other abnormalities are seen.    Interpretation:    The EMG is abnormal.  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  There is no evidence of neuromuscular junction disorder.    EMG Physician:      Nikole Miner MD FAAN    Sensory NCS      Nerve / Sites Rec. Site Onset Peak NP Amp Ref. PP Amp Dist Yonny Ref. Temp     ms ms  V  V  V cm m/s m/s  C   L MEDIAN - Dig II Anti      Wrist Dig II 2.29 3.39 23.4 10.0 33.2 14 61.1 48.0 32.1   L ULNAR - Dig V Anti      Wrist Dig V 2.40 3.28 20.0 8.0 40.2 12.5 52.2 48.0 32.1   L SURAL - Lat Mall 60      Calf Ankle 2.40 3.07 13.3 5.0 10.6 12.5 52.2 38.0 30.2   L SUP PERONEAL      Lat Leg Arellano 2.66 3.49 3.5  5.1 12.5 47.1 38.0 29.9       Motor NCS      Nerve / Sites Rec. Site Lat Ref. Amp Ref. Rel Amp Dist Yonny Ref. Dur. Area Temp.     ms ms mV mV % cm m/s m/s ms %  C   L MEDIAN - APB      Wrist APB 3.70 4.40 5.4 5.0 100 8   5.89 100 32.2      Elbow APB 8.02  4.9  90.1 24 55.5 48.0 6.72 98.8 32.1   L ULNAR - ADM      Wrist ADM 2.92 3.50 12.4 5.0 100 8   7.66 100 31.9      B.Elbow ADM 6.88  9.8  78.9 22 55.6 48.0 7.19 82.1 32      A.Elbow ADM 8.28  9.2  74.7 8 56.9 48.0 6.82 79.3 32.2   L DEEP PERONEAL - EDB 60      Ankle EDB 4.74 6.00 0.3 2.0 100 8   5.31 100 30.4      FibHead EDB 12.76  0.3  121 36 44.9 38.0 7.19 1427 30.4      Pop Fos EDB 14.22  0.3  117 8 54.9 38.0 7.50 75.1 30.4   L TIBIAL - AH      Ankle AH 3.85 6.00 4.1 4.0 100 8   6.82 100 30.2      Pop Fos AH 14.11  3.6  87.5 41 40.0 38.0 8.18 78.9 30.2       F  Wave      Nerve Min F Lat Max F Lat Mean FLat Temp.    ms ms ms  C   L TIBIAL 56.41 58.12 57.69 30.2   L ULNAR 27.34 32.71 29.79 32       Rep Nerve Stim 6      Muscle / Train Time Rate Amp 4-1 Fac Ampl Area 4-1 Fac Area     pps mV % % mVms % %   L ABD DIG MIN (UL)   Baseline 0:00:00 3 11.2 -1.6 100 33.2 -5.6 100   Post Exercise : 1 0:01:12 3 11.2 2.1 101 31.4 -3.3 94.4   2 0:02:18 3 11.4 -0.3 102 32.9 -4.3 98.9   3 0:03:22 3 11.4 -0.2 102 33.5 -5.7 101   4 0:04:33 3 11.3 -0.4 101 33.2 -5.6 100   L NASALIS   Baseline 0:00:00 3 3.0 -0 100 7.8 -0.3 100   Post Exercise 1 0:01:34 3 3.0 -2 102 7.9 -1.8 101   2 0:02:51 3 3.0 3.2 102 8.6 -5.6 110   3 0:04:10 3 3.1  -1.8 104 8.8 -6.5 113   4 0:05:34 3 3.1 0.1 104 9.2 -1.6 118       EMG Summary Table     Spontaneous MUAP Recruitment    IA Fib PSW Fasc H.F. Amp Dur. PPP Pattern   L. TIB ANTERIOR N None None None None N N N N   L. GASTROCN (MED) N None None None None 1+ 1+ N N   L. EXT ZABALA LONG N None None None None 1+ 1+ N Reduced   L. VAST LATERALIS N None None None None N N N N   L. T FASCIA SHAWNA N None None None None N N N N   L. GENIOGLOSSUS N None None None None N N N N                             Again, thank you for allowing me to participate in the care of your patient.      Sincerely,    Nikole Miner MD

## 2019-04-24 DIAGNOSIS — I48.0 PAROXYSMAL ATRIAL FIBRILLATION (H): ICD-10-CM

## 2019-04-24 DIAGNOSIS — I49.8 VENTRICULAR BIGEMINY: ICD-10-CM

## 2019-04-24 DIAGNOSIS — Z79.01 LONG TERM CURRENT USE OF ANTICOAGULANT THERAPY: ICD-10-CM

## 2019-04-24 DIAGNOSIS — I10 ESSENTIAL HYPERTENSION, BENIGN: ICD-10-CM

## 2019-04-24 RX ORDER — TRIAMTERENE/HYDROCHLOROTHIAZID 37.5-25 MG
TABLET ORAL
Qty: 90 TABLET | Refills: 0 | Status: SHIPPED | OUTPATIENT
Start: 2019-04-24 | End: 2019-07-31

## 2019-04-24 RX ORDER — WARFARIN SODIUM 2.5 MG/1
TABLET ORAL
Qty: 90 TABLET | Refills: 0 | Status: SHIPPED | OUTPATIENT
Start: 2019-04-24 | End: 2019-09-04

## 2019-04-24 NOTE — TELEPHONE ENCOUNTER
"Requested Prescriptions   Pending Prescriptions Disp Refills     triamterene-HCTZ (MAXZIDE-25) 37.5-25 MG tablet [Pharmacy Med Name: TRIAMTERENE 37.5MG/ HCTZ 25MG TABS] 90 tablet 0     Sig: TAKE 1 TABLET BY MOUTH EVERY MORNING       Diuretics (Including Combos) Protocol Passed - 4/24/2019  8:28 AM        Passed - Blood pressure under 140/90 in past 12 months     BP Readings from Last 3 Encounters:   04/03/19 136/67   03/29/19 126/76   03/19/19 138/73                 Passed - Recent (12 mo) or future (30 days) visit within the authorizing provider's specialty     Patient had office visit in the last 12 months or has a visit in the next 30 days with authorizing provider or within the authorizing provider's specialty.  See \"Patient Info\" tab in inbasket, or \"Choose Columns\" in Meds & Orders section of the refill encounter.              Passed - Medication is active on med list        Passed - Patient is age 18 or older        Passed - No active pregancy on record        Passed - Normal serum creatinine on file in past 12 months     Recent Labs   Lab Test 06/25/18  1438   CR 0.92              Passed - Normal serum potassium on file in past 12 months     Recent Labs   Lab Test 06/25/18  1438   POTASSIUM 4.5                    Passed - Normal serum sodium on file in past 12 months     Recent Labs   Lab Test 06/25/18  1438                 Passed - No positive pregnancy test in past 12 months        warfarin (COUMADIN) 2.5 MG tablet [Pharmacy Med Name: WARFARIN SOD 2.5MG TABLETS (GREEN)] 90 tablet 0     Sig: TAKE 1/2 TABLET BY MOUTH ON WEDNESDAY AND SATURDAY AND 1 TABLET BY MOUTH DAILY THE REST OF THE WEEK       Vitamin K Antagonists Failed - 4/24/2019  8:28 AM        Failed - INR is within goal in the past 6 weeks     Confirm INR is within goal in the past 6 weeks.     Recent Labs   Lab Test 02/28/19   INR 2.8*                       Passed - Recent (12 mo) or future (30 days) visit within the authorizing provider's " "specialty     Patient had office visit in the last 12 months or has a visit in the next 30 days with authorizing provider or within the authorizing provider's specialty.  See \"Patient Info\" tab in inbasket, or \"Choose Columns\" in Meds & Orders section of the refill encounter.              Passed - Medication is active on med list        Passed - Patient is 18 years of age or older        Passed - Patient is not pregnant        Passed - No positive pregnancy on file in past 12 months          "

## 2019-04-25 ENCOUNTER — ANTICOAGULATION THERAPY VISIT (OUTPATIENT)
Dept: NURSING | Facility: CLINIC | Age: 64
End: 2019-04-25
Payer: MEDICARE

## 2019-04-25 DIAGNOSIS — Z79.01 LONG TERM CURRENT USE OF ANTICOAGULANTS WITH INR GOAL OF 2.0-3.0: ICD-10-CM

## 2019-04-25 DIAGNOSIS — I48.91 ATRIAL FIBRILLATION (H): ICD-10-CM

## 2019-04-25 DIAGNOSIS — I49.8 VENTRICULAR BIGEMINY: ICD-10-CM

## 2019-04-25 LAB — INR POINT OF CARE: 2.9 (ref 0.86–1.14)

## 2019-04-25 PROCEDURE — 85610 PROTHROMBIN TIME: CPT | Mod: QW

## 2019-04-25 PROCEDURE — 99207 ZZC NO CHARGE NURSE ONLY: CPT

## 2019-04-25 PROCEDURE — 36416 COLLJ CAPILLARY BLOOD SPEC: CPT

## 2019-04-25 NOTE — PROGRESS NOTES
ANTICOAGULATION FOLLOW-UP CLINIC VISIT    Patient Name:  Amparo Mccray  Date:  2019  Contact Type:  Face to Face    SUBJECTIVE:     Patient Findings            OBJECTIVE    INR Protime   Date Value Ref Range Status   2019 2.9 (A) 0.86 - 1.14 Final       ASSESSMENT / PLAN  No question data found.  Anticoagulation Summary  As of 2019    INR goal:   2.0-3.0   TTR:   80.7 % (1.6 y)   INR used for dosin.9 (2019)   Warfarin maintenance plan:   1.25 mg (2.5 mg x 0.5) every Wed, Sat; 2.5 mg (2.5 mg x 1) all other days   Full warfarin instructions:   1.25 mg every Wed, Sat; 2.5 mg all other days   Weekly warfarin total:   15 mg   No change documented:   Willow Ellison RN   Plan last modified:   Willow Ellison RN (2017)   Next INR check:   2019   Priority:   INR   Target end date:       Indications    Atrial fibrillation (H) [I48.91]  Ventricular bigeminy [I49.9]  Long term current use of anticoagulants with INR goal of 2.0-3.0 [Z79.01]             Anticoagulation Episode Summary     INR check location:       Preferred lab:       Send INR reminders to:    TRIAGE POOL    Comments:         Anticoagulation Care Providers     Provider Role Specialty Phone number    Alan Almeida MD [facility] Internal Medicine [phone]            See the Encounter Report to view Anticoagulation Flowsheet and Dosing Calendar (Go to Encounters tab in chart review, and find the Anticoagulation Therapy Visit)      Willow Ellison RN

## 2019-05-10 ENCOUNTER — OFFICE VISIT (OUTPATIENT)
Dept: NEUROPSYCHOLOGY | Facility: CLINIC | Age: 64
End: 2019-05-10
Attending: PSYCHIATRY & NEUROLOGY
Payer: MEDICARE

## 2019-05-10 DIAGNOSIS — R41.3 MEMORY LOSS: Primary | ICD-10-CM

## 2019-05-10 NOTE — PROGRESS NOTES
The patient was seen for neuropsychological evaluation at the request of Nikole Miner MD for the purposes of diagnostic clarification and treatment planning.  202 minutes of test administration and scoring were provided by this writer.  Please see Dr. Angela Ortiz's report for a full interpretation of the findings.    Mary Lacey  Graduate Practicum Student

## 2019-05-13 ENCOUNTER — TELEPHONE (OUTPATIENT)
Dept: GASTROENTEROLOGY | Facility: CLINIC | Age: 64
End: 2019-05-13

## 2019-05-13 NOTE — TELEPHONE ENCOUNTER
Patient scheduled for   Lab draw at [facility] Waiting room 9:15 am   Esophageal Manometry 9:30 am   and Upper endoscopy 14:25    Indication for procedure. dysphagia    Referring Provider. Dr. Moralez    ? no    Arrival time verified? Thursday May 16th@9:15 am for Lab draw at [facility] waiting room. Then to Endoscopy clinic@ 9:30 am  Will tenatively return to clinic at 1325 for check in to have Upper endoscopy performed    Facility location verified? [address]; 1st floor    Instructions given regarding prep and procedure    Prep Type npo after midnoc    Are you taking any anticoagulants or blood thinners? Yes, coumadin and patient has been holding. No need for bridging as she is low risk     Instructions given? Yes, via e mail    Electronic implanted devices? no    Pre procedure teaching completed? Yes    Transportation from procedure? Yes, son will escort and remain with patient 6 hours after exam    H&P / Pre op physical completed? n/a

## 2019-05-16 ENCOUNTER — HOSPITAL ENCOUNTER (OUTPATIENT)
Facility: CLINIC | Age: 64
Discharge: HOME | End: 2019-05-16
Attending: INTERNAL MEDICINE | Admitting: INTERNAL MEDICINE
Payer: MEDICARE

## 2019-05-16 VITALS
RESPIRATION RATE: 20 BRPM | SYSTOLIC BLOOD PRESSURE: 127 MMHG | HEART RATE: 74 BPM | OXYGEN SATURATION: 96 % | DIASTOLIC BLOOD PRESSURE: 73 MMHG

## 2019-05-16 DIAGNOSIS — I10 ESSENTIAL HYPERTENSION, BENIGN: ICD-10-CM

## 2019-05-16 LAB — UPPER GI ENDOSCOPY: NORMAL

## 2019-05-16 PROCEDURE — 25000128 H RX IP 250 OP 636: Performed by: INTERNAL MEDICINE

## 2019-05-16 PROCEDURE — 25000125 ZZHC RX 250: Performed by: INTERNAL MEDICINE

## 2019-05-16 PROCEDURE — 25000132 ZZH RX MED GY IP 250 OP 250 PS 637: Mod: GY | Performed by: INTERNAL MEDICINE

## 2019-05-16 PROCEDURE — 27210814 ZZ H TUBE GASTRO CR12

## 2019-05-16 PROCEDURE — G0500 MOD SEDAT ENDO SERVICE >5YRS: HCPCS | Performed by: INTERNAL MEDICINE

## 2019-05-16 PROCEDURE — 43239 EGD BIOPSY SINGLE/MULTIPLE: CPT | Performed by: INTERNAL MEDICINE

## 2019-05-16 PROCEDURE — 40000104 ZZH STATISTIC MODERATE SEDATION < 10 MIN: Performed by: INTERNAL MEDICINE

## 2019-05-16 PROCEDURE — 88305 TISSUE EXAM BY PATHOLOGIST: CPT | Performed by: INTERNAL MEDICINE

## 2019-05-16 PROCEDURE — 91037 ESOPH IMPED FUNCTION TEST: CPT | Performed by: INTERNAL MEDICINE

## 2019-05-16 RX ORDER — FENTANYL CITRATE 50 UG/ML
INJECTION, SOLUTION INTRAMUSCULAR; INTRAVENOUS PRN
Status: DISCONTINUED | OUTPATIENT
Start: 2019-05-16 | End: 2019-05-16 | Stop reason: HOSPADM

## 2019-05-16 RX ORDER — SIMETHICONE
LIQUID (ML) MISCELLANEOUS PRN
Status: DISCONTINUED | OUTPATIENT
Start: 2019-05-16 | End: 2019-05-16 | Stop reason: HOSPADM

## 2019-05-16 NOTE — DISCHARGE INSTRUCTIONS
Discharge Instructions after  Upper Endoscopy (EGD)    Activity and Diet  You were given medicine for pain. You may be dizzy or sleepy.  For 24 hours:    Do not drive or use heavy equipment.    Do not make important decisions.    Do not drink any alcohol.  _x__ You may return to your regular diet.    Discomfort  You may have a sore throat for 2 to 3 days. It may help to:    Avoid hot liquids for 24 hours.    Use sore throat lozenges.    Gargle as needed with salt water up to 4 times a day. Mix 1 cup of warm water  with 1 teaspoon of salt. Do not swallow.      You may take Tylenol (acetaminophen) for pain unless your doctor has told you not to.    Do not take aspirin or ibuprofen (Advil, Motrin) or other NSAIDS  (anti-inflammatory drugs) for _3__ days.    Follow-up  _x__ We took small tissue samples for study. If you do not have a follow-up visit scheduled,  call your provider s office in 2 weeks for the results.    Other instructions________________________________________________________    When to call us:  Problems are rare. Call right away if you have:    Unusual throat pain or trouble swallowing    Unusual pain in belly or chest that is not relieved by belching or passing air    Black stools (tar-like looking bowel movement)    Temperature above 100.6  F. (37.5  C).    If you vomit blood or have severe pain, go to an emergency room.    If you have questions, call:  Monday to Friday, 7 a.m. to 4:30 p.m.: Endoscopy: [phone] (We may have to call you back)    After hours: Hospital: [phone] (Ask for the GI fellow on call)

## 2019-05-16 NOTE — DISCHARGE INSTRUCTIONS
Motility Discharge Instructions    1. Resume regular diet.  2. You may have a bloody nose or sore throat after the procedure.  3. If you have questions call [phone] from 7:00am-4:30pm.  For after hours questions call GI doctor on call at [phone].    Kasia Rangel RN

## 2019-05-17 LAB — COPATH REPORT: NORMAL

## 2019-05-22 ASSESSMENT — ENCOUNTER SYMPTOMS
POLYDIPSIA: 0
EYE IRRITATION: 0
FATIGUE: 1
ARTHRALGIAS: 1
SEIZURES: 0
PARALYSIS: 0
TREMORS: 1
INCREASED ENERGY: 0
SLEEP DISTURBANCES DUE TO BREATHING: 1
LEG PAIN: 0
COUGH: 1
SPUTUM PRODUCTION: 0
NIGHT SWEATS: 0
DIZZINESS: 0
NECK PAIN: 0
WEIGHT LOSS: 0
COUGH DISTURBING SLEEP: 0
JOINT SWELLING: 0
EYE REDNESS: 0
TINGLING: 0
LOSS OF CONSCIOUSNESS: 0
MUSCLE CRAMPS: 1
PALPITATIONS: 0
HEMOPTYSIS: 0
HYPERTENSION: 0
MEMORY LOSS: 1
HEADACHES: 0
FEVER: 0
WEAKNESS: 1
NUMBNESS: 0
CHILLS: 0
SNORES LOUDLY: 0
ORTHOPNEA: 1
DOUBLE VISION: 1
SHORTNESS OF BREATH: 1
SYNCOPE: 0
DECREASED APPETITE: 0
STIFFNESS: 1
DYSPNEA ON EXERTION: 0
POSTURAL DYSPNEA: 1
WEIGHT GAIN: 0
LIGHT-HEADEDNESS: 0
SPEECH CHANGE: 1
BACK PAIN: 1
HYPOTENSION: 0
HALLUCINATIONS: 0
MYALGIAS: 1
POLYPHAGIA: 0
DISTURBANCES IN COORDINATION: 0
WHEEZING: 0
MUSCLE WEAKNESS: 1
ALTERED TEMPERATURE REGULATION: 0
EYE WATERING: 1
EXERCISE INTOLERANCE: 1
EYE PAIN: 0

## 2019-05-28 ENCOUNTER — OFFICE VISIT (OUTPATIENT)
Dept: NEUROLOGY | Facility: CLINIC | Age: 64
End: 2019-05-28
Payer: MEDICARE

## 2019-05-28 VITALS
DIASTOLIC BLOOD PRESSURE: 83 MMHG | HEIGHT: 69 IN | BODY MASS INDEX: 35.84 KG/M2 | WEIGHT: 242 LBS | HEART RATE: 66 BPM | OXYGEN SATURATION: 98 % | SYSTOLIC BLOOD PRESSURE: 123 MMHG

## 2019-05-28 DIAGNOSIS — G60.9 IDIOPATHIC PERIPHERAL NEUROPATHY: ICD-10-CM

## 2019-05-28 DIAGNOSIS — G70.00 MYASTHENIA GRAVIS WITHOUT EXACERBATION (H): Primary | ICD-10-CM

## 2019-05-28 RX ORDER — PYRIDOSTIGMINE BROMIDE 60 MG/1
TABLET ORAL
Qty: 90 TABLET | Refills: 3 | Status: SHIPPED | OUTPATIENT
Start: 2019-05-28 | End: 2019-09-30

## 2019-05-28 ASSESSMENT — MIFFLIN-ST. JEOR: SCORE: 1726.04

## 2019-05-28 ASSESSMENT — PAIN SCALES - GENERAL: PAINLEVEL: NO PAIN (0)

## 2019-05-28 NOTE — LETTER
5/28/2019       RE: Amparo Mccray  [address]  [place] [address]     Dear Colleague,    Thank you for referring your patient, Amparo Mccray, to the [facility] NEUROLOGY at [facility]. Please see a copy of my visit note below.    [facility]    Amparo Mccray MRN# [id number]   Age: 63 year old YOB: 1955     Requesting physician: Alan Jackson            Assessment and Plan:   Assessment/Plan:  1. Essential Tremor- stay on current metoprolol for now. Could increase metoprolol up or start primidone in future.    2. Memory Complaints-Neuropsych within normal limits, suspect anxiety and poor sleep. Patient will work on those two things and observe.    3. Dysphagia- stable.  EGD with biopsy unrevealing on my review but she will check in with gastroenterology.  Minorly abnormal striated muscle antibody titer 1:40  Pt reports fatigue is limiting her life.  She would like to try mestinon to see if it would help. She was cautioned about side effects. I will see her back in 3 months.    4. Peripheral Neuropathy  Seen on EMG , not causing symptoms at this time.    30 minutes spent with the patient over 50% counseling.             History of Present Illness:   CC: Recheck    Amparo is a 63-year-old woman who has multiple year history of dysphagia.  She does also now reports some significant fatigue levels and feeling as though she gets weak with excess activity.  She has had 2 work-ups for myasthenia gravis.  All have been normal except for a single striated muscle antibody titer of 1:40.  EMG with repetitive stimulation has been normal.  She has been found to have a mild peripheral neuropathy which is felt to be an incidental finding.  She has complaints of tremor that are consistent with essential tremor.    The patient also was having complaints of cognitive changes.  She had neuropsychological testing with   "Diana which was again essentially normal.    GI has done a work-up for dysphagia without any clear abnormalities.  During the EGD there was some question of possible abnormal spots consistent with eosinophilic esophagitis but it appears to me that the biopsy came back negative.         Physical Exam:     /83   Pulse 66   Ht 1.759 m (5' 9.25\")   Wt 109.8 kg (242 lb)   LMP 12/31/2012   SpO2 98%   BMI 35.48 kg/m        Rest of the exam deferred today         Data:   All laboratory data reviewed  All imaging studies reviewed by me    DATA for DOCUMENTATION:         Past Medical History:     Patient Active Problem List   Diagnosis     Essential hypertension, benign     CARDIOVASCULAR SCREENING; LDL GOAL LESS THAN 160     Atrial fibrillation (H)     Ventricular bigeminy     Chest pain     Aftercare following joint replacement     Knee joint replacement by other means     ACP (advance care planning)     Paroxysmal A-fib (H)     Morbid obesity (H)     Knee joint replacement status     Health Care Home     Long-term (current) use of anticoagulants [Z79.01]     Long term current use of anticoagulants with INR goal of 2.0-3.0     Past Medical History:   Diagnosis Date     Arthritis      Chest pain, unspecified     neg stress test, put on PPI- no help     Essential hypertension, benign     Hypertension, Benign     Obesity (BMI 30-39.9) 7/1/13    BMI 33.6     Paroxysmal atrial fibrillation (H) 03/2010     Superficial thrombophlebitis      Thrombosis of leg     superficial not dvt's     Ventricular bigeminy     bigeminy- nrml LV fxn       Also see scanned health assessment forms.       Past Surgical History:     Past Surgical History:   Procedure Laterality Date     ABDOMEN SURGERY       ARTHROPLASTY KNEE Right 3/2/2015    Procedure: ARTHROPLASTY KNEE;  Surgeon: Cuco Baugh MD;  Location: [facility] OR     C NONSPECIFIC PROCEDURE      left hand      CL AFF SURGICAL PATHOLOGY  1976    HGSIL     ESOPHAGOSCOPY, " GASTROSCOPY, DUODENOSCOPY (EGD), COMBINED N/A 5/16/2019    Procedure: ESOPHAGOGASTRODUODENOSCOPY, WITH BIOPSY;  Surgeon: Liliane Montalvo MD;  Location: [facility]     H ABLATION FOCAL AFIB  11/02/2017     H ABLATION FOCAL AFIB  01/15/2013     ORTHOPEDIC SURGERY      l tka  and wrist      TUBAL LIGATION      Uterine fibroids removed            Social History:     Social History     Socioeconomic History     Marital status:      Spouse name: Not on file     Number of children: Not on file     Years of education: Not on file     Highest education level: Not on file   Occupational History     Not on file   Social Needs     Financial resource strain: Not on file     Food insecurity:     Worry: Not on file     Inability: Not on file     Transportation needs:     Medical: Not on file     Non-medical: Not on file   Tobacco Use     Smoking status: Never Smoker     Smokeless tobacco: Never Used   Substance and Sexual Activity     Alcohol use: Yes     Comment: once a year     Drug use: No     Sexual activity: Yes     Partners: Male     Birth control/protection: None   Lifestyle     Physical activity:     Days per week: Not on file     Minutes per session: Not on file     Stress: Not on file   Relationships     Social connections:     Talks on phone: Not on file     Gets together: Not on file     Attends Jain service: Not on file     Active member of club or organization: Not on file     Attends meetings of clubs or organizations: Not on file     Relationship status: Not on file     Intimate partner violence:     Fear of current or ex partner: Not on file     Emotionally abused: Not on file     Physically abused: Not on file     Forced sexual activity: Not on file   Other Topics Concern     Parent/sibling w/ CABG, MI or angioplasty before 65F 55M? Yes   Social History Narrative     Not on file     Family History:     Family History   Problem Relation Age of Onset     C.A.D. Paternal Grandmother       Hypertension Paternal Grandmother      C.A.D. Paternal Grandfather      Connective Tissue Disorder Paternal Grandfather      Hypertension Paternal Grandfather      C.A.D. Father      Hypertension Father      Cancer Maternal Grandmother         uterine     Colon Cancer Maternal Grandmother      Cancer Maternal Grandfather         leukemia     Other Cancer Maternal Grandfather      Cancer Mother         uterine     Gastrointestinal Disease Mother         ulcers     Hypertension Mother      Other - See Comments Sister         essential tremors     C.A.D. Brother         stent- 47     Hypertension Brother      Depression Daughter      Eye Disorder Son      Hypertension Brother      Depression Daughter      Asthma Son             Medications:     Current Outpatient Medications   Medication Sig     lisinopril (PRINIVIL/ZESTRIL) 10 MG tablet TAKE 1 TABLET(10 MG) BY MOUTH DAILY     metoprolol tartrate (LOPRESSOR) 50 MG tablet TAKE 1 TABLET(50 MG) BY MOUTH TWICE DAILY     triamterene-HCTZ (MAXZIDE-25) 37.5-25 MG tablet TAKE 1 TABLET BY MOUTH EVERY MORNING     warfarin (COUMADIN) 2.5 MG tablet TAKE 1/2 TABLET BY MOUTH ON WEDNESDAY AND SATURDAY AND 1 TABLET BY MOUTH DAILY THE REST OF THE WEEK     No current facility-administered medications for this visit.           Review of Systems:   A comprehensive 10 point review of systems (constitutional, ENT, cardiac, peripheral vascular, lymphatic, respiratory, GI, , Musculoskeletal, skin, Neurological) was performed and found to be negative except as described in this note.     See intake form completed by patient    Again, thank you for allowing me to participate in the care of your patient.      Sincerely,    Nikole Miner MD

## 2019-05-28 NOTE — NURSING NOTE
Chief Complaint   Patient presents with     RECHECK     UMP RETURN AFTER TESTING, NEW BREATHING ISSUES       Jahaira Antonio, EMT

## 2019-05-28 NOTE — PROGRESS NOTES
[facility]    Amparo Mccray MRN# [id number]   Age: 63 year old YOB: 1955     Requesting physician: Alan Jackson            Assessment and Plan:   Assessment/Plan:  1. Essential Tremor- stay on current metoprolol for now. Could increase metoprolol up or start primidone in future.    2. Memory Complaints-Neuropsych within normal limits, suspect anxiety and poor sleep. Patient will work on those two things and observe.    3. Dysphagia- stable.  EGD with biopsy unrevealing on my review but she will check in with gastroenterology.  Minorly abnormal striated muscle antibody titer 1:40  Pt reports fatigue is limiting her life.  She would like to try mestinon to see if it would help. She was cautioned about side effects. I will see her back in 3 months.    4. Peripheral Neuropathy  Seen on EMG , not causing symptoms at this time.    30 minutes spent with the patient over 50% counseling.             History of Present Illness:   CC: Recheck    Amparo is a 63-year-old woman who has multiple year history of dysphagia.  She does also now reports some significant fatigue levels and feeling as though she gets weak with excess activity.  She has had 2 work-ups for myasthenia gravis.  All have been normal except for a single striated muscle antibody titer of 1:40.  EMG with repetitive stimulation has been normal.  She has been found to have a mild peripheral neuropathy which is felt to be an incidental finding.  She has complaints of tremor that are consistent with essential tremor.    The patient also was having complaints of cognitive changes.  She had neuropsychological testing with Dr. Ortiz which was again essentially normal.    GI has done a work-up for dysphagia without any clear abnormalities.  During the EGD there was some question of possible abnormal spots consistent with eosinophilic esophagitis but it appears to me that the biopsy came back negative.          "Physical Exam:     /83   Pulse 66   Ht 1.759 m (5' 9.25\")   Wt 109.8 kg (242 lb)   LMP 12/31/2012   SpO2 98%   BMI 35.48 kg/m       Rest of the exam deferred today         Data:   All laboratory data reviewed  All imaging studies reviewed by me             DATA for DOCUMENTATION:         Past Medical History:     Patient Active Problem List   Diagnosis     Essential hypertension, benign     CARDIOVASCULAR SCREENING; LDL GOAL LESS THAN 160     Atrial fibrillation (H)     Ventricular bigeminy     Chest pain     Aftercare following joint replacement     Knee joint replacement by other means     ACP (advance care planning)     Paroxysmal A-fib (H)     Morbid obesity (H)     Knee joint replacement status     Health Care Home     Long-term (current) use of anticoagulants [Z79.01]     Long term current use of anticoagulants with INR goal of 2.0-3.0     Past Medical History:   Diagnosis Date     Arthritis      Chest pain, unspecified     neg stress test, put on PPI- no help     Essential hypertension, benign     Hypertension, Benign     Obesity (BMI 30-39.9) 7/1/13    BMI 33.6     Paroxysmal atrial fibrillation (H) 03/2010     Superficial thrombophlebitis      Thrombosis of leg     superficial not dvt's     Ventricular bigeminy     bigeminy- nrml LV fxn       Also see scanned health assessment forms.       Past Surgical History:     Past Surgical History:   Procedure Laterality Date     ABDOMEN SURGERY       ARTHROPLASTY KNEE Right 3/2/2015    Procedure: ARTHROPLASTY KNEE;  Surgeon: Cuco Baugh MD;  Location:  OR     C NONSPECIFIC PROCEDURE      left hand      [facility] AFF SURGICAL PATHOLOGY  1976    HGSIL     ESOPHAGOSCOPY, GASTROSCOPY, DUODENOSCOPY (EGD), COMBINED N/A 5/16/2019    Procedure: ESOPHAGOGASTRODUODENOSCOPY, WITH BIOPSY;  Surgeon: Liliane Montalvo MD;  Location: [facility]     H ABLATION FOCAL AFIB  11/02/2017     H ABLATION FOCAL AFIB  01/15/2013     ORTHOPEDIC SURGERY      l tka  " and wrist      TUBAL LIGATION      Uterine fibroids removed            Social History:     Social History     Socioeconomic History     Marital status:      Spouse name: Not on file     Number of children: Not on file     Years of education: Not on file     Highest education level: Not on file   Occupational History     Not on file   Social Needs     Financial resource strain: Not on file     Food insecurity:     Worry: Not on file     Inability: Not on file     Transportation needs:     Medical: Not on file     Non-medical: Not on file   Tobacco Use     Smoking status: Never Smoker     Smokeless tobacco: Never Used   Substance and Sexual Activity     Alcohol use: Yes     Comment: once a year     Drug use: No     Sexual activity: Yes     Partners: Male     Birth control/protection: None   Lifestyle     Physical activity:     Days per week: Not on file     Minutes per session: Not on file     Stress: Not on file   Relationships     Social connections:     Talks on phone: Not on file     Gets together: Not on file     Attends Congregational service: Not on file     Active member of club or organization: Not on file     Attends meetings of clubs or organizations: Not on file     Relationship status: Not on file     Intimate partner violence:     Fear of current or ex partner: Not on file     Emotionally abused: Not on file     Physically abused: Not on file     Forced sexual activity: Not on file   Other Topics Concern     Parent/sibling w/ CABG, MI or angioplasty before 65F 55M? Yes   Social History Narrative     Not on file              Family History:     Family History   Problem Relation Age of Onset     C.A.D. Paternal Grandmother      Hypertension Paternal Grandmother      C.A.D. Paternal Grandfather      Connective Tissue Disorder Paternal Grandfather      Hypertension Paternal Grandfather      C.A.D. Father      Hypertension Father      Cancer Maternal Grandmother         uterine     Colon Cancer Maternal  Grandmother      Cancer Maternal Grandfather         leukemia     Other Cancer Maternal Grandfather      Cancer Mother         uterine     Gastrointestinal Disease Mother         ulcers     Hypertension Mother      Other - See Comments Sister         essential tremors     C.A.D. Brother         stent- 47     Hypertension Brother      Depression Daughter      Eye Disorder Son      Hypertension Brother      Depression Daughter      Asthma Son             Medications:     Current Outpatient Medications   Medication Sig     lisinopril (PRINIVIL/ZESTRIL) 10 MG tablet TAKE 1 TABLET(10 MG) BY MOUTH DAILY     metoprolol tartrate (LOPRESSOR) 50 MG tablet TAKE 1 TABLET(50 MG) BY MOUTH TWICE DAILY     triamterene-HCTZ (MAXZIDE-25) 37.5-25 MG tablet TAKE 1 TABLET BY MOUTH EVERY MORNING     warfarin (COUMADIN) 2.5 MG tablet TAKE 1/2 TABLET BY MOUTH ON WEDNESDAY AND SATURDAY AND 1 TABLET BY MOUTH DAILY THE REST OF THE WEEK     No current facility-administered medications for this visit.               Review of Systems:   A comprehensive 10 point review of systems (constitutional, ENT, cardiac, peripheral vascular, lymphatic, respiratory, GI, , Musculoskeletal, skin, Neurological) was performed and found to be negative except as described in this note.     See intake form completed by patient  Answers for HPI/ROS submitted by the patient on 5/22/2019   General Symptoms: Yes  Skin Symptoms: No  HENT Symptoms: No  EYE SYMPTOMS: Yes  HEART SYMPTOMS: Yes  LUNG SYMPTOMS: Yes  INTESTINAL SYMPTOMS: No  URINARY SYMPTOMS: No  GYNECOLOGIC SYMPTOMS: No  BREAST SYMPTOMS: No  SKELETAL SYMPTOMS: Yes  BLOOD SYMPTOMS: No  NERVOUS SYSTEM SYMPTOMS: Yes  MENTAL HEALTH SYMPTOMS: No  Fever: No  Loss of appetite: No  Weight loss: No  Weight gain: No  Fatigue: Yes  Night sweats: No  Chills: No  Increased stress: No  Excessive hunger: No  Excessive thirst: No  Feeling hot or cold when others believe the temperature is normal: No  Loss of height:  No  Post-operative complications: No  Surgical site pain: No  Hallucinations: No  Change in or Loss of Energy: No  Hyperactivity: No  Confusion: No  Eye pain: No  Vision loss: No  Dry eyes: No  Watery eyes: Yes  Eye bulging: No  Double vision: Yes  Flashing of lights: No  Spots: No  Floaters: Yes  Redness: No  Crossed eyes: No  Tunnel Vision: No  Yellowing of eyes: No  Eye irritation: No  Cough: Yes  Sputum or phlegm: No  Coughing up blood: No  Difficulty breating or shortness of breath: Yes  Snoring: No  Wheezing: No  Difficulty breathing on exertion: No  Nighttime Cough: No  Difficulty breathing when lying flat: Yes  Chest pain or pressure: No  Fast or irregular heartbeat: No  Pain in legs with walking: No  Trouble breathing while lying down: Yes  Fingers or toes appear blue: No  High blood pressure: No  Low blood pressure: No  Fainting: No  Murmurs: No  Pacemaker: No  Varicose veins: Yes  Edema or swelling: No  Wake up at night with shortness of breath: Yes  Light-headedness: No  Exercise intolerance: Yes  Back pain: Yes  Muscle aches: Yes  Neck pain: No  Swollen joints: No  Joint pain: Yes  Bone pain: No  Muscle cramps: Yes  Muscle weakness: Yes  Joint stiffness: Yes  Bone fracture: No  Trouble with coordination: No  Dizziness or trouble with balance: No  Fainting or black-out spells: No  Memory loss: Yes  Headache: No  Seizures: No  Speech problems: Yes  Tingling: No  Tremor: Yes  Weakness: Yes  Difficulty walking: No  Paralysis: No  Numbness: No

## 2019-05-28 NOTE — PROGRESS NOTES
Name: Amparo Mccray MRN: [id number]  : 1955  GIANG: 5/10/2019  Staff: JANNETTE Tech: AK Age: 63  Sex: Female Hand: Right   Educ: 12  Occupation: disabled, retail    WAIS-IV     Raw SSa     Vocabulary  49 13     Block Design  50 13     Coding  66 11     Digit Span  25 9    WRAT4   SS %ile Grade Equiv.     Word Reading  97 42 12.5    WMS-Revised  Raw    MAS     Info & Orientation 14       LM I   24 9     LM II   18 9     VR I   37 13     VR II   33 11     VR Recognition  5    Scar AVLT   (30 item recognition)     Trial 1 2 3 4 5 B (6) 7              4 6 7 6 7 6 6       Raw MAS     30 minute recall   7 8     30 min Recognition  10 6       Intrusions   0     Learning Efficiency  67      % retention 100          MAS  13    SCAR-OSTERRIETH COMPLEX FIGURE    Raw    T %ile     Copy    33     >16    BOSTON NAMING TEST   Score: 55 MAS: 10  50%ile                    [ 54   w/o cues        2   w/phonemic cues]     COWAT (CFL)   Score: 25  MAS: 6  9%ile    SEMANTIC FLUENCY   Score: 49  MAS: 11  63%ile    CLOCK DRAWING     Command   3/3             Copy     3/3    TRAIL MAKING TEST    Raw         Err  MAS  %ile   A 38            0               8             25   B 62        0               12             75    STROOP   Raw + Stone  =   Total          MAS %ile    Word 77 +     --  = 77 6 9   Color  61 +     --= 61 8 25       Color/Word  40 +    --= 40 11 63     WCST (128 cards)    Raw   T/%ile   Categories: 6 >16   % Persev. Err.: 6 65   Concept. Resp.: 79 59   FTMS:  2    TEST OF SUSTAINED ATTENTION AND TRACKING   Time: 79  Err: 2    LANG JUDGMENT OF LINE ORIENTATION Form H   Raw: 30  MAS: 16  98%ile:    DEMENTIA RATING SCALE - 2 Standard       Raw       MAS            Raw      MAS  Attention  36  11        Concept   39           12  Init/Psv  37  11  Memory   22       7  Construct 6  10   Total     140/144     11     MMPI-2-RF  QUEST    MAS = Morgan Older Adult Normative Study Age Adj. Scaled Score

## 2019-05-28 NOTE — PROGRESS NOTES
NAME: Amparo Mccray  MR#: [id number]  YOB: 1955  DATE OF EXAM: 5/10/2019    Neuropsychology Laboratory  [facility]  [address]  [place]  [address] [phone]    NEUROPSYCHOLOGICAL EVALUATION    RELEVANT HISTORY AND REASON FOR REFERRAL    Amparo Mccray is a 63 year old right-handed, disabled  with 12 years of formal education. Information was obtained via interview with the patient and review of her medical records. Records indicate that Ms. Mccray has been followed by neurologists since 2015 for numerous complaints including myalgias, cramping, dysphagia, and cognitive problems. Extensive workup in 2015 yielded no clear etiology for her symptoms. She returned to see her neurologist on 3/19/2019, and reported that her symptoms had never resolved and continued to progress, particularly over the last six months. She is bothered primarily by dysphagia. She has also had concerns about her memory. She reports several instances of being disoriented including once when she was driving in the middle of the intersection and could not remember where she was or where she was going or why she was trying to travel. Sometimes she has not been able to recall her address when she is asked. She has tremor as well, which has been characterized as essential tremor. She was referred for neuropsychological evaluation by Nikole Miner M.D., for further categorization of any cognitive difficulties and to evaluate mood and personality.    CLINICAL INTERVIEW FINDINGS    Upon interview, Ms. Mccray stated that she had seen Dr. Miner at an outside clinic four or five years ago, and that her symptoms have been progressing since then. She is choking more frequently, but she has also noticed decline in cognition. She stated that her IQ was tested at the age of 36 and that it was high, and it feels like it is  3  now. Once, she was at a stoplight and for a few seconds she did  not know where she was or where to turn. She has made coffee and neglected to put the coffee cup there. Once she spent 15 minutes unable to get out of her house until she realized that she did not need the key from the inside. She has left the burner on her stove. 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 She sometimes forgets events even if they are in her calendar and she must set the alarm for herself. When she comes home from work she feels like she is in a brain fog. She stated that her children tell her that she has already said something, and there are times when she does not know if she has said something or just thought it. Generally she has not noticed difficulty with word finding, although sometimes the longer she talks the more her words come out jumbled. Attention and concentration have been a problem for her. She has so much trouble focusing when reading that she gives up, which is a major change for her. She denied difficulty with organization.    Ms. Mccray lives with her son. She manages her own finances, medications, and cooking, apparently without difficulty. She drives generally without difficulty, although beginning four years ago she noticed something was wrong with her depth perception, so she has learned to stay back farther than she thinks she needs to be. She handles her personal cares independently.    Ms. Mccray stated that in her 41 she had an episode of depression and thought about driving her car off of a bridge. Her primary care provider gave her an antidepressant, and these thoughts dissipated. She worked with a psychiatrist years ago for something that happened to her children. She has never been hospitalized for psychiatric care. She described her mood as usually upbeat, and it has not changed recently. She does not feel depressed or anxious. She is no more irritable than normal. She  stated that silly things make her cry. She takes a Tylenol PM to help her sleep, but sometimes lies there wide-awake especially over the last three or four years. She tries to get seven or eight hours of sleep, but usually get six or seven. Sometimes she wakes up for 30 minutes in the middle of the night. She has not been napping during the day because it makes her sleep worse at night. Her appetite has been somewhat lower but she has been gaining weight, which he attributes to lack of activity. Her energy level is much lower over the last four years, as the more she moves, the more her body hurts. Her interest level is low. She likes to read and quilt but she is not doing these activities now because it is too hard to stay focused. She denied visual or auditory hallucinations.    Twice a year, Ms. Mccray may consume 1   glasses of alcohol. She does not believe that alcohol has any effect on her tremor. She tried CBD oil a year ago which helped her tremors. She denied tobacco use. She has never been in any chemical dependency treatment programs. She goes to the [facility] once a month with her sister and denied compulsive gambling or any other compulsive behaviors.    In school, Ms. Mccray was reportedly a poor student, as she was bored and did not strive to get good grades. She was never in any special education classes, nor was she held back any grades. She graduated from high school. 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 She worked at [employer] until the fall of 2015, and stopped working at that point. 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 She is receiving disability. She has been  six times, with her longest marriage lusting nine years. Her last divorce was in 2011. She has five children.    In 2002, Ms. Mccray fell on ice and saw stars. She was not  hospitalized. She denied any history of seizure or stroke. Her balance is off, and if she closes her eyes she feels like she is moving. She had an episode when the right part of her face was numb. A stroke was apparently ruled out. This has happened on and off, and could be either side or either arm. She has had tremor since her early 40s, especially after physical exertion. This is primarily in her hands and arms and affects both sides equally. She is no longer experiencing headaches. She is bothered by muscle pains.      PAST MEDICAL HISTORY: Medical records indicate a history of atrial fibrillation, hypertension, morbid obesity, paroxysmal a fib.    CURRENT MEDICATIONS:  Include lisinopril, metoprolol, triamterene-HCTZ, and warfarin.    FAMILY MEDICAL HISTORY:  Significant for a brother with ALS diagnosed at 64, a maternal aunt with Parkinson s disease, and 86-year-old mother with memory problems, two sisters with depression and anxiety, one with panic disorder, and a maternal half sister with essential tremor.    BEHAVIORAL OBSERVATIONS    During the evaluation, Ms. Mccray was pleasant, cooperative, and seemed to understand the instructions. She was alert and oriented to person, place, and time. Tremors were observed clinically in both hands. Mood was euthymic. Speech was fluent, with normal articulation, volume, and rate. Spontaneous conversation was present and appropriate. Internal performance validity measures fell within normal limits. The results are believed to accurately reflect her current level of functioning.    MEASURES ADMINISTERED    The following measures were administered by a graduate level practicum student, under the direct supervision of a licensed psychologist:    Wechsler Abbreviated Scale of Intelligence - 2 (WASI-2); Subtests of the Wechsler Adult Intelligence Scale - 4; Reading subtest of the Wide Range Achievement Test - 4; subtests of the Wechsler Memory Scale - Revised; Scar Auditory  Verbal Learning Test; Scar-Osterrieth Complex Figure; Ellsworth Naming Test; Controlled Oral Word Association Test; Semantic Fluency; Clock Drawing; Trail Making Test; Stroop; Test of Sustained Atttention and Tracking; Wisconsin Card Sorting Test; Dementia Rating Scale - 2 (DRS-2); QUEST; [place] Multiphasic Personality Inventory - 2 - Restructured Form (MMPI-2-RF).    RESULTS AND INTERPRETATION    Overall intellectual functioning was estimated to fall in the high average range, marginally above premorbid estimates of average based on single word reading abilities. Performance on a screening measure dementia was average (DRS-2 Total Score = 140/144).    Confrontation naming was average for her age. Expressive vocabulary was high average. Letter fluency was mildly impaired, and generative naming to category was average.    Attention span was average for her age. Divided attention was high average. Performance on a measure of distractibility was average. Performance on a measure of sustained attention and tracking fell within normal limits. Visual search and scanning was average. Psychomotor processing speed was average.    Basic visual perception, including matching lines and angles, was above average for her age. Construction of a clock fell within normal limits. Construction of a complex design also fell within normal limits. Assembly of visual material was high average.     Novel problem-solving, including the ability to generate strategies and solutions, fell within normal limits for her age and level of education.    Immediate and 30 minute delayed recall of verbal narrative material was average. On a multiple trial list learning task, learning was moderately inefficient, but retention of the learned material (100%) following a 30 minute delay was high average. Immediate recall of visual material was high average, with average recall following a 30 minute delay.    On the MMPI-2-RF, a self-report measure of mood  and personality, Ms. Mccray responded to the items in a consistent and valid manner. Her level of emotional distress was low. She endorsed the item,  no one knows it but I have tried to kill myself.  On interview, she had mentioned considering suicide in her 40s, which prompted her to meet with her physician for treatment recommendations. She denied depressed mood or ideation, anxiety, or psychosis.    IMPRESSIONS AND RECOMMENDATIONS    Current results indicate performance that falls almost entirely within normal limits across cognitive domains. Phonemic fluency is mildly slowed, but there is no other indication of executive dysfunction. She has some difficulty learning new information on a list learning task, likely reflecting subtle variability in attention, but memory falls well within normal limits. Language, visual processing, and complex attentional processing all fall within normal limits. Personality assessment falls within normal limits.     This pattern of performance does not reflect dementia at this time, nor is it suggestive of focal or lateralized cerebral involvement. She has a subjective sense of substantial cognitive decline, which could be associated with nonrestorative sleep, or perhaps with mild anxiety. If not already considered, she may benefit from further evaluation of sleep and treatment recommendations. She may also benefit from employing strategies such as deep breathing or relaxation techniques, and to participate in exercise as much as she is able.    In terms of daily functioning, Ms. Mccray is not experiencing cognitive difficulties that might interfere with her ability to manage her instrumental activities of daily living, or to follow through with treatment recommendations. Given very subtle variability in attention, she may find it helpful to work in environments that are relatively free from distractions when completing a task, and to provide herself with frequent, brief breaks  when working on larger tasks.     It may be helpful to continue to follow her over time. Given her subjective sense of decline, repeated neuropsychological evaluation in one year may help to determine whether there are changes in cognitive functioning over time.    Angela Ortiz, Ph.D., ABPP  Licensed Psychologist, LP [id number]  Board Certified in Clinical Neuropsychology      Time spent: 65 minutes neurobehavioral status exam including interview, clinical assessment by licensed and board-certified neuropsychologist (CPT 45934). 60 minutes (1 unit) neuropsychological testing evaluation by licensed and board-certified neuropsychologist, including integration of patient data, interpretation of standardized test results and clinical data, clinical decision-making, treatment planning, report, and interactive feedback to the patient, first hour (CPT 59226). 185 minutes (3 units) of neuropsychological testing evaluation by licensed and board-certified neuropsychologist, including integration of patient data, interpretation of standardized test results and clinical data, clinical decision-making, treatment planning, report, and interactive feedback to the patient, subsequent hours (CPT 66022). ICD-10 Diagnoses: R41.3

## 2019-06-13 ENCOUNTER — OFFICE VISIT (OUTPATIENT)
Dept: URGENT CARE | Facility: URGENT CARE | Age: 64
End: 2019-06-13
Payer: MEDICARE

## 2019-06-13 ENCOUNTER — ANCILLARY PROCEDURE (OUTPATIENT)
Dept: GENERAL RADIOLOGY | Facility: CLINIC | Age: 64
End: 2019-06-13
Attending: PHYSICIAN ASSISTANT
Payer: MEDICARE

## 2019-06-13 VITALS — BODY MASS INDEX: 35.44 KG/M2 | TEMPERATURE: 97.6 F | HEART RATE: 76 BPM | WEIGHT: 241.7 LBS

## 2019-06-13 DIAGNOSIS — R05.9 COUGH: ICD-10-CM

## 2019-06-13 DIAGNOSIS — R06.00 PND (PAROXYSMAL NOCTURNAL DYSPNEA): ICD-10-CM

## 2019-06-13 DIAGNOSIS — Z79.01 LONG TERM CURRENT USE OF ANTICOAGULANTS WITH INR GOAL OF 2.0-3.0: ICD-10-CM

## 2019-06-13 DIAGNOSIS — R09.89 CHEST CONGESTION: ICD-10-CM

## 2019-06-13 DIAGNOSIS — R06.00 PND (PAROXYSMAL NOCTURNAL DYSPNEA): Primary | ICD-10-CM

## 2019-06-13 LAB
ALBUMIN SERPL-MCNC: 3.8 G/DL (ref 3.4–5)
ALP SERPL-CCNC: 84 U/L (ref 40–150)
ALT SERPL W P-5'-P-CCNC: 25 U/L (ref 0–50)
ANION GAP SERPL CALCULATED.3IONS-SCNC: 7 MMOL/L (ref 3–14)
AST SERPL W P-5'-P-CCNC: 19 U/L (ref 0–45)
BASOPHILS # BLD AUTO: 0 10E9/L (ref 0–0.2)
BASOPHILS NFR BLD AUTO: 0.3 %
BILIRUB SERPL-MCNC: 0.3 MG/DL (ref 0.2–1.3)
BUN SERPL-MCNC: 20 MG/DL (ref 7–30)
CALCIUM SERPL-MCNC: 8.5 MG/DL (ref 8.5–10.1)
CHLORIDE SERPL-SCNC: 105 MMOL/L (ref 94–109)
CO2 SERPL-SCNC: 27 MMOL/L (ref 20–32)
CREAT SERPL-MCNC: 0.87 MG/DL (ref 0.52–1.04)
DIFFERENTIAL METHOD BLD: NORMAL
EOSINOPHIL # BLD AUTO: 0.1 10E9/L (ref 0–0.7)
EOSINOPHIL NFR BLD AUTO: 2 %
ERYTHROCYTE [DISTWIDTH] IN BLOOD BY AUTOMATED COUNT: 12.7 % (ref 10–15)
GFR SERPL CREATININE-BSD FRML MDRD: 71 ML/MIN/{1.73_M2}
GLUCOSE SERPL-MCNC: 132 MG/DL (ref 70–99)
HCT VFR BLD AUTO: 39.4 % (ref 35–47)
HGB BLD-MCNC: 13 G/DL (ref 11.7–15.7)
LYMPHOCYTES # BLD AUTO: 1.3 10E9/L (ref 0.8–5.3)
LYMPHOCYTES NFR BLD AUTO: 20.9 %
MCH RBC QN AUTO: 30.7 PG (ref 26.5–33)
MCHC RBC AUTO-ENTMCNC: 33 G/DL (ref 31.5–36.5)
MCV RBC AUTO: 93 FL (ref 78–100)
MONOCYTES # BLD AUTO: 0.4 10E9/L (ref 0–1.3)
MONOCYTES NFR BLD AUTO: 5.6 %
NEUTROPHILS # BLD AUTO: 4.6 10E9/L (ref 1.6–8.3)
NEUTROPHILS NFR BLD AUTO: 71.2 %
PLATELET # BLD AUTO: 208 10E9/L (ref 150–450)
POTASSIUM SERPL-SCNC: 4.2 MMOL/L (ref 3.4–5.3)
PROT SERPL-MCNC: 7.3 G/DL (ref 6.8–8.8)
RBC # BLD AUTO: 4.24 10E12/L (ref 3.8–5.2)
SODIUM SERPL-SCNC: 139 MMOL/L (ref 133–144)
TROPONIN I SERPL-MCNC: <0.015 UG/L (ref 0–0.04)
WBC # BLD AUTO: 6.4 10E9/L (ref 4–11)

## 2019-06-13 PROCEDURE — 99215 OFFICE O/P EST HI 40 MIN: CPT | Mod: 25 | Performed by: PHYSICIAN ASSISTANT

## 2019-06-13 PROCEDURE — 80053 COMPREHEN METABOLIC PANEL: CPT | Performed by: PHYSICIAN ASSISTANT

## 2019-06-13 PROCEDURE — 94640 AIRWAY INHALATION TREATMENT: CPT | Performed by: PHYSICIAN ASSISTANT

## 2019-06-13 PROCEDURE — 85025 COMPLETE CBC W/AUTO DIFF WBC: CPT | Performed by: PHYSICIAN ASSISTANT

## 2019-06-13 PROCEDURE — 36415 COLL VENOUS BLD VENIPUNCTURE: CPT | Performed by: PHYSICIAN ASSISTANT

## 2019-06-13 PROCEDURE — 84484 ASSAY OF TROPONIN QUANT: CPT | Performed by: PHYSICIAN ASSISTANT

## 2019-06-13 PROCEDURE — 71046 X-RAY EXAM CHEST 2 VIEWS: CPT

## 2019-06-13 PROCEDURE — 93000 ELECTROCARDIOGRAM COMPLETE: CPT | Performed by: PHYSICIAN ASSISTANT

## 2019-06-13 RX ORDER — AZITHROMYCIN 250 MG/1
TABLET, FILM COATED ORAL
Qty: 6 TABLET | Refills: 0 | Status: SHIPPED | OUTPATIENT
Start: 2019-06-13 | End: 2019-09-30

## 2019-06-13 RX ORDER — ALBUTEROL SULFATE 0.83 MG/ML
2.5 SOLUTION RESPIRATORY (INHALATION) ONCE
Status: COMPLETED | OUTPATIENT
Start: 2019-06-13 | End: 2019-06-13

## 2019-06-13 RX ORDER — PREDNISONE 20 MG/1
20 TABLET ORAL 2 TIMES DAILY
Qty: 10 TABLET | Refills: 0 | Status: SHIPPED | OUTPATIENT
Start: 2019-06-13 | End: 2019-09-30

## 2019-06-13 RX ORDER — ALBUTEROL SULFATE 90 UG/1
2 AEROSOL, METERED RESPIRATORY (INHALATION) EVERY 6 HOURS
Qty: 8.5 G | Refills: 0 | Status: SHIPPED | OUTPATIENT
Start: 2019-06-13 | End: 2019-09-30

## 2019-06-13 RX ADMIN — ALBUTEROL SULFATE 2.5 MG: 0.83 SOLUTION RESPIRATORY (INHALATION) at 12:36

## 2019-06-14 NOTE — PROGRESS NOTES
SUBJECTIVE:   Amparo Mccray is a 63 year old female presenting with a chief complaint of chills, runny nose, stuffy nose, cough - non-productive and facial pain/pressure.  Onset of symptoms was 1 week(s) ago.  Course of illness is worsening.    Severity moderate  Current and Associated symptoms: runny nose, stuffy nose, cough - productive, wheezing and facial pain/pressure  Treatment measures tried include OTC Cough med.  Predisposing factors include recent illness .    Past Medical History:   Diagnosis Date     Arthritis      Chest pain, unspecified     neg stress test, put on PPI- no help     Essential hypertension, benign     Hypertension, Benign     Obesity (BMI 30-39.9) 7/1/13    BMI 33.6     Paroxysmal atrial fibrillation (H) 03/2010     Superficial thrombophlebitis      Thrombosis of leg     superficial not dvt's     Ventricular bigeminy     bigeminy- nrml LV fxn        Allergies   Allergen Reactions     Celebrex [Celecoxib] Hives     Hands itching then hives     Ibuprofen Swelling     Face swelling     Ibuprofen Hives     Family History   Problem Relation Age of Onset     C.A.D. Paternal Grandmother      Hypertension Paternal Grandmother      C.A.D. Paternal Grandfather      Connective Tissue Disorder Paternal Grandfather      Hypertension Paternal Grandfather      C.A.D. Father      Hypertension Father      Cancer Maternal Grandmother         uterine     Colon Cancer Maternal Grandmother      Cancer Maternal Grandfather         leukemia     Other Cancer Maternal Grandfather      Cancer Mother         uterine     Gastrointestinal Disease Mother         ulcers     Hypertension Mother      Other - See Comments Sister         essential tremors     C.A.D. Brother         stent- 47     Hypertension Brother      Depression Daughter      Eye Disorder Son      Hypertension Brother      Depression Daughter      Asthma Son          Social History     Tobacco Use     Smoking status: Never Smoker     Smokeless  tobacco: Never Used   Substance Use Topics     Alcohol use: Yes     Comment: once a year       ROS:  CONSTITUTIONAL:NEGATIVE for fever, chills, change in weight  INTEGUMENTARY/SKIN: NEGATIVE for worrisome rashes, moles or lesions  EYES: NEGATIVE for vision changes or irritation  ENT/MOUTH: NEGATIVE for ear, mouth and throat problems  RESP:POSITIVE for cough-productive and congestion  CV: NEGATIVE for chest pain, palpitations or peripheral edema  GI: NEGATIVE for nausea, abdominal pain, heartburn, or change in bowel habits  : negative for and dysuria  MUSCULOSKELETAL: NEGATIVE for significant arthralgias or myalgia  NEURO: NEGATIVE for weakness, dizziness or paresthesias    OBJECTIVE  :Pulse 76   Temp 97.6  F (36.4  C) (Tympanic)   Wt 109.6 kg (241 lb 11.2 oz)   LMP 12/31/2012   BMI 35.44 kg/m    GENERAL APPEARANCE: healthy, alert and no distress  EYES: EOMI,  PERRL, conjunctiva clear  HENT: ear canals and TM's normal.  Nose and mouth without ulcers, erythema or lesions  NECK: supple, nontender, no lymphadenopathy  RESP: lungs clear to auscultation - no rales, rhonchi or wheezes  CV: regular rates and rhythm, normal S1 S2, no murmur noted  NEURO: Normal strength and tone, sensory exam grossly normal,  normal speech and mentation  SKIN: no suspicious lesions or rashes    Chest xray Negative for acute findings, read by Jonah AQUINO at time of visit.    EKG normal sinus rhythm    Results for orders placed or performed in visit on 06/13/19   CBC with platelets differential   Result Value Ref Range    WBC 6.4 4.0 - 11.0 10e9/L    RBC Count 4.24 3.8 - 5.2 10e12/L    Hemoglobin 13.0 11.7 - 15.7 g/dL    Hematocrit 39.4 35.0 - 47.0 %    MCV 93 78 - 100 fl    MCH 30.7 26.5 - 33.0 pg    MCHC 33.0 31.5 - 36.5 g/dL    RDW 12.7 10.0 - 15.0 %    Platelet Count 208 150 - 450 10e9/L    % Neutrophils 71.2 %    % Lymphocytes 20.9 %    % Monocytes 5.6 %    % Eosinophils 2.0 %    % Basophils 0.3 %    Absolute Neutrophil  4.6 1.6 - 8.3 10e9/L    Absolute Lymphocytes 1.3 0.8 - 5.3 10e9/L    Absolute Monocytes 0.4 0.0 - 1.3 10e9/L    Absolute Eosinophils 0.1 0.0 - 0.7 10e9/L    Absolute Basophils 0.0 0.0 - 0.2 10e9/L    Diff Method Automated Method    Comprehensive metabolic panel   Result Value Ref Range    Sodium 139 133 - 144 mmol/L    Potassium 4.2 3.4 - 5.3 mmol/L    Chloride 105 94 - 109 mmol/L    Carbon Dioxide 27 20 - 32 mmol/L    Anion Gap 7 3 - 14 mmol/L    Glucose 132 (H) 70 - 99 mg/dL    Urea Nitrogen 20 7 - 30 mg/dL    Creatinine 0.87 0.52 - 1.04 mg/dL    GFR Estimate 71 >60 mL/min/[1.73_m2]    GFR Estimate If Black 83 >60 mL/min/[1.73_m2]    Calcium 8.5 8.5 - 10.1 mg/dL    Bilirubin Total 0.3 0.2 - 1.3 mg/dL    Albumin 3.8 3.4 - 5.0 g/dL    Protein Total 7.3 6.8 - 8.8 g/dL    Alkaline Phosphatase 84 40 - 150 U/L    ALT 25 0 - 50 U/L    AST 19 0 - 45 U/L   Troponin I   Result Value Ref Range    Troponin I ES <0.015 0.000 - 0.045 ug/L     Albuterol Neb given in clinic    ASSESSMENT/PLAN      ICD-10-CM    1. PND (paroxysmal nocturnal dyspnea) R06.00 CBC with platelets differential     Comprehensive metabolic panel     XR Chest 2 Views     Troponin I     EKG 12-lead complete w/read - Clinics     Nursing Communication 1     INHALATION/NEBULIZER TREATMENT, INITIAL     albuterol (PROVENTIL) neb solution 2.5 mg     ALBUTEROL UNIT DOSE, 1 MG     predniSONE (DELTASONE) 20 MG tablet     albuterol (PROVENTIL HFA) 108 (90 Base) MCG/ACT inhaler   2. Cough R05 INHALATION/NEBULIZER TREATMENT, INITIAL     albuterol (PROVENTIL) neb solution 2.5 mg     predniSONE (DELTASONE) 20 MG tablet     albuterol (PROVENTIL HFA) 108 (90 Base) MCG/ACT inhaler   3. Chest congestion R09.89 INHALATION/NEBULIZER TREATMENT, INITIAL     albuterol (PROVENTIL) neb solution 2.5 mg     azithromycin (ZITHROMAX) 250 MG tablet   4. Long term current use of anticoagulants with INR goal of 2.0-3.0 Z79.01 azithromycin (ZITHROMAX) 250 MG tablet       Orders Placed This  Encounter     INHALATION/NEBULIZER TREATMENT, INITIAL     ALBUTEROL UNIT DOSE, 1 MG     XR Chest 2 Views     CBC with platelets differential     Comprehensive metabolic panel     Troponin I     albuterol (PROVENTIL) neb solution 2.5 mg     azithromycin (ZITHROMAX) 250 MG tablet     predniSONE (DELTASONE) 20 MG tablet     albuterol (PROVENTIL HFA) 108 (90 Base) MCG/ACT inhaler     Prednisone for chest tightness  Albuterol for chest tightnesss  zpak for infection, productive cough  Troponin I to rule cardiac problems  Chest xray to rule out pneumonia and pnuemothorax  Follow up with PCP for ongoing symptoms  Go to the ED if symptoms worsen

## 2019-06-17 PROBLEM — Z79.01 LONG TERM CURRENT USE OF ANTICOAGULANT THERAPY: Status: ACTIVE | Noted: 2017-08-23

## 2019-06-19 DIAGNOSIS — I10 ESSENTIAL HYPERTENSION, BENIGN: ICD-10-CM

## 2019-06-19 RX ORDER — LISINOPRIL 10 MG/1
TABLET ORAL
Qty: 90 TABLET | Refills: 2 | Status: SHIPPED | OUTPATIENT
Start: 2019-06-19 | End: 2020-03-15

## 2019-06-19 RX ORDER — METOPROLOL TARTRATE 50 MG
TABLET ORAL
Qty: 180 TABLET | Refills: 2 | Status: SHIPPED | OUTPATIENT
Start: 2019-06-19 | End: 2020-04-02

## 2019-06-19 NOTE — TELEPHONE ENCOUNTER
"Requested Prescriptions   Pending Prescriptions Disp Refills     lisinopril (PRINIVIL/ZESTRIL) 10 MG tablet [Pharmacy Med Name: LISINOPRIL 10MG TABLETS] 90 tablet 0     Sig: TAKE 1 TABLET(10 MG) BY MOUTH DAILY  Last Written Prescription Date:  09/18/18  Last Fill Quantity: 90,  # refills: 2   Last office visit: 2/28/2019 with prescribing provider:  02/28/19   Future Office Visit:  0       ACE Inhibitors (Including Combos) Protocol Passed - 6/19/2019  8:13 AM        Passed - Blood pressure under 140/90 in past 12 months     BP Readings from Last 3 Encounters:   05/28/19 123/83   05/16/19 127/73   04/03/19 136/67                 Passed - Recent (12 mo) or future (30 days) visit within the authorizing provider's specialty     Patient had office visit in the last 12 months or has a visit in the next 30 days with authorizing provider or within the authorizing provider's specialty.  See \"Patient Info\" tab in inbasket, or \"Choose Columns\" in Meds & Orders section of the refill encounter.              Passed - Medication is active on med list        Passed - Patient is age 18 or older        Passed - No active pregnancy on record        Passed - Normal serum creatinine on file in past 12 months     Recent Labs   Lab Test 06/13/19  1136  11/01/17  1059   CR 0.87   < >  --    CREAT  --   --  0.9    < > = values in this interval not displayed.             Passed - Normal serum potassium on file in past 12 months     Recent Labs   Lab Test 06/13/19  1136   POTASSIUM 4.2             Passed - No positive pregnancy test within past 12 months        metoprolol tartrate (LOPRESSOR) 50 MG tablet [Pharmacy Med Name: METOPROLOL TARTRATE 50MG TABLETS] 180 tablet 0     Sig: TAKE 1 TABLET(50 MG) BY MOUTH TWICE DAILY  Last Written Prescription Date:  01/02/19  Last Fill Quantity: 180,  # refills: 1   Last office visit: 2/28/2019 with prescribing provider:  02/28/19   Future Office Visit:  0       Beta-Blockers Protocol Passed - 6/19/2019  " "8:13 AM        Passed - Blood pressure under 140/90 in past 12 months     BP Readings from Last 3 Encounters:   05/28/19 123/83   05/16/19 127/73   04/03/19 136/67                 Passed - Patient is age 6 or older        Passed - Recent (12 mo) or future (30 days) visit within the authorizing provider's specialty     Patient had office visit in the last 12 months or has a visit in the next 30 days with authorizing provider or within the authorizing provider's specialty.  See \"Patient Info\" tab in inbasket, or \"Choose Columns\" in Meds & Orders section of the refill encounter.              Passed - Medication is active on med list        "

## 2019-07-05 ENCOUNTER — TELEPHONE (OUTPATIENT)
Dept: INTERNAL MEDICINE | Facility: CLINIC | Age: 64
End: 2019-07-05

## 2019-07-05 NOTE — LETTER
July 5, 2019      Amparo Mccray  [address]   [place] [address]      Dear Amparo,    We are contacting you because our records show you were due for an INR on 6/20/2019.      There are potentially serious risks when taking warfarin without careful monitoring, and we want to make sure you are safely managed.    Please call the INR clinic at [phone] and we will be happy to help you schedule an appointment.  If there has been a change in your care, or other concerns, please let us know so we can help and/or update our records.    Sincerely,        Alan Almeida MD

## 2019-07-24 ENCOUNTER — TELEPHONE (OUTPATIENT)
Dept: INTERNAL MEDICINE | Facility: CLINIC | Age: 64
End: 2019-07-24

## 2019-07-24 NOTE — TELEPHONE ENCOUNTER
Pt is past due for INR appointment.  First letter sent on 7/5/19    Second letter sent today    FYI to PCP that patient has been non-compliant with INR.  Pt was due in June     Last INR was in April    Vinny Salinas RN, BSN

## 2019-07-24 NOTE — LETTER
July 24, 2019      [facility]  [address] [place] [address]   [place] [address]      Dear Amparo,    We are contacting you because our records show you were due for an INR on 6/20/2019.      There are potentially serious risks when taking warfarin without careful monitoring, and we want to make sure you are safely managed.    Please call the INR clinic at [phone] and we will be happy to help you schedule an appointment.  If there has been a change in your care, or other concerns, please let us know so we can help and/or update our records.    Sincerely,    Alan Almeida MD

## 2019-07-31 DIAGNOSIS — I10 ESSENTIAL HYPERTENSION, BENIGN: ICD-10-CM

## 2019-07-31 RX ORDER — TRIAMTERENE/HYDROCHLOROTHIAZID 37.5-25 MG
TABLET ORAL
Qty: 90 TABLET | Refills: 1 | Status: SHIPPED | OUTPATIENT
Start: 2019-07-31 | End: 2020-01-22

## 2019-07-31 NOTE — TELEPHONE ENCOUNTER
"Requested Prescriptions   Pending Prescriptions Disp Refills     triamterene-HCTZ (MAXZIDE-25) 37.5-25 MG tablet [Pharmacy Med Name: TRIAMTERENE 37.5MG/ HCTZ 25MG TABS] 90 tablet 0     Sig: TAKE 1 TABLET BY MOUTH EVERY MORNING       Diuretics (Including Combos) Protocol Passed - 7/31/2019  8:27 AM        Passed - Blood pressure under 140/90 in past 12 months     BP Readings from Last 3 Encounters:   05/28/19 123/83   05/16/19 127/73   04/03/19 136/67                 Passed - Recent (12 mo) or future (30 days) visit within the authorizing provider's specialty     Patient had office visit in the last 12 months or has a visit in the next 30 days with authorizing provider or within the authorizing provider's specialty.  See \"Patient Info\" tab in inbasket, or \"Choose Columns\" in Meds & Orders section of the refill encounter.              Passed - Medication is active on med list        Passed - Patient is age 18 or older        Passed - No active pregancy on record        Passed - Normal serum creatinine on file in past 12 months     Recent Labs   Lab Test 06/13/19  1136   CR 0.87              Passed - Normal serum potassium on file in past 12 months     Recent Labs   Lab Test 06/13/19  1136   POTASSIUM 4.2                    Passed - Normal serum sodium on file in past 12 months     Recent Labs   Lab Test 06/13/19  1136                 Passed - No positive pregnancy test in past 12 months          "

## 2019-08-08 ENCOUNTER — ANTICOAGULATION THERAPY VISIT (OUTPATIENT)
Dept: ANTICOAGULATION | Facility: CLINIC | Age: 64
End: 2019-08-08
Payer: MEDICARE

## 2019-08-08 DIAGNOSIS — I48.91 ATRIAL FIBRILLATION (H): Primary | ICD-10-CM

## 2019-08-08 LAB — INR POINT OF CARE: 3.1 (ref 0.86–1.14)

## 2019-08-08 PROCEDURE — 36416 COLLJ CAPILLARY BLOOD SPEC: CPT

## 2019-08-08 PROCEDURE — 85610 PROTHROMBIN TIME: CPT | Mod: QW

## 2019-08-08 NOTE — PROGRESS NOTES
ANTICOAGULATION FOLLOW-UP CLINIC VISIT    Patient Name:  Amparo Mccray  Date:  8/8/2019  Contact Type:  Face to Face    SUBJECTIVE:  Patient Findings     Comments:   The patient was assessed for diet, medication, and activity level changes, missed or extra doses, bruising or bleeding, with no problem findings.  Pt lives [place], has been noncompliant with getting INR's completed. Pt asking about home monitor, would like referral process initiated.        Clinical Outcomes     Comments:   The patient was assessed for diet, medication, and activity level changes, missed or extra doses, bruising or bleeding, with no problem findings.  Pt lives [place], has been noncompliant with getting INR's completed. Pt asking about home monitor, would like referral process initiated.           OBJECTIVE    INR Protime   Date Value Ref Range Status   08/08/2019 3.1 (A) 0.86 - 1.14 Final       ASSESSMENT / PLAN  INR assessment THER    Recheck INR In: 6 WEEKS    INR Location Clinic      Anticoagulation Summary  As of 8/8/2019    INR goal:   2.0-3.0   TTR:   76.1 % (1.9 y)   INR used for dosing:   3.1! (8/8/2019)   Warfarin maintenance plan:   1.25 mg (2.5 mg x 0.5) every Wed, Sat; 2.5 mg (2.5 mg x 1) all other days   Full warfarin instructions:   1.25 mg every Wed, Sat; 2.5 mg all other days   Weekly warfarin total:   15 mg   Plan last modified:   Willow Ellison RN (11/7/2017)   Next INR check:   9/19/2019   Priority:   INR   Target end date:       Indications    Atrial fibrillation (H) [I48.91]  Ventricular bigeminy [I49.9]  Long term current use of anticoagulants with INR goal of 2.0-3.0 [Z79.01]             Anticoagulation Episode Summary     INR check location:       Preferred lab:       Send INR reminders to:    TRIAGE POOL    Comments:         Anticoagulation Care Providers     Provider Role Specialty Phone number    Alan Almeida MD [facility] Internal Medicine [phone]            See the Encounter  Report to view Anticoagulation Flowsheet and Dosing Calendar (Go to Encounters tab in chart review, and find the Anticoagulation Therapy Visit)    Dosage adjustment made based on physician directed care plan.    Zakia Meek RN

## 2019-09-04 DIAGNOSIS — I49.8 VENTRICULAR BIGEMINY: ICD-10-CM

## 2019-09-04 DIAGNOSIS — I48.0 PAROXYSMAL ATRIAL FIBRILLATION (H): ICD-10-CM

## 2019-09-04 DIAGNOSIS — Z79.01 LONG TERM CURRENT USE OF ANTICOAGULANT THERAPY: ICD-10-CM

## 2019-09-04 RX ORDER — WARFARIN SODIUM 2.5 MG/1
TABLET ORAL
Qty: 60 TABLET | Refills: 0 | Status: SHIPPED | OUTPATIENT
Start: 2019-09-04 | End: 2019-11-14

## 2019-09-04 RX ORDER — WARFARIN SODIUM 2.5 MG/1
TABLET ORAL
Qty: 1 TABLET | Refills: 0 | OUTPATIENT
Start: 2019-09-04

## 2019-09-04 NOTE — TELEPHONE ENCOUNTER
"Requested Prescriptions   Pending Prescriptions Disp Refills     warfarin (COUMADIN) 2.5 MG tablet [Pharmacy Med Name: WARFARIN SOD 2.5MG TABLETS (GREEN)] 90 tablet 0     Sig: TAKE 1/2 TABLET BY MOUTH ON WEDNESDAY AND SATURDAY AND 1 TABLET BY MOUTH DAILY THE REST OF THE WEEK       Vitamin K Antagonists Failed - 9/4/2019  6:58 AM        Failed - INR is within goal in the past 6 weeks     Confirm INR is within goal in the past 6 weeks.     Recent Labs   Lab Test 08/08/19   INR 3.1*                       Passed - Recent (12 mo) or future (30 days) visit within the authorizing provider's specialty     Patient had office visit in the last 12 months or has a visit in the next 30 days with authorizing provider or within the authorizing provider's specialty.  See \"Patient Info\" tab in inbasket, or \"Choose Columns\" in Meds & Orders section of the refill encounter.              Passed - Medication is active on med list        Passed - Patient is 18 years of age or older        Passed - Patient is not pregnant        Passed - No positive pregnancy on file in past 12 months          "

## 2019-09-04 NOTE — TELEPHONE ENCOUNTER
"Requested Prescriptions   Pending Prescriptions Disp Refills     warfarin (COUMADIN) 2.5 MG tablet [Pharmacy Med Name: WARFARIN SOD 2.5MG TABLETS (GREEN)] 77 tablet 0     Sig: SEE NOTES       Vitamin K Antagonists Failed - 9/4/2019  2:51 PM        Failed - INR is within goal in the past 6 weeks     Confirm INR is within goal in the past 6 weeks.     Recent Labs   Lab Test 08/08/19   INR 3.1*                       Passed - Recent (12 mo) or future (30 days) visit within the authorizing provider's specialty     Patient had office visit in the last 12 months or has a visit in the next 30 days with authorizing provider or within the authorizing provider's specialty.  See \"Patient Info\" tab in inbasket, or \"Choose Columns\" in Meds & Orders section of the refill encounter.              Passed - Medication is active on med list        Passed - Patient is 18 years of age or older        Passed - Patient is not pregnant        Passed - No positive pregnancy on file in past 12 months          "

## 2019-09-04 NOTE — TELEPHONE ENCOUNTER
Refill sent on 9/4/19    Denied request for a 90 day supply. Pt needs to schedule with . Last visit was June 2018.

## 2019-09-22 ASSESSMENT — ENCOUNTER SYMPTOMS
PANIC: 0
EYE PAIN: 0
DEPRESSION: 1
NECK PAIN: 0
SORE THROAT: 0
HALLUCINATIONS: 0
BACK PAIN: 0
EYE IRRITATION: 0
FEVER: 0
JOINT SWELLING: 0
WEIGHT GAIN: 0
TROUBLE SWALLOWING: 1
SHORTNESS OF BREATH: 1
DECREASED CONCENTRATION: 1
ALTERED TEMPERATURE REGULATION: 0
DOUBLE VISION: 0
DECREASED APPETITE: 0
HOARSE VOICE: 0
NECK MASS: 0
COUGH: 1
FATIGUE: 1
POSTURAL DYSPNEA: 1
COUGH DISTURBING SLEEP: 1
TASTE DISTURBANCE: 0
NERVOUS/ANXIOUS: 0
DYSPNEA ON EXERTION: 0
INCREASED ENERGY: 1
MUSCLE WEAKNESS: 1
MUSCLE CRAMPS: 1
POLYPHAGIA: 0
EYE WATERING: 0
HEMOPTYSIS: 0
SPUTUM PRODUCTION: 1
SMELL DISTURBANCE: 0
NIGHT SWEATS: 0
WEIGHT LOSS: 0
INSOMNIA: 1
CHILLS: 0
SNORES LOUDLY: 0
EYE REDNESS: 0
STIFFNESS: 0
ARTHRALGIAS: 0
MYALGIAS: 1
POLYDIPSIA: 0
WHEEZING: 1

## 2019-09-25 ENCOUNTER — ANTICOAGULATION THERAPY VISIT (OUTPATIENT)
Dept: ANTICOAGULATION | Facility: CLINIC | Age: 64
End: 2019-09-25
Payer: MEDICARE

## 2019-09-25 ENCOUNTER — TELEPHONE (OUTPATIENT)
Dept: INTERNAL MEDICINE | Facility: CLINIC | Age: 64
End: 2019-09-25

## 2019-09-25 DIAGNOSIS — I48.0 PAROXYSMAL ATRIAL FIBRILLATION (H): Primary | ICD-10-CM

## 2019-09-25 LAB — INR POINT OF CARE: 2.7 (ref 0.86–1.14)

## 2019-09-25 PROCEDURE — 85610 PROTHROMBIN TIME: CPT | Mod: QW

## 2019-09-25 PROCEDURE — 36416 COLLJ CAPILLARY BLOOD SPEC: CPT

## 2019-09-25 NOTE — PROGRESS NOTES
"ANTICOAGULATION FOLLOW-UP CLINIC VISIT    Patient Name:  Amparo Mccray  Date:  2019  Contact Type:  Face to Face    SUBJECTIVE:  Patient Findings     Comments:   Pt reports that she feels better after an URI. Pt reports that she was taking aspirin for a headache. Pt does not like to take Tylenol when it was suggested. \"It does nothing for me\"  Pt denies any changes in diet,activity or medication. Pt denies any missed or extra doses as well.        Clinical Outcomes     Comments:   Pt reports that she feels better after an URI. Pt reports that she was taking aspirin for a headache. Pt does not like to take Tylenol when it was suggested. \"It does nothing for me\"  Pt denies any changes in diet,activity or medication. Pt denies any missed or extra doses as well.           OBJECTIVE    INR Protime   Date Value Ref Range Status   2019 2.7 (A) 0.86 - 1.14 Final       ASSESSMENT / PLAN  INR assessment THER    Recheck INR In: 6 WEEKS    INR Location Clinic      Anticoagulation Summary  As of 2019    INR goal:   2.0-3.0   TTR:   76.0 % (2.1 y)   INR used for dosin.7 (2019)   Warfarin maintenance plan:   1.25 mg (2.5 mg x 0.5) every Wed, Sat; 2.5 mg (2.5 mg x 1) all other days   Full warfarin instructions:   1.25 mg every Wed, Sat; 2.5 mg all other days   Weekly warfarin total:   15 mg   No change documented:   Thuy Chino RN   Plan last modified:   Willow Ellison RN (2017)   Next INR check:      Priority:   INR   Target end date:       Indications    Atrial fibrillation (H) [I48.91]  Ventricular bigeminy [I49.9]  Long term current use of anticoagulants with INR goal of 2.0-3.0 [Z79.01]             Anticoagulation Episode Summary     INR check location:       Preferred lab:       Send INR reminders to:    TRIAGE POOL    Comments:         Anticoagulation Care Providers     Provider Role Specialty Phone number    Alan Almeida MD [facility] Internal Medicine [phone]    "         See the Encounter Report to view Anticoagulation Flowsheet and Dosing Calendar (Go to Encounters tab in chart review, and find the Anticoagulation Therapy Visit)    Pt INR is 2.7 today. Pt advised to continue taking 1.25 mg on Wednesdays, Saturdays and 2.5 mg all the other days. Recheck INR in 6 weeks or sooner as needed. Pt decided that she does not want to do home INR at this time and will continue to come to [facility] for INR checks. Amparo aware if signs of clotting (pain, tenderness, swelling, color change in leg or arm, SOB) and bleeding occur (blood in stool, urine, large bruising, bleeding gums, nosebleeds) to have INR check sooner. If sx severe report to ER or concerned for stroke call 911. If general questions or concerns arise, call clinic.         Thuy Chino RN

## 2019-09-30 ENCOUNTER — OFFICE VISIT (OUTPATIENT)
Dept: NEUROLOGY | Facility: CLINIC | Age: 64
End: 2019-09-30
Payer: MEDICARE

## 2019-09-30 VITALS
RESPIRATION RATE: 18 BRPM | HEIGHT: 69 IN | WEIGHT: 235 LBS | OXYGEN SATURATION: 98 % | HEART RATE: 67 BPM | SYSTOLIC BLOOD PRESSURE: 129 MMHG | BODY MASS INDEX: 34.8 KG/M2 | DIASTOLIC BLOOD PRESSURE: 78 MMHG

## 2019-09-30 DIAGNOSIS — G70.00 MYASTHENIA GRAVIS WITHOUT EXACERBATION (H): ICD-10-CM

## 2019-09-30 RX ORDER — PYRIDOSTIGMINE BROMIDE 60 MG/1
TABLET ORAL
Qty: 90 TABLET | Refills: 3 | Status: SHIPPED | OUTPATIENT
Start: 2019-09-30 | End: 2020-05-12

## 2019-09-30 ASSESSMENT — MIFFLIN-ST. JEOR: SCORE: 1682.39

## 2019-09-30 ASSESSMENT — PAIN SCALES - GENERAL: PAINLEVEL: NO PAIN (0)

## 2019-09-30 NOTE — PROGRESS NOTES
[facility]    Amparo Mccray MRN# [id number]   Age: 63 year old YOB: 1955     Requesting physician: Alan Jackson            Assessment and Plan:   Assessment:  1. Dysphagia  Patient taking mestinon and feels it helps despite negative work up.    2. Neuropathy-idiopathic  Stable    3.  Family history of ALS     Plan:  1.  At this time the patient has a diagnosis clinically of mild myasthenia gravis that has responded to low-dose Mestinon.  She is fully aware that we have never been able to confirm this diagnosis either with repetitive stimulation on EMG nor with further antibody testing and we will continue to monitor.  As long as such a low dose of Mestinon is helping her I do not think there is any harm in continuing.  If she has worsening breathing symptoms she could always try an extra half dose of Mestinon and if it does not make a difference discontinue that process.  She can contact the office if she thinks she needs further evaluation.    The patient was reassured that we are continuing to monitor for development of findings consistent with ALS.  At this time she actually feels better and I do not think we need to do any repeat testing.    I will see her back in 6 months time.     Nikole Miner MD [facility]  Department of Neurology  Pager [phone]             History of Present Illness:   CC: Recheck    Amparo is a 63-year-old woman who had presented with dysphagia. Work up has been negative for MG except for a slightly abnormal striated muscle antibody titer 1:40. She tried Mestinon empirically due to her complaints of dysphagia that was leading to choking especially at night.  She takes a small dose of 60 mg once or twice a day and really feels that it has been beneficial to her.  When she initially started taking it she had some diarrhea but otherwise this is gone away and she really feels better for the symptoms.  She did have some fatigue complaints and  "those have not gone away but she is able to exercise a little bit more and has lost some weight.    The patient relays 2 episodes where she had some difficulty breathing both in the setting of having upper respiratory illness that was producing a lot of sputum.  She notices that symptoms were worse with laying flat and she had to prop herself up but again she does not know if that was because of weakness or more so because she was producing excess sputum.    The patient reports that since I saw her last her brother who had been diagnosed with ALS has passed away.  Certainly it raises concerns for her that she could have ALS although nothing has been found to document this during examinations and repeated evaluations.           Physical Exam:   /78 (BP Location: Right arm, Patient Position: Chair, Cuff Size: Adult Regular)   Pulse 67   Resp 18   Ht 1.74 m (5' 8.5\")   Wt 106.6 kg (235 lb)   LMP 12/31/2012   SpO2 98%   BMI 35.21 kg/m    General appearance: The patient is well-groomed and cooperative with examination.  She is in no acute distress  Neurological examination: The patient has no aphasia or dysarthria.  Attention and recall are intact.  Cranial nerves II through XII intact with no evidence of fatigable weakness in the eyelids or in the mouth.  Gag reflex was intact but certainly quite small.  No fasciculations are appreciated in the face or the tongue.  Gait examination is stable with normal strength in all 4 extremities         Data:   N/A             DATA for DOCUMENTATION:         Past Medical History:     Patient Active Problem List   Diagnosis     Essential hypertension, benign     CARDIOVASCULAR SCREENING; LDL GOAL LESS THAN 160     Atrial fibrillation (H)     Ventricular bigeminy     Chest pain     Aftercare following joint replacement     Knee joint replacement by other means     ACP (advance care planning)     Paroxysmal A-fib (H)     Morbid obesity (H)     Knee joint replacement status "     Health Care Home     Long-term (current) use of anticoagulants [Z79.01]     Long term current use of anticoagulants with INR goal of 2.0-3.0     Past Medical History:   Diagnosis Date     Arthritis      Chest pain, unspecified     neg stress test, put on PPI- no help     Essential hypertension, benign     Hypertension, Benign     Obesity (BMI 30-39.9) 7/1/13    BMI 33.6     Paroxysmal atrial fibrillation (H) 03/2010     Superficial thrombophlebitis      Thrombosis of leg     superficial not dvt's     Ventricular bigeminy     bigeminy- nrml LV fxn       Also see scanned health assessment forms.       Past Surgical History:     Past Surgical History:   Procedure Laterality Date     ABDOMEN SURGERY       ARTHROPLASTY KNEE Right 3/2/2015    Procedure: ARTHROPLASTY KNEE;  Surgeon: Cuco Baugh MD;  Location: [facility] OR     C NONSPECIFIC PROCEDURE      left hand      [facility] SURGICAL PATHOLOGY  1976    HGSIL     ESOPHAGOSCOPY, GASTROSCOPY, DUODENOSCOPY (EGD), COMBINED N/A 5/16/2019    Procedure: ESOPHAGOGASTRODUODENOSCOPY, WITH BIOPSY;  Surgeon: Liliane Montalvo MD;  Location: [facility] GI     H ABLATION FOCAL AFIB  11/02/2017     H ABLATION FOCAL AFIB  01/15/2013     ORTHOPEDIC SURGERY      l tka  and wrist      TUBAL LIGATION      Uterine fibroids removed            Social History:     Social History     Socioeconomic History     Marital status:      Spouse name: Not on file     Number of children: Not on file     Years of education: Not on file     Highest education level: Not on file   Occupational History     Not on file   Social Needs     Financial resource strain: Not on file     Food insecurity:     Worry: Not on file     Inability: Not on file     Transportation needs:     Medical: Not on file     Non-medical: Not on file   Tobacco Use     Smoking status: Never Smoker     Smokeless tobacco: Never Used   Substance and Sexual Activity     Alcohol use: Yes     Comment: once a year     Drug  use: No     Sexual activity: Yes     Partners: Male     Birth control/protection: None   Lifestyle     Physical activity:     Days per week: Not on file     Minutes per session: Not on file     Stress: Not on file   Relationships     Social connections:     Talks on phone: Not on file     Gets together: Not on file     Attends Buddhist service: Not on file     Active member of club or organization: Not on file     Attends meetings of clubs or organizations: Not on file     Relationship status: Not on file     Intimate partner violence:     Fear of current or ex partner: Not on file     Emotionally abused: Not on file     Physically abused: Not on file     Forced sexual activity: Not on file   Other Topics Concern     Parent/sibling w/ CABG, MI or angioplasty before 65F 55M? Yes   Social History Narrative     Not on file              Family History:     Family History   Problem Relation Age of Onset     C.A.D. Paternal Grandmother      Hypertension Paternal Grandmother      C.A.D. Paternal Grandfather      Connective Tissue Disorder Paternal Grandfather      Hypertension Paternal Grandfather      C.A.D. Father      Hypertension Father      Cancer Maternal Grandmother         uterine     Colon Cancer Maternal Grandmother      Cancer Maternal Grandfather         leukemia     Other Cancer Maternal Grandfather      Cancer Mother         uterine     Gastrointestinal Disease Mother         ulcers     Hypertension Mother      Other - See Comments Sister         essential tremors     C.A.D. Brother         stent- 47     Hypertension Brother      Depression Daughter      Eye Disorder Son      Hypertension Brother      Depression Daughter      Asthma Son             Medications:     Current Outpatient Medications   Medication Sig Dispense Refill     lisinopril (PRINIVIL/ZESTRIL) 10 MG tablet TAKE 1 TABLET(10 MG) BY MOUTH DAILY 90 tablet 2     metoprolol tartrate (LOPRESSOR) 50 MG tablet TAKE 1 TABLET(50 MG) BY MOUTH TWICE  DAILY 180 tablet 2     pyridostigmine (MESTINON) 60 MG tablet 1 tablet by mouth three times a day 90 tablet 3     triamterene-HCTZ (MAXZIDE-25) 37.5-25 MG tablet TAKE 1 TABLET BY MOUTH EVERY MORNING 90 tablet 1     warfarin (COUMADIN) 2.5 MG tablet Take 0.5 tablets(1.25 mg) on Wednesdays, Saturdays and 1 tablet (2.5 mg) all other days or as directed by the anticoagulation clinic 60 tablet 0              Review of Systems:   A comprehensive 10 point review of systems (constitutional, ENT, cardiac, peripheral vascular, lymphatic, respiratory, GI, , Musculoskeletal, skin, Neurological) was performed and found to be negative except as described in this note.     See intake form completed by patient  Answers for HPI/ROS submitted by the patient on 9/22/2019   General Symptoms: Yes  Skin Symptoms: No  HENT Symptoms: Yes  EYE SYMPTOMS: Yes  HEART SYMPTOMS: No  LUNG SYMPTOMS: Yes  INTESTINAL SYMPTOMS: No  URINARY SYMPTOMS: No  GYNECOLOGIC SYMPTOMS: No  BREAST SYMPTOMS: No  SKELETAL SYMPTOMS: Yes  BLOOD SYMPTOMS: No  NERVOUS SYSTEM SYMPTOMS: No  MENTAL HEALTH SYMPTOMS: Yes  Fever: No  Loss of appetite: No  Weight loss: No  Weight gain: No  Fatigue: Yes  Night sweats: No  Chills: No  Increased stress: Yes  Excessive hunger: No  Excessive thirst: No  Feeling hot or cold when others believe the temperature is normal: No  Loss of height: No  Post-operative complications: No  Surgical site pain: No  Hallucinations: No  Change in or Loss of Energy: Yes  Hyperactivity: No  Confusion: No  Ear pain: No  Ear discharge: No  Hearing loss: No  Tinnitus: No  Nosebleeds: No  Trouble swallowing: Yes   Voice hoarseness: No  Mouth sores: No  Sore throat: No  Tooth pain: No  Gum tenderness: No  Bleeding gums: No  Change in taste: No  Change in sense of smell: No  Dry mouth: No  Hearing aid used: No  Neck lump: No  Eye pain: No  Vision loss: No  Dry eyes: No  Watery eyes: No  Eye bulging: No  Double vision: No  Flashing of lights: No  Spots:  No  Floaters: Yes  Redness: No  Crossed eyes: No  Tunnel Vision: No  Yellowing of eyes: No  Eye irritation: No  Cough: Yes  Sputum or phlegm: Yes  Coughing up blood: No  Difficulty breating or shortness of breath: Yes  Snoring: No  Wheezing: Yes  Difficulty breathing on exertion: No  Nighttime Cough: Yes  Difficulty breathing when lying flat: Yes  Back pain: No  Muscle aches: Yes  Neck pain: No  Swollen joints: No  Joint pain: No  Bone pain: No  Muscle cramps: Yes  Muscle weakness: Yes  Joint stiffness: No  Bone fracture: No  Nervous or Anxious: No  Depression: Yes  Trouble sleeping: Yes  Trouble thinking or concentrating: Yes  Mood changes: No  Panic attacks: No

## 2019-09-30 NOTE — LETTER
9/30/2019       RE: Amparo Mccray  [address]  [address]  [place] [address]     Dear Colleague,    Thank you for referring your patient, Amparo Mccray, to the [facility] NEUROLOGY at [facility]. Please see a copy of my visit note below.        [facility]    Amparo Mccray MRN# [id number]   Age: 63 year old YOB: 1955     Requesting physician: Alan Jackson            Assessment and Plan:   Assessment:  1. Dysphagia  Patient taking mestinon and feels it helps despite negative work up.    2. Neuropathy-idiopathic  Stable    3.  Family history of ALS     Plan:  1.  At this time the patient has a diagnosis clinically of mild myasthenia gravis that has responded to low-dose Mestinon.  She is fully aware that we have never been able to confirm this diagnosis either with repetitive stimulation on EMG nor with further antibody testing and we will continue to monitor.  As long as such a low dose of Mestinon is helping her I do not think there is any harm in continuing.  If she has worsening breathing symptoms she could always try an extra half dose of Mestinon and if it does not make a difference discontinue that process.  She can contact the office if she thinks she needs further evaluation.    The patient was reassured that we are continuing to monitor for development of findings consistent with ALS.  At this time she actually feels better and I do not think we need to do any repeat testing.    I will see her back in 6 months time.     Nikole Miner MD [facility]  Department of Neurology  Pager [phone]             History of Present Illness:   CC: Recheck    Amparo is a 63-year-old woman who had presented with dysphagia. Work up has been negative for MG except for a slightly abnormal striated muscle antibody titer 1:40. She tried Mestinon empirically due to her complaints of dysphagia that was leading to choking especially at  "night.  She takes a small dose of 60 mg once or twice a day and really feels that it has been beneficial to her.  When she initially started taking it she had some diarrhea but otherwise this is gone away and she really feels better for the symptoms.  She did have some fatigue complaints and those have not gone away but she is able to exercise a little bit more and has lost some weight.    The patient relays 2 episodes where she had some difficulty breathing both in the setting of having upper respiratory illness that was producing a lot of sputum.  She notices that symptoms were worse with laying flat and she had to prop herself up but again she does not know if that was because of weakness or more so because she was producing excess sputum.    The patient reports that since I saw her last her brother who had been diagnosed with ALS has passed away.  Certainly it raises concerns for her that she could have ALS although nothing has been found to document this during examinations and repeated evaluations.           Physical Exam:   /78 (BP Location: Right arm, Patient Position: Chair, Cuff Size: Adult Regular)   Pulse 67   Resp 18   Ht 1.74 m (5' 8.5\")   Wt 106.6 kg (235 lb)   LMP 12/31/2012   SpO2 98%   BMI 35.21 kg/m     General appearance: The patient is well-groomed and cooperative with examination.  She is in no acute distress  Neurological examination: The patient has no aphasia or dysarthria.  Attention and recall are intact.  Cranial nerves II through XII intact with no evidence of fatigable weakness in the eyelids or in the mouth.  Gag reflex was intact but certainly quite small.  No fasciculations are appreciated in the face or the tongue.  Gait examination is stable with normal strength in all 4 extremities         Data:   N/A             DATA for DOCUMENTATION:         Past Medical History:     Patient Active Problem List   Diagnosis     Essential hypertension, benign     CARDIOVASCULAR " SCREENING; LDL GOAL LESS THAN 160     Atrial fibrillation (H)     Ventricular bigeminy     Chest pain     Aftercare following joint replacement     Knee joint replacement by other means     ACP (advance care planning)     Paroxysmal A-fib (H)     Morbid obesity (H)     Knee joint replacement status     Health Care Home     Long-term (current) use of anticoagulants [Z79.01]     Long term current use of anticoagulants with INR goal of 2.0-3.0     Past Medical History:   Diagnosis Date     Arthritis      Chest pain, unspecified     neg stress test, put on PPI- no help     Essential hypertension, benign     Hypertension, Benign     Obesity (BMI 30-39.9) 7/1/13    BMI 33.6     Paroxysmal atrial fibrillation (H) 03/2010     Superficial thrombophlebitis      Thrombosis of leg     superficial not dvt's     Ventricular bigeminy     bigeminy- nrml LV fxn       Also see scanned health assessment forms.       Past Surgical History:     Past Surgical History:   Procedure Laterality Date     ABDOMEN SURGERY       ARTHROPLASTY KNEE Right 3/2/2015    Procedure: ARTHROPLASTY KNEE;  Surgeon: Cuco Baugh MD;  Location: [facility] OR     C NONSPECIFIC PROCEDURE      left hand      [facility] AFF SURGICAL PATHOLOGY  1976    HGSIL     ESOPHAGOSCOPY, GASTROSCOPY, DUODENOSCOPY (EGD), COMBINED N/A 5/16/2019    Procedure: ESOPHAGOGASTRODUODENOSCOPY, WITH BIOPSY;  Surgeon: Liliane Montalvo MD;  Location: [facility]     H ABLATION FOCAL AFIB  11/02/2017     H ABLATION FOCAL AFIB  01/15/2013     ORTHOPEDIC SURGERY      l tka  and wrist      TUBAL LIGATION      Uterine fibroids removed            Social History:     Social History     Socioeconomic History     Marital status:      Spouse name: Not on file     Number of children: Not on file     Years of education: Not on file     Highest education level: Not on file   Occupational History     Not on file   Social Needs     Financial resource strain: Not on file     Food insecurity:      Worry: Not on file     Inability: Not on file     Transportation needs:     Medical: Not on file     Non-medical: Not on file   Tobacco Use     Smoking status: Never Smoker     Smokeless tobacco: Never Used   Substance and Sexual Activity     Alcohol use: Yes     Comment: once a year     Drug use: No     Sexual activity: Yes     Partners: Male     Birth control/protection: None   Lifestyle     Physical activity:     Days per week: Not on file     Minutes per session: Not on file     Stress: Not on file   Relationships     Social connections:     Talks on phone: Not on file     Gets together: Not on file     Attends Anabaptist service: Not on file     Active member of club or organization: Not on file     Attends meetings of clubs or organizations: Not on file     Relationship status: Not on file     Intimate partner violence:     Fear of current or ex partner: Not on file     Emotionally abused: Not on file     Physically abused: Not on file     Forced sexual activity: Not on file   Other Topics Concern     Parent/sibling w/ CABG, MI or angioplasty before 65F 55M? Yes   Social History Narrative     Not on file              Family History:     Family History   Problem Relation Age of Onset     C.A.D. Paternal Grandmother      Hypertension Paternal Grandmother      C.A.D. Paternal Grandfather      Connective Tissue Disorder Paternal Grandfather      Hypertension Paternal Grandfather      C.A.D. Father      Hypertension Father      Cancer Maternal Grandmother         uterine     Colon Cancer Maternal Grandmother      Cancer Maternal Grandfather         leukemia     Other Cancer Maternal Grandfather      Cancer Mother         uterine     Gastrointestinal Disease Mother         ulcers     Hypertension Mother      Other - See Comments Sister         essential tremors     C.A.ROBBI. Brother         stent- 47     Hypertension Brother      Depression Daughter      Eye Disorder Son      Hypertension Brother      Depression  Daughter      Asthma Son             Medications:     Current Outpatient Medications   Medication Sig Dispense Refill     lisinopril (PRINIVIL/ZESTRIL) 10 MG tablet TAKE 1 TABLET(10 MG) BY MOUTH DAILY 90 tablet 2     metoprolol tartrate (LOPRESSOR) 50 MG tablet TAKE 1 TABLET(50 MG) BY MOUTH TWICE DAILY 180 tablet 2     pyridostigmine (MESTINON) 60 MG tablet 1 tablet by mouth three times a day 90 tablet 3     triamterene-HCTZ (MAXZIDE-25) 37.5-25 MG tablet TAKE 1 TABLET BY MOUTH EVERY MORNING 90 tablet 1     warfarin (COUMADIN) 2.5 MG tablet Take 0.5 tablets(1.25 mg) on Wednesdays, Saturdays and 1 tablet (2.5 mg) all other days or as directed by the anticoagulation clinic 60 tablet 0            Review of Systems:   A comprehensive 10 point review of systems (constitutional, ENT, cardiac, peripheral vascular, lymphatic, respiratory, GI, , Musculoskeletal, skin, Neurological) was performed and found to be negative except as described in this note.     See intake form completed by patient    Again, thank you for allowing me to participate in the care of your patient.      Sincerely,    Nikole Miner MD

## 2019-10-07 ENCOUNTER — TELEPHONE (OUTPATIENT)
Dept: INTERNAL MEDICINE | Facility: CLINIC | Age: 64
End: 2019-10-07

## 2019-10-07 NOTE — TELEPHONE ENCOUNTER
Called patient to see if she will continue to go to [facility] for her INR checks. She states while the weather is good she will continue up there but once the roads are back she may return to [facility] (currently on [facility] patient list). Will not have a problem having it checked on Monday afternoons (when INR nurse is available). Patient also stated that home monitoring of INR was too expensive so she will not be pursuing that route.     Melodie PHILLIPS RN  Anticoagulation Clinic  [facility]

## 2019-11-05 DIAGNOSIS — Z79.01 LONG TERM CURRENT USE OF ANTICOAGULANTS WITH INR GOAL OF 2.0-3.0: Primary | ICD-10-CM

## 2019-11-05 DIAGNOSIS — I49.8 VENTRICULAR BIGEMINY: ICD-10-CM

## 2019-11-05 DIAGNOSIS — I48.0 PAROXYSMAL ATRIAL FIBRILLATION (H): ICD-10-CM

## 2019-11-06 ENCOUNTER — ANTICOAGULATION THERAPY VISIT (OUTPATIENT)
Dept: ANTICOAGULATION | Facility: CLINIC | Age: 64
End: 2019-11-06
Payer: MEDICARE

## 2019-11-06 DIAGNOSIS — I49.8 VENTRICULAR BIGEMINY: ICD-10-CM

## 2019-11-06 DIAGNOSIS — I48.91 ATRIAL FIBRILLATION (H): ICD-10-CM

## 2019-11-06 DIAGNOSIS — Z79.01 LONG TERM CURRENT USE OF ANTICOAGULANTS WITH INR GOAL OF 2.0-3.0: ICD-10-CM

## 2019-11-06 LAB — INR POINT OF CARE: 2.9 (ref 0.86–1.14)

## 2019-11-06 PROCEDURE — 36416 COLLJ CAPILLARY BLOOD SPEC: CPT

## 2019-11-06 PROCEDURE — 85610 PROTHROMBIN TIME: CPT | Mod: QW

## 2019-11-06 PROCEDURE — 99207 ZZC NO CHARGE NURSE ONLY: CPT

## 2019-11-06 NOTE — PROGRESS NOTES
ANTICOAGULATION FOLLOW-UP CLINIC VISIT    Patient Name:  Amparo Mccray  Date:  2019  Contact Type:  Face to Face    SUBJECTIVE:  Patient Findings     Comments:   The patient was assessed for diet, medication, and activity level changes, missed or extra doses, bruising or bleeding, with no problem findings.          Clinical Outcomes     Negatives:   Major bleeding event, Thromboembolic event, Anticoagulation-related hospital admission, Anticoagulation-related ED visit, Anticoagulation-related fatality    Comments:   The patient was assessed for diet, medication, and activity level changes, missed or extra doses, bruising or bleeding, with no problem findings.             OBJECTIVE    INR Protime   Date Value Ref Range Status   2019 2.9 (A) 0.86 - 1.14 Final       ASSESSMENT / PLAN  No question data found.  Anticoagulation Summary  As of 2019    INR goal:   2.0-3.0   TTR:   77.3 % (2.2 y)   INR used for dosin.9 (2019)   Warfarin maintenance plan:   1.25 mg (2.5 mg x 0.5) every Wed, Sat; 2.5 mg (2.5 mg x 1) all other days   Full warfarin instructions:   1.25 mg every Wed, Sat; 2.5 mg all other days   Weekly warfarin total:   15 mg   No change documented:   Liliana Valdez RN   Plan last modified:   Willow Ellison RN (2017)   Next INR check:   2019   Priority:   INR   Target end date:       Indications    Atrial fibrillation (H) [I48.91]  Ventricular bigeminy [I49.9]  Long term current use of anticoagulants with INR goal of 2.0-3.0 [Z79.01]             Anticoagulation Episode Summary     INR check location:       Preferred lab:       Send INR reminders to:   JOSEP ALEXANDER    Comments:         Anticoagulation Care Providers     Provider Role Specialty Phone number    Alan Almeida MD [facility] Internal Medicine [phone]            See the Encounter Report to view Anticoagulation Flowsheet and Dosing Calendar (Go to Encounters tab in chart review, and  find the Anticoagulation Therapy Visit)        Liliana Valdez RN

## 2019-11-07 ENCOUNTER — HEALTH MAINTENANCE LETTER (OUTPATIENT)
Age: 64
End: 2019-11-07

## 2019-11-14 DIAGNOSIS — Z79.01 LONG TERM CURRENT USE OF ANTICOAGULANT THERAPY: ICD-10-CM

## 2019-11-14 DIAGNOSIS — I49.8 VENTRICULAR BIGEMINY: ICD-10-CM

## 2019-11-14 DIAGNOSIS — I48.0 PAROXYSMAL ATRIAL FIBRILLATION (H): ICD-10-CM

## 2019-11-14 RX ORDER — WARFARIN SODIUM 2.5 MG/1
TABLET ORAL
Qty: 60 TABLET | Refills: 0 | Status: SHIPPED | OUTPATIENT
Start: 2019-11-14 | End: 2020-01-22

## 2019-11-14 NOTE — TELEPHONE ENCOUNTER
Anticoagulation Monitoring Return Date Recheck   Latest Ref Rng & Units     11/6/2019 12/18/2019      Anticoagulation Monitoring Instructions   Latest Ref Rng & Units    11/6/2019 1.25 mg every Wed, Sat; 2.5 mg all other days   Prescription approved per [facility] Refill Protocol.  Nehal Jacobsen, RN  Anticoagulation Nurse - [facility]

## 2019-12-18 ENCOUNTER — ANTICOAGULATION THERAPY VISIT (OUTPATIENT)
Dept: ANTICOAGULATION | Facility: CLINIC | Age: 64
End: 2019-12-18
Payer: MEDICARE

## 2019-12-18 DIAGNOSIS — I49.8 VENTRICULAR BIGEMINY: ICD-10-CM

## 2019-12-18 DIAGNOSIS — Z79.01 LONG TERM CURRENT USE OF ANTICOAGULANTS WITH INR GOAL OF 2.0-3.0: ICD-10-CM

## 2019-12-18 DIAGNOSIS — I48.91 ATRIAL FIBRILLATION (H): ICD-10-CM

## 2019-12-18 LAB — INR POINT OF CARE: 2.4 (ref 0.86–1.14)

## 2019-12-18 PROCEDURE — 85610 PROTHROMBIN TIME: CPT | Mod: QW

## 2019-12-18 PROCEDURE — 36416 COLLJ CAPILLARY BLOOD SPEC: CPT

## 2019-12-18 PROCEDURE — 99207 ZZC NO CHARGE NURSE ONLY: CPT

## 2019-12-18 NOTE — PROGRESS NOTES
ANTICOAGULATION FOLLOW-UP CLINIC VISIT    Patient Name:  Amparo Mccray  Date:  2019  Contact Type:  Face to Face    SUBJECTIVE:  Patient Findings     Comments:   The patient was assessed for diet, medication, and activity level changes, missed or extra doses, bruising or bleeding, with no problem findings.          Clinical Outcomes     Negatives:   Major bleeding event, Thromboembolic event, Anticoagulation-related hospital admission, Anticoagulation-related ED visit, Anticoagulation-related fatality    Comments:   The patient was assessed for diet, medication, and activity level changes, missed or extra doses, bruising or bleeding, with no problem findings.             OBJECTIVE    INR Protime   Date Value Ref Range Status   2019 2.4 (A) 0.86 - 1.14 Final       ASSESSMENT / PLAN  No question data found.  Anticoagulation Summary  As of 2019    INR goal:   2.0-3.0   TTR:   80.0 % (8.7 mo)   INR used for dosin.4 (2019)   Warfarin maintenance plan:   1.25 mg (2.5 mg x 0.5) every Wed, Sat; 2.5 mg (2.5 mg x 1) all other days   Full warfarin instructions:   1.25 mg every Wed, Sat; 2.5 mg all other days   Weekly warfarin total:   15 mg   No change documented:   Liliana Valdez RN   Plan last modified:   Willow Ellison RN (2017)   Next INR check:   2020   Priority:   Maintenance   Target end date:       Indications    Atrial fibrillation (H) [I48.91]  Ventricular bigeminy [I49.9]  Long term current use of anticoagulants with INR goal of 2.0-3.0 [Z79.01]             Anticoagulation Episode Summary     INR check location:       Preferred lab:       Send INR reminders to:   JOSEP ALEXANDER    Comments:         Anticoagulation Care Providers     Provider Role Specialty Phone number    Alan Almeida MD [facility] Internal Medicine [phone]            See the Encounter Report to view Anticoagulation Flowsheet and Dosing Calendar (Go to Encounters tab in chart  review, and find the Anticoagulation Therapy Visit)        Liliana Valdez RN

## 2020-01-22 DIAGNOSIS — I10 ESSENTIAL HYPERTENSION, BENIGN: ICD-10-CM

## 2020-01-22 DIAGNOSIS — Z79.01 LONG TERM CURRENT USE OF ANTICOAGULANT THERAPY: ICD-10-CM

## 2020-01-22 DIAGNOSIS — I48.0 PAROXYSMAL ATRIAL FIBRILLATION (H): ICD-10-CM

## 2020-01-22 DIAGNOSIS — I49.8 VENTRICULAR BIGEMINY: ICD-10-CM

## 2020-01-22 RX ORDER — WARFARIN SODIUM 2.5 MG/1
TABLET ORAL
Qty: 60 TABLET | Refills: 5 | Status: SHIPPED | OUTPATIENT
Start: 2020-01-22 | End: 2021-03-24

## 2020-01-22 RX ORDER — TRIAMTERENE/HYDROCHLOROTHIAZID 37.5-25 MG
TABLET ORAL
Qty: 90 TABLET | Refills: 0 | Status: SHIPPED | OUTPATIENT
Start: 2020-01-22 | End: 2020-04-17

## 2020-01-22 NOTE — TELEPHONE ENCOUNTER
"Requested Prescriptions   Pending Prescriptions Disp Refills     warfarin ANTICOAGULANT (COUMADIN) 2.5 MG tablet [Pharmacy Med Name: WARFARIN SOD 2.5MG TABLETS (GREEN)] 60 tablet 0     Sig: SEE NOTES       Vitamin K Antagonists Failed - 1/22/2020  8:30 AM        Failed - INR is within goal in the past 6 weeks     Confirm INR is within goal in the past 6 weeks.     Recent Labs   Lab Test 12/18/19   INR 2.4*                       Passed - Recent (12 mo) or future (30 days) visit within the authorizing provider's specialty     Patient has had an office visit with the authorizing provider or a provider within the authorizing providers department within the previous 12 mos or has a future within next 30 days. See \"Patient Info\" tab in inbasket, or \"Choose Columns\" in Meds & Orders section of the refill encounter.              Passed - Medication is active on med list        Passed - Patient is 18 years of age or older        Passed - Patient is not pregnant        Passed - No positive pregnancy on file in past 12 months        triamterene-HCTZ (MAXZIDE-25) 37.5-25 MG tablet [Pharmacy Med Name: TRIAMTERENE 37.5MG/ HCTZ 25MG TABS] 90 tablet 1     Sig: TAKE 1 TABLET BY MOUTH EVERY MORNING       Diuretics (Including Combos) Protocol Passed - 1/22/2020  8:30 AM        Passed - Blood pressure under 140/90 in past 12 months     BP Readings from Last 3 Encounters:   09/30/19 129/78   05/28/19 123/83   05/16/19 127/73                 Passed - Recent (12 mo) or future (30 days) visit within the authorizing provider's specialty     Patient has had an office visit with the authorizing provider or a provider within the authorizing providers department within the previous 12 mos or has a future within next 30 days. See \"Patient Info\" tab in inbasket, or \"Choose Columns\" in Meds & Orders section of the refill encounter.              Passed - Medication is active on med list        Passed - Patient is age 18 or older        Passed - No " active pregancy on record        Passed - Normal serum creatinine on file in past 12 months     Recent Labs   Lab Test 06/13/19  1136   CR 0.87              Passed - Normal serum potassium on file in past 12 months     Recent Labs   Lab Test 06/13/19  1136   POTASSIUM 4.2                    Passed - Normal serum sodium on file in past 12 months     Recent Labs   Lab Test 06/13/19  1136                 Passed - No positive pregnancy test in past 12 months

## 2020-01-30 ENCOUNTER — ANTICOAGULATION THERAPY VISIT (OUTPATIENT)
Dept: ANTICOAGULATION | Facility: CLINIC | Age: 65
End: 2020-01-30
Payer: MEDICARE

## 2020-01-30 LAB — INR POINT OF CARE: 2.6 (ref 0.86–1.14)

## 2020-01-30 PROCEDURE — 36416 COLLJ CAPILLARY BLOOD SPEC: CPT

## 2020-01-30 PROCEDURE — 85610 PROTHROMBIN TIME: CPT | Mod: QW

## 2020-01-30 NOTE — PROGRESS NOTES
ANTICOAGULATION FOLLOW-UP CLINIC VISIT    Patient Name:  Amparo Mccray  Date:  2020  Contact Type:  Face to Face    SUBJECTIVE:  Patient Findings     Comments:   The patient was assessed for diet, medication, and activity level changes, missed or extra doses, bruising or bleeding, with no problem findings.          Clinical Outcomes     Negatives:   Major bleeding event, Thromboembolic event, Anticoagulation-related hospital admission, Anticoagulation-related ED visit, Anticoagulation-related fatality    Comments:   The patient was assessed for diet, medication, and activity level changes, missed or extra doses, bruising or bleeding, with no problem findings.             OBJECTIVE    INR Protime   Date Value Ref Range Status   2020 2.6 (A) 0.86 - 1.14 Final       ASSESSMENT / PLAN  INR assessment THER    Recheck INR In: 6 WEEKS    INR Location Clinic      Anticoagulation Summary  As of 2020    INR goal:   2.0-3.0   TTR:   95.4 % (8.7 mo)   INR used for dosin.6 (2020)   Warfarin maintenance plan:   1.25 mg (2.5 mg x 0.5) every Wed, Sat; 2.5 mg (2.5 mg x 1) all other days   Full warfarin instructions:   1.25 mg every Wed, Sat; 2.5 mg all other days   Weekly warfarin total:   15 mg   Plan last modified:   Willow Ellison RN (2017)   Next INR check:   3/12/2020   Priority:   Maintenance   Target end date:       Indications    Atrial fibrillation (H) [I48.91]  Ventricular bigeminy [I49.9]  Long term current use of anticoagulants with INR goal of 2.0-3.0 [Z79.01]             Anticoagulation Episode Summary     INR check location:       Preferred lab:       Send INR reminders to:   JOSEP ALEXANDER    Comments:         Anticoagulation Care Providers     Provider Role Specialty Phone number    Alan Almeida MD [facility] Internal Medicine [phone]            See the Encounter Report to view Anticoagulation Flowsheet and Dosing Calendar (Go to Encounters tab in chart  review, and find the Anticoagulation Therapy Visit)    Dosage adjustment made based on physician directed care plan.    Zakia Meek RN

## 2020-02-17 ENCOUNTER — HEALTH MAINTENANCE LETTER (OUTPATIENT)
Age: 65
End: 2020-02-17

## 2020-03-12 ENCOUNTER — ANTICOAGULATION THERAPY VISIT (OUTPATIENT)
Dept: ANTICOAGULATION | Facility: CLINIC | Age: 65
End: 2020-03-12
Payer: MEDICARE

## 2020-03-12 LAB — INR POINT OF CARE: 1.8 (ref 0.86–1.14)

## 2020-03-12 PROCEDURE — 36416 COLLJ CAPILLARY BLOOD SPEC: CPT

## 2020-03-12 PROCEDURE — 85610 PROTHROMBIN TIME: CPT | Mod: QW

## 2020-03-12 NOTE — PROGRESS NOTES
ANTICOAGULATION FOLLOW-UP CLINIC VISIT    Patient Name:  Amparo Mccray  Date:  3/12/2020  Contact Type:  Face to Face    SUBJECTIVE:  Patient Findings     Positives:   Change in health (Pt had influenza B for about 1 week, feeling better 2 days ago.  ), Missed doses (Pt missed dose 5 days ago.)             OBJECTIVE    INR Protime   Date Value Ref Range Status   2020 1.8 (A) 0.86 - 1.14 Final       ASSESSMENT / PLAN  INR assessment SUB    Recheck INR In: 6 WEEKS    INR Location Clinic      Anticoagulation Summary  As of 3/12/2020    INR goal:   2.0-3.0   TTR:   91.3 % (8.7 mo)   INR used for dosin.8! (3/12/2020)   Warfarin maintenance plan:   1.25 mg (2.5 mg x 0.5) every Wed, Sat; 2.5 mg (2.5 mg x 1) all other days   Full warfarin instructions:   3/12: 5 mg; Otherwise 1.25 mg every Wed, Sat; 2.5 mg all other days   Weekly warfarin total:   15 mg   Plan last modified:   Willow Ellison RN (2017)   Next INR check:   2020   Priority:   Maintenance   Target end date:       Indications    Atrial fibrillation (H) [I48.91]  Ventricular bigeminy [I49.9]  Long term current use of anticoagulants with INR goal of 2.0-3.0 [Z79.01]             Anticoagulation Episode Summary     INR check location:       Preferred lab:       Send INR reminders to:   JOSEP ALEXANDER    Comments:         Anticoagulation Care Providers     Provider Role Specialty Phone number    Alan Almeida MD [facility] Internal Medicine [phone]            See the Encounter Report to view Anticoagulation Flowsheet and Dosing Calendar (Go to Encounters tab in chart review, and find the Anticoagulation Therapy Visit)    Dosage adjustment made based on physician directed care plan.    INR 1.8, flu like sxs for 1 week, finished 2 days ago, missed dose on weekend. Pt will take 5mg today, then resume maintenance dose and INR check 6 weeks, due to [facility] nurse aware reason for subtherapeutic is not eating normal, diarrhea,  vomiting, missed dose.     Zakia Meek RN

## 2020-03-15 DIAGNOSIS — I10 ESSENTIAL HYPERTENSION, BENIGN: ICD-10-CM

## 2020-03-15 RX ORDER — LISINOPRIL 10 MG/1
TABLET ORAL
Qty: 30 TABLET | Refills: 0 | Status: SHIPPED | OUTPATIENT
Start: 2020-03-15 | End: 2020-04-16

## 2020-03-15 NOTE — TELEPHONE ENCOUNTER
"Requested Prescriptions   Pending Prescriptions Disp Refills     lisinopril (ZESTRIL) 10 MG tablet [Pharmacy Med Name: LISINOPRIL 10MG TABLETS] 90 tablet 2     Sig: TAKE 1 TABLET(10 MG) BY MOUTH DAILY   Last Written Prescription Date:  6/19/2019  Last Fill Quantity: 90,  # refills: 2   Last Office Visit: 2/28/2019   Future Office Visit:         ACE Inhibitors (Including Combos) Protocol Failed - 3/15/2020  9:55 AM        Failed - Recent (12 mo) or future (30 days) visit within the authorizing provider's specialty     Patient has had an office visit with the authorizing provider or a provider within the authorizing providers department within the previous 12 mos or has a future within next 30 days. See \"Patient Info\" tab in inbasket, or \"Choose Columns\" in Meds & Orders section of the refill encounter.              Passed - Blood pressure under 140/90 in past 12 months     BP Readings from Last 3 Encounters:   09/30/19 129/78   05/28/19 123/83   05/16/19 127/73                 Passed - Medication is active on med list        Passed - Patient is age 18 or older        Passed - No active pregnancy on record        Passed - Normal serum creatinine on file in past 12 months     Recent Labs   Lab Test 06/13/19  1136  11/01/17  1059   CR 0.87   < >  --    CREAT  --   --  0.9    < > = values in this interval not displayed.       Ok to refill medication if creatinine is low          Passed - Normal serum potassium on file in past 12 months     Recent Labs   Lab Test 06/13/19  1136   POTASSIUM 4.2             Passed - No positive pregnancy test within past 12 months             "

## 2020-04-02 DIAGNOSIS — I10 ESSENTIAL HYPERTENSION, BENIGN: ICD-10-CM

## 2020-04-02 RX ORDER — METOPROLOL TARTRATE 50 MG
TABLET ORAL
Qty: 180 TABLET | Refills: 0 | Status: SHIPPED | OUTPATIENT
Start: 2020-04-02 | End: 2020-07-01

## 2020-04-02 NOTE — TELEPHONE ENCOUNTER
"Requested Prescriptions   Pending Prescriptions Disp Refills     metoprolol tartrate (LOPRESSOR) 50 MG tablet [Pharmacy Med Name: METOPROLOL TARTRATE 50MG TABLETS] 180 tablet 2     Sig: TAKE 1 TABLET(50 MG) BY MOUTH TWICE DAILY       Beta-Blockers Protocol Passed - 4/2/2020  4:01 PM        Passed - Blood pressure under 140/90 in past 12 months     BP Readings from Last 3 Encounters:   09/30/19 129/78   05/28/19 123/83   05/16/19 127/73                 Passed - Patient is age 6 or older        Passed - Recent (12 mo) or future (30 days) visit within the authorizing provider's specialty     Patient has had an office visit with the authorizing provider or a provider within the authorizing providers department within the previous 12 mos or has a future within next 30 days. See \"Patient Info\" tab in inbasket, or \"Choose Columns\" in Meds & Orders section of the refill encounter.              Passed - Medication is active on med list             "

## 2020-04-16 DIAGNOSIS — I10 ESSENTIAL HYPERTENSION, BENIGN: ICD-10-CM

## 2020-04-16 RX ORDER — LISINOPRIL 10 MG/1
10 TABLET ORAL DAILY
Qty: 30 TABLET | Refills: 0 | Status: SHIPPED | OUTPATIENT
Start: 2020-04-16 | End: 2020-04-17

## 2020-04-16 NOTE — TELEPHONE ENCOUNTER
"Requested Prescriptions   Pending Prescriptions Disp Refills     lisinopril (ZESTRIL) 10 MG tablet 30 tablet 0     Sig: Take 1 tablet (10 mg) by mouth daily       ACE Inhibitors (Including Combos) Protocol Passed - 4/16/2020  8:49 AM        Passed - Blood pressure under 140/90 in past 12 months     BP Readings from Last 3 Encounters:   09/30/19 129/78   05/28/19 123/83   05/16/19 127/73                 Passed - Recent (12 mo) or future (30 days) visit within the authorizing provider's specialty     Patient has had an office visit with the authorizing provider or a provider within the authorizing providers department within the previous 12 mos or has a future within next 30 days. See \"Patient Info\" tab in inbasket, or \"Choose Columns\" in Meds & Orders section of the refill encounter.              Passed - Medication is active on med list        Passed - Patient is age 18 or older        Passed - No active pregnancy on record        Passed - Normal serum creatinine on file in past 12 months     Recent Labs   Lab Test 06/13/19  1136  11/01/17  1059   CR 0.87   < >  --    CREAT  --   --  0.9    < > = values in this interval not displayed.       Ok to refill medication if creatinine is low          Passed - Normal serum potassium on file in past 12 months     Recent Labs   Lab Test 06/13/19  1136   POTASSIUM 4.2             Passed - No positive pregnancy test within past 12 months             "

## 2020-04-17 ENCOUNTER — VIRTUAL VISIT (OUTPATIENT)
Dept: INTERNAL MEDICINE | Facility: CLINIC | Age: 65
End: 2020-04-17
Payer: MEDICARE

## 2020-04-17 VITALS — BODY MASS INDEX: 35.55 KG/M2 | WEIGHT: 240 LBS | HEIGHT: 69 IN

## 2020-04-17 DIAGNOSIS — I48.91 ATRIAL FIBRILLATION, UNSPECIFIED TYPE (H): ICD-10-CM

## 2020-04-17 DIAGNOSIS — Z13.220 LIPID SCREENING: ICD-10-CM

## 2020-04-17 DIAGNOSIS — J22 LOWER RESPIRATORY INFECTION: ICD-10-CM

## 2020-04-17 DIAGNOSIS — G70.00 MYASTHENIA GRAVIS WITHOUT EXACERBATION (H): ICD-10-CM

## 2020-04-17 DIAGNOSIS — I10 ESSENTIAL HYPERTENSION, BENIGN: Primary | ICD-10-CM

## 2020-04-17 PROCEDURE — 99214 OFFICE O/P EST MOD 30 MIN: CPT | Mod: 95 | Performed by: INTERNAL MEDICINE

## 2020-04-17 RX ORDER — TRIAMTERENE/HYDROCHLOROTHIAZID 37.5-25 MG
1 TABLET ORAL EVERY MORNING
Qty: 90 TABLET | Refills: 1 | Status: SHIPPED | OUTPATIENT
Start: 2020-04-17 | End: 2020-11-03

## 2020-04-17 RX ORDER — LISINOPRIL 10 MG/1
10 TABLET ORAL DAILY
Qty: 90 TABLET | Refills: 1 | Status: SHIPPED | OUTPATIENT
Start: 2020-04-17 | End: 2020-11-17

## 2020-04-17 ASSESSMENT — ENCOUNTER SYMPTOMS
MUSCULOSKELETAL NEGATIVE: 1
GASTROINTESTINAL NEGATIVE: 1
CARDIOVASCULAR NEGATIVE: 1
RESPIRATORY NEGATIVE: 1
EYES NEGATIVE: 1
CONSTITUTIONAL NEGATIVE: 1

## 2020-04-17 ASSESSMENT — MIFFLIN-ST. JEOR: SCORE: 1700.07

## 2020-04-17 NOTE — PROGRESS NOTES
"Amparo Mccray is a 64 year old female who is being evaluated via a billable video visit.      The patient has been notified of following:     \"This video visit will be conducted via a call between you and your physician/provider. We have found that certain health care needs can be provided without the need for an in-person physical exam.  This service lets us provide the care you need with a video conversation.  If a prescription is necessary we can send it directly to your pharmacy.  If lab work is needed we can place an order for that and you can then stop by our lab to have the test done at a later time.    Video visits are billed at different rates depending on your insurance coverage.  Please reach out to your insurance provider with any questions.    If during the course of the call the physician/provider feels a video visit is not appropriate, you will not be charged for this service.\"    Patient has given verbal consent for Video visit? Yes    [facility] you [facility]    Patient would like the video invitation sent by: Text to cell phone: [phone]      Subjective     Amparo Mccray is a 64 year old female who presents to clinic today for the following health issues:    Hypertension Follow-up      Do you check your blood pressure regularly outside of the clinic? No     Are you following a low salt diet? Yes    Are your blood pressures ever more than 140 on the top number (systolic) OR more   than 90 on the bottom number (diastolic), for example 140/90? n/a      How many servings of fruits and vegetables do you eat daily?  2-3    On average, how many sweetened beverages do you drink each day (Examples: soda, juice, sweet tea, etc.  Do NOT count diet or artificially sweetened beverages)?   0    How many days per week do you miss taking your medication? 0      Atrial Fib  Hasn't noted any tachycardia or palpitations.  Continues w/coumadin w/o any bleeding    Influenza like " "illness 3/6/20  - seen in [place] early march for flu like sx.  Returned from [place] a few days prior. After her illness her son-in-law also feel ill.   Both have recovered at this time. Her symptoms persisted for 12 d.          Video Start Time: 149            Reviewed and updated as needed this visit by Provider         Review of Systems   Constitutional: Negative.    HENT: Negative.    Eyes: Negative.    Respiratory: Negative.    Cardiovascular: Negative.    Gastrointestinal: Negative.    Musculoskeletal: Negative.           Objective    Ht 1.74 m (5' 8.5\")   Wt 108.9 kg (240 lb)   LMP 12/31/2012   BMI 35.96 kg/m    Estimated body mass index is 35.96 kg/m  as calculated from the following:    Height as of this encounter: 1.74 m (5' 8.5\").    Weight as of this encounter: 108.9 kg (240 lb).   Recent BP reviewed   BP Readings from Last 3 Encounters:   09/30/19 129/78   05/28/19 123/83   05/16/19 127/73      Physical Exam   GENERAL: alert, no distress and over weight  EYES: Eyes grossly normal to inspection  RESP: normal breath sounds, talks without any dyspnea, no wheezing.     Labs reviewed in Epic        Assessment & Plan     1. Essential hypertension, benign  Based on outside # seems to relatively well controlled  - lisinopril (ZESTRIL) 10 MG tablet; Take 1 tablet (10 mg) by mouth daily  Dispense: 90 tablet; Refill: 1  - triamterene-HCTZ (MAXZIDE-25) 37.5-25 MG tablet; Take 1 tablet by mouth every morning  Dispense: 90 tablet; Refill: 1    2. Atrial fibrillation, unspecified type (H)  Reports minimal events- has [employer] monitor at home.   Cont anticoag and b-blocker    3. Myasthenia gravis without exacerbation (H)  She reports some improvement w/mestinon   F/u per neurology    4. Hx of influenza like illess 3/2020  Very consistent with COVID. Would be interesting to get serology when more avail       BMI:   Estimated body mass index is 35.96 kg/m  as calculated from the following:    Height as of this " "encounter: 1.74 m (5' 8.5\").    Weight as of this encounter: 108.9 kg (240 lb).     Work on weight loss  Regular exercise    No follow-ups on file.    Alan Almeida MD  [facility]      Video-Visit Details    Type of service:  Video Visit    Video End Time (time video stopped): 210    Originating Location (pt. Location): Home    Distant Location (provider location):  [facility]     Mode of Communication:  Video Conference via [employer] [facility]    No follow-ups on file.       Alan Almeida MD       "

## 2020-04-21 ENCOUNTER — ANTICOAGULATION THERAPY VISIT (OUTPATIENT)
Dept: NURSING | Facility: CLINIC | Age: 65
End: 2020-04-21

## 2020-04-21 DIAGNOSIS — Z13.220 LIPID SCREENING: ICD-10-CM

## 2020-04-21 DIAGNOSIS — I10 ESSENTIAL HYPERTENSION, BENIGN: ICD-10-CM

## 2020-04-21 DIAGNOSIS — Z79.01 LONG TERM CURRENT USE OF ANTICOAGULANTS WITH INR GOAL OF 2.0-3.0: ICD-10-CM

## 2020-04-21 DIAGNOSIS — I48.0 PAROXYSMAL A-FIB (H): ICD-10-CM

## 2020-04-21 DIAGNOSIS — I48.91 ATRIAL FIBRILLATION (H): ICD-10-CM

## 2020-04-21 DIAGNOSIS — I49.8 VENTRICULAR BIGEMINY: ICD-10-CM

## 2020-04-21 LAB
CAPILLARY BLOOD COLLECTION: NORMAL
INR PPP: 3 (ref 0.86–1.14)

## 2020-04-21 PROCEDURE — 36416 COLLJ CAPILLARY BLOOD SPEC: CPT | Performed by: INTERNAL MEDICINE

## 2020-04-21 PROCEDURE — 85610 PROTHROMBIN TIME: CPT | Performed by: INTERNAL MEDICINE

## 2020-04-21 NOTE — PROGRESS NOTES
Pt denies any changes in diet,health or activity. Amparo is aware if signs of clotting (pain, tenderness, swelling, color change in leg or arm, SOB) and bleeding occur (blood in stool, urine, large bruising, bleeding gums, nosebleeds) to have INR check sooner. If sx severe report to ER or concerned for stroke call 911. If general questions or concerns arise, call clinic.    Liliana Valdez RN  [facility]

## 2020-05-12 ENCOUNTER — VIRTUAL VISIT (OUTPATIENT)
Dept: NEUROLOGY | Facility: CLINIC | Age: 65
End: 2020-05-12
Payer: MEDICARE

## 2020-05-12 DIAGNOSIS — G70.00 MYASTHENIA GRAVIS WITHOUT EXACERBATION (H): ICD-10-CM

## 2020-05-12 DIAGNOSIS — M54.16 LUMBAR RADICULOPATHY: ICD-10-CM

## 2020-05-12 DIAGNOSIS — R41.3 MEMORY LOSS: Primary | ICD-10-CM

## 2020-05-12 RX ORDER — PYRIDOSTIGMINE BROMIDE 60 MG/1
TABLET ORAL
Qty: 90 TABLET | Refills: 3 | Status: SHIPPED | OUTPATIENT
Start: 2020-05-12 | End: 2021-04-27

## 2020-05-12 NOTE — PROGRESS NOTES
"Amparo Mccray is a 64 year old female who is being evaluated via a billable video visit.      The patient has been notified of following:     \"This video visit will be conducted via a call between you and your physician/provider. We have found that certain health care needs can be provided without the need for an in-person physical exam.  This service lets us provide the care you need with a video conversation.  If a prescription is necessary we can send it directly to your pharmacy.  If lab work is needed we can place an order for that and you can then stop by our lab to have the test done at a later time.    Video visits are billed at different rates depending on your insurance coverage.  Please reach out to your insurance provider with any questions.    If during the course of the call the physician/provider feels a video visit is not appropriate, you will not be charged for this service.\"    Patient has given verbal consent for Video visit? Yes    How would you like to obtain your AVS? [facility]    Patient would like the video invitation sent by: [phone]    Will anyone else be joining your video visit? No      Video-Visit Details    Type of service:  Video Visit    Video Start Time: 1400  Video End Time: 2:20 PM    Originating Location (pt. Location): Home    Distant Location (provider location):  [facility]UR[facility]     Platform used for Video Visit: Charly Terrazas, MAYANK    Reason for visit: Recheck    HPI: Amparo is a 64-year-old woman who had presented with a wide constellation of symptoms including cramping, dysphagia and dysarthria that she felt was worse towards the end of the day with some fatigability and also concerns for cognitive changes.  After thorough work-up no clear conclusive diagnosis was made.  Due to the fatigability of her symptoms she was empirically tried on Mestinon and does feel as though it is helped her symptoms.  She currently takes 60 mg once a day sometimes taking an " extra 30 mg.  She has had no more choking episodes.    Since I last saw her she does think her cognition has changed for the worse.  She relates driving down to [place] with her sister and getting a little disoriented about the order of the towns on the way to [place].  She has made this drive 100s of times and is never had this problem before.  In addition she thinks that sometimes when she talks she has a hard time finding the correct word.  She did have neuropsychological testing with Dr. Oswald last year with a few minor abnormalities present.  It was recommended to repeat testing in 1 year if further concerns arose.  She would like to do this.    In addition to the symptoms the patient reports recently having 3 episodes of the painful electrical zinging sensation going down her left leg.  She describes the symptoms lasting for a few minutes but been really intense pain.  One episode occurred when she was closing a patio door another while she was seated in another one while she was walking.  It starts around the waist and then goes down the back of the left leg.  No weakness or numbness associated with this in between.  She does have a diagnosis of spinal stenosis.    Finally the patient reports some twitching of the muscles around her eye and going into the face last week that was noticed by her sister as they were talking via video chat.  She reports that has resolved at this time and she cannot think of a trigger.    On exam via video today the patient is awake and alert.  She is oriented.  There is no clear aphasia or dysarthria.  There is no evidence of any ptosis.  There is some facial asymmetry while the patient is talking but on examination everything normalizes and there is no evidence of any facial droop.    Impression/recommendations:  1.  Dysphagia and dysarthria  Continue Mestinon for now and continue to monitor.  It should be noted ALS runs in the family.  Repeat EMG sometime this summer may  be appropriate    2.  Cognitive changes  Repeat neuropsychological testing    3.  Left radicular leg pain  MRI of the lumbar spine post COVID.  She will call me if she develops weakness or has increased frequency of the symptoms.    Nikole Miner MD [facility]  Department of Neurology  Pager [phone]

## 2020-06-02 ENCOUNTER — ANTICOAGULATION THERAPY VISIT (OUTPATIENT)
Dept: ANTICOAGULATION | Facility: CLINIC | Age: 65
End: 2020-06-02

## 2020-06-02 ENCOUNTER — MYC MEDICAL ADVICE (OUTPATIENT)
Dept: INTERNAL MEDICINE | Facility: CLINIC | Age: 65
End: 2020-06-02

## 2020-06-02 DIAGNOSIS — I49.8 VENTRICULAR BIGEMINY: ICD-10-CM

## 2020-06-02 DIAGNOSIS — Z79.01 LONG TERM CURRENT USE OF ANTICOAGULANTS WITH INR GOAL OF 2.0-3.0: ICD-10-CM

## 2020-06-02 DIAGNOSIS — I48.91 ATRIAL FIBRILLATION (H): ICD-10-CM

## 2020-06-02 DIAGNOSIS — Z13.220 LIPID SCREENING: ICD-10-CM

## 2020-06-02 DIAGNOSIS — I48.0 PAROXYSMAL A-FIB (H): ICD-10-CM

## 2020-06-02 DIAGNOSIS — I10 ESSENTIAL HYPERTENSION, BENIGN: ICD-10-CM

## 2020-06-02 LAB
ANION GAP SERPL CALCULATED.3IONS-SCNC: 4 MMOL/L (ref 3–14)
BUN SERPL-MCNC: 20 MG/DL (ref 7–30)
CALCIUM SERPL-MCNC: 8.7 MG/DL (ref 8.5–10.1)
CHLORIDE SERPL-SCNC: 106 MMOL/L (ref 94–109)
CHOLEST SERPL-MCNC: 260 MG/DL
CO2 SERPL-SCNC: 28 MMOL/L (ref 20–32)
CREAT SERPL-MCNC: 0.84 MG/DL (ref 0.52–1.04)
GFR SERPL CREATININE-BSD FRML MDRD: 74 ML/MIN/{1.73_M2}
GLUCOSE SERPL-MCNC: 105 MG/DL (ref 70–99)
HDLC SERPL-MCNC: 47 MG/DL
INR PPP: 2.7 (ref 0.86–1.14)
LDLC SERPL CALC-MCNC: 178 MG/DL
NONHDLC SERPL-MCNC: 213 MG/DL
POTASSIUM SERPL-SCNC: 3.8 MMOL/L (ref 3.4–5.3)
SODIUM SERPL-SCNC: 138 MMOL/L (ref 133–144)
TRIGL SERPL-MCNC: 176 MG/DL

## 2020-06-02 PROCEDURE — 85610 PROTHROMBIN TIME: CPT | Performed by: INTERNAL MEDICINE

## 2020-06-02 PROCEDURE — 80048 BASIC METABOLIC PNL TOTAL CA: CPT | Performed by: INTERNAL MEDICINE

## 2020-06-02 PROCEDURE — 36415 COLL VENOUS BLD VENIPUNCTURE: CPT | Performed by: INTERNAL MEDICINE

## 2020-06-02 PROCEDURE — 80061 LIPID PANEL: CPT | Performed by: INTERNAL MEDICINE

## 2020-06-02 NOTE — PROGRESS NOTES
Patient to take 1.25mg Wed/Sat and 2.5 MG AOD and recheck INR in 6 weeks.  Detailed message left on VM and next appointment scheduled for 7/14 at 10:30 am    Liliana Valdez RN  [facility]

## 2020-07-01 DIAGNOSIS — I10 ESSENTIAL HYPERTENSION, BENIGN: ICD-10-CM

## 2020-07-01 RX ORDER — METOPROLOL TARTRATE 50 MG
TABLET ORAL
Qty: 180 TABLET | Refills: 0 | Status: SHIPPED | OUTPATIENT
Start: 2020-07-01 | End: 2020-09-28

## 2020-07-01 NOTE — TELEPHONE ENCOUNTER
Prescription approved per [facility] Refill Protocol.    Yenny GONSALEZN, RN, PHN

## 2020-07-02 ENCOUNTER — VIRTUAL VISIT (OUTPATIENT)
Dept: NEUROPSYCHOLOGY | Facility: CLINIC | Age: 65
End: 2020-07-02
Attending: PSYCHIATRY & NEUROLOGY
Payer: MEDICARE

## 2020-07-02 DIAGNOSIS — R41.3 MEMORY LOSS: Primary | ICD-10-CM

## 2020-07-02 NOTE — PROGRESS NOTES
"Amparo Mccray is a 64 year old female who is being evaluated via a billable video visit.      The patient has been notified of following:     \"This video visit will be conducted via a call between you and your provider. We have found that certain health care needs can be provided without the need for an in-person physical exam.  This service lets us provide the care you need with a video conversation and remote testing.  If additional testing is needed, it will be scheduled in the clinic at a later time.    Video visits are billed at different rates depending on your insurance coverage.  Please reach out to your insurance provider with any questions.    If during the course of the call the provider feels a video visit is not appropriate, you will not be charged for this service.\"    Patient has given verbal consent for Video visit? Yes  How would you like to obtain your AVS? Altagracia  Patient would like the video invitation sent by: Send to e-mail at: [email or web address]  Will anyone else be joining your video visit? No    NEUROPSYCHOLOGICAL EVALUATION    RELEVANT HISTORY AND REASON FOR REFERRAL    Amparo Mccray is a 64 year old, right-handed  with 12 years of formal education. Information was obtained via video interview with the patient and review of her medical records, including a prior neuropsychological evaluation. Records indicate that Ms. Mccray has been followed by neurologists for numerous complaints since 2015, including myalgias, cramping, dysphagia, dysarthria, and cognitive problems. Her most recent Neurology visit was on 5/12/2020, where it was noted that after thorough work-up, no clear, conclusive diagnosis has been made. Mestinon has been prescribed due to the fatigability of her symptoms, which seems to have helped. She has reported declining cognition. She recently drove down to [place] with her sister, and was disoriented about the order of the towns despite having made the " drive hundreds of times before. She has had some word finding difficulties. She was referred for neuropsychological evaluation by Nikole Miner M.D., for further characterization of any cognitive difficulties and to evaluate mood, and to make comparisons to her previous evaluation.    Ms. Mccray first underwent a neuropsychological evaluation under my direction on 5/10/2019. Please refer to the report from that date for full details regarding her history and the findings of her evaluation. Briefly, results indicated performance that fell almost entirely within normal limits across cognitive domains. Phonemic fluency was mildly slowed, although there was no other indication of executive dysfunction. She had some difficulty learning new information on a list learning task, likely reflecting subtle variability in attention, but memory fell within normal limits. Personality assessment also fell within normal limits.    The clinic was closed at the time of this evaluation due to concerns regarding COVID-19. A limited evaluation was administered remotely, using video technology. Initially, a video connection was established via [employer], but it was inconsistent so the platform was switched to Zoom.    CLINICAL INTERVIEW FINDINGS    Upon interview, Ms. Mccray stated that she is most bothered by difficulty with word finding.  She may look at something and know what it is or what she is trying to say, but she must describe it since she cannot think of the specific word.  This can happen with adjectives as well.  It may come back to her later, but it has become increasingly frustrating for her.  She also makes mistakes such as pressing the third floor button in an elevator when she is already on the third floor, and waiting for a minute before realizing what she had done.  She tells people that they have to have her write down information or repeat it, or she will forget it.  She is particularly bothered by difficulty with  attention and concentration.  She noted that she was an avid reader, and would read 20 or more books each summer.  Now she cannot focus well enough to even read an article and she has not been getting any enjoyment out of reading so she no longer does so.  She denied difficulty with decision-making.    Ms. Mccray stated that her adult son is her roommate.  She has been managing her own finances and driving, apparently without difficulty.  She manages her own medications, occasionally forgetting if she took them.  She has developed a system to help remind herself.  She cooks without difficulty, although she frequently forgets to turn off the stove.  She handles her personal cares independently.    When asked to describe her mood, Ms. Mccray stated that it has been fine.  She worked a part-time job up until March, at the beginning of the pandemic, but stopping work did not affect her life much.  She does not mind the solitude and she remains in close contact with her family.  She has not felt depressed or anxious and described herself as generally upbeat.  She is no more irritable than normal and has not been crying any more than normal.  She stated that her sleep goes in streaks.  She tries to sleep for 7 to 8 hours a night because poor sleep is a trigger for her a fib.  Sometimes, however, she cannot fall asleep until 3:00 a.m., so she sleeps until 10:00 a.m.  She does not nap because she wants to be able to sleep at night.  Her appetite has been good.  She has gained weight since March, and noted that the gym is closed in her building.  Her energy level is low, which she attributed to not doing much.  Her interest level is good, however.  She denied suicidal ideation.  She denied visual or auditory hallucinations.  She drinks alcohol quite rarely, perhaps once a year.  She denied illicit drug use or tobacco use.    As she had last year, Ms. Mccray continues to experience some difficulty with swallowing.  She is  prescribed a medication for that which she takes once a day, and she notices that by the evening her swallowing is much worse, so she thinks that the medications are quite helpful during the day.  She also experiences muscle spasms in her feet, legs, back, stomach, arms, and neck.  Additionally, she experiences the sensation of electric shock going down her back and her leg, and she has some numbness on the left side and suspects that she has a pinched nerve.  Her balance has not been particularly good and she has a harder time walking on an uneven surface.  She feels drunk when doing so.  She has not been falling, however.  She continues to experience tremor and has been diagnosed with essential tremor.  She has not been bothered by headaches.  She noted that she was quite sick with flu-like symptoms at the beginning of March after traveling to [place], but COVID-19 testing was not available at that time.    PAST MEDICAL HISTORY: Medical records indicate a history of atrial fibrillation, hypertension, knee joint replacement, obesity, paroxysmal A. fib, essential tremor.    CURRENT MEDICATIONS:  Include lisinopril, metoprolol, pyridostigmine, triamterene-HCTZ.    FAMILY MEDICAL HISTORY:  Significant for a brother with ALS diagnosed at 64, a maternal aunt with Parkinson s disease, a mother with memory problems, two sisters with depression and anxiety, one with panic disorder, and a maternal half sister with essential tremor.    BEHAVIORAL OBSERVATIONS    As noted, behavioral observations are limited as the evaluation took place over a video platform. There was some difficulty establishing the initial video connection, but she helped to troubleshoot problems, and once the video was established, no technical issues were noted. During the evaluation, she was pleasant, cooperative, and seemed to understand the instructions. She was alert and oriented to person, place, and time. No abnormal movements were observed in  this limited setting. Mood was euthymic. Speech was fluent, with normal articulation, volume, and rate. Spontaneous conversation was present and appropriate. Internal performance validity measures fell within normal limits. Results are interpreted with caution given the deviation from standard protocols, but are believed to be a reasonable estimate of her functioning.    MEASURES ADMINISTERED    The following measures were administered by a trained psychometrist, remotely via video technology, under the direct supervision of a licensed psychologist:    Orientation; Subtests of the Wechsler Adult Intelligence Scale - 4; Reading subtest of the Wide Range Achievement Test - 4; Story Memory of the Repeatable Battery for the Assessment of Neuropsychological Status (RBANS); Bolton Verbal Learning Test - Revised, Form 1; [place] Naming Test, 15 Item; Controlled Oral Word Association Test; Semantic Fluency; Clock Drawing; Oral Trail Making Test; Test of Sustained Attention and Tracking; BDAE Complex Ideational Material; ILS Health and Safety Questions; Geriatric Depression Scale (GDS); WILBER-7.    RESULTS AND INTERPRETATION    Verbal intellectual functioning was estimated to fall at the upper end of the average range, consistent with premorbid estimates based on single word reading abilities, administered at her prior evaluation.    Confrontation naming was high average for her age. Expressive vocabulary was high average. Verbal abstract reasoning was average. Comprehension of complex ideational material fell below expected, possibly reflecting attentional inefficiencies. Letter fluency was low average for her age, and generative naming to category was high average.     Attention was variable. Attention span was low average for her age. Divided attention was high average. Performance on a measure of sustained attention and tracking fell within normal limits.     Basic visual perception, including matching lines and angles,  was average for her age. Construction of a clock fell within normal limits. Nonverbal deductive reasoning was high average.    Immediate recall of verbal narrative material was average, with average recall following a brief delay. On a multiple trial list learning task, immediate recall was borderline impaired, with low average retention following a 25 minute delay. Recognition memory on this task was average.    Responses to various health and safety scenarios fell somewhat below expected, due to lack detail rather than incorrect responses.    On the GDS, a self-report questionnaire, she endorsed minimal depressive symptomatology. She endorsed minimal anxiety on the WILBER-7.    IMPRESSIONS AND RECOMMENDATIONS    Current results are interpreted with caution. Due to concerns regarding COVID-19, the evaluation was administered remotely using a video platform. Every attempt was made to administer measures in a standardized manner; however, differences in administration from the standardization samples may affect interpretation.    Results indicate variable attention, ranging from low average to above average. Attentional inefficiencies also appeared to underlie her relative weakness in learning new information. She retained information well once she had learned it. Language, visual processing, and executive functioning fell within normal limits. She did not endorse significant symptoms of depression or anxiety.    Limited comparisons can be made to her 5/10/2019 neuropsychological evaluation. While a similar pattern was observed on fluency tasks, letter fluency has improved somewhat. Sustained attention has marginally improved. Performance otherwise remains stable across cognitive domains.    This pattern of performance does not reflect dementia at this time, nor is it suggestive of progressive cognitive decline. The primary finding is one of variability in attention, similar to one year ago. Her sleep varies, and it is  possible that non-restorative sleep contributes to this variability. She may benefit from further discussion with her physicians regarding sleep.    In terms of daily functioning, Ms. Mccray is not experiencing cognitive difficulties that might interfere with her ability to actively participate in treatment, or to manage her instrumental activities of daily living independently. Given her variable attention, she may find it helpful when working on a task to work in an environment that is relatively free from distractions, such as noises or other interruptions. It may be helpful to take frequent, brief breaks when working on larger projects. She indicated that she has forgotten to turn off the stove, so she may find it helpful to set a timer when she turns on the stove, to remind herself to turn it off once she is finished with cooking.    Results may serve as an updated baseline of her neurocognitive functioning, and the evaluation may be repeated in the future for comparison, should a change in mental status occur.    Angela Ortiz, Ph.D., ABPP  Licensed Psychologist, LP [id number]  Board Certified in Clinical Neuropsychology    Time spent: One unit professional time, including interview  (CPT 43283). 60 minutes (1 unit) neuropsychological testing evaluation by licensed and board-certified neuropsychologist, including integration of patient data, interpretation of standardized test results and clinical data, clinical decision-making, treatment planning, report, and interactive feedback to the patient, first hour (CPT 22462). 95 minutes (2 units) of neuropsychological testing evaluation by licensed and board-certified neuropsychologist, including integration of patient data, interpretation of standardized test results and clinical data, clinical decision-making, treatment planning, report, and interactive feedback to the patient, subsequent hours (CPT 21324). 30 minutes of neuropsychological test administration and  scoring by technician, first 30 minutes (CPT 77336). 121 additional minutes (4 units) neuropsychological test administration and scoring by technician, subsequent 30 minutes (CPT 17839). ICD-10 Diagnoses: R41.3    Due to circumstances that prevent in-person clinical visits, this assessment was conducted using telehealth methods (including remote audiovisual presentation of test instructions and test stimuli). The standard administration of these procedures involves in-person, face-to-face methods. The impact of applying non-standard administration methods has been evaluated only in part by scientific research. While every effort was made to simulate standard assessment practices, the diagnostic conclusions and recommendations for treatment provided in this report are being advanced with these reservations.      Video-Visit Details    Type of service:  Video Visit    Video Start Time: 7:58 a.m.  Video End Time: 8:30 a.m.    Psychometrist time:  Prep  Rooming & Test Administration    Scoring/Monitoring       Start: 7:20 Start: 7:50 Start: 8:32 Start: 0:00 Start: 10:05 Start: 0:00 Start: 0:00   Stop: 7:30 Stop: 7:53 Stop: 10:00 Stop: 0:00 Stop: 10:55 Stop: 0:00 Stop: 0:00   Total: 10m Total: 3m Total: 88m Total: 0m Total: 50m Total: 0m Total: 0m   Total Administration Time 91m  Total Scoring/Monitor Time 50m  Total Time Overall 151m      Originating Location (pt. Location): Home    Distant Location (provider location):  [facility] NEUROPSYCHOLOGY     Platform used for Video Visit: Zoom  (started on [employer])    Angela Ortiz, PhD LP

## 2020-07-02 NOTE — LETTER
"7/2/2020       RE: Amparo Mccray  [address]  [address]  [facility] [address]     Dear Colleague,    Thank you for referring your patient, Amparo Mccray, to the [facility] NEUROPSYCHOLOGY at [facility]. Please see a copy of my visit note below.    Amparo Mccray is a 64 year old female who is being evaluated via a billable video visit.      The patient has been notified of following:     \"This video visit will be conducted via a call between you and your provider. We have found that certain health care needs can be provided without the need for an in-person physical exam.  This service lets us provide the care you need with a video conversation and remote testing.  If additional testing is needed, it will be scheduled in the clinic at a later time.    Video visits are billed at different rates depending on your insurance coverage.  Please reach out to your insurance provider with any questions.    If during the course of the call the provider feels a video visit is not appropriate, you will not be charged for this service.\"    Patient has given verbal consent for Video visit? Yes  How would you like to obtain your AVS? MyChart  Patient would like the video invitation sent by: Send to e-mail at: [email or web address]  Will anyone else be joining your video visit? No    NEUROPSYCHOLOGICAL EVALUATION    RELEVANT HISTORY AND REASON FOR REFERRAL    Amparo Mccray is a 64 year old, right-handed  with 12 years of formal education. Information was obtained via video interview with the patient and review of her medical records, including a prior neuropsychological evaluation. Records indicate that Ms. Mccray has been followed by neurologists for numerous complaints since 2015, including myalgias, cramping, dysphagia, dysarthria, and cognitive problems. Her most recent Neurology visit was on 5/12/2020, where it was noted that after thorough work-up, no clear, " conclusive diagnosis has been made. Mestinon has been prescribed due to the fatigability of her symptoms, which seems to have helped. She has reported declining cognition. She recently drove down to [place] with her sister, and was disoriented about the order of the towns despite having made the drive hundreds of times before. She has had some word finding difficulties. She was referred for neuropsychological evaluation by Nikole Miner M.D., for further characterization of any cognitive difficulties and to evaluate mood, and to make comparisons to her previous evaluation.    Ms. Mccray first underwent a neuropsychological evaluation under my direction on 5/10/2019. Please refer to the report from that date for full details regarding her history and the findings of her evaluation. Briefly, results indicated performance that fell almost entirely within normal limits across cognitive domains. Phonemic fluency was mildly slowed, although there was no other indication of executive dysfunction. She had some difficulty learning new information on a list learning task, likely reflecting subtle variability in attention, but memory fell within normal limits. Personality assessment also fell within normal limits.    The clinic was closed at the time of this evaluation due to concerns regarding COVID-19. A limited evaluation was administered remotely, using video technology. Initially, a video connection was established via [employer], but it was inconsistent so the platform was switched to Zoom.    CLINICAL INTERVIEW FINDINGS    Upon interview, Ms. Mccray stated that she is most bothered by difficulty with word finding.  She may look at something and know what it is or what she is trying to say, but she must describe it since she cannot think of the specific word.  This can happen with adjectives as well.  It may come back to her later, but it has become increasingly frustrating for her.  She also makes mistakes such as  pressing the third floor button in an elevator when she is already on the third floor, and waiting for a minute before realizing what she had done.  She tells people that they have to have her write down information or repeat it, or she will forget it.  She is particularly bothered by difficulty with attention and concentration.  She noted that she was an avid reader, and would read 20 or more books each summer.  Now she cannot focus well enough to even read an article and she has not been getting any enjoyment out of reading so she no longer does so.  She denied difficulty with decision-making.    Ms. Mccray stated that her adult son is her roommate.  She has been managing her own finances and driving, apparently without difficulty.  She manages her own medications, occasionally forgetting if she took them.  She has developed a system to help remind herself.  She cooks without difficulty, although she frequently forgets to turn off the stove.  She handles her personal cares independently.    When asked to describe her mood, Ms. Mccray stated that it has been fine.  She worked a part-time job up until March, at the beginning of the pandemic, but stopping work did not affect her life much.  She does not mind the solitude and she remains in close contact with her family.  She has not felt depressed or anxious and described herself as generally upbeat.  She is no more irritable than normal and has not been crying any more than normal.  She stated that her sleep goes in streaks.  She tries to sleep for 7 to 8 hours a night because poor sleep is a trigger for her a fib.  Sometimes, however, she cannot fall asleep until 3:00 a.m., so she sleeps until 10:00 a.m.  She does not nap because she wants to be able to sleep at night.  Her appetite has been good.  She has gained weight since March, and noted that the gym is closed in her building.  Her energy level is low, which she attributed to not doing much.  Her interest  level is good, however.  She denied suicidal ideation.  She denied visual or auditory hallucinations.  She drinks alcohol quite rarely, perhaps once a year.  She denied illicit drug use or tobacco use.    As she had last year, Ms. Mccray continues to experience some difficulty with swallowing.  She is prescribed a medication for that which she takes once a day, and she notices that by the evening her swallowing is much worse, so she thinks that the medications are quite helpful during the day.  She also experiences muscle spasms in her feet, legs, back, stomach, arms, and neck.  Additionally, she experiences the sensation of electric shock going down her back and her leg, and she has some numbness on the left side and suspects that she has a pinched nerve.  Her balance has not been particularly good and she has a harder time walking on an uneven surface.  She feels drunk when doing so.  She has not been falling, however.  She continues to experience tremor and has been diagnosed with essential tremor.  She has not been bothered by headaches.  She noted that she was quite sick with flu-like symptoms at the beginning of March after traveling to [place], but COVID-19 testing was not available at that time.    PAST MEDICAL HISTORY: Medical records indicate a history of atrial fibrillation, hypertension, knee joint replacement, obesity, paroxysmal A. fib, essential tremor.    CURRENT MEDICATIONS:  Include lisinopril, metoprolol, pyridostigmine, triamterene-HCTZ.    FAMILY MEDICAL HISTORY:  Significant for a brother with ALS diagnosed at 64, a maternal aunt with Parkinson s disease, a mother with memory problems, two sisters with depression and anxiety, one with panic disorder, and a maternal half sister with essential tremor.    BEHAVIORAL OBSERVATIONS    As noted, behavioral observations are limited as the evaluation took place over a video platform. There was some difficulty establishing the initial video connection,  but she helped to troubleshoot problems, and once the video was established, no technical issues were noted. During the evaluation, she was pleasant, cooperative, and seemed to understand the instructions. She was alert and oriented to person, place, and time. No abnormal movements were observed in this limited setting. Mood was euthymic. Speech was fluent, with normal articulation, volume, and rate. Spontaneous conversation was present and appropriate. Internal performance validity measures fell within normal limits. Results are interpreted with caution given the deviation from standard protocols, but are believed to be a reasonable estimate of her functioning.    MEASURES ADMINISTERED    The following measures were administered by a trained psychometrist, remotely via video technology, under the direct supervision of a licensed psychologist:    Orientation; Subtests of the Wechsler Adult Intelligence Scale - 4; Reading subtest of the Wide Range Achievement Test - 4; Story Memory of the Repeatable Battery for the Assessment of Neuropsychological Status (RBANS); Bolton Verbal Learning Test - Revised, Form 1; Roebuck Naming Test, 15 Item; Controlled Oral Word Association Test; Semantic Fluency; Clock Drawing; Oral Trail Making Test; Test of Sustained Attention and Tracking; BDAE Complex Ideational Material; ILS Health and Safety Questions; Geriatric Depression Scale (GDS); WILBER-7.    RESULTS AND INTERPRETATION    Verbal intellectual functioning was estimated to fall at the upper end of the average range, consistent with premorbid estimates based on single word reading abilities, administered at her prior evaluation.    Confrontation naming was high average for her age. Expressive vocabulary was high average. Verbal abstract reasoning was average. Comprehension of complex ideational material fell below expected, possibly reflecting attentional inefficiencies. Letter fluency was low average for her age, and generative  naming to category was high average.     Attention was variable. Attention span was low average for her age. Divided attention was high average. Performance on a measure of sustained attention and tracking fell within normal limits.     Basic visual perception, including matching lines and angles, was average for her age. Construction of a clock fell within normal limits. Nonverbal deductive reasoning was high average.    Immediate recall of verbal narrative material was average, with average recall following a brief delay. On a multiple trial list learning task, immediate recall was borderline impaired, with low average retention following a 25 minute delay. Recognition memory on this task was average.    Responses to various health and safety scenarios fell somewhat below expected, due to lack detail rather than incorrect responses.    On the GDS, a self-report questionnaire, she endorsed minimal depressive symptomatology. She endorsed minimal anxiety on the WILBER-7.    IMPRESSIONS AND RECOMMENDATIONS    Current results are interpreted with caution. Due to concerns regarding COVID-19, the evaluation was administered remotely using a video platform. Every attempt was made to administer measures in a standardized manner; however, differences in administration from the standardization samples may affect interpretation.    Results indicate variable attention, ranging from low average to above average. Attentional inefficiencies also appeared to underlie her relative weakness in learning new information. She retained information well once she had learned it. Language, visual processing, and executive functioning fell within normal limits. She did not endorse significant symptoms of depression or anxiety.    Limited comparisons can be made to her 5/10/2019 neuropsychological evaluation. While a similar pattern was observed on fluency tasks, letter fluency has improved somewhat. Sustained attention has marginally improved.  Performance otherwise remains stable across cognitive domains.    This pattern of performance does not reflect dementia at this time, nor is it suggestive of progressive cognitive decline. The primary finding is one of variability in attention, similar to one year ago. Her sleep varies, and it is possible that non-restorative sleep contributes to this variability. She may benefit from further discussion with her physicians regarding sleep.    In terms of daily functioning, Ms. Mccray is not experiencing cognitive difficulties that might interfere with her ability to actively participate in treatment, or to manage her instrumental activities of daily living independently. Given her variable attention, she may find it helpful when working on a task to work in an environment that is relatively free from distractions, such as noises or other interruptions. It may be helpful to take frequent, brief breaks when working on larger projects. She indicated that she has forgotten to turn off the stove, so she may find it helpful to set a timer when she turns on the stove, to remind herself to turn it off once she is finished with cooking.    Results may serve as an updated baseline of her neurocognitive functioning, and the evaluation may be repeated in the future for comparison, should a change in mental status occur.    Angela Ortiz, Ph.D., ABPP  Licensed Psychologist, LP [id number]  Board Certified in Clinical Neuropsychology    Time spent: One unit professional time, including interview  (CPT 24747). 60 minutes (1 unit) neuropsychological testing evaluation by licensed and board-certified neuropsychologist, including integration of patient data, interpretation of standardized test results and clinical data, clinical decision-making, treatment planning, report, and interactive feedback to the patient, first hour (CPT 41999). 95 minutes (2 units) of neuropsychological testing evaluation by licensed and board-certified  neuropsychologist, including integration of patient data, interpretation of standardized test results and clinical data, clinical decision-making, treatment planning, report, and interactive feedback to the patient, subsequent hours (CPT 00384). 30 minutes of neuropsychological test administration and scoring by technician, first 30 minutes (CPT 08029). 121 additional minutes (4 units) neuropsychological test administration and scoring by technician, subsequent 30 minutes (CPT 33920). ICD-10 Diagnoses: R41.3    Due to circumstances that prevent in-person clinical visits, this assessment was conducted using telehealth methods (including remote audiovisual presentation of test instructions and test stimuli). The standard administration of these procedures involves in-person, face-to-face methods. The impact of applying non-standard administration methods has been evaluated only in part by scientific research. While every effort was made to simulate standard assessment practices, the diagnostic conclusions and recommendations for treatment provided in this report are being advanced with these reservations.      Video-Visit Details    Type of service:  Video Visit    Video Start Time: 7:58 a.m.  Video End Time: 8:30 a.m.    Psychometrist time:  Prep  Rooming & Test Administration    Scoring/Monitoring       Start: 7:20 Start: 7:50 Start: 8:32 Start: 0:00 Start: 10:05 Start: 0:00 Start: 0:00   Stop: 7:30 Stop: 7:53 Stop: 10:00 Stop: 0:00 Stop: 10:55 Stop: 0:00 Stop: 0:00   Total: 10m Total: 3m Total: 88m Total: 0m Total: 50m Total: 0m Total: 0m   Total Administration Time 91m  Total Scoring/Monitor Time 50m  Total Time Overall 151m      Originating Location (pt. Location): Home    Distant Location (provider location):  [facility] NEUROPSYCHOLOGY     Platform used for Video Visit: Zoom  (started on [employer])    Angela Ortiz, PhD LP          Again, thank you for allowing me to participate in the care of your patient.       Sincerely,    Angela Ortiz, PhD LP

## 2020-07-02 NOTE — NURSING NOTE
Pt was seen for neuropsychological evaluation at the request of Dr. Nikole Miner for the purposes of diagnostic clarification and treatment planning. 151 minutes of video test administration and scoring were provided by this writer. Please see Dr. Angela Ortiz's report for a full interpretation of the findings.    Video Start: 8:32AM  Video Stop: 10:00AM    Cem Conn  Psychometrist

## 2020-07-07 NOTE — PROGRESS NOTES
Patient:  Amparo Mccray MRN:  [id number] :  57 GIANG:  20   Education: 12 Handedness:  RIGHT Provider: JANNETTE Psychometrist:     Station: Outpatient Age: 64       Orientation    WAIS-IV Raw SS RDS   Personal Info 4   Digit Span 23 8 9   Place 2   Vocabulary 48 13    Time 0   Matrix Reasoning 20 13 Est VIQ   Presidents 5   Similarities 27 11 110   BNT-15  COWAT  Semantic Fluency      Raw 15 Form CFL Raw 53     z 0.90 Raw 28 MAS 12     Total Stim Correct 0 MAS 8       Total Phonemic Correct 0         Complex Ideational Material  Clock Drawing  RBANS Line Orientation      Raw 9 Command 3 Raw 17     SS 5   z 0.14     T 25   %ile 51-75     Oral Trails    TSAT      Trails A 8 Z 3.25 Total time 73 Z 0.63   Trails B 30 Z 1.82 Total Errors 0 Z 0.92   HVLT           Trial 1 5   T-Score   T-Score   Trial 2 7 Total Recall 21 37 True Positives 11    Trial 3 9 Delayed Recall 7 37 False Positives 0    Learning 4 Percent Retention 78 41 Discrim. Index 11 52   RBANS Story           Total Immediate 16 z Score -0.69 Scaled Score 9     Total Delay 9 z Score -0.14 Scaled Score 10     ILS Health and Safety Questionnaire          Total 33 Classification MODERATE       GDS          Total 7 Interpretation MINIMAL       WILBER-7          Total Score 0 Interpretation MINIMAL

## 2020-07-14 ENCOUNTER — ANTICOAGULATION THERAPY VISIT (OUTPATIENT)
Dept: ANTICOAGULATION | Facility: CLINIC | Age: 65
End: 2020-07-14

## 2020-07-14 DIAGNOSIS — I48.0 PAROXYSMAL A-FIB (H): ICD-10-CM

## 2020-07-14 DIAGNOSIS — I48.91 ATRIAL FIBRILLATION (H): ICD-10-CM

## 2020-07-14 DIAGNOSIS — Z79.01 LONG TERM CURRENT USE OF ANTICOAGULANTS WITH INR GOAL OF 2.0-3.0: ICD-10-CM

## 2020-07-14 DIAGNOSIS — I49.8 VENTRICULAR BIGEMINY: ICD-10-CM

## 2020-07-14 LAB
CAPILLARY BLOOD COLLECTION: NORMAL
INR PPP: 2.4 (ref 0.86–1.14)

## 2020-07-14 PROCEDURE — 85610 PROTHROMBIN TIME: CPT | Performed by: INTERNAL MEDICINE

## 2020-07-14 PROCEDURE — 36416 COLLJ CAPILLARY BLOOD SPEC: CPT | Performed by: INTERNAL MEDICINE

## 2020-07-14 NOTE — PROGRESS NOTES
Pt denies any changes in diet,health or activity. Patient to take 1.25mg W/Sa and 2.5mg AOD and recheck INR in 6 weeks. Amparo is aware if signs of clotting (pain, tenderness, swelling, color change in leg or arm, SOB) and bleeding occur (blood in stool, urine, large bruising, bleeding gums, nosebleeds) to have INR check sooner. If sx severe report to ER or concerned for stroke call 911. If general questions or concerns arise, call clinic.  Liliana Valdez RN  [facility]

## 2020-08-11 ENCOUNTER — MYC MEDICAL ADVICE (OUTPATIENT)
Dept: INTERNAL MEDICINE | Facility: CLINIC | Age: 65
End: 2020-08-11

## 2020-08-11 DIAGNOSIS — H53.2 DIPLOPIA: Primary | ICD-10-CM

## 2020-08-12 NOTE — TELEPHONE ENCOUNTER
Called to triage that pt was not having other sx . Double vision is worse with looking down . Myasthenia gravis dx potentially per neurology so pt thinks it may be this causing sx . Pt is not having any weakness , pain or speech problems.Alma Gonzalez RN

## 2020-08-25 ENCOUNTER — ANTICOAGULATION THERAPY VISIT (OUTPATIENT)
Dept: NURSING | Facility: CLINIC | Age: 65
End: 2020-08-25
Payer: MEDICARE

## 2020-08-25 DIAGNOSIS — I48.0 PAROXYSMAL A-FIB (H): ICD-10-CM

## 2020-08-25 DIAGNOSIS — I48.91 ATRIAL FIBRILLATION (H): ICD-10-CM

## 2020-08-25 DIAGNOSIS — Z79.01 LONG TERM CURRENT USE OF ANTICOAGULANTS WITH INR GOAL OF 2.0-3.0: ICD-10-CM

## 2020-08-25 DIAGNOSIS — I49.8 VENTRICULAR BIGEMINY: ICD-10-CM

## 2020-08-25 LAB
CAPILLARY BLOOD COLLECTION: NORMAL
INR PPP: 2.9 (ref 0.86–1.14)

## 2020-08-25 PROCEDURE — 99207 ZZC NO CHARGE NURSE ONLY: CPT

## 2020-08-25 PROCEDURE — 85610 PROTHROMBIN TIME: CPT | Performed by: INTERNAL MEDICINE

## 2020-08-25 PROCEDURE — 36416 COLLJ CAPILLARY BLOOD SPEC: CPT | Performed by: INTERNAL MEDICINE

## 2020-08-25 NOTE — PROGRESS NOTES
ANTICOAGULATION FOLLOW-UP CLINIC VISIT    Patient Name:  Amparo Mccray  Date:  2020  Contact Type:  Telephone    SUBJECTIVE:  Patient Findings     Comments:   The patient was assessed for diet, medication, and activity level changes, missed or extra doses, bruising or bleeding, with no problem findings.          Clinical Outcomes     Comments:   The patient was assessed for diet, medication, and activity level changes, missed or extra doses, bruising or bleeding, with no problem findings.             OBJECTIVE    Recent labs: (last 7 days)     20  1021   INR 2.90*       ASSESSMENT / PLAN  INR assessment THER    Recheck INR In: 6 WEEKS    INR Location Clinic      Anticoagulation Summary  As of 2020    INR goal:   2.0-3.0   TTR:   95.3 % (1 y)   INR used for dosin.90 (2020)   Warfarin maintenance plan:   1.25 mg (2.5 mg x 0.5) every Wed, Sat; 2.5 mg (2.5 mg x 1) all other days   Full warfarin instructions:   1.25 mg every Wed, Sat; 2.5 mg all other days   Weekly warfarin total:   15 mg   No change documented:   Natalya Watson RN   Plan last modified:   Willow Ellison RN (2017)   Next INR check:   10/6/2020   Target end date:   Indefinite    Indications    Atrial fibrillation (H) [I48.91]  Ventricular bigeminy [I49.9]  Long term current use of anticoagulants with INR goal of 2.0-3.0 [Z79.01]             Anticoagulation Episode Summary     INR check location:   Anticoagulation Clinic    Preferred lab:       Send INR reminders to:   [facility]    Comments:         Anticoagulation Care Providers     Provider Role Specialty Phone number    Alan Almeida MD [facility] Internal Medicine [phone]            See the Encounter Report to view Anticoagulation Flowsheet and Dosing Calendar (Go to Encounters tab in chart review, and find the Anticoagulation Therapy Visit)        Natalya Watson, RN

## 2020-09-09 ENCOUNTER — OFFICE VISIT (OUTPATIENT)
Dept: INTERNAL MEDICINE | Facility: CLINIC | Age: 65
End: 2020-09-09
Payer: MEDICARE

## 2020-09-09 VITALS
BODY MASS INDEX: 36.12 KG/M2 | TEMPERATURE: 97.8 F | SYSTOLIC BLOOD PRESSURE: 112 MMHG | HEIGHT: 69 IN | DIASTOLIC BLOOD PRESSURE: 70 MMHG | WEIGHT: 243.9 LBS

## 2020-09-09 DIAGNOSIS — Z12.11 COLON CANCER SCREENING: ICD-10-CM

## 2020-09-09 DIAGNOSIS — Z01.818 PREOP GENERAL PHYSICAL EXAM: Primary | ICD-10-CM

## 2020-09-09 DIAGNOSIS — H25.9 AGE-RELATED CATARACT OF BOTH EYES, UNSPECIFIED AGE-RELATED CATARACT TYPE: ICD-10-CM

## 2020-09-09 PROCEDURE — 99214 OFFICE O/P EST MOD 30 MIN: CPT | Performed by: INTERNAL MEDICINE

## 2020-09-09 ASSESSMENT — MIFFLIN-ST. JEOR: SCORE: 1717.76

## 2020-09-09 NOTE — PROGRESS NOTES
[address] [address]  Phone: [phone]  Primary Provider: Desirae Almeida  Pre-op Performing Provider: DESIRAE ALMEIDA    PREOPERATIVE EVALUATION:  Today's date: 9/9/2020    Amparo Mccray is a 64 year old female who presents for a preoperative evaluation.    Surgical Information:  No flowsheet data found.  Fax number for surgical facility: [phone]  Type of Anesthesia Anticipated: {ANESTHESIA:150104}    Subjective     HPI related to upcoming procedure: ***  No flowsheet data found.  Patient does not have a Health Care Directive or Living Will: Discussed advance care planning with patient; however, patient declined at this time.    RX monitoring program (MNPMP) reviewed: {Mnpmpreport:114588}  {Review MNPMP for all patients per [employer] [email or web address]}  {Chronic problem details (Optional):583143}    Review of Systems  {ROS Preop Choices:368246}    Patient Active Problem List    Diagnosis Date Noted     Atrial fibrillation (H)      Priority: High     questionable TIA- now on coumadin       Essential hypertension, benign 03/20/2006     Priority: High     Long term current use of anticoagulants with INR goal of 2.0-3.0 11/01/2018     Priority: Medium     Long-term (current) use of anticoagulants [Z79.01] 08/23/2017     Priority: Medium     Health Care Home 03/06/2015     Priority: Medium     Status:  Declined    See Letters for Emergency  Care Plan  Date:  March 6, 2015         Knee joint replacement status 03/02/2015     Priority: Medium     Morbid obesity (H) 07/01/2013     Priority: Medium     BMI 33.6       Paroxysmal A-fib (H) 12/05/2012     Priority: Medium     ACP (advance care planning) 08/23/2012     Priority: Medium     Discussed advance care planning with patient; information given to patient to review. 8/23/2012          Aftercare following joint replacement 06/08/2011     Priority: Medium     Knee  joint replacement by other means 06/08/2011     Priority: Medium     Ventricular bigeminy      Priority: Medium     bigeminy- nrml LV fxn       Chest pain      Priority: Medium     neg stress test, put on PPI- no help  Problem list name updated by automated process. Provider to review       CARDIOVASCULAR SCREENING; LDL GOAL LESS THAN 160 10/31/2010     Priority: Medium      Past Medical History:   Diagnosis Date     Arthritis      Chest pain, unspecified     neg stress test, put on PPI- no help     Essential hypertension, benign     Hypertension, Benign     Obesity (BMI 30-39.9) 7/1/13    BMI 33.6     Paroxysmal atrial fibrillation (H) 03/2010     Superficial thrombophlebitis      Thrombosis of leg     superficial not dvt's     Ventricular bigeminy     bigeminy- nrml LV fxn     Past Surgical History:   Procedure Laterality Date     ABDOMEN SURGERY       ARTHROPLASTY KNEE Right 3/2/2015    Procedure: ARTHROPLASTY KNEE;  Surgeon: Cuco Baugh MD;  Location: [facility]     C NONSPECIFIC PROCEDURE      left hand      [facility] AFF SURGICAL PATHOLOGY  1976    HGSIL     ESOPHAGOSCOPY, GASTROSCOPY, DUODENOSCOPY (EGD), COMBINED N/A 5/16/2019    Procedure: ESOPHAGOGASTRODUODENOSCOPY, WITH BIOPSY;  Surgeon: Liliane Montalvo MD;  Location: [facility]     H ABLATION FOCAL AFIB  11/02/2017     H ABLATION FOCAL AFIB  01/15/2013     ORTHOPEDIC SURGERY      l tka  and wrist      TUBAL LIGATION      Uterine fibroids removed     Current Outpatient Medications   Medication Sig Dispense Refill     lisinopril (ZESTRIL) 10 MG tablet Take 1 tablet (10 mg) by mouth daily 90 tablet 1     metoprolol tartrate (LOPRESSOR) 50 MG tablet TAKE 1 TABLET(50 MG) BY MOUTH TWICE DAILY 180 tablet 0     pyridostigmine (MESTINON) 60 MG tablet 1 tablet by mouth three times a day 90 tablet 3     triamterene-HCTZ (MAXZIDE-25) 37.5-25 MG tablet Take 1 tablet by mouth every morning 90 tablet 1     warfarin ANTICOAGULANT (COUMADIN) 2.5 MG tablet SEE  "NOTES 60 tablet 5       Allergies   Allergen Reactions     Celebrex [Celecoxib] Hives     Hands itching then hives     Ibuprofen Swelling     Face swelling     Ibuprofen Hives        Social History     Tobacco Use     Smoking status: Never Smoker     Smokeless tobacco: Never Used   Substance Use Topics     Alcohol use: Yes     Comment: once a year     {FAMILY HISTORY (Optional):226292022}  History   Drug Use No            Objective   /70   Temp 97.8  F (36.6  C) (Temporal)   Ht 1.74 m (5' 8.5\")   Wt 110.6 kg (243 lb 14.4 oz)   LMP 2012   BMI 36.55 kg/m    Physical Exam  {EXAM Preop Choices:715633}    Recent Labs   Lab Test 20  1021 20  1015 20  0944  19  1136   HGB  --   --   --   --  13.0   PLT  --   --   --   --  208   INR 2.90* 2.40* 2.70*   < >  --    NA  --   --  138  --  139   POTASSIUM  --   --  3.8  --  4.2   CR  --   --  0.84  --  0.87    < > = values in this interval not displayed.        PRE-OP Diagnostics:  {LABS:259496}  {EK}    {Provider  Link to PREOP SmartSet :046595}     Assessment & Plan   The proposed surgical procedure is considered {HIGH=major cardiovascular or procedures requiring prolonged anesthesia >4 hours or large fluid shifts;    INTERMEDIATE=abdominal, most orthopedic and intrathoracic surgery; LOW= endoscopy, cataract and breast surgery:341424} risk.    REVISED CARDIAC RISK INDEX  The patient has the following serious cardiovascular risks for perioperative complications:  {PREOP REVISED CARDIAC RISK INDEX (RCRI):652089::\"No serious cardiac risks = 0 points\"}    INTERPRETATION: {REVISED CARDIAC RISK INTERPRETATION:916151}    {Diag Picklist :558128}    The patient has the following additional risks and recommendations for perioperative complications:    {IMPORTANT - Conditions - complete carefully!!:427556}     MEDICATION INSTRUCTIONS:  {IMPORTANT - Medications:001077}    RECOMMENDATION:  {IMPORTANT - Approval:343186::\"APPROVAL GIVEN to " "proceed with proposed procedure, without further diagnostic evaluation.\"}    No follow-ups on file.    Signed Electronically by: Alan Almeida MD    Copy of this evaluation report is provided to requesting physician.    [facility] Guidelines    Revised Cardiac Risk Index  "

## 2020-09-09 NOTE — NURSING NOTE
"Chief Complaint   Patient presents with     Pre-Op Exam     /70   Temp 97.8  F (36.6  C) (Temporal)   Ht 1.74 m (5' 8.5\")   Wt 110.6 kg (243 lb 14.4 oz)   LMP 12/31/2012   BMI 36.55 kg/m   Estimated body mass index is 36.55 kg/m  as calculated from the following:    Height as of this encounter: 1.74 m (5' 8.5\").    Weight as of this encounter: 110.6 kg (243 lb 14.4 oz).        Health Maintenance due pending provider review:  Pt declines Hm today  Flu vaccine info updated, pt completed 08/2020    Karol Dominguez CMA  "

## 2020-09-09 NOTE — PROGRESS NOTES
[address] [address]  Phone: [phone]  Primary Provider: Alan Almeida      PREOPERATIVE EVALUATION:  Today's date: 9/9/2020    Amparo Mccray is a 64 year old female who presents for a preoperative evaluation.    Surgical Information:  Surgery Details 9/9/2020   Surgery/Procedure: Cataract surgery   Surgery Location: [facility]   Surgeon: Dr. Whittaker   Surgery Date: 09/22/2020   Time of Surgery: 11:30   Where patient plans to recover: At home with family     Fax number for surgical facility: [phone]  Type of Anesthesia Anticipated: Local with MAC    Subjective     HPI related to upcoming procedure:   64-year-old with underlying myasthenia gravis who recently developed some diplopia.  She saw an ophthalmologist and was noted to have significant cataracts bilaterally which likely were the culprit or change in visual acuity.  No other abnormalities were noted on her examination.    Preop Questions 9/9/2020   1. Have you ever had a heart attack or stroke? No   2. Have you ever had surgery on your heart or blood vessels, such as a stent placement, a coronary artery bypass, or surgery on an artery in your head, neck, heart, or legs? No   3. Do you have chest pain with activity? No   4. Do you have a history of  heart failure? No   5. Do you currently have a cold, bronchitis or symptoms of other infection? No   6. Do you have a cough, shortness of breath, or wheezing? No   7. Do you or anyone in your family have previous history of blood clots? YES    8. Do you or does anyone in your family have a serious bleeding problem such as prolonged bleeding following surgeries or cuts? No   9. Have you ever had problems with anemia or been told to take iron pills? YES    10. Have you had any abnormal blood loss such as black, tarry or bloody stools, or abnormal vaginal bleeding? No   11. Have you ever had a blood transfusion? No    Are you willing to have a blood transfusion if it is medically needed before, during, or after your surgery? Yes   13. Have you or any of your relatives ever had problems with anesthesia? No   14. Do you have sleep apnea, excessive snoring or daytime drowsiness? No   15. Do you have any artifical heart valves or other implanted medical devices like a pacemaker, defibrillator, or continuous glucose monitor? No   16. Do you have artificial joints? YES    17. Are you allergic to latex? No   18. Is there any chance that you may be pregnant? No     Patient does not have a Health Care Directive or Living Will: Discussed advance care planning with patient; however, patient declined at this time.    RX monitoring program ([place]NPMP) reviewed:  not reviewed/not due     See problem list for active medical problems.  Problems all longstanding and stable, except as noted/documented.  See ROS for pertinent symptoms related to these conditions.      Review of Systems  CONSTITUTIONAL: NEGATIVE for fever, chills, change in weight  ENT/MOUTH: NEGATIVE for ear, mouth and throat problems  RESP: NEGATIVE for significant cough or SOB  CV: NEGATIVE for chest pain, palpitations or peripheral edema    Patient Active Problem List    Diagnosis Date Noted     Atrial fibrillation (H)      Priority: High     questionable TIA- now on coumadin       Essential hypertension, benign 03/20/2006     Priority: High     Long term current use of anticoagulants with INR goal of 2.0-3.0 11/01/2018     Priority: Medium     Long-term (current) use of anticoagulants [Z79.01] 08/23/2017     Priority: Medium     Knee joint replacement status 03/02/2015     Priority: Medium     Morbid obesity (H) 07/01/2013     Priority: Medium     BMI 33.6       Paroxysmal A-fib (H) 12/05/2012     Priority: Medium     ACP (advance care planning) 08/23/2012     Priority: Medium     Discussed advance care planning with patient; information given to patient to review. 8/23/2012           Aftercare following joint replacement 06/08/2011     Priority: Medium     Knee joint replacement by other means 06/08/2011     Priority: Medium     Ventricular bigeminy      Priority: Medium     bigeminy- nrml LV fxn       Chest pain      Priority: Medium     neg stress test, put on PPI- no help  Problem list name updated by automated process. Provider to review       CARDIOVASCULAR SCREENING; LDL GOAL LESS THAN 160 10/31/2010     Priority: Medium     Health Care Home 03/06/2015     Priority: Low     Status:  Declined    See Letters for Emergency  Care Plan  Date:  March 6, 2015          Past Medical History:   Diagnosis Date     Arthritis      Chest pain, unspecified     neg stress test, put on PPI- no help     Essential hypertension, benign     Hypertension, Benign     Obesity (BMI 30-39.9) 7/1/13    BMI 33.6     Paroxysmal atrial fibrillation (H) 03/2010     Superficial thrombophlebitis      Thrombosis of leg     superficial not dvt's     Ventricular bigeminy     bigeminy- nrml LV fxn     Past Surgical History:   Procedure Laterality Date     ABDOMEN SURGERY       ARTHROPLASTY KNEE Right 3/2/2015    Procedure: ARTHROPLASTY KNEE;  Surgeon: Cuco Baugh MD;  Location: [facility]     C NONSPECIFIC PROCEDURE      left hand      CL AFF SURGICAL PATHOLOGY  1976    HGSIL     ESOPHAGOSCOPY, GASTROSCOPY, DUODENOSCOPY (EGD), COMBINED N/A 5/16/2019    Procedure: ESOPHAGOGASTRODUODENOSCOPY, WITH BIOPSY;  Surgeon: Liliane Montalvo MD;  Location: [facility]     H ABLATION FOCAL AFIB  11/02/2017     H ABLATION FOCAL AFIB  01/15/2013     ORTHOPEDIC SURGERY      l tka  and wrist      TUBAL LIGATION      Uterine fibroids removed     Current Outpatient Medications   Medication Sig Dispense Refill     lisinopril (ZESTRIL) 10 MG tablet Take 1 tablet (10 mg) by mouth daily 90 tablet 1     metoprolol tartrate (LOPRESSOR) 50 MG tablet TAKE 1 TABLET(50 MG) BY MOUTH TWICE DAILY 180 tablet 0     pyridostigmine  "(MESTINON) 60 MG tablet 1 tablet by mouth three times a day 90 tablet 3     triamterene-HCTZ (MAXZIDE-25) 37.5-25 MG tablet Take 1 tablet by mouth every morning 90 tablet 1     warfarin ANTICOAGULANT (COUMADIN) 2.5 MG tablet SEE NOTES 60 tablet 5       Allergies   Allergen Reactions     Celebrex [Celecoxib] Hives     Hands itching then hives     Ibuprofen Swelling     Face swelling     Ibuprofen Hives        Social History     Tobacco Use     Smoking status: Never Smoker     Smokeless tobacco: Never Used   Substance Use Topics     Alcohol use: Yes     Comment: once a year       History   Drug Use No            Objective   /70   Temp 97.8  F (36.6  C) (Temporal)   Ht 1.74 m (5' 8.5\")   Wt 110.6 kg (243 lb 14.4 oz)   LMP 12/31/2012   BMI 36.55 kg/m    Physical Exam  GENERAL APPEARANCE: alert, no distress and over weight  HENT: nose and mouth without ulcers or lesions and normal cephalic/atraumatic  RESP: lungs clear to auscultation - no rales, rhonchi or wheezes  CV: regular rate and rhythm, normal S1 S2, no S3 or S4 and no murmur, click or rub   ABDOMEN: soft, nontender, no HSM or masses and bowel sounds normal  NEURO: Normal strength and tone, sensory exam grossly normal, mentation intact and speech normal    Recent Labs   Lab Test 08/25/20  1021 07/14/20  1015 06/02/20  0944  06/13/19  1136   HGB  --   --   --   --  13.0   PLT  --   --   --   --  208   INR 2.90* 2.40* 2.70*   < >  --    NA  --   --  138  --  139   POTASSIUM  --   --  3.8  --  4.2   CR  --   --  0.84  --  0.87    < > = values in this interval not displayed.        PRE-OP Diagnostics:  No labs were ordered during this visit.  No EKG required for low risk surgery (cataract, skin procedure, breast biopsy, etc).         Assessment & Plan   The proposed surgical procedure is considered LOW risk.    REVISED CARDIAC RISK INDEX  The patient has the following serious cardiovascular risks for perioperative complications:  No serious cardiac risks = " 0 points    INTERPRETATION: 0 points: Class I (very low risk - 0.4% complication rate)       1. Preop general physical exam  2. Age-related cataract of both eyes, unspecified age-related cataract type    The patient has the following additional risks and recommendations for perioperative complications:     - No identified additional risk factors other than previously addressed     MEDICATION INSTRUCTIONS:    Anticoagulant or Antiplatelet Medication Use   - WARFARIN: Bleeding risk is minimal for this procedure (e.g. cataract, 1 to 2 dental extractions) and warfarin should be continued before procedure.     RECOMMENDATION:  APPROVAL GIVEN to proceed with proposed procedure, without further diagnostic evaluation.    No follow-ups on file.    Signed Electronically by: Alan Almeida MD    Copy of this evaluation report is provided to requesting physician.    Preop [facility] Preop Guidelines    Revised Cardiac Risk Index

## 2020-09-09 NOTE — PATIENT INSTRUCTIONS

## 2020-09-25 DIAGNOSIS — I10 ESSENTIAL HYPERTENSION, BENIGN: ICD-10-CM

## 2020-09-28 RX ORDER — METOPROLOL TARTRATE 50 MG
TABLET ORAL
Qty: 180 TABLET | Refills: 0 | Status: SHIPPED | OUTPATIENT
Start: 2020-09-28 | End: 2020-12-29

## 2020-10-01 ENCOUNTER — VIRTUAL VISIT (OUTPATIENT)
Dept: FAMILY MEDICINE | Facility: OTHER | Age: 65
End: 2020-10-01

## 2020-10-02 DIAGNOSIS — Z20.822 SUSPECTED COVID-19 VIRUS INFECTION: Primary | ICD-10-CM

## 2020-10-03 DIAGNOSIS — Z20.822 SUSPECTED COVID-19 VIRUS INFECTION: ICD-10-CM

## 2020-10-03 PROCEDURE — U0003 INFECTIOUS AGENT DETECTION BY NUCLEIC ACID (DNA OR RNA); SEVERE ACUTE RESPIRATORY SYNDROME CORONAVIRUS 2 (SARS-COV-2) (CORONAVIRUS DISEASE [COVID-19]), AMPLIFIED PROBE TECHNIQUE, MAKING USE OF HIGH THROUGHPUT TECHNOLOGIES AS DESCRIBED BY CMS-2020-01-R: HCPCS | Performed by: FAMILY MEDICINE

## 2020-10-03 PROCEDURE — 99207 PR NO CHARGE LOS: CPT

## 2020-10-05 LAB
SARS-COV-2 RNA SPEC QL NAA+PROBE: NOT DETECTED
SPECIMEN SOURCE: NORMAL

## 2020-10-07 NOTE — PROGRESS NOTES
"Date: 10/01/2020 15:34:53  Clinician: Jonah Boland  Clinician NPI: [id number]  Patient: Amparo Mccray  Patient : 1954  Patient Address: [address] [place] [id number]  Patient Phone: [phone]  Visit Protocol: URI  Patient Summary:  Amparo is a 65 year old ( : 1954 ) female who initiated a [facility] Visit for COVID-19 (Coronavirus) evaluation and screening. When asked the question \"Please sign me up to receive news, health information and promotions. \", Amparo responded \"No\".   A synchronous visit is necessary because the patient reported the following abnormal symptoms:   Recent facial or sinus surgery (requires clarification)   Amparo states her symptoms started 1-2 days ago.   Her symptoms consist of a headache, a cough, nasal congestion, rhinitis, myalgia, chills, and malaise. Amparo also feels feverish.   Symptom details     Nasal secretions: The color of her mucus is clear and yellow.    Cough: Amparo coughs a few times an hour and her cough is more bothersome at night. Phlegm does not come into her throat when she coughs. She believes her cough is caused by post-nasal drip.     Temperature: Her current temperature is 99.9 degrees Fahrenheit.     Headache: She states the headache is mild (1-3 on a 10 point pain scale).      Amparo denies having ear pain, wheezing, anosmia, vomiting, nausea, facial pain or pressure, sore throat, teeth pain, ageusia, and diarrhea. She also denies having a sinus infection within the past year and taking antibiotic medication in the past month. She is not experiencing dyspnea.   Precipitating events  She has not recently been exposed to someone with influenza. Amparo has not been in close contact with any high risk individuals.   Pertinent COVID-19 (Coronavirus) information  In the past 14 days, Amparo has not worked in a congregate living setting.   She does not work or volunteer as healthcare worker or a  and does not work or volunteer in a " healthcare facility.   Amparo also has not lived in a congregate living setting in the past 14 days. She does not live with a healthcare worker.   Amparo has not had a close contact with a laboratory-confirmed COVID-19 patient within 14 days of symptom onset.   Since December 2019, Amparo and has had upper respiratory infection (URI) or influenza-like illness. Has not been diagnosed with lab-confirmed COVID-19 test      Date(s) of previous URI or influenza-like illness (free-text): 3/1/20     Symptoms Amparo experienced during previous URI or influenza-like illness as reported by the patient (free-text): Fever aches headache loss of taste        Pertinent medical history  Amparo does not get yeast infections when she takes antibiotics.   Amparo does not need a return to work/school note.   Weight: 240 lbs   Amparo does not smoke or use smokeless tobacco.   Additional information as reported by the patient (free text): I had cataract surgery 9/22. Scheduled for next cataract on 10/8 . Also my stomach is making a lot of noise and there's a lot of gas   Weight: 240 lbs    MEDICATIONS: metoprolol tartrate oral, warfarin (bulk), lisinopril oral, triamterene-hydrochlorothiazide oral, pyridostigmine bromide oral, moxifloxacin ophthalmic (eye), prednisolone acetate ophthalmic (eye), ALLERGIES: ibuprofen, Celebrex  Clinician Response:  Dear Amparo,   Your symptoms show that you may have coronavirus (COVID-19). This illness can cause fever, cough and trouble breathing. Many people get a mild case and get better on their own. Some people can get very sick.  What should I do?  We would like to test you for this virus.   1. Please call [phone] to schedule your visit. Explain that you were referred by [facility] to have a COVID-19 test. Be ready to share your [facility] visit ID number.  The following will serve as your written order for this COVID Test, ordered by me, for the indication of suspected COVID [Z20.828]: The test will be  "ordered in [employer], our electronic health record, after you are scheduled. It will show as ordered and authorized by Sky Yadav MD.  Order: COVID-19 (Coronavirus) PCR for SYMPTOMATIC testing from [facility].      2. When it's time for your COVID test:  Stay at least 6 feet away from others. (If someone will drive you to your test, stay in the backseat, as far away from the  as you can.)   Cover your mouth and nose with a mask, tissue or washcloth.  Go straight to the testing site. Don't make any stops on the way there or back.      3.Starting now: Stay home and away from others (self-isolate) until:   You've had no fever---and no medicine that reduces fever---for one full day (24 hours). And...   Your other symptoms have gotten better. For example, your cough or breathing has improved. And...   At least 10 days have passed since your symptoms started.       During this time, don't leave the house except for testing or medical care.   Stay in your own room, even for meals. Use your own bathroom if you can.   Stay away from others in your home. No hugging, kissing or shaking hands. No visitors.  Don't go to work, school or anywhere else.    Clean \"high touch\" surfaces often (doorknobs, counters, handles, etc.). Use a household cleaning spray or wipes. You'll find a full list of  on the EPA website: www.epa.gov/pesticide-registration/list-n-disinfectants-use-against-sars-cov-2.   Cover your mouth and nose with a mask, tissue or washcloth to avoid spreading germs.  Wash your hands and face often. Use soap and water.  Caregivers in these groups are at risk for severe illness due to COVID-19:  o People 65 years and older  o People who live in a nursing home or long-term care facility  o People with chronic disease (lung, heart, cancer, diabetes, kidney, liver, immunologic)  o People who have a weakened immune system, including those who:   Are in cancer treatment  Take medicine that weakens the immune system, such " as corticosteroids  Had a bone marrow or organ transplant  Have an immune deficiency  Have poorly controlled HIV or AIDS  Are obese (body mass index of 40 or higher)  Smoke regularly   o Caregivers should wear gloves while washing dishes, handling laundry and cleaning bedrooms and bathrooms.  o Use caution when washing and drying laundry: Don't shake dirty laundry, and use the warmest water setting that you can.  o For more tips, go to www.cdc.gov/coronavirus/2019-ncov/downloads/10Things.pdf.    How can I take care of myself?    Get lots of rest. Drink extra fluids (unless a doctor has told you not to).   Take Tylenol (acetaminophen) for fever or pain. If you have liver or kidney problems, ask your family doctor if it's okay to take Tylenol.   Adults can take either:    650 mg (two 325 mg pills) every 4 to 6 hours, or...   1,000 mg (two 500 mg pills) every 8 hours as needed.    Note: Don't take more than 3,000 mg in one day. Acetaminophen is found in many medicines (both prescribed and over-the-counter medicines). Read all labels to be sure you don't take too much.   For children, check the Tylenol bottle for the right dose. The dose is based on the child's age or weight.    If you have other health problems (like cancer, heart failure, an organ transplant or severe kidney disease): Call your specialty clinic if you don't feel better in the next 2 days.       Know when to call 911. Emergency warning signs include:    Trouble breathing or shortness of breath Pain or pressure in the chest that doesn't go away Feeling confused like you haven't felt before, or not being able to wake up Bluish-colored lips or face.  Where can I get more information?    [facility] -- About COVID-19: [email or web address]   CDC -- What to Do If You're Sick: www.cdc.gov/coronavirus/2019-ncov/about/steps-when-sick.html   CDC -- Ending Home Isolation: www.cdc.gov/coronavirus/2019-ncov/hcp/disposition-in-home-patients.html    CDC -- Caring for Someone: www.cdc.gov/coronavirus/2019-ncov/if-you-are-sick/care-for-someone.html   [facility] -- Interim Guidance for Hospital Discharge to Home: www.health.[facility].[place]/diseases/coronavirus/hcp/hospdischarge.pdf   [facility] clinical trials (COVID-19 research studies): clinicalaffairs.[facility]-clinical-trials    Below are the COVID-19 hotlines at the [place] Department of Health ([facility]). Interpreters are available.    For health questions: Call 100-548-4435 or 1-776.503.7261 (7 a.m. to 7 p.m.) For questions about schools and childcare: Call 737-757-2571 or 1-862.798.5005 (7 a.m. to 7 p.m.)    Diagnosis: Other malaise  Diagnosis ICD: R53.81  Triage Notes: I reviewed the patient's history, verified their identity, and explained the [facility] Visit process.    recent cataract surgery  Synchronous Triage: phone, status: completed, duration: 119 seconds

## 2020-10-12 ENCOUNTER — TELEPHONE (OUTPATIENT)
Dept: INTERNAL MEDICINE | Facility: CLINIC | Age: 65
End: 2020-10-12

## 2020-10-12 ENCOUNTER — ANTICOAGULATION THERAPY VISIT (OUTPATIENT)
Dept: ANTICOAGULATION | Facility: CLINIC | Age: 65
End: 2020-10-12

## 2020-10-12 DIAGNOSIS — I48.0 PAROXYSMAL A-FIB (H): ICD-10-CM

## 2020-10-12 DIAGNOSIS — I48.91 ATRIAL FIBRILLATION (H): ICD-10-CM

## 2020-10-12 DIAGNOSIS — I48.91 ATRIAL FIBRILLATION, UNSPECIFIED TYPE (H): Primary | ICD-10-CM

## 2020-10-12 DIAGNOSIS — I48.91 ATRIAL FIBRILLATION, UNSPECIFIED TYPE (H): ICD-10-CM

## 2020-10-12 DIAGNOSIS — I49.8 VENTRICULAR BIGEMINY: ICD-10-CM

## 2020-10-12 DIAGNOSIS — Z79.01 LONG TERM CURRENT USE OF ANTICOAGULANTS WITH INR GOAL OF 2.0-3.0: ICD-10-CM

## 2020-10-12 LAB
CAPILLARY BLOOD COLLECTION: NORMAL
INR PPP: 2.9 (ref 0.86–1.14)

## 2020-10-12 PROCEDURE — 85610 PROTHROMBIN TIME: CPT | Performed by: INTERNAL MEDICINE

## 2020-10-12 PROCEDURE — 36416 COLLJ CAPILLARY BLOOD SPEC: CPT | Performed by: INTERNAL MEDICINE

## 2020-10-12 NOTE — PROGRESS NOTES
ANTICOAGULATION MANAGEMENT     Patient Name:  Amparo Mccray  Date:  10/12/2020    ASSESSMENT /SUBJECTIVE:    Today's INR result of 2.9 is therapeutic. Goal INR of 2.0-3.0      Warfarin dose taken: Warfarin taken as instructed    Diet: No new diet changes affecting INR    Medication changes/ interactions: No new medications/supplements affecting INR    Previous INR: Therapeutic     S/S of bleeding or thromboembolism: No    New injury or illness: No    Upcoming surgery, procedure or cardioversion: No    Additional findings: None      PLAN:    Telephone call with Amparo regarding INR result and instructed:     Warfarin Dosing Instructions: Continue your current warfarin dose 1.25mg Wed/Sat and 2.5mg AoD    Instructed patient to follow up no later than: 6 weeks  Lab visit scheduled    Education provided: None required      Amparo verbalizes understanding and agrees to warfarin dosing plan.    Instructed to call the Anticoagulation Clinic for any changes, questions or concerns. (#[phone])        Liliana Valdez RN      OBJECTIVE:  Recent labs: (last 7 days)     10/12/20  1003   INR 2.90*         INR assessment THER    Recheck INR In: 6 WEEKS    INR Location Clinic      Anticoagulation Summary  As of 10/12/2020    INR goal:  2.0-3.0   TTR:  95.3 % (1 y)   INR used for dosing:  No new INR was available at the time of this encounter.   Warfarin maintenance plan:  1.25 mg (2.5 mg x 0.5) every Wed, Sat; 2.5 mg (2.5 mg x 1) all other days   Full warfarin instructions:  1.25 mg every Wed, Sat; 2.5 mg all other days   Weekly warfarin total:  15 mg   No change documented:  Liliana Valdez RN   Plan last modified:  Willow Ellison RN (11/7/2017)   Next INR check:  11/23/2020   Priority:  Maintenance   Target end date:  Indefinite    Indications    Atrial fibrillation (H) [I48.91]  Ventricular bigeminy [I49.8]  Long term current use of anticoagulants with INR goal of 2.0-3.0 [Z79.01]  Atrial  fibrillation  unspecified type (H) [I48.91]             Anticoagulation Episode Summary     INR check location:  Anticoagulation Clinic    Preferred lab:      Send INR reminders to:  [facility]    Comments:        Anticoagulation Care Providers     Provider Role Specialty Phone number    Alan Almeida MD Referring Internal Medicine [phone]

## 2020-11-02 DIAGNOSIS — I10 ESSENTIAL HYPERTENSION, BENIGN: ICD-10-CM

## 2020-11-03 RX ORDER — TRIAMTERENE/HYDROCHLOROTHIAZID 37.5-25 MG
1 TABLET ORAL EVERY MORNING
Qty: 90 TABLET | Refills: 2 | Status: SHIPPED | OUTPATIENT
Start: 2020-11-03 | End: 2021-07-26

## 2020-11-16 DIAGNOSIS — I10 ESSENTIAL HYPERTENSION, BENIGN: ICD-10-CM

## 2020-11-17 RX ORDER — LISINOPRIL 10 MG/1
10 TABLET ORAL DAILY
Qty: 90 TABLET | Refills: 2 | Status: SHIPPED | OUTPATIENT
Start: 2020-11-17 | End: 2021-07-26

## 2020-11-23 ENCOUNTER — ANTICOAGULATION THERAPY VISIT (OUTPATIENT)
Dept: ANTICOAGULATION | Facility: CLINIC | Age: 65
End: 2020-11-23

## 2020-11-23 DIAGNOSIS — I48.91 ATRIAL FIBRILLATION (H): ICD-10-CM

## 2020-11-23 DIAGNOSIS — I48.91 ATRIAL FIBRILLATION, UNSPECIFIED TYPE (H): ICD-10-CM

## 2020-11-23 DIAGNOSIS — I48.0 PAROXYSMAL A-FIB (H): ICD-10-CM

## 2020-11-23 DIAGNOSIS — I49.8 VENTRICULAR BIGEMINY: ICD-10-CM

## 2020-11-23 DIAGNOSIS — Z79.01 LONG TERM CURRENT USE OF ANTICOAGULANTS WITH INR GOAL OF 2.0-3.0: ICD-10-CM

## 2020-11-23 LAB
CAPILLARY BLOOD COLLECTION: NORMAL
INR PPP: 3 (ref 0.86–1.14)

## 2020-11-23 PROCEDURE — 85610 PROTHROMBIN TIME: CPT | Performed by: INTERNAL MEDICINE

## 2020-11-23 PROCEDURE — 36416 COLLJ CAPILLARY BLOOD SPEC: CPT | Performed by: INTERNAL MEDICINE

## 2020-11-23 NOTE — PROGRESS NOTES
ANTICOAGULATION MANAGEMENT     Patient Name:  Amparo Mccray  Date:  11/23/2020    ASSESSMENT /SUBJECTIVE:    Today's INR result of 3.0 is therapeutic. Goal INR of 2.0-3.0      Warfarin dose taken: Warfarin taken as instructed    Diet: No new diet changes affecting INR    Medication changes/ interactions: No new medications/supplements affecting INR    Previous INR: Therapeutic     S/S of bleeding or thromboembolism: No    New injury or illness: No    Upcoming surgery, procedure or cardioversion: No    Additional findings: None      PLAN:    Telephone call with Amparo regarding INR result and instructed:     Warfarin Dosing Instructions: Continue your current warfarin dose 1.25mg Wed Sat and 2.5mg AOD    Instructed patient to follow up no later than: 6 weeks  Lab visit scheduled    Education provided: None required      Amparo verbalizes understanding and agrees to warfarin dosing plan.    Instructed to call the Anticoagulation Clinic for any changes, questions or concerns. (#[phone])        Liliana Valdez RN      OBJECTIVE:  Recent labs: (last 7 days)     11/23/20  1031   INR 3.00*         No question data found.  Anticoagulation Summary  As of 11/23/2020    INR goal:  2.0-3.0   TTR:  95.3 % (1 y)   INR used for dosing:  No new INR was available at the time of this encounter.   Warfarin maintenance plan:  1.25 mg (2.5 mg x 0.5) every Wed, Sat; 2.5 mg (2.5 mg x 1) all other days   Full warfarin instructions:  1.25 mg every Wed, Sat; 2.5 mg all other days   Weekly warfarin total:  15 mg   No change documented:  Liliana Valdez RN   Plan last modified:  Willow Ellison RN (11/7/2017)   Next INR check:  1/11/2021   Priority:  Maintenance   Target end date:  Indefinite    Indications    Atrial fibrillation (H) [I48.91]  Ventricular bigeminy [I49.8]  Long term current use of anticoagulants with INR goal of 2.0-3.0 [Z79.01]  Atrial fibrillation  unspecified type (H) [I48.91]             Anticoagulation  Episode Summary     INR check location:  Anticoagulation Clinic    Preferred lab:      Send INR reminders to:  [facility]    Comments:        Anticoagulation Care Providers     Provider Role Specialty Phone number    Alan Almeida MD Referring Internal Medicine [phone]       Anticoagulation Management    Unable to reach Amparo today.    Today's INR result of 3.0 is therapeutic (goal INR of 2.0-3.0).  Result received from: Clinic Lab    Follow up required to confirm warfarin dose taken and assess for changes    LMTCB transfer to [phone]      Anticoagulation clinic to follow up    Liliana Valdez RN

## 2020-11-29 ENCOUNTER — HEALTH MAINTENANCE LETTER (OUTPATIENT)
Age: 65
End: 2020-11-29

## 2020-12-26 DIAGNOSIS — I10 ESSENTIAL HYPERTENSION, BENIGN: ICD-10-CM

## 2020-12-29 RX ORDER — METOPROLOL TARTRATE 50 MG
TABLET ORAL
Qty: 180 TABLET | Refills: 1 | Status: SHIPPED | OUTPATIENT
Start: 2020-12-29 | End: 2021-06-17

## 2021-01-11 ENCOUNTER — ANTICOAGULATION THERAPY VISIT (OUTPATIENT)
Dept: ANTICOAGULATION | Facility: CLINIC | Age: 66
End: 2021-01-11

## 2021-01-11 DIAGNOSIS — I48.91 ATRIAL FIBRILLATION (H): ICD-10-CM

## 2021-01-11 DIAGNOSIS — Z79.01 LONG TERM CURRENT USE OF ANTICOAGULANTS WITH INR GOAL OF 2.0-3.0: ICD-10-CM

## 2021-01-11 DIAGNOSIS — I48.91 ATRIAL FIBRILLATION, UNSPECIFIED TYPE (H): ICD-10-CM

## 2021-01-11 DIAGNOSIS — I49.8 VENTRICULAR BIGEMINY: ICD-10-CM

## 2021-01-11 DIAGNOSIS — I48.0 PAROXYSMAL A-FIB (H): ICD-10-CM

## 2021-01-11 LAB
CAPILLARY BLOOD COLLECTION: NORMAL
INR PPP: 2.6 (ref 0.86–1.14)

## 2021-01-11 PROCEDURE — 36416 COLLJ CAPILLARY BLOOD SPEC: CPT | Performed by: INTERNAL MEDICINE

## 2021-01-11 PROCEDURE — 99207 PR NO CHARGE NURSE ONLY: CPT

## 2021-01-11 PROCEDURE — 85610 PROTHROMBIN TIME: CPT | Performed by: INTERNAL MEDICINE

## 2021-01-11 NOTE — PROGRESS NOTES
ANTICOAGULATION FOLLOW-UP CLINIC VISIT    Patient Name:  Amparo Mccray  Date:  2021  Contact Type:  Telephone    SUBJECTIVE:  Patient Findings     Comments:  The patient was assessed for diet, medication, and activity level changes, missed or extra doses, bruising or bleeding, with no problem findings.          Clinical Outcomes     Comments:  The patient was assessed for diet, medication, and activity level changes, missed or extra doses, bruising or bleeding, with no problem findings.             OBJECTIVE    Recent labs: (last 7 days)     21  1058   INR 2.60*       ASSESSMENT / PLAN  INR assessment THER    Recheck INR In: 6 WEEKS    INR Location Clinic      Anticoagulation Summary  As of 2021    INR goal:  2.0-3.0   TTR:  95.3 % (1 y)   INR used for dosin.60 (2021)   Warfarin maintenance plan:  1.25 mg (2.5 mg x 0.5) every Wed, Sat; 2.5 mg (2.5 mg x 1) all other days   Full warfarin instructions:  1.25 mg every Wed, Sat; 2.5 mg all other days   Weekly warfarin total:  15 mg   Plan last modified:  Willow Ellison RN (2017)   Next INR check:  2021   Priority:  Maintenance   Target end date:  Indefinite    Indications    Atrial fibrillation (H) [I48.91]  Ventricular bigeminy [I49.8]  Long term current use of anticoagulants with INR goal of 2.0-3.0 [Z79.01]  Atrial fibrillation  unspecified type (H) [I48.91]             Anticoagulation Episode Summary     INR check location:  Anticoagulation Clinic    Preferred lab:      Send [place] reminders [place]:  [facility]    Comments:        Anticoagulation Care Providers     Provider Role Specialty Phone number    Alan Almeida MD Referring Internal Medicine [phone]            See the Encounter Report to view Anticoagulation Flowsheet and Dosing Calendar (Go to Encounters tab in chart review, and find the Anticoagulation Therapy Visit)        Natalya Watson RN

## 2021-02-22 ENCOUNTER — ANTICOAGULATION THERAPY VISIT (OUTPATIENT)
Dept: ANTICOAGULATION | Facility: CLINIC | Age: 66
End: 2021-02-22

## 2021-02-22 DIAGNOSIS — I49.8 VENTRICULAR BIGEMINY: ICD-10-CM

## 2021-02-22 DIAGNOSIS — I48.91 ATRIAL FIBRILLATION (H): ICD-10-CM

## 2021-02-22 DIAGNOSIS — I48.0 PAROXYSMAL A-FIB (H): ICD-10-CM

## 2021-02-22 DIAGNOSIS — Z79.01 LONG TERM CURRENT USE OF ANTICOAGULANTS WITH INR GOAL OF 2.0-3.0: ICD-10-CM

## 2021-02-22 DIAGNOSIS — I48.91 ATRIAL FIBRILLATION, UNSPECIFIED TYPE (H): ICD-10-CM

## 2021-02-22 LAB
CAPILLARY BLOOD COLLECTION: NORMAL
INR PPP: 2.9 (ref 0.86–1.14)

## 2021-02-22 PROCEDURE — 85610 PROTHROMBIN TIME: CPT | Performed by: INTERNAL MEDICINE

## 2021-02-22 PROCEDURE — 36416 COLLJ CAPILLARY BLOOD SPEC: CPT | Performed by: INTERNAL MEDICINE

## 2021-02-22 NOTE — PROGRESS NOTES
ANTICOAGULATION MANAGEMENT     Patient Name:  Amparo Mccray  Date:  2/22/2021    ASSESSMENT /SUBJECTIVE:    Today's INR result of 2.9 is therapeutic. Goal INR of 2.0-3.0      Warfarin dose taken: Warfarin taken as instructed    Diet: No new diet changes affecting INR    Medication changes/ interactions: No new medications/supplements affecting INR    Previous INR: Therapeutic     S/S of bleeding or thromboembolism: No    New injury or illness: No    Upcoming surgery, procedure or cardioversion: No    Additional findings: None      PLAN:    Telephone call with Amparo regarding INR result and instructed:     Warfarin Dosing Instructions: Continue your current warfarin dose 1.25mg Wed/Sat and 2.5mg AOD    Instructed patient to follow up no later than: 6 weeks  Lab visit scheduled    Education provided: None required      Amparo verbalizes understanding and agrees to warfarin dosing plan.    Instructed to call the Anticoagulation Clinic for any changes, questions or concerns. (#[phone])        Liliana Valdez RN      OBJECTIVE:  Recent labs: (last 7 days)     02/22/21  1101   INR 2.90*         No question data found.  Anticoagulation Summary  As of 2/22/2021    INR goal:  2.0-3.0   TTR:  95.3 % (1 y)   INR used for dosing:  No new INR was available at the time of this encounter.   Warfarin maintenance plan:  1.25 mg (2.5 mg x 0.5) every Wed, Sat; 2.5 mg (2.5 mg x 1) all other days   Full warfarin instructions:  1.25 mg every Wed, Sat; 2.5 mg all other days   Weekly warfarin total:  15 mg   No change documented:  Liliana Valdez RN   Plan last modified:  Willow Ellison RN (11/7/2017)   Next INR check:  4/5/2021   Priority:  Maintenance   Target end date:  Indefinite    Indications    Atrial fibrillation (H) [I48.91]  Ventricular bigeminy [I49.8]  Long term current use of anticoagulants with INR goal of 2.0-3.0 [Z79.01]  Atrial fibrillation  unspecified type (H) [I48.91]             Anticoagulation  Episode Summary     INR check location:  Anticoagulation Clinic    Preferred lab:      Send INR reminders to:  [facility]    Comments:        Anticoagulation Care Providers     Provider Role Specialty Phone number    Alan Almeida MD Referring Internal Medicine [phone]

## 2021-03-18 ENCOUNTER — IMMUNIZATION (OUTPATIENT)
Dept: NURSING | Facility: CLINIC | Age: 66
End: 2021-03-18
Payer: MEDICARE

## 2021-03-18 PROCEDURE — 91301 PR COVID VAC MODERNA 100 MCG/0.5 ML IM: CPT

## 2021-03-18 PROCEDURE — 0011A PR COVID VAC MODERNA 100 MCG/0.5 ML IM: CPT

## 2021-03-22 ENCOUNTER — ANTICOAGULATION THERAPY VISIT (OUTPATIENT)
Dept: ANTICOAGULATION | Facility: CLINIC | Age: 66
End: 2021-03-22

## 2021-03-22 DIAGNOSIS — Z79.01 LONG TERM CURRENT USE OF ANTICOAGULANTS WITH INR GOAL OF 2.0-3.0: ICD-10-CM

## 2021-03-22 DIAGNOSIS — I48.0 PAROXYSMAL A-FIB (H): ICD-10-CM

## 2021-03-22 DIAGNOSIS — I48.91 ATRIAL FIBRILLATION, UNSPECIFIED TYPE (H): ICD-10-CM

## 2021-03-22 DIAGNOSIS — I49.8 VENTRICULAR BIGEMINY: ICD-10-CM

## 2021-03-22 DIAGNOSIS — I48.91 ATRIAL FIBRILLATION (H): ICD-10-CM

## 2021-03-22 LAB
CAPILLARY BLOOD COLLECTION: NORMAL
INR PPP: 2.2 (ref 0.86–1.14)

## 2021-03-22 PROCEDURE — 36416 COLLJ CAPILLARY BLOOD SPEC: CPT | Performed by: INTERNAL MEDICINE

## 2021-03-22 PROCEDURE — 85610 PROTHROMBIN TIME: CPT | Performed by: INTERNAL MEDICINE

## 2021-03-22 NOTE — PROGRESS NOTES
Anticoagulation Management    Unable to reach Amparo today.    Today's INR result of 2.2 is therapeutic (goal INR of 2.0-3.0).  Result received from: Clinic Lab    Follow up required to confirm warfarin dose taken and assess for changes    Left message to continue current dose of warfarin 2.5 mg tonight.      Anticoagulation clinic to follow up    Liliana Valdez RN

## 2021-03-23 DIAGNOSIS — I48.91 ATRIAL FIBRILLATION (H): Primary | ICD-10-CM

## 2021-03-23 NOTE — PROGRESS NOTES
ANTICOAGULATION MANAGEMENT     Patient Name:  Amparo Mccray  Date:  3/23/2021    ASSESSMENT /SUBJECTIVE:    Today's INR result of 2.2 is therapeutic. Goal INR of 2.0-3.0      Warfarin dose taken: Warfarin taken as instructed    Diet: No new diet changes affecting INR    Medication changes/ interactions: No new medications/supplements affecting INR    Previous INR: Therapeutic     S/S of bleeding or thromboembolism: No    New injury or illness: No    Upcoming surgery, procedure or cardioversion: No    Additional findings: None      PLAN:    Telephone call with Amparo regarding INR result and instructed:     Warfarin Dosing Instructions: Continue your current warfarin dose 1.25mg Wed/Sat and 2.5 mg AOD    Instructed patient to follow up no later than: 6 weeks  Lab visit scheduled    Education provided: None required      Amparo verbalizes understanding and agrees to warfarin dosing plan.    Instructed to call the Anticoagulation Clinic for any changes, questions or concerns. (#[phone])        Liliana Valdez RN      OBJECTIVE:  Recent labs: (last 7 days)     03/22/21  1106   INR 2.20*         No question data found.  Anticoagulation Summary  As of 3/22/2021    INR goal:  2.0-3.0   TTR:  100.0 % (1 y)   INR used for dosing:  No new INR was available at the time of this encounter.   Warfarin maintenance plan:  1.25 mg (2.5 mg x 0.5) every Wed, Sat; 2.5 mg (2.5 mg x 1) all other days   Full warfarin instructions:  1.25 mg every Wed, Sat; 2.5 mg all other days   Weekly warfarin total:  15 mg   No change documented:  Liliana Valdez RN   Plan last modified:  Willow Ellison RN (11/7/2017)   Next INR check:  5/3/2021   Priority:  Maintenance   Target end date:  Indefinite    Indications    Atrial fibrillation (H) [I48.91]  Ventricular bigeminy [I49.8]  Long term current use of anticoagulants with INR goal of 2.0-3.0 [Z79.01]  Atrial fibrillation  unspecified type (H) [I48.91]             Anticoagulation  Episode Summary     INR check location:  Anticoagulation Clinic    Preferred lab:      Send INR reminders to:  [facility]    Comments:        Anticoagulation Care Providers     Provider Role Specialty Phone number    Alan Almeida MD Referring Internal Medicine [phone]

## 2021-03-24 DIAGNOSIS — I48.0 PAROXYSMAL ATRIAL FIBRILLATION (H): ICD-10-CM

## 2021-03-24 DIAGNOSIS — Z79.01 LONG TERM CURRENT USE OF ANTICOAGULANT THERAPY: ICD-10-CM

## 2021-03-24 DIAGNOSIS — I49.8 VENTRICULAR BIGEMINY: ICD-10-CM

## 2021-03-24 RX ORDER — WARFARIN SODIUM 2.5 MG/1
TABLET ORAL
Qty: 90 TABLET | Refills: 1 | Status: SHIPPED | OUTPATIENT
Start: 2021-03-24 | End: 2021-11-03

## 2021-04-06 ENCOUNTER — TRANSFERRED RECORDS (OUTPATIENT)
Dept: HEALTH INFORMATION MANAGEMENT | Facility: CLINIC | Age: 66
End: 2021-04-06

## 2021-04-10 ENCOUNTER — HEALTH MAINTENANCE LETTER (OUTPATIENT)
Age: 66
End: 2021-04-10

## 2021-04-15 ENCOUNTER — IMMUNIZATION (OUTPATIENT)
Dept: NURSING | Facility: CLINIC | Age: 66
End: 2021-04-15
Attending: INTERNAL MEDICINE
Payer: MEDICARE

## 2021-04-15 PROCEDURE — 91301 PR COVID VAC MODERNA 100 MCG/0.5 ML IM: CPT

## 2021-04-15 PROCEDURE — 0012A PR COVID VAC MODERNA 100 MCG/0.5 ML IM: CPT

## 2021-04-27 DIAGNOSIS — G70.00 MYASTHENIA GRAVIS WITHOUT EXACERBATION (H): ICD-10-CM

## 2021-04-27 RX ORDER — PYRIDOSTIGMINE BROMIDE 60 MG/1
TABLET ORAL
Qty: 90 TABLET | Refills: 3 | Status: SHIPPED | OUTPATIENT
Start: 2021-04-27 | End: 2022-03-17

## 2021-04-27 NOTE — TELEPHONE ENCOUNTER
Rx Authorization:    Requested Medication/ Dose pyridostigmine (MESTINON) 60 MG tablet    Date last refill ordered: 5/12/20    Quantity ordered: 90    # refills: 3    Date of last clinic visit with ordering provider: 5/12/20    Date of next clinic visit with ordering provider:     All pertinent protocol data (lab date/result):     Include pertinent information from patients message:

## 2021-05-11 ENCOUNTER — ANTICOAGULATION THERAPY VISIT (OUTPATIENT)
Dept: ANTICOAGULATION | Facility: CLINIC | Age: 66
End: 2021-05-11

## 2021-05-11 DIAGNOSIS — I48.91 ATRIAL FIBRILLATION, UNSPECIFIED TYPE (H): ICD-10-CM

## 2021-05-11 DIAGNOSIS — I48.91 ATRIAL FIBRILLATION (H): ICD-10-CM

## 2021-05-11 DIAGNOSIS — Z79.01 LONG TERM CURRENT USE OF ANTICOAGULANTS WITH INR GOAL OF 2.0-3.0: ICD-10-CM

## 2021-05-11 DIAGNOSIS — I49.8 VENTRICULAR BIGEMINY: ICD-10-CM

## 2021-05-11 LAB
CAPILLARY BLOOD COLLECTION: NORMAL
INR PPP: 2.6 (ref 0.86–1.14)

## 2021-05-11 PROCEDURE — 85610 PROTHROMBIN TIME: CPT | Performed by: INTERNAL MEDICINE

## 2021-05-11 PROCEDURE — 99207 PR NO CHARGE NURSE ONLY: CPT

## 2021-05-11 PROCEDURE — 36416 COLLJ CAPILLARY BLOOD SPEC: CPT | Performed by: INTERNAL MEDICINE

## 2021-05-11 NOTE — PROGRESS NOTES
ANTICOAGULATION FOLLOW-UP CLINIC VISIT    Patient Name:  Amparo Mccray  Date:  2021  Contact Type:  Telephone    SUBJECTIVE:  Patient Findings     Comments:  The patient was assessed for diet, medication, and activity level changes, missed or extra doses, bruising or bleeding, with no problem findings.          Clinical Outcomes     Comments:  The patient was assessed for diet, medication, and activity level changes, missed or extra doses, bruising or bleeding, with no problem findings.             OBJECTIVE    Recent labs: (last 7 days)     21  1017   INR 2.60*       ASSESSMENT / PLAN  INR assessment THER    Recheck INR In: 6 WEEKS    INR Location Clinic      Anticoagulation Summary  As of 2021    INR goal:  2.0-3.0   TTR:  100.0 % (1 y)   INR used for dosin.60 (2021)   Warfarin maintenance plan:  1.25 mg (2.5 mg x 0.5) every Wed, Sat; 2.5 mg (2.5 mg x 1) all other days   Full warfarin instructions:  1.25 mg every Wed, Sat; 2.5 mg all other days   Weekly warfarin total:  15 mg   No change documented:  Natalya Watson, RN   Plan last modified:  Willow Ellison, RN (2017)   Next INR check:  2021   Priority:  Maintenance   Target end date:  Indefinite    Indications    Atrial fibrillation (H) [I48.91]  Ventricular bigeminy [I49.8]  Long term current use of anticoagulants with INR goal of 2.0-3.0 [Z79.01]  Atrial fibrillation  unspecified type (H) [I48.91]             Anticoagulation Episode Summary     INR check location:  Anticoagulation Clinic    Preferred lab:      Send INR reminders to:  [facility]    Comments:        Anticoagulation Care Providers     Provider Role Specialty Phone number    Alan Almeida MD Referring Internal Medicine [phone]            See the Encounter Report to view Anticoagulation Flowsheet and Dosing Calendar (Go to Encounters tab in chart review, and find the Anticoagulation Therapy Visit)        Natalya Watson,  RN

## 2021-06-15 ENCOUNTER — MYC MEDICAL ADVICE (OUTPATIENT)
Dept: INTERNAL MEDICINE | Facility: CLINIC | Age: 66
End: 2021-06-15

## 2021-06-15 NOTE — TELEPHONE ENCOUNTER
Patient Quality Outreach      Summary:    Patient has the following on her problem list/HM: None    Patient is due/failing the following:   Breast Cancer Screening - Mammogram and Cervical Cancer Screening - PAP Needed    Type of outreach:    Sent [employer] message.    Questions for provider review:    None                                                                                                                                     Karol Dominguez CMA       Chart routed to .

## 2021-06-16 DIAGNOSIS — I10 ESSENTIAL HYPERTENSION, BENIGN: ICD-10-CM

## 2021-06-17 RX ORDER — METOPROLOL TARTRATE 50 MG
TABLET ORAL
Qty: 180 TABLET | Refills: 0 | Status: SHIPPED | OUTPATIENT
Start: 2021-06-17 | End: 2021-09-28

## 2021-06-22 ENCOUNTER — ANTICOAGULATION THERAPY VISIT (OUTPATIENT)
Dept: ANTICOAGULATION | Facility: CLINIC | Age: 66
End: 2021-06-22

## 2021-06-22 DIAGNOSIS — Z79.01 LONG TERM CURRENT USE OF ANTICOAGULANTS WITH INR GOAL OF 2.0-3.0: ICD-10-CM

## 2021-06-22 DIAGNOSIS — I48.91 ATRIAL FIBRILLATION (H): ICD-10-CM

## 2021-06-22 DIAGNOSIS — I48.91 ATRIAL FIBRILLATION, UNSPECIFIED TYPE (H): ICD-10-CM

## 2021-06-22 DIAGNOSIS — I49.8 VENTRICULAR BIGEMINY: ICD-10-CM

## 2021-06-22 LAB
CAPILLARY BLOOD COLLECTION: NORMAL
INR PPP: 3.4 (ref 0.86–1.14)

## 2021-06-22 PROCEDURE — 36416 COLLJ CAPILLARY BLOOD SPEC: CPT | Performed by: INTERNAL MEDICINE

## 2021-06-22 PROCEDURE — 85610 PROTHROMBIN TIME: CPT | Performed by: INTERNAL MEDICINE

## 2021-06-22 PROCEDURE — 99207 PR NO CHARGE NURSE ONLY: CPT

## 2021-06-22 NOTE — PROGRESS NOTES
ANTICOAGULATION MANAGEMENT     Amparolesli Whittenmicki Shazia 65 year old female is on warfarin with supratherapeutic INR result. (Goal INR 2.0-3.0)    Recent labs: (last 7 days)     06/22/21  1024   INR 3.40*       ASSESSMENT     Source(s): Patient/Caregiver Call       Warfarin doses taken: Warfarin taken as instructed    Diet: Decreased greens/vitamin K in diet; plans to resume previous intake.  Was on camping trip last week    New illness, injury, or hospitalization: No    Medication/supplement changes: None noted    Signs or symptoms of bleeding or clotting: No    Previous INR: Therapeutic last 2(+) visits    Additional findings: None     PLAN     Recommended plan for no diet, medication or health factor changes affecting INR     1osing Instructions: Hold ** doses then continue your current warfarin dose with next INR in 1 day       Summary  As of 6/22/2021    Full warfarin instructions:  6/23: Hold; Otherwise 1.25 mg every Wed, Sat; 2.5 mg all other days   Next INR check:  7/6/2021             Telephone call with Amparo whom verbalizes understanding and agrees to plan    Lab visit scheduled    Education provided: Importance of consistent vitamin K intake    Plan made per [facility] anticoagulation protocol    Natalya Watson, RN  Anticoagulation Clinic  6/22/2021    _______________________________________________________________________     Anticoagulation Episode Summary     Current INR goal:  2.0-3.0   TTR:  94.3 % (1 y)   Target end date:  Indefinite   Send INR reminders to:  [facility]    Indications    Atrial fibrillation (H) [I48.91]  Ventricular bigeminy [I49.8]  Long term current use of anticoagulants with INR goal of 2.0-3.0 [Z79.01]  Atrial fibrillation  unspecified type (H) [I48.91]           Comments:           Anticoagulation Care Providers     Provider Role Specialty Phone number    Alan Almeida MD Referring Internal Medicine [phone]        ANTICOAGULATION FOLLOW-UP CLINIC  VISIT    Patient Name:  Amparo Mccray  Date:  6/22/2021  Contact Type:  Telephone    SUBJECTIVE:         OBJECTIVE    Recent labs: (last 7 days)     06/22/21  1024   INR 3.40*       ASSESSMENT / PLAN  No question data found.  Anticoagulation Summary  As of 6/22/2021    INR goal:  2.0-3.0   TTR:  94.3 % (1 y)   INR used for dosing:  3.40 (6/22/2021)   Warfarin maintenance plan:  1.25 mg (2.5 mg x 0.5) every Wed, Sat; 2.5 mg (2.5 mg x 1) all other days   Full warfarin instructions:  6/23: Hold; Otherwise 1.25 mg every Wed, Sat; 2.5 mg all other days   Weekly warfarin total:  15 mg   Plan last modified:  Natalya Watson RN (6/22/2021)   Next INR check:  7/6/2021   Priority:  Maintenance   Target end date:  Indefinite    Indications    Atrial fibrillation (H) [I48.91]  Ventricular bigeminy [I49.8]  Long term current use of anticoagulants with INR goal of 2.0-3.0 [Z79.01]  Atrial fibrillation  unspecified type (H) [I48.91]             Anticoagulation Episode Summary     INR check location:  Anticoagulation Clinic    Preferred lab:      Send INR reminders to:  [facility]    Comments:        Anticoagulation Care Providers     Provider Role Specialty Phone number    Alan Almeida MD Referring Internal Medicine [phone]            See the Encounter Report to view Anticoagulation Flowsheet and Dosing Calendar (Go to Encounters tab in chart review, and find the Anticoagulation Therapy Visit)        Natalya Watson RN

## 2021-07-21 ENCOUNTER — TELEPHONE (OUTPATIENT)
Dept: INTERNAL MEDICINE | Facility: CLINIC | Age: 66
End: 2021-07-21

## 2021-07-21 DIAGNOSIS — I10 ESSENTIAL HYPERTENSION, BENIGN: ICD-10-CM

## 2021-07-21 DIAGNOSIS — I48.91 ATRIAL FIBRILLATION, UNSPECIFIED TYPE (H): Primary | ICD-10-CM

## 2021-07-21 NOTE — TELEPHONE ENCOUNTER
ANTICOAGULATION     Amparo Mccray is overdue for INR check.      Spoke with Amparo and scheduled lab appointment on 7/28    Liliana Valdez RN

## 2021-07-23 NOTE — TELEPHONE ENCOUNTER
Routing refill request to provider for review/approval because:  Labs not current:    Creatinine   Date Value Ref Range Status   06/02/2020 0.84 0.52 - 1.04 mg/dL Final     Potassium   Date Value Ref Range Status   06/02/2020 3.8 3.4 - 5.3 mmol/L Final     Patient has lab appointment on 7/28/21.         Ksenia Dubose RN  [facility]

## 2021-07-26 RX ORDER — TRIAMTERENE/HYDROCHLOROTHIAZID 37.5-25 MG
1 TABLET ORAL EVERY MORNING
Qty: 90 TABLET | Refills: 0 | Status: SHIPPED | OUTPATIENT
Start: 2021-07-26 | End: 2021-11-02

## 2021-07-26 RX ORDER — LISINOPRIL 10 MG/1
TABLET ORAL
Qty: 90 TABLET | Refills: 0 | Status: SHIPPED | OUTPATIENT
Start: 2021-07-26 | End: 2021-09-24 | Stop reason: ALTCHOICE

## 2021-07-28 ENCOUNTER — ANTICOAGULATION THERAPY VISIT (OUTPATIENT)
Dept: ANTICOAGULATION | Facility: CLINIC | Age: 66
End: 2021-07-28

## 2021-07-28 ENCOUNTER — LAB (OUTPATIENT)
Dept: LAB | Facility: CLINIC | Age: 66
End: 2021-07-28
Payer: MEDICARE

## 2021-07-28 DIAGNOSIS — I48.91 ATRIAL FIBRILLATION (H): Primary | ICD-10-CM

## 2021-07-28 DIAGNOSIS — Z79.01 LONG TERM CURRENT USE OF ANTICOAGULANTS WITH INR GOAL OF 2.0-3.0: ICD-10-CM

## 2021-07-28 DIAGNOSIS — I48.91 ATRIAL FIBRILLATION, UNSPECIFIED TYPE (H): ICD-10-CM

## 2021-07-28 DIAGNOSIS — I49.8 VENTRICULAR BIGEMINY: ICD-10-CM

## 2021-07-28 LAB — INR BLD: 3.1 (ref 0.9–1.1)

## 2021-07-28 PROCEDURE — 36416 COLLJ CAPILLARY BLOOD SPEC: CPT

## 2021-07-28 PROCEDURE — 85610 PROTHROMBIN TIME: CPT

## 2021-07-28 NOTE — PROGRESS NOTES
ANTICOAGULATION MANAGEMENT     Amparo Chan Shazia 65 year old female is on warfarin with supratherapeutic INR result. (Goal INR 2.0-3.0)    Recent labs: (last 7 days)     07/28/21  1340   INR 3.1*       ASSESSMENT     Source(s): Chart Review and Patient/Caregiver Call       Warfarin doses taken: Warfarin taken as instructed    Diet: No new diet changes identified    New illness, injury, or hospitalization: No    Medication/supplement changes: None noted    Signs or symptoms of bleeding or clotting: No    Previous INR: Supratherapeutic    Additional findings: None     PLAN     Recommended plan for no diet, medication or health factor changes affecting INR     Dosing Instructions:  Decrease your warfarin dose (8% change) with next INR in 2 weeks       Summary  As of 7/28/2021    Full warfarin instructions:  1.25 mg every Mon, Wed, Sat; 2.5 mg all other days   Next INR check:  8/11/2021             Telephone call with Amparo who verbalizes understanding and agrees to plan    Lab visit scheduled    Education provided: Target INR goal and significance of current INR result, Monitoring for bleeding signs and symptoms and Monitoring for clotting signs and symptoms    Plan made per [facility] anticoagulation protocol    Liliana Valdez, RN  Anticoagulation Clinic  7/28/2021    _______________________________________________________________________     Anticoagulation Episode Summary     Current INR goal:  2.0-3.0   TTR:  84.4 % (1 y)   Target end date:  Indefinite   Send INR reminders to:  [facility]    Indications    Atrial fibrillation (H) [I48.91]  Ventricular bigeminy [I49.8]  Long term current use of anticoagulants with INR goal of 2.0-3.0 [Z79.01]  Atrial fibrillation  unspecified type (H) [I48.91]           Comments:           Anticoagulation Care Providers     Provider Role Specialty Phone number    Alan Almeida MD Referring Internal Medicine [phone]

## 2021-08-11 ENCOUNTER — LAB (OUTPATIENT)
Dept: LAB | Facility: CLINIC | Age: 66
End: 2021-08-11
Payer: MEDICARE

## 2021-08-11 ENCOUNTER — ANTICOAGULATION THERAPY VISIT (OUTPATIENT)
Dept: ANTICOAGULATION | Facility: CLINIC | Age: 66
End: 2021-08-11

## 2021-08-11 DIAGNOSIS — I49.8 VENTRICULAR BIGEMINY: ICD-10-CM

## 2021-08-11 DIAGNOSIS — I48.91 ATRIAL FIBRILLATION, UNSPECIFIED TYPE (H): ICD-10-CM

## 2021-08-11 DIAGNOSIS — Z79.01 LONG TERM CURRENT USE OF ANTICOAGULANTS WITH INR GOAL OF 2.0-3.0: ICD-10-CM

## 2021-08-11 DIAGNOSIS — I48.91 ATRIAL FIBRILLATION (H): Primary | ICD-10-CM

## 2021-08-11 LAB — INR BLD: 2.4 (ref 0.9–1.1)

## 2021-08-11 PROCEDURE — 36416 COLLJ CAPILLARY BLOOD SPEC: CPT

## 2021-08-11 PROCEDURE — 85610 PROTHROMBIN TIME: CPT

## 2021-08-11 NOTE — PROGRESS NOTES
ANTICOAGULATION MANAGEMENT     Amparolesli Whittenmicki Shazia 65 year old female is on warfarin with therapeutic INR result. (Goal INR 2.0-3.0)    Recent labs: (last 7 days)     08/11/21  1030   INR 2.4*       ASSESSMENT     Source(s): Chart Review and Patient/Caregiver Call       Warfarin doses taken: Warfarin taken as instructed    Diet: No new diet changes identified    New illness, injury, or hospitalization: No    Medication/supplement changes: None noted    Signs or symptoms of bleeding or clotting: No    Previous INR: Supratherapeutic -- resulting in dose decrease    Additional findings: None     PLAN     Recommended plan for no diet, medication or health factor changes affecting INR     Dosing Instructions: Continue your current warfarin dose with next INR in 2 weeks       Summary  As of 8/11/2021    Full warfarin instructions:  1.25 mg every Mon, Wed, Sat; 2.5 mg all other days   Next INR check:  9/1/2021             Telephone call with Amparo who verbalizes understanding and agrees to plan    Lab visit scheduled    Education provided: Contact [phone]  with any changes, questions or concerns.     Plan made per [facility] anticoagulation protocol    Argentina Najera RN  Anticoagulation Clinic  8/11/2021    _______________________________________________________________________     Anticoagulation Episode Summary     Current INR goal:  2.0-3.0   TTR:  83.8 % (1 y)   Target end date:  Indefinite   Send INR reminders to:  [facility]    Indications    Atrial fibrillation (H) [I48.91]  Ventricular bigeminy [I49.8]  Long term current use of anticoagulants with INR goal of 2.0-3.0 [Z79.01]  Atrial fibrillation  unspecified type (H) [I48.91]           Comments:           Anticoagulation Care Providers     Provider Role Specialty Phone number    Alan Almeida MD Referring Internal Medicine [phone]

## 2021-09-01 ENCOUNTER — LAB (OUTPATIENT)
Dept: LAB | Facility: CLINIC | Age: 66
End: 2021-09-01
Payer: MEDICARE

## 2021-09-01 ENCOUNTER — ANTICOAGULATION THERAPY VISIT (OUTPATIENT)
Dept: ANTICOAGULATION | Facility: CLINIC | Age: 66
End: 2021-09-01

## 2021-09-01 ENCOUNTER — DOCUMENTATION ONLY (OUTPATIENT)
Dept: INTERNAL MEDICINE | Facility: CLINIC | Age: 66
End: 2021-09-01

## 2021-09-01 DIAGNOSIS — Z79.01 LONG TERM CURRENT USE OF ANTICOAGULANTS WITH INR GOAL OF 2.0-3.0: ICD-10-CM

## 2021-09-01 DIAGNOSIS — I48.91 ATRIAL FIBRILLATION (H): Primary | ICD-10-CM

## 2021-09-01 DIAGNOSIS — I49.8 VENTRICULAR BIGEMINY: ICD-10-CM

## 2021-09-01 DIAGNOSIS — I48.91 ATRIAL FIBRILLATION, UNSPECIFIED TYPE (H): Primary | ICD-10-CM

## 2021-09-01 DIAGNOSIS — I48.91 ATRIAL FIBRILLATION, UNSPECIFIED TYPE (H): ICD-10-CM

## 2021-09-01 LAB — INR BLD: 3 (ref 0.9–1.1)

## 2021-09-01 PROCEDURE — 85610 PROTHROMBIN TIME: CPT

## 2021-09-01 PROCEDURE — 36416 COLLJ CAPILLARY BLOOD SPEC: CPT

## 2021-09-01 NOTE — PROGRESS NOTES
ANTICOAGULATION MANAGEMENT      Amparo Mccray due for annual renewal of referral to anticoagulation monitoring. Order pended for your review and signature.      ANTICOAGULATION SUMMARY      Warfarin indication(s)     Atrial fibrillation    Heart valve present?  NO       Current goal range   INR: 2.0-3.0     Goal appropriate for indication? Yes, INR 2-3 appropriate for hx of DVT, PE, hypercoagulable state, Afib, LVAD, or bileaflet AVR without risk factors     Current duration of therapy Indefinite/long term therapy   Time in Therapeutic Range (TTR)  (Goal > 60%) 83%       Office visit with referring provider's group within last year yes on 9/9/20       Liliana Valdez RN

## 2021-09-01 NOTE — PROGRESS NOTES
ANTICOAGULATION MANAGEMENT     Amparolesli Whittenmicki Shazia 65 year old female is on warfarin with therapeutic INR result. (Goal INR 2.0-3.0)    Recent labs: (last 7 days)     09/01/21  1215   INR 3.0*       ASSESSMENT     Source(s): Patient/Caregiver Call       Warfarin doses taken: Warfarin taken as instructed    Diet: No new diet changes identified    New illness, injury, or hospitalization: No    Medication/supplement changes: None noted    Signs or symptoms of bleeding or clotting: No    Previous INR: Therapeutic last visit; previously outside of goal range    Additional findings: None     PLAN     Recommended plan for no diet, medication or health factor changes affecting INR     Dosing Instructions: Continue your current warfarin dose with next INR in 4 weeks       Summary  As of 9/1/2021    Full warfarin instructions:  1.25 mg every Mon, Wed, Sat; 2.5 mg all other days   Next INR check:  9/29/2021             Telephone call with Amparo who verbalizes understanding and agrees to plan    Lab visit scheduled    Education provided: Monitoring for bleeding signs and symptoms and Monitoring for clotting signs and symptoms    Plan made per [facility] anticoagulation protocol    Liliana Valdez RN  Anticoagulation Clinic  9/1/2021    _______________________________________________________________________     Anticoagulation Episode Summary     Current INR goal:  2.0-3.0   TTR:  83.8 % (1 y)   Target end date:  Indefinite   Send INR reminders to:  [facility]    Indications    Atrial fibrillation (H) [I48.91]  Ventricular bigeminy [I49.8]  Long term current use of anticoagulants with INR goal of 2.0-3.0 [Z79.01]  Atrial fibrillation  unspecified type (H) [I48.91]           Comments:           Anticoagulation Care Providers     Provider Role Specialty Phone number    Alan Almeida MD Referring Internal Medicine [phone]           Patient id of hypothyroidism currently she is maintained on levothyroxine 75 mcg p o  daily  Repeat TSH level in 3 months    Most recent TSH 3 18

## 2021-09-22 ENCOUNTER — OFFICE VISIT (OUTPATIENT)
Dept: CARDIOLOGY | Facility: CLINIC | Age: 66
End: 2021-09-22
Payer: MEDICARE

## 2021-09-22 ENCOUNTER — ANTICOAGULATION THERAPY VISIT (OUTPATIENT)
Dept: ANTICOAGULATION | Facility: CLINIC | Age: 66
End: 2021-09-22

## 2021-09-22 ENCOUNTER — LAB (OUTPATIENT)
Dept: LAB | Facility: CLINIC | Age: 66
End: 2021-09-22

## 2021-09-22 VITALS
BODY MASS INDEX: 36.4 KG/M2 | HEIGHT: 69 IN | HEART RATE: 74 BPM | SYSTOLIC BLOOD PRESSURE: 143 MMHG | DIASTOLIC BLOOD PRESSURE: 88 MMHG | WEIGHT: 245.8 LBS

## 2021-09-22 DIAGNOSIS — I48.91 ATRIAL FIBRILLATION (H): Primary | ICD-10-CM

## 2021-09-22 DIAGNOSIS — I48.91 ATRIAL FIBRILLATION, UNSPECIFIED TYPE (H): Primary | ICD-10-CM

## 2021-09-22 DIAGNOSIS — Z79.01 LONG TERM CURRENT USE OF ANTICOAGULANTS WITH INR GOAL OF 2.0-3.0: ICD-10-CM

## 2021-09-22 DIAGNOSIS — I49.8 VENTRICULAR BIGEMINY: ICD-10-CM

## 2021-09-22 DIAGNOSIS — I48.91 ATRIAL FIBRILLATION, UNSPECIFIED TYPE (H): ICD-10-CM

## 2021-09-22 LAB — INR BLD: 3.1 (ref 0.9–1.1)

## 2021-09-22 PROCEDURE — 36416 COLLJ CAPILLARY BLOOD SPEC: CPT

## 2021-09-22 PROCEDURE — 85610 PROTHROMBIN TIME: CPT

## 2021-09-22 PROCEDURE — 99214 OFFICE O/P EST MOD 30 MIN: CPT | Performed by: INTERNAL MEDICINE

## 2021-09-22 PROCEDURE — 93000 ELECTROCARDIOGRAM COMPLETE: CPT | Performed by: INTERNAL MEDICINE

## 2021-09-22 ASSESSMENT — MIFFLIN-ST. JEOR: SCORE: 1721.44

## 2021-09-22 NOTE — PROGRESS NOTES
"Service Date: 09/22/2021    HISTORY OF PRESENT ILLNESS:  Thank you for allowing me to participate in care of your delightful patient.  As you know, Amparo is a 65-year-old female with a history of symptomatic atrial fibrillation in the background of hypertension who underwent a catheter-based ablation with pulmonary vein isolation along with isolation of SVC back in 2013 and had been doing quite well until 4 years later when she had more episodes of atrial fibrillation as documented by her handheld telemetry.  I recommended a repeat ablation.  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  There was also evidence of accessory pulmonary veins near the right inferior pulmonary veins, which was isolated as well.  I also ablated the CTI given conduction was present across the CTI.    Since then, she is doing well and has not seen me since more than 3 years ago.    Recently, she was in [place] vacationing when she went to a [employer] place and afterward experienced severe pressure, as if someone was \"squeezing her heart almost in a vise\" that lasted for half an hour or so and then it would subside and relapse again throughout the day.  Fortunately, it went away the next day.  She took some Tums and Maalox without relief.  The patient did not seek medical attention, thinking that it was \"heartburn,\" which she never had before.    She also noted that sometimes at night she would wake up feeling \"skipping and pounding.\"  By the time she grabbed her handheld device, it abated.  EKG today demonstrates sinus rhythm with no evidence of any Q-waves.    The symptoms described with a squeezing tightness in her chest after eating pizza could be from esophageal spasm.  On the other hand, she does have risk for coronary disease.  A stress test is needed to make sure that the pain was not caused by an MI or any evidence of ischemia.    The nocturnal " symptoms she has been having could be from premature contractions, and I would like to obtain a 2-week Zio Patch monitor for documentation of that.  In the meantime, I encouraged the patient to continue warfarin for now until further notice.  She has been on it ever since the ablation done 4 years ago.    Wilbert Thomas MD        D: 2021   T: 2021   MT: marshal    Name:     DANY SHELBY  MRN:      [id number]        Account:      [id number]   :      1955           Service Date: 2021       Document: [id number]

## 2021-09-22 NOTE — PROGRESS NOTES
HPI and Plan:   See dictation  [id number]  Orders Placed This Encounter   Procedures     EKG 12-lead complete w/read - Clinics (performed today)     Leadless EKG Monitor 8 to 14 Days       No orders of the defined types were placed in this encounter.      There are no discontinued medications.      Encounter Diagnosis   Name Primary?     Atrial fibrillation, unspecified type (H) Yes       CURRENT MEDICATIONS:  Current Outpatient Medications   Medication Sig Dispense Refill     lisinopril (ZESTRIL) 10 MG tablet TAKE 1 TABLET(10 MG) BY MOUTH DAILY 90 tablet 0     metoprolol tartrate (LOPRESSOR) 50 MG tablet TAKE 1 TABLET(50 MG) BY MOUTH TWICE DAILY 180 tablet 0     pyridostigmine (MESTINON) 60 MG tablet 1 tablet by mouth three times a day 90 tablet 3     triamterene-HCTZ (MAXZIDE-25) 37.5-25 MG tablet TAKE 1 TABLET BY MOUTH EVERY MORNING 90 tablet 0     warfarin ANTICOAGULANT (COUMADIN) 2.5 MG tablet TAKE 1/2 TABLET ON WEDNESDAY AND SATURDAY AND 1 TABLET DAILY THE REST OF THE WEEK OR AS DIRECTED BY ANTICOAGULATION CLINIC 90 tablet 1       ALLERGIES     Allergies   Allergen Reactions     Celebrex [Celecoxib] Hives     Hands itching then hives     Ibuprofen Swelling     Face swelling     Ibuprofen Hives       PAST MEDICAL HISTORY:  Past Medical History:   Diagnosis Date     Arthritis      Chest pain, unspecified     neg stress test, put on PPI- no help     Essential hypertension, benign     Hypertension, Benign     Obesity (BMI 30-39.9) 7/1/13    BMI 33.6     Paroxysmal atrial fibrillation (H) 03/2010     Superficial thrombophlebitis      Thrombosis of leg     superficial not dvt's     Ventricular bigeminy     bigeminy- nrml LV fxn       PAST SURGICAL HISTORY:  Past Surgical History:   Procedure Laterality Date     ABDOMEN SURGERY       ARTHROPLASTY KNEE Right 3/2/2015    Procedure: ARTHROPLASTY KNEE;  Surgeon: Cuco Baugh MD;  Location:  OR     [facility] SURGICAL PATHOLOGY  1976    HGSIL     ESOPHAGOSCOPY,  GASTROSCOPY, DUODENOSCOPY (EGD), COMBINED N/A 5/16/2019    Procedure: ESOPHAGOGASTRODUODENOSCOPY, WITH BIOPSY;  Surgeon: Liliane Montalvo MD;  Location: [facility]     H ABLATION FOCAL AFIB  11/02/2017     H ABLATION FOCAL AFIB  01/15/2013     ORTHOPEDIC SURGERY      l tka  and wrist      TUBAL LIGATION      Uterine fibroids removed     ZZC NONSPECIFIC PROCEDURE      left hand        FAMILY HISTORY:  Family History   Problem Relation Age of Onset     C.A.D. Paternal Grandmother      Hypertension Paternal Grandmother      C.A.D. Paternal Grandfather      Connective Tissue Disorder Paternal Grandfather      Hypertension Paternal Grandfather      C.A.D. Father      Hypertension Father      Cancer Maternal Grandmother         uterine     Colon Cancer Maternal Grandmother      Cancer Maternal Grandfather         leukemia     Other Cancer Maternal Grandfather      Cancer Mother         uterine     Gastrointestinal Disease Mother         ulcers     Hypertension Mother      Other - See Comments Sister         essential tremors     C.A.D. Brother         stent- 47     Hypertension Brother      Depression Daughter      Eye Disorder Son      Hypertension Brother      Depression Daughter      Asthma Son        SOCIAL HISTORY:  Social History     Socioeconomic History     Marital status:      Spouse name: None     Number of children: None     Years of education: None     Highest education level: None   Occupational History     None   Tobacco Use     Smoking status: Never Smoker     Smokeless tobacco: Never Used   Substance and Sexual Activity     Alcohol use: Yes     Comment: once a year     Drug use: No     Sexual activity: Yes     Partners: Male     Birth control/protection: None   Other Topics Concern     Parent/sibling w/ CABG, MI or angioplasty before 65F 55M? Yes   Social History Narrative     None     Social Determinants of Health     Financial Resource Strain:      Difficulty of Paying Living Expenses:   "  Food Insecurity:      Worried About Running Out of Food in the Last Year:      Ran Out of Food in the Last Year:    Transportation Needs:      Lack of Transportation (Medical):      Lack of Transportation (Non-Medical):    Physical Activity:      Days of Exercise per Week:      Minutes of Exercise per Session:    Stress:      Feeling of Stress :    Social Connections:      Frequency of Communication with Friends and Family:      Frequency of Social Gatherings with Friends and Family:      Attends [employer] Services:      Active Member of Clubs or Organizations:      Attends Club or Organization Meetings:      Marital Status:    Intimate Partner Violence:      Fear of Current or Ex-Partner:      Emotionally Abused:      Physically Abused:      Sexually Abused:        Review of Systems:  Skin:  Positive for bruising sometimes   Eyes:  Negative      ENT:  Negative      Respiratory:  Negative for shortness of breath;dyspnea on exertion;cough     Cardiovascular:    Positive for;palpitations;chest pain episodes of chest pain, last one in July  Gastroenterology: Positive for nausea with chest pain  Genitourinary:  Negative      Musculoskeletal:  Negative      Neurologic:  Negative      Psychiatric:  Negative      Heme/Lymph/Imm:  Negative      Endocrine:  Negative        Physical Exam:  Vitals: BP (!) 143/88   Pulse 74   Ht 1.74 m (5' 8.5\")   Wt 111.5 kg (245 lb 12.8 oz)   LMP 12/31/2012   BMI 36.83 kg/m      Constitutional:  cooperative, alert and oriented, well developed, well nourished, in no acute distress obese      Skin:  warm and dry to the touch, no apparent skin lesions or masses noted          Head:  normocephalic, no masses or lesions        Eyes:  pupils equal and round, conjunctivae and lids unremarkable, sclera white, no xanthalasma, EOMS intact, no nystagmus        Lymph:No Cervical lymphadenopathy present     ENT:  no pallor or cyanosis        Neck:  carotid pulses are full and equal bilaterally, JVP " normal, no carotid bruit        Respiratory:  normal breath sounds, clear to auscultation, normal A-P diameter, normal symmetry, normal respiratory excursion, no use of accessory muscles         Cardiac: regular rhythm, normal S1/S2, no S3 or S4, apical impulse not displaced, no murmurs, gallops or rubs                pulses full and equal, no bruits auscultated                                        GI:  abdomen soft, non-tender, BS normoactive, no mass, no HSM, no bruits obese      Extremities and Muscular Skeletal:  no deformities, clubbing, cyanosis, erythema observed              Neurological:  no gross motor deficits        Psych:  Alert and Oriented x 3        CC  No referring provider defined for this encounter.

## 2021-09-22 NOTE — LETTER
9/22/2021    Alan Almeida MD  [address]  [facility] [address]    RE: Amparo Mccray       Dear Colleague,    I had the pleasure of seeing Amparo Mccray in the [facility].    HPI and Plan:   See dictation  [id number]  Orders Placed This Encounter   Procedures     EKG 12-lead complete w/read - Clinics (performed today)     Leadless EKG Monitor 8 to 14 Days       No orders of the defined types were placed in this encounter.      There are no discontinued medications.      Encounter Diagnosis   Name Primary?     Atrial fibrillation, unspecified type (H) Yes       CURRENT MEDICATIONS:  Current Outpatient Medications   Medication Sig Dispense Refill     lisinopril (ZESTRIL) 10 MG tablet TAKE 1 TABLET(10 MG) BY MOUTH DAILY 90 tablet 0     metoprolol tartrate (LOPRESSOR) 50 MG tablet TAKE 1 TABLET(50 MG) BY MOUTH TWICE DAILY 180 tablet 0     pyridostigmine (MESTINON) 60 MG tablet 1 tablet by mouth three times a day 90 tablet 3     triamterene-HCTZ (MAXZIDE-25) 37.5-25 MG tablet TAKE 1 TABLET BY MOUTH EVERY MORNING 90 tablet 0     warfarin ANTICOAGULANT (COUMADIN) 2.5 MG tablet TAKE 1/2 TABLET ON WEDNESDAY AND SATURDAY AND 1 TABLET DAILY THE REST OF THE WEEK OR AS DIRECTED BY ANTICOAGULATION CLINIC 90 tablet 1       ALLERGIES     Allergies   Allergen Reactions     Celebrex [Celecoxib] Hives     Hands itching then hives     Ibuprofen Swelling     Face swelling     Ibuprofen Hives       PAST MEDICAL HISTORY:  Past Medical History:   Diagnosis Date     Arthritis      Chest pain, unspecified     neg stress test, put on PPI- no help     Essential hypertension, benign     Hypertension, Benign     Obesity (BMI 30-39.9) 7/1/13    BMI 33.6     Paroxysmal atrial fibrillation (H) 03/2010     Superficial thrombophlebitis      Thrombosis of leg     superficial not dvt's     Ventricular bigeminy     bigeminy- nrml LV fxn       PAST SURGICAL  HISTORY:  Past Surgical History:   Procedure Laterality Date     ABDOMEN SURGERY       ARTHROPLASTY KNEE Right 3/2/2015    Procedure: ARTHROPLASTY KNEE;  Surgeon: Cuco Baugh MD;  Location: [facility] OR     [facility] AFF SURGICAL PATHOLOGY  1976    HGSIL     ESOPHAGOSCOPY, GASTROSCOPY, DUODENOSCOPY (EGD), COMBINED N/A 5/16/2019    Procedure: ESOPHAGOGASTRODUODENOSCOPY, WITH BIOPSY;  Surgeon: Liliane Montalvo MD;  Location: [facility]     H ABLATION FOCAL AFIB  11/02/2017     H ABLATION FOCAL AFIB  01/15/2013     ORTHOPEDIC SURGERY      l tka  and wrist      TUBAL LIGATION      Uterine fibroids removed     ZZC NONSPECIFIC PROCEDURE      left hand        FAMILY HISTORY:  Family History   Problem Relation Age of Onset     C.A.D. Paternal Grandmother      Hypertension Paternal Grandmother      C.A.D. Paternal Grandfather      Connective Tissue Disorder Paternal Grandfather      Hypertension Paternal Grandfather      C.A.D. Father      Hypertension Father      Cancer Maternal Grandmother         uterine     Colon Cancer Maternal Grandmother      Cancer Maternal Grandfather         leukemia     Other Cancer Maternal Grandfather      Cancer Mother         uterine     Gastrointestinal Disease Mother         ulcers     Hypertension Mother      Other - See Comments Sister         essential tremors     C.A.D. Brother         stent- 47     Hypertension Brother      Depression Daughter      Eye Disorder Son      Hypertension Brother      Depression Daughter      Asthma Son        SOCIAL HISTORY:  Social History     Socioeconomic History     Marital status:      Spouse name: None     Number of children: None     Years of education: None     Highest education level: None   Occupational History     None   Tobacco Use     Smoking status: Never Smoker     Smokeless tobacco: Never Used   Substance and Sexual Activity     Alcohol use: Yes     Comment: once a year     Drug use: No     Sexual activity: Yes     Partners:  "Male     Birth control/protection: None   Other Topics Concern     Parent/sibling w/ CABG, MI or angioplasty before 65F 55M? Yes   Social History Narrative     None     Social Determinants of Health     Financial Resource Strain:      Difficulty of Paying Living Expenses:    Food Insecurity:      Worried About Running Out of Food in the Last Year:      Ran Out of Food in the Last Year:    Transportation Needs:      Lack of Transportation (Medical):      Lack of Transportation (Non-Medical):    Physical Activity:      Days of Exercise per Week:      Minutes of Exercise per Session:    Stress:      Feeling of Stress :    Social Connections:      Frequency of Communication with Friends and Family:      Frequency of Social Gatherings with Friends and Family:      Attends [employer] Services:      Active Member of Clubs or Organizations:      Attends Club or Organization Meetings:      Marital Status:    Intimate Partner Violence:      Fear of Current or Ex-Partner:      Emotionally Abused:      Physically Abused:      Sexually Abused:        Review of Systems:  Skin:  Positive for bruising sometimes   Eyes:  Negative      ENT:  Negative      Respiratory:  Negative for shortness of breath;dyspnea on exertion;cough     Cardiovascular:    Positive for;palpitations;chest pain episodes of chest pain, last one in July  Gastroenterology: Positive for nausea with chest pain  Genitourinary:  Negative      Musculoskeletal:  Negative      Neurologic:  Negative      Psychiatric:  Negative      Heme/Lymph/Imm:  Negative      Endocrine:  Negative        Physical Exam:  Vitals: BP (!) 143/88   Pulse 74   Ht 1.74 m (5' 8.5\")   Wt 111.5 kg (245 lb 12.8 oz)   LMP 12/31/2012   BMI 36.83 kg/m      Constitutional:  cooperative, alert and oriented, well developed, well nourished, in no acute distress obese      Skin:  warm and dry to the touch, no apparent skin lesions or masses noted          Head:  normocephalic, no masses or lesions   "      Eyes:  pupils equal and round, conjunctivae and lids unremarkable, sclera white, no xanthalasma, EOMS intact, no nystagmus        Lymph:No Cervical lymphadenopathy present     ENT:  no pallor or cyanosis        Neck:  carotid pulses are full and equal bilaterally, JVP normal, no carotid bruit        Respiratory:  normal breath sounds, clear to auscultation, normal A-P diameter, normal symmetry, normal respiratory excursion, no use of accessory muscles         Cardiac: regular rhythm, normal S1/S2, no S3 or S4, apical impulse not displaced, no murmurs, gallops or rubs                pulses full and equal, no bruits auscultated                                        GI:  abdomen soft, non-tender, BS normoactive, no mass, no HSM, no bruits obese      Extremities and Muscular Skeletal:  no deformities, clubbing, cyanosis, erythema observed              Neurological:  no gross motor deficits        Psych:  Alert and Oriented x 3        CC  No referring provider defined for this encounter.                  Thank you for allowing me to participate in the care of your patient.      Sincerely,     Wilbert Hernandez MD     [facility]  cc:   No referring provider defined for this encounter.

## 2021-09-22 NOTE — PROGRESS NOTES
ANTICOAGULATION MANAGEMENT     Amparo Raymicki Shazia 65 year old female is on warfarin with supratherapeutic INR result. (Goal INR 2.0-3.0)    Recent labs: (last 7 days)     09/22/21  1342   INR 3.1*       ASSESSMENT     Source(s): Chart Review and Patient/Caregiver Call       Warfarin doses taken: Warfarin taken as instructed    Diet: No new diet changes identified    New illness, injury, or hospitalization: No    Medication/supplement changes: None noted    Signs or symptoms of bleeding or clotting: No    Previous INR: Therapeutic last 2(+) visits    Additional findings: patient saw cardiology today.  Will be having a stress test tomorrow as well as a 14 day EKG monitor to determine if she needs to continue on warfarin.     PLAN     Recommended plan for no diet, medication or health factor changes affecting INR     Dosing Instructions: Continue your current warfarin dose with next INR in 2 weeks       Summary  As of 9/22/2021    Full warfarin instructions:  1.25 mg every Mon, Wed, Sat; 2.5 mg all other days   Next INR check:  10/6/2021             Telephone call with Amparo who verbalizes understanding and agrees to plan    Lab visit scheduled    Education provided: Please call back if any changes to your diet, medications or how you've been taking warfarin    Plan made per [facility] anticoagulation protocol    Rafaela Espinoza RN  Anticoagulation Clinic  9/22/2021    _______________________________________________________________________     Anticoagulation Episode Summary     Current INR goal:  2.0-3.0   TTR:  78.1 % (1 y)   Target end date:  Indefinite   Send INR reminders to:  [facility]    Indications    Atrial fibrillation (H) [I48.91]  Ventricular bigeminy [I49.8]  Long term current use of anticoagulants with INR goal of 2.0-3.0 [Z79.01]  Atrial fibrillation  unspecified type (H) [I48.91]           Comments:           Anticoagulation Care Providers     Provider Role Specialty Phone number    Juan Pablo  Alan ALEMAN MD [facility] [phone]

## 2021-09-23 ENCOUNTER — HOSPITAL ENCOUNTER (OUTPATIENT)
Dept: CARDIOLOGY | Facility: CLINIC | Age: 66
Setting detail: NUCLEAR MEDICINE
End: 2021-09-23
Attending: INTERNAL MEDICINE
Payer: MEDICARE

## 2021-09-23 ENCOUNTER — HOSPITAL ENCOUNTER (OUTPATIENT)
Dept: CARDIOLOGY | Facility: CLINIC | Age: 66
End: 2021-09-23
Attending: INTERNAL MEDICINE
Payer: MEDICARE

## 2021-09-23 VITALS
WEIGHT: 244.2 LBS | OXYGEN SATURATION: 98 % | SYSTOLIC BLOOD PRESSURE: 140 MMHG | DIASTOLIC BLOOD PRESSURE: 88 MMHG | BODY MASS INDEX: 36.17 KG/M2 | HEART RATE: 58 BPM | HEIGHT: 69 IN

## 2021-09-23 DIAGNOSIS — I48.91 ATRIAL FIBRILLATION, UNSPECIFIED TYPE (H): ICD-10-CM

## 2021-09-23 LAB
CV STRESS MAX HR HE: 146
NUC STRESS EJECTION FRACTION: 60 %
RATE PRESSURE PRODUCT: NORMAL
STRESS ANGINA INDEX: 2
STRESS ECHO BASELINE DIASTOLIC HE: 88
STRESS ECHO BASELINE HR: 73
STRESS ECHO BASELINE SYSTOLIC BP: 136
STRESS ECHO CALCULATED PERCENT HR: 94 %
STRESS ECHO LAST STRESS DIASTOLIC BP: 82
STRESS ECHO LAST STRESS SYSTOLIC BP: 178
STRESS ECHO POST ESTIMATED WORKLOAD: 7 METS
STRESS ECHO POST EXERCISE DUR MIN: 5 MIN
STRESS ECHO POST EXERCISE DUR SEC: 0 SEC
STRESS ECHO TARGET HR: 156

## 2021-09-23 PROCEDURE — 343N000001 HC RX 343: Performed by: INTERNAL MEDICINE

## 2021-09-23 PROCEDURE — 78452 HT MUSCLE IMAGE SPECT MULT: CPT | Mod: 26 | Performed by: INTERNAL MEDICINE

## 2021-09-23 PROCEDURE — 93018 CV STRESS TEST I&R ONLY: CPT | Mod: ME | Performed by: INTERNAL MEDICINE

## 2021-09-23 PROCEDURE — 93017 CV STRESS TEST TRACING ONLY: CPT | Mod: ME

## 2021-09-23 PROCEDURE — A9502 TC99M TETROFOSMIN: HCPCS | Performed by: INTERNAL MEDICINE

## 2021-09-23 PROCEDURE — 93016 CV STRESS TEST SUPVJ ONLY: CPT | Performed by: INTERNAL MEDICINE

## 2021-09-23 PROCEDURE — G1004 CDSM NDSC: HCPCS | Performed by: INTERNAL MEDICINE

## 2021-09-23 RX ADMIN — TETROFOSMIN 6 MCI.: 1.38 INJECTION, POWDER, LYOPHILIZED, FOR SOLUTION INTRAVENOUS at 09:59

## 2021-09-23 RX ADMIN — TETROFOSMIN 18.81 MCI.: 1.38 INJECTION, POWDER, LYOPHILIZED, FOR SOLUTION INTRAVENOUS at 11:45

## 2021-09-23 ASSESSMENT — MIFFLIN-ST. JEOR: SCORE: 1722.06

## 2021-09-24 ENCOUNTER — TELEPHONE (OUTPATIENT)
Dept: CARDIOLOGY | Facility: CLINIC | Age: 66
End: 2021-09-24

## 2021-09-24 DIAGNOSIS — R94.39 ABNORMAL STRESS TEST: Primary | ICD-10-CM

## 2021-09-24 RX ORDER — AMLODIPINE BESYLATE 5 MG/1
5 TABLET ORAL 2 TIMES DAILY
Qty: 180 TABLET | Refills: 3 | Status: SHIPPED | OUTPATIENT
Start: 2021-09-24 | End: 2022-09-26

## 2021-09-25 ENCOUNTER — HEALTH MAINTENANCE LETTER (OUTPATIENT)
Age: 66
End: 2021-09-25

## 2021-09-27 ENCOUNTER — MYC MEDICAL ADVICE (OUTPATIENT)
Dept: INTERNAL MEDICINE | Facility: CLINIC | Age: 66
End: 2021-09-27

## 2021-09-27 DIAGNOSIS — I10 ESSENTIAL HYPERTENSION, BENIGN: ICD-10-CM

## 2021-09-28 ENCOUNTER — HOSPITAL ENCOUNTER (OUTPATIENT)
Dept: CARDIOLOGY | Facility: CLINIC | Age: 66
Discharge: HOME | End: 2021-09-28
Attending: INTERNAL MEDICINE | Admitting: INTERNAL MEDICINE
Payer: MEDICARE

## 2021-09-28 DIAGNOSIS — I48.91 ATRIAL FIBRILLATION, UNSPECIFIED TYPE (H): ICD-10-CM

## 2021-09-28 PROCEDURE — 93246 EXT ECG>7D<15D RECORDING: CPT

## 2021-09-28 PROCEDURE — 93248 EXT ECG>7D<15D REV&INTERPJ: CPT | Performed by: INTERNAL MEDICINE

## 2021-09-28 RX ORDER — METOPROLOL TARTRATE 50 MG
TABLET ORAL
Qty: 60 TABLET | Refills: 0 | Status: SHIPPED | OUTPATIENT
Start: 2021-09-28 | End: 2021-10-22

## 2021-09-28 NOTE — TELEPHONE ENCOUNTER
Routing refill request to provider for review/approval because:  BP Readings from Last 3 Encounters:   09/23/21 (!) 140/88   09/22/21 (!) 143/88   09/09/20 112/70     Patient has an appointment on 10/22/21         Ksenia Dubose RN  [facility]

## 2021-10-22 ENCOUNTER — ANTICOAGULATION THERAPY VISIT (OUTPATIENT)
Dept: NURSING | Facility: CLINIC | Age: 66
End: 2021-10-22

## 2021-10-22 ENCOUNTER — LAB (OUTPATIENT)
Dept: LAB | Facility: CLINIC | Age: 66
End: 2021-10-22
Payer: MEDICARE

## 2021-10-22 ENCOUNTER — OFFICE VISIT (OUTPATIENT)
Dept: INTERNAL MEDICINE | Facility: CLINIC | Age: 66
End: 2021-10-22
Payer: MEDICARE

## 2021-10-22 VITALS
OXYGEN SATURATION: 97 % | HEIGHT: 69 IN | RESPIRATION RATE: 16 BRPM | BODY MASS INDEX: 36.14 KG/M2 | WEIGHT: 244 LBS | HEART RATE: 79 BPM | SYSTOLIC BLOOD PRESSURE: 130 MMHG | TEMPERATURE: 97.2 F | DIASTOLIC BLOOD PRESSURE: 86 MMHG

## 2021-10-22 DIAGNOSIS — Z12.31 VISIT FOR SCREENING MAMMOGRAM: ICD-10-CM

## 2021-10-22 DIAGNOSIS — Z12.11 SCREEN FOR COLON CANCER: ICD-10-CM

## 2021-10-22 DIAGNOSIS — I25.118 CORONARY ARTERY DISEASE OF NATIVE HEART WITH STABLE ANGINA PECTORIS, UNSPECIFIED VESSEL OR LESION TYPE (H): ICD-10-CM

## 2021-10-22 DIAGNOSIS — I48.91 ATRIAL FIBRILLATION, UNSPECIFIED TYPE (H): ICD-10-CM

## 2021-10-22 DIAGNOSIS — I10 ESSENTIAL HYPERTENSION, BENIGN: Primary | ICD-10-CM

## 2021-10-22 DIAGNOSIS — Z11.59 NEED FOR HEPATITIS C SCREENING TEST: ICD-10-CM

## 2021-10-22 DIAGNOSIS — Z23 NEED FOR PROPHYLACTIC VACCINATION AND INOCULATION AGAINST INFLUENZA: ICD-10-CM

## 2021-10-22 LAB — INR BLD: 2.8 (ref 0.9–1.1)

## 2021-10-22 PROCEDURE — 36415 COLL VENOUS BLD VENIPUNCTURE: CPT | Performed by: INTERNAL MEDICINE

## 2021-10-22 PROCEDURE — 99214 OFFICE O/P EST MOD 30 MIN: CPT | Mod: 25 | Performed by: INTERNAL MEDICINE

## 2021-10-22 PROCEDURE — 36416 COLLJ CAPILLARY BLOOD SPEC: CPT

## 2021-10-22 PROCEDURE — 80061 LIPID PANEL: CPT | Performed by: INTERNAL MEDICINE

## 2021-10-22 PROCEDURE — 90682 RIV4 VACC RECOMBINANT DNA IM: CPT | Performed by: INTERNAL MEDICINE

## 2021-10-22 PROCEDURE — 85610 PROTHROMBIN TIME: CPT

## 2021-10-22 PROCEDURE — G0008 ADMIN INFLUENZA VIRUS VAC: HCPCS | Performed by: INTERNAL MEDICINE

## 2021-10-22 PROCEDURE — 80048 BASIC METABOLIC PNL TOTAL CA: CPT | Performed by: INTERNAL MEDICINE

## 2021-10-22 PROCEDURE — 86803 HEPATITIS C AB TEST: CPT | Performed by: INTERNAL MEDICINE

## 2021-10-22 RX ORDER — ATORVASTATIN CALCIUM 40 MG/1
40 TABLET, FILM COATED ORAL AT BEDTIME
Qty: 90 TABLET | Refills: 1 | Status: SHIPPED | OUTPATIENT
Start: 2021-10-22 | End: 2022-04-18

## 2021-10-22 RX ORDER — METOPROLOL TARTRATE 50 MG
75 TABLET ORAL 2 TIMES DAILY
Qty: 270 TABLET | Refills: 0 | Status: SHIPPED | OUTPATIENT
Start: 2021-10-22 | End: 2022-01-18

## 2021-10-22 ASSESSMENT — MIFFLIN-ST. JEOR: SCORE: 1721.16

## 2021-10-22 NOTE — PROGRESS NOTES
"  Assessment & Plan     Essential hypertension, benign  Inc to 75 bid  - metoprolol tartrate (LOPRESSOR) 50 MG tablet; Take 1.5 tablets (75 mg) by mouth 2 times daily    Coronary artery disease of native heart with stable angina pectoris, unspecified vessel or lesion type (H)  Start statin   Cont anticoag-if d/c then start asa  Any recurrence in sx-> cath   - atorvastatin (LIPITOR) 40 MG tablet; Take 1 tablet (40 mg) by mouth At Bedtime  - Basic metabolic panel  (Ca, Cl, CO2, Creat, Gluc, K, Na, BUN); Future  - Lipid panel reflex to direct LDL Non-fasting; Future  - Basic metabolic panel  (Ca, Cl, CO2, Creat, Gluc, K, Na, BUN)  - Lipid panel reflex to direct LDL Non-fasting    Visit for screening mammogram  - MA SCREENING DIGITAL BILAT - Future  (s+30); Future    Need for prophylactic vaccination and inoculation against influenza  - INFLUENZA QUAD, RECOMBINANT, P-FREE (RIV4) (FLUBLOK)    Screen for colon cancer  - Fecal colorectal cancer screen (FIT); Future  - Fecal colorectal cancer screen (FIT)    Need for hepatitis C screening test  - Hepatitis C Screen Reflex to HCV RNA Quant and Genotype; Future  - Hepatitis C Screen Reflex to HCV RNA Quant and Genotype    Review of external notes as documented elsewhere in note  Review of the result(s) of each unique test - labs/stress test  Ordering of each unique test  Prescription drug management         BMI:   Estimated body mass index is 36.03 kg/m  as calculated from the following:    Height as of this encounter: 1.753 m (5' 9\").    Weight as of this encounter: 110.7 kg (244 lb).   Weight management plan: Discussed healthy diet and exercise guidelines    Work on weight loss  Regular exercise      Return in about 6 months (around 4/22/2022) for Preventative Visit, Pre-Visit Fasting Labs.    Alan Almeida MD  [facility]    Momo Christensen is a 65 year old who presents for the following health issues     HPI     Hypertension " "Follow-up      Do you check your blood pressure regularly outside of the clinic? No     Are you following a low salt diet? Yes    Are your blood pressures ever more than 140 on the top number (systolic) OR more   than 90 on the bottom number (diastolic), for example 140/90? No      How many servings of fruits and vegetables do you eat daily?  0-1    On average, how many sweetened beverages do you drink each day (Examples: soda, juice, sweet tea, etc.  Do NOT count diet or artificially sweetened beverages)?   0    How many days per week do you exercise enough to make your heart beat faster? 4    How many minutes a day do you exercise enough to make your heart beat faster? 9 or less    How many days per week do you miss taking your medication? 0        Review of Systems         Objective    /86 (BP Location: Right arm, Patient Position: Chair, Cuff Size: Adult Large)   Pulse 79   Temp 97.2  F (36.2  C) (Temporal)   Resp 16   Ht 1.753 m (5' 9\")   Wt 110.7 kg (244 lb)   LMP 12/31/2012   SpO2 97%   BMI 36.03 kg/m    Body mass index is 36.03 kg/m .  Physical Exam   GENERAL APPEARANCE: alert, no distress and over weight  RESP: lungs clear to auscultation - no rales, rhonchi or wheezes  CV: regular rates and rhythm, normal S1 S2, no S3 or S4 and no murmur, click or rub  MS: extremities normal- no gross deformities noted    Results for orders placed or performed in visit on 10/22/21   Hepatitis C Screen Reflex to HCV RNA Quant and Genotype     Status: Normal   Result Value Ref Range    Hepatitis C Antibody Nonreactive Nonreactive    Narrative    Assay performance characteristics have not been established for newborns, infants, and children.   Basic metabolic panel  (Ca, Cl, CO2, Creat, Gluc, K, Na, BUN)     Status: Normal   Result Value Ref Range    Sodium 137 133 - 144 mmol/L    Potassium 3.4 3.4 - 5.3 mmol/L    Chloride 103 94 - 109 mmol/L    Carbon Dioxide (CO2) 28 20 - 32 mmol/L    Anion Gap 6 3 - 14 mmol/L "    Urea Nitrogen 16 7 - 30 mg/dL    Creatinine 0.92 0.52 - 1.04 mg/dL    Calcium 9.0 8.5 - 10.1 mg/dL    Glucose 92 70 - 99 mg/dL    GFR Estimate 66 >60 mL/min/1.73m2   Lipid panel reflex to direct LDL Non-fasting     Status: Abnormal   Result Value Ref Range    Cholesterol 266 (H) <200 mg/dL    Triglycerides 147 <150 mg/dL    Direct Measure HDL 47 (L) >=50 mg/dL    LDL Cholesterol Calculated 190 (H) <=100 mg/dL    Non HDL Cholesterol 219 (H) <130 mg/dL    Patient Fasting > 8hrs? Yes     Narrative    Cholesterol  Desirable:  <200 mg/dL    Triglycerides  Normal:  Less than 150 mg/dL  Borderline High:  150-199 mg/dL  High:  200-499 mg/dL  Very High:  Greater than or equal to 500 mg/dL    Direct Measure HDL  Female:  Greater than or equal to 50 mg/dL   Male:  Greater than or equal to 40 mg/dL    LDL Cholesterol  Desirable:  <100mg/dL  Above Desirable:  100-129 mg/dL   Borderline High:  130-159 mg/dL   High:  160-189 mg/dL   Very High:  >= 190 mg/dL    Non HDL Cholesterol  Desirable:  130 mg/dL  Above Desirable:  130-159 mg/dL  Borderline High:  160-189 mg/dL  High:  190-219 mg/dL  Very High:  Greater than or equal to 220 mg/dL   Results for orders placed or performed in visit on 10/22/21   INR point of care     Status: Abnormal   Result Value Ref Range    INR 2.8 (H) 0.9 - 1.1    Narrative    This test is intended for monitoring Coumadin therapy. Results are not accurate in patients with prolonged INR due to factor deficiency.

## 2021-10-22 NOTE — PROGRESS NOTES
ANTICOAGULATION MANAGEMENT     Amparo Raymicki Shazia 65 year old female is on warfarin with therapeutic INR result. (Goal INR 2.0-3.0)    Recent labs: (last 7 days)     10/22/21  1404   INR 2.8*       ASSESSMENT     Source(s): Chart Review and Patient/Caregiver Call       Warfarin doses taken: Warfarin taken as instructed    Diet: No new diet changes identified    New illness, injury, or hospitalization: No    Medication/supplement changes: None noted    Signs or symptoms of bleeding or clotting: No    Previous INR: Supratherapeutic    Additional findings: None     PLAN     Recommended plan for no diet, medication or health factor changes affecting INR     Dosing Instructions: Continue your current warfarin dose with next INR in 6 weeks       Summary  As of 10/22/2021    Full warfarin instructions:  1.25 mg every Mon, Wed, Sat; 2.5 mg all other days   Next INR check:               Telephone call with Amparo who agrees to plan and repeated back plan correctly    Lab visit scheduled    Education provided: Importance of notifying clinic for changes in medications; a sooner lab recheck maybe needed. and Contact [phone]  with any changes, questions or concerns.     Plan made per [facility] anticoagulation protocol    Thuy Chino RN  Anticoagulation Clinic  10/22/2021    _______________________________________________________________________     Anticoagulation Episode Summary     Current INR goal:  2.0-3.0   TTR:  75.3 % (1 y)   Target end date:  Indefinite   Send INR reminders to:  [facility]    Indications    Atrial fibrillation (H) [I48.91]  Ventricular bigeminy [I49.8]  Long term current use of anticoagulants with INR goal of 2.0-3.0 [Z79.01]  Atrial fibrillation  unspecified type (H) [I48.91]           Comments:           Anticoagulation Care Providers     Provider Role Specialty Phone number    Alan Almeida MD Referring Internal Medicine [phone]

## 2021-10-23 LAB
ANION GAP SERPL CALCULATED.3IONS-SCNC: 6 MMOL/L (ref 3–14)
BUN SERPL-MCNC: 16 MG/DL (ref 7–30)
CALCIUM SERPL-MCNC: 9 MG/DL (ref 8.5–10.1)
CHLORIDE BLD-SCNC: 103 MMOL/L (ref 94–109)
CHOLEST SERPL-MCNC: 266 MG/DL
CO2 SERPL-SCNC: 28 MMOL/L (ref 20–32)
CREAT SERPL-MCNC: 0.92 MG/DL (ref 0.52–1.04)
FASTING STATUS PATIENT QL REPORTED: YES
GFR SERPL CREATININE-BSD FRML MDRD: 66 ML/MIN/1.73M2
GLUCOSE BLD-MCNC: 92 MG/DL (ref 70–99)
HCV AB SERPL QL IA: NONREACTIVE
HDLC SERPL-MCNC: 47 MG/DL
LDLC SERPL CALC-MCNC: 190 MG/DL
NONHDLC SERPL-MCNC: 219 MG/DL
POTASSIUM BLD-SCNC: 3.4 MMOL/L (ref 3.4–5.3)
SODIUM SERPL-SCNC: 137 MMOL/L (ref 133–144)
TRIGL SERPL-MCNC: 147 MG/DL

## 2021-10-24 PROCEDURE — 82274 ASSAY TEST FOR BLOOD FECAL: CPT | Performed by: INTERNAL MEDICINE

## 2021-10-25 ENCOUNTER — TELEPHONE (OUTPATIENT)
Dept: CARDIOLOGY | Facility: CLINIC | Age: 66
End: 2021-10-25

## 2021-10-25 DIAGNOSIS — I48.0 PAROXYSMAL ATRIAL FIBRILLATION (H): Primary | ICD-10-CM

## 2021-10-25 NOTE — TELEPHONE ENCOUNTER
"10/25/21 Msg  Recd from Dr Thomas re recent leadless monitor    No AF noted. Her reported sxs were related to pvcs. Incidental findings of psvt for which she had no sxs. No afib noted. Difficult to answer question of stopping warfarin given her CHADSVASC 2. I would have take warfarin for another year and reassess  with another 2 weeks zio. Did we call her about the stress test result and recommendations. Ok to monitor pvcs for now. If quite symptomatic on certain days she can take extra 1/2 tablet of metoprolol prn daily.    Spoke w pt and explained results and recommendations. Pt states she was at PCP on Friday 10/22 and her Metoprolol was increased to 75 mg BID which has made \" a huge difference\" and she feels much better.  Order placed for 2 week ZP and follow up w Dr Thomas in 1 year.   Pt voiced understanding and agreement with plan.   Ilana 3 pm  "

## 2021-10-28 LAB — HEMOCCULT STL QL IA: NEGATIVE

## 2021-11-01 ENCOUNTER — MYC MEDICAL ADVICE (OUTPATIENT)
Dept: INTERNAL MEDICINE | Facility: CLINIC | Age: 66
End: 2021-11-01

## 2021-11-01 DIAGNOSIS — I48.0 PAROXYSMAL ATRIAL FIBRILLATION (H): ICD-10-CM

## 2021-11-01 DIAGNOSIS — Z79.01 LONG TERM CURRENT USE OF ANTICOAGULANT THERAPY: ICD-10-CM

## 2021-11-01 DIAGNOSIS — I10 ESSENTIAL HYPERTENSION, BENIGN: ICD-10-CM

## 2021-11-01 DIAGNOSIS — I49.8 VENTRICULAR BIGEMINY: ICD-10-CM

## 2021-11-02 RX ORDER — TRIAMTERENE/HYDROCHLOROTHIAZID 37.5-25 MG
1 TABLET ORAL EVERY MORNING
Qty: 90 TABLET | Refills: 0 | Status: SHIPPED | OUTPATIENT
Start: 2021-11-02 | End: 2022-01-18

## 2021-11-02 NOTE — TELEPHONE ENCOUNTER
Routing refill request to provider for review/approval because:  INR   Date Value Ref Range Status   10/22/2021 2.8 (H) 0.9 - 1.1 Final   06/22/2021 3.40 (H) 0.86 - 1.14 Final     Comment:     This test is intended for monitoring Coumadin therapy.  Results are not   accurate in patients with prolonged INR due to factor deficiency.            Mario Arroyo RN  [facility] Triage Nurse

## 2021-11-03 RX ORDER — WARFARIN SODIUM 2.5 MG/1
TABLET ORAL
Qty: 100 TABLET | Refills: 0 | Status: SHIPPED | OUTPATIENT
Start: 2021-11-03 | End: 2022-03-10

## 2021-12-03 ENCOUNTER — LAB (OUTPATIENT)
Dept: LAB | Facility: CLINIC | Age: 66
End: 2021-12-03
Payer: MEDICARE

## 2021-12-03 ENCOUNTER — ANTICOAGULATION THERAPY VISIT (OUTPATIENT)
Dept: ANTICOAGULATION | Facility: CLINIC | Age: 66
End: 2021-12-03

## 2021-12-03 DIAGNOSIS — I49.8 VENTRICULAR BIGEMINY: ICD-10-CM

## 2021-12-03 DIAGNOSIS — Z79.01 LONG TERM CURRENT USE OF ANTICOAGULANTS WITH INR GOAL OF 2.0-3.0: ICD-10-CM

## 2021-12-03 DIAGNOSIS — I48.91 ATRIAL FIBRILLATION, UNSPECIFIED TYPE (H): ICD-10-CM

## 2021-12-03 DIAGNOSIS — I48.91 ATRIAL FIBRILLATION (H): Primary | ICD-10-CM

## 2021-12-03 LAB — INR BLD: 2.3 (ref 0.9–1.1)

## 2021-12-03 PROCEDURE — 36416 COLLJ CAPILLARY BLOOD SPEC: CPT

## 2021-12-03 PROCEDURE — 85610 PROTHROMBIN TIME: CPT

## 2021-12-03 NOTE — PROGRESS NOTES
Anticoagulation Management    Unable to reach Amparo today.    Today's INR result of 2.3 is therapeutic (goal INR of 2.0-3.0).  Result received from: Clinic Lab    Follow up required to assess for changes     Left a voicemail advising pt to continue taking warfarin 1.25 mg on Mon/Wed/Fri and 2.5 mg all other days. Requested a call back    Transfer to [phone]      Anticoagulation clinic to follow up    Thuy Chino RN

## 2021-12-06 NOTE — PROGRESS NOTES
ANTICOAGULATION MANAGEMENT     Amparo Mccray 66 year old female is on warfarin with therapeutic INR result. (Goal INR 2.0-3.0)    Recent labs: (last 7 days)     12/03/21  1349   INR 2.3*       ASSESSMENT     Source(s): Chart Review       Previous INR: Therapeutic last visit; previously outside of goal range    Additional findings: None     PLAN     Recommended plan for no diet, medication or health factor changes affecting INR     Dosing Instructions: Continue your current warfarin dose with next INR in 6 weeks       Summary  As of 12/3/2021    Full warfarin instructions:  1.25 mg every Mon, Wed, Sat; 2.5 mg all other days   Next INR check:  1/14/2022             Detailed voice message left for Amparo with dosing instructions and follow up date.     Contact [phone]  to schedule and with any changes, questions or concerns.     Education provided: None required    Plan made per [facility] anticoagulation protocol    Liliana Valdez RN  Anticoagulation Clinic  12/6/2021    _______________________________________________________________________     Anticoagulation Episode Summary     Current INR goal:  2.0-3.0   TTR:  75.1 % (1 y)   Target end date:  Indefinite   Send INR reminders to:  [facility]    Indications    Atrial fibrillation (H) [I48.91]  Ventricular bigeminy [I49.8]  Long term current use of anticoagulants with INR goal of 2.0-3.0 [Z79.01]  Atrial fibrillation  unspecified type (H) [I48.91]           Comments:           Anticoagulation Care Providers     Provider Role Specialty Phone number    Alan Almeida MD Referring Internal Medicine [phone]

## 2022-01-15 ENCOUNTER — HEALTH MAINTENANCE LETTER (OUTPATIENT)
Age: 67
End: 2022-01-15

## 2022-01-17 DIAGNOSIS — I10 ESSENTIAL HYPERTENSION, BENIGN: ICD-10-CM

## 2022-01-18 ENCOUNTER — MYC REFILL (OUTPATIENT)
Dept: INTERNAL MEDICINE | Facility: CLINIC | Age: 67
End: 2022-01-18
Payer: MEDICARE

## 2022-01-18 DIAGNOSIS — I10 ESSENTIAL HYPERTENSION, BENIGN: ICD-10-CM

## 2022-01-18 RX ORDER — METOPROLOL TARTRATE 50 MG
TABLET ORAL
Qty: 270 TABLET | Refills: 2 | Status: SHIPPED | OUTPATIENT
Start: 2022-01-18 | End: 2022-10-13

## 2022-01-18 RX ORDER — METOPROLOL TARTRATE 50 MG
75 TABLET ORAL 2 TIMES DAILY
Qty: 270 TABLET | Refills: 0 | OUTPATIENT
Start: 2022-01-18

## 2022-01-18 RX ORDER — TRIAMTERENE/HYDROCHLOROTHIAZID 37.5-25 MG
1 TABLET ORAL EVERY MORNING
Qty: 90 TABLET | Refills: 0 | Status: SHIPPED | OUTPATIENT
Start: 2022-01-18 | End: 2022-04-18

## 2022-01-25 ENCOUNTER — ANTICOAGULATION THERAPY VISIT (OUTPATIENT)
Dept: ANTICOAGULATION | Facility: CLINIC | Age: 67
End: 2022-01-25

## 2022-01-25 ENCOUNTER — LAB (OUTPATIENT)
Dept: LAB | Facility: CLINIC | Age: 67
End: 2022-01-25
Payer: MEDICARE

## 2022-01-25 DIAGNOSIS — I48.91 ATRIAL FIBRILLATION, UNSPECIFIED TYPE (H): ICD-10-CM

## 2022-01-25 DIAGNOSIS — I48.91 ATRIAL FIBRILLATION (H): Primary | ICD-10-CM

## 2022-01-25 DIAGNOSIS — I49.8 VENTRICULAR BIGEMINY: ICD-10-CM

## 2022-01-25 DIAGNOSIS — Z79.01 LONG TERM CURRENT USE OF ANTICOAGULANTS WITH INR GOAL OF 2.0-3.0: ICD-10-CM

## 2022-01-25 LAB — INR BLD: 2.4 (ref 0.9–1.1)

## 2022-01-25 PROCEDURE — 36416 COLLJ CAPILLARY BLOOD SPEC: CPT

## 2022-01-25 PROCEDURE — 85610 PROTHROMBIN TIME: CPT

## 2022-01-25 NOTE — PROGRESS NOTES
ANTICOAGULATION MANAGEMENT     Amparo Fishmicki Shazia 66 year old female is on warfarin with therapeutic INR result. (Goal INR 2.0-3.0)    Recent labs: (last 7 days)     01/25/22  1300   INR 2.4*       ASSESSMENT     Source(s): Chart Review and Patient/Caregiver Call       Warfarin doses taken: Warfarin taken as instructed    Diet: No new diet changes identified    New illness, injury, or hospitalization: No    Medication/supplement changes: None noted    Signs or symptoms of bleeding or clotting: No    Previous INR: Therapeutic last 2(+) visits    Additional findings: None     PLAN     Recommended plan for no diet, medication or health factor changes affecting INR     Dosing Instructions: Continue your current warfarin dose with next INR in 6 weeks       Summary  As of 1/25/2022    Full warfarin instructions:  1.25 mg every Mon, Wed, Sat; 2.5 mg all other days   Next INR check:  3/8/2022             Telephone call with Amparo who verbalizes understanding and agrees to plan    Lab visit scheduled    Education provided: Please call back if any changes to your diet, medications or how you've been taking warfarin    Plan made per [facility] anticoagulation protocol    Natalya Watson RN  Anticoagulation Clinic  1/25/2022    _______________________________________________________________________     Anticoagulation Episode Summary     Current INR goal:  2.0-3.0   TTR:  75.3 % (1 y)   Target end date:  Indefinite   Send INR reminders to:  [facility]    Indications    Atrial fibrillation (H) [I48.91]  Ventricular bigeminy [I49.8]  Long term current use of anticoagulants with INR goal of 2.0-3.0 [Z79.01]  Atrial fibrillation  unspecified type (H) [I48.91]           Comments:           Anticoagulation Care Providers     Provider Role Specialty Phone number    Alan Almeida MD Referring Internal Medicine [phone]

## 2022-03-08 ENCOUNTER — ANTICOAGULATION THERAPY VISIT (OUTPATIENT)
Dept: ANTICOAGULATION | Facility: CLINIC | Age: 67
End: 2022-03-08

## 2022-03-08 ENCOUNTER — LAB (OUTPATIENT)
Dept: LAB | Facility: CLINIC | Age: 67
End: 2022-03-08
Payer: MEDICARE

## 2022-03-08 DIAGNOSIS — I48.91 ATRIAL FIBRILLATION, UNSPECIFIED TYPE (H): ICD-10-CM

## 2022-03-08 DIAGNOSIS — I48.91 ATRIAL FIBRILLATION (H): Primary | ICD-10-CM

## 2022-03-08 DIAGNOSIS — I49.8 VENTRICULAR BIGEMINY: ICD-10-CM

## 2022-03-08 DIAGNOSIS — Z79.01 LONG TERM CURRENT USE OF ANTICOAGULANTS WITH INR GOAL OF 2.0-3.0: ICD-10-CM

## 2022-03-08 LAB — INR BLD: 2.2 (ref 0.9–1.1)

## 2022-03-08 PROCEDURE — 85610 PROTHROMBIN TIME: CPT

## 2022-03-08 PROCEDURE — 36416 COLLJ CAPILLARY BLOOD SPEC: CPT

## 2022-03-08 NOTE — PROGRESS NOTES
ANTICOAGULATION MANAGEMENT     Amparo Fishmicki Shazia 66 year old female is on warfarin with therapeutic INR result. (Goal INR 2.0-3.0)    Recent labs: (last 7 days)     03/08/22  1323   INR 2.2*       ASSESSMENT       Source(s): Chart Review and Patient/Caregiver Call       Warfarin doses taken: Warfarin taken as instructed    Diet: No new diet changes identified    New illness, injury, or hospitalization: No    Medication/supplement changes: None noted    Signs or symptoms of bleeding or clotting: No    Previous INR: Therapeutic last 2(+) visits    Additional findings: None       PLAN     Recommended plan for no diet, medication or health factor changes affecting INR     Dosing Instructions: Continue your current warfarin dose with next INR in 6 weeks       Summary  As of 3/8/2022    Full warfarin instructions:  1.25 mg every Mon, Wed, Sat; 2.5 mg all other days   Next INR check:  4/19/2022             Telephone call with Amparo who verbalizes understanding and agrees to plan    Lab visit scheduled    Education provided: Please call back if any changes to your diet, medications or how you've been taking warfarin and Contact [phone]  with any changes, questions or concerns.     Plan made per [facility] anticoagulation protocol    Melodie Rose, RN  Anticoagulation Clinic  3/8/2022    _______________________________________________________________________     Anticoagulation Episode Summary     Current INR goal:  2.0-3.0   TTR:  75.3 % (1 y)   Target end date:  Indefinite   Send INR reminders to:  [facility]    Indications    Atrial fibrillation (H) [I48.91]  Ventricular bigeminy [I49.8]  Long term current use of anticoagulants with INR goal of 2.0-3.0 [Z79.01]  Atrial fibrillation  unspecified type (H) [I48.91]           Comments:           Anticoagulation Care Providers     Provider Role Specialty Phone number    Alan Almeida MD Referring Internal Medicine [phone]

## 2022-03-09 DIAGNOSIS — I49.8 VENTRICULAR BIGEMINY: ICD-10-CM

## 2022-03-09 DIAGNOSIS — I48.0 PAROXYSMAL ATRIAL FIBRILLATION (H): ICD-10-CM

## 2022-03-09 DIAGNOSIS — Z79.01 LONG TERM CURRENT USE OF ANTICOAGULANT THERAPY: ICD-10-CM

## 2022-03-10 RX ORDER — WARFARIN SODIUM 2.5 MG/1
TABLET ORAL
Qty: 100 TABLET | Refills: 1 | Status: SHIPPED | OUTPATIENT
Start: 2022-03-10 | End: 2023-03-16

## 2022-03-16 ENCOUNTER — MYC MEDICAL ADVICE (OUTPATIENT)
Dept: NEUROLOGY | Facility: CLINIC | Age: 67
End: 2022-03-16
Payer: MEDICARE

## 2022-03-16 DIAGNOSIS — G70.00 MYASTHENIA GRAVIS WITHOUT EXACERBATION (H): ICD-10-CM

## 2022-03-17 RX ORDER — PYRIDOSTIGMINE BROMIDE 60 MG/1
TABLET ORAL
Qty: 90 TABLET | Refills: 3 | Status: SHIPPED | OUTPATIENT
Start: 2022-03-17 | End: 2023-03-16

## 2022-04-05 ENCOUNTER — TELEPHONE (OUTPATIENT)
Dept: CARDIOLOGY | Facility: CLINIC | Age: 67
End: 2022-04-05
Payer: MEDICARE

## 2022-04-05 NOTE — TELEPHONE ENCOUNTER
[facility] Call Center    Phone Message    May a detailed message be left on voicemail: yes     Reason for Call: Other: Per pt has a amrah on phone that she uses to do EKG - had chest discomfort and marah showing PVC doesn't feel is serious enough to go to ER so wants to send msg to [facility] she can send a PDF copy of the EKG if needed. Did try to schedule appt with [facility] but is booked out to May she said might need sooner appt depending on what team see on the EKG request call back to discuss.     Action Taken: Message routed to:  Other: Cardiology    Travel Screening: Not Applicable

## 2022-04-06 NOTE — TELEPHONE ENCOUNTER
"4/6/22 Spoke w pt who states that for the past month  She has been having episodes of a feeling of \" a tight ball squeezing in the center of my chest\" which occurs almost every day off/on. She denies any associated sx such as nausea, dizziness , radiating pain or SOB. It subsides on its own.  Yesterday she began to experience palpitations as well and she checked her Apple Watch which showed \" inconclusive\".She also has a [employer] mobile and recorded a tracing from that which showed \" PVCs.\"  Pt had NM GXT in 9/2021 which showed \" Stress test was positive for small area of ischemia\". Tx with medical management is what pt is currently following.  Pt will send [employer] tracing . Scheduled her to see Janelle Le PA-C on 4/12 at 310.  Pt instructed to got to ED if pain continues and does not subside or if associated sx are present.  Pt voiced understanding and agreement with plan.   Ilana 820 am  "

## 2022-04-15 DIAGNOSIS — I10 ESSENTIAL HYPERTENSION, BENIGN: ICD-10-CM

## 2022-04-15 DIAGNOSIS — I25.118 CORONARY ARTERY DISEASE OF NATIVE HEART WITH STABLE ANGINA PECTORIS, UNSPECIFIED VESSEL OR LESION TYPE (H): ICD-10-CM

## 2022-04-18 ENCOUNTER — APPOINTMENT (OUTPATIENT)
Dept: CT IMAGING | Facility: CLINIC | Age: 67
DRG: 287 | End: 2022-04-18
Attending: EMERGENCY MEDICINE
Payer: MEDICARE

## 2022-04-18 ENCOUNTER — HOSPITAL ENCOUNTER (INPATIENT)
Facility: CLINIC | Age: 67
LOS: 1 days | Discharge: HOME | DRG: 287 | End: 2022-04-20
Attending: EMERGENCY MEDICINE | Admitting: HOSPITALIST
Payer: MEDICARE

## 2022-04-18 DIAGNOSIS — R07.9 ACUTE CHEST PAIN: ICD-10-CM

## 2022-04-18 DIAGNOSIS — I25.118 CORONARY ARTERY DISEASE OF NATIVE HEART WITH STABLE ANGINA PECTORIS, UNSPECIFIED VESSEL OR LESION TYPE (H): Primary | ICD-10-CM

## 2022-04-18 DIAGNOSIS — I20.0 UNSTABLE ANGINA (H): ICD-10-CM

## 2022-04-18 DIAGNOSIS — E87.6 HYPOKALEMIA: ICD-10-CM

## 2022-04-18 DIAGNOSIS — R07.9 CHEST PAIN, UNSPECIFIED TYPE: ICD-10-CM

## 2022-04-18 DIAGNOSIS — Z79.01 LONG TERM CURRENT USE OF ANTICOAGULANTS WITH INR GOAL OF 2.0-3.0: ICD-10-CM

## 2022-04-18 DIAGNOSIS — I71.20 THORACIC AORTIC ANEURYSM WITHOUT RUPTURE (H): ICD-10-CM

## 2022-04-18 DIAGNOSIS — I48.91 ATRIAL FIBRILLATION, UNSPECIFIED TYPE (H): ICD-10-CM

## 2022-04-18 LAB
ALBUMIN SERPL-MCNC: 4 G/DL (ref 3.4–5)
ALBUMIN UR-MCNC: NEGATIVE MG/DL
ALP SERPL-CCNC: 105 U/L (ref 40–150)
ALT SERPL W P-5'-P-CCNC: 31 U/L (ref 0–50)
ANION GAP SERPL CALCULATED.3IONS-SCNC: 3 MMOL/L (ref 3–14)
APPEARANCE UR: CLEAR
APTT PPP: 40 SECONDS (ref 22–38)
AST SERPL W P-5'-P-CCNC: 22 U/L (ref 0–45)
BACTERIA #/AREA URNS HPF: ABNORMAL /HPF
BASOPHILS # BLD AUTO: 0 10E3/UL (ref 0–0.2)
BASOPHILS NFR BLD AUTO: 0 %
BILIRUB SERPL-MCNC: 0.4 MG/DL (ref 0.2–1.3)
BILIRUB UR QL STRIP: NEGATIVE
BUN SERPL-MCNC: 18 MG/DL (ref 7–30)
CALCIUM SERPL-MCNC: 9.2 MG/DL (ref 8.5–10.1)
CHLORIDE BLD-SCNC: 103 MMOL/L (ref 94–109)
CO2 SERPL-SCNC: 33 MMOL/L (ref 20–32)
COLOR UR AUTO: ABNORMAL
CREAT SERPL-MCNC: 0.82 MG/DL (ref 0.52–1.04)
EOSINOPHIL # BLD AUTO: 0.1 10E3/UL (ref 0–0.7)
EOSINOPHIL NFR BLD AUTO: 1 %
ERYTHROCYTE [DISTWIDTH] IN BLOOD BY AUTOMATED COUNT: 12.9 % (ref 10–15)
GFR SERPL CREATININE-BSD FRML MDRD: 79 ML/MIN/1.73M2
GLUCOSE BLD-MCNC: 102 MG/DL (ref 70–99)
GLUCOSE UR STRIP-MCNC: NEGATIVE MG/DL
HCT VFR BLD AUTO: 42.2 % (ref 35–47)
HGB BLD-MCNC: 13.9 G/DL (ref 11.7–15.7)
HGB UR QL STRIP: NEGATIVE
HOLD SPECIMEN: NORMAL
HOLD SPECIMEN: NORMAL
IMM GRANULOCYTES # BLD: 0 10E3/UL
IMM GRANULOCYTES NFR BLD: 0 %
INR PPP: 2.15 (ref 0.85–1.15)
KETONES UR STRIP-MCNC: NEGATIVE MG/DL
LEUKOCYTE ESTERASE UR QL STRIP: NEGATIVE
LYMPHOCYTES # BLD AUTO: 1.6 10E3/UL (ref 0.8–5.3)
LYMPHOCYTES NFR BLD AUTO: 24 %
MAGNESIUM SERPL-MCNC: 2.2 MG/DL (ref 1.6–2.3)
MCH RBC QN AUTO: 28.8 PG (ref 26.5–33)
MCHC RBC AUTO-ENTMCNC: 32.9 G/DL (ref 31.5–36.5)
MCV RBC AUTO: 88 FL (ref 78–100)
MONOCYTES # BLD AUTO: 0.5 10E3/UL (ref 0–1.3)
MONOCYTES NFR BLD AUTO: 7 %
MUCOUS THREADS #/AREA URNS LPF: PRESENT /LPF
NEUTROPHILS # BLD AUTO: 4.4 10E3/UL (ref 1.6–8.3)
NEUTROPHILS NFR BLD AUTO: 68 %
NITRATE UR QL: NEGATIVE
NRBC # BLD AUTO: 0 10E3/UL
NRBC BLD AUTO-RTO: 0 /100
PH UR STRIP: 5.5 [PH] (ref 5–7)
PLATELET # BLD AUTO: 265 10E3/UL (ref 150–450)
POTASSIUM BLD-SCNC: 3.1 MMOL/L (ref 3.4–5.3)
PROT SERPL-MCNC: 8.2 G/DL (ref 6.8–8.8)
RBC # BLD AUTO: 4.82 10E6/UL (ref 3.8–5.2)
RBC URINE: 1 /HPF
SARS-COV-2 RNA RESP QL NAA+PROBE: NEGATIVE
SODIUM SERPL-SCNC: 139 MMOL/L (ref 133–144)
SP GR UR STRIP: 1 (ref 1–1.03)
SQUAMOUS EPITHELIAL: 1 /HPF
TROPONIN I SERPL HS-MCNC: 6 NG/L
TROPONIN I SERPL HS-MCNC: 7 NG/L
UROBILINOGEN UR STRIP-MCNC: NORMAL MG/DL
WBC # BLD AUTO: 6.5 10E3/UL (ref 4–11)
WBC URINE: 2 /HPF

## 2022-04-18 PROCEDURE — U0003 INFECTIOUS AGENT DETECTION BY NUCLEIC ACID (DNA OR RNA); SEVERE ACUTE RESPIRATORY SYNDROME CORONAVIRUS 2 (SARS-COV-2) (CORONAVIRUS DISEASE [COVID-19]), AMPLIFIED PROBE TECHNIQUE, MAKING USE OF HIGH THROUGHPUT TECHNOLOGIES AS DESCRIBED BY CMS-2020-01-R: HCPCS | Performed by: EMERGENCY MEDICINE

## 2022-04-18 PROCEDURE — 36415 COLL VENOUS BLD VENIPUNCTURE: CPT | Performed by: PHYSICIAN ASSISTANT

## 2022-04-18 PROCEDURE — 74177 CT ABD & PELVIS W/CONTRAST: CPT | Mod: ME

## 2022-04-18 PROCEDURE — 84484 ASSAY OF TROPONIN QUANT: CPT | Performed by: PHYSICIAN ASSISTANT

## 2022-04-18 PROCEDURE — 99285 EMERGENCY DEPT VISIT HI MDM: CPT | Mod: 25

## 2022-04-18 PROCEDURE — 84484 ASSAY OF TROPONIN QUANT: CPT | Performed by: EMERGENCY MEDICINE

## 2022-04-18 PROCEDURE — 83735 ASSAY OF MAGNESIUM: CPT | Performed by: EMERGENCY MEDICINE

## 2022-04-18 PROCEDURE — 36415 COLL VENOUS BLD VENIPUNCTURE: CPT | Performed by: EMERGENCY MEDICINE

## 2022-04-18 PROCEDURE — 250N000011 HC RX IP 250 OP 636: Performed by: EMERGENCY MEDICINE

## 2022-04-18 PROCEDURE — 99220 PR INITIAL OBSERVATION CARE,LEVEL III: CPT | Performed by: PHYSICIAN ASSISTANT

## 2022-04-18 PROCEDURE — 85610 PROTHROMBIN TIME: CPT | Performed by: EMERGENCY MEDICINE

## 2022-04-18 PROCEDURE — C9803 HOPD COVID-19 SPEC COLLECT: HCPCS

## 2022-04-18 PROCEDURE — 250N000013 HC RX MED GY IP 250 OP 250 PS 637: Performed by: EMERGENCY MEDICINE

## 2022-04-18 PROCEDURE — 258N000003 HC RX IP 258 OP 636: Performed by: EMERGENCY MEDICINE

## 2022-04-18 PROCEDURE — 93005 ELECTROCARDIOGRAM TRACING: CPT

## 2022-04-18 PROCEDURE — 85730 THROMBOPLASTIN TIME PARTIAL: CPT | Performed by: EMERGENCY MEDICINE

## 2022-04-18 PROCEDURE — G0378 HOSPITAL OBSERVATION PER HR: HCPCS

## 2022-04-18 PROCEDURE — 96360 HYDRATION IV INFUSION INIT: CPT | Mod: 59

## 2022-04-18 PROCEDURE — 96361 HYDRATE IV INFUSION ADD-ON: CPT

## 2022-04-18 PROCEDURE — 80053 COMPREHEN METABOLIC PANEL: CPT | Performed by: EMERGENCY MEDICINE

## 2022-04-18 PROCEDURE — 85025 COMPLETE CBC W/AUTO DIFF WBC: CPT | Performed by: EMERGENCY MEDICINE

## 2022-04-18 PROCEDURE — 81001 URINALYSIS AUTO W/SCOPE: CPT | Performed by: EMERGENCY MEDICINE

## 2022-04-18 RX ORDER — ATORVASTATIN CALCIUM 40 MG/1
TABLET, FILM COATED ORAL
Qty: 90 TABLET | Refills: 1 | Status: SHIPPED | OUTPATIENT
Start: 2022-04-18 | End: 2022-10-13

## 2022-04-18 RX ORDER — ACETAMINOPHEN 650 MG/1
650 SUPPOSITORY RECTAL EVERY 6 HOURS PRN
Status: DISCONTINUED | OUTPATIENT
Start: 2022-04-18 | End: 2022-04-20

## 2022-04-18 RX ORDER — NITROGLYCERIN 0.4 MG/1
0.4 TABLET SUBLINGUAL EVERY 5 MIN PRN
Status: DISCONTINUED | OUTPATIENT
Start: 2022-04-18 | End: 2022-04-18

## 2022-04-18 RX ORDER — POTASSIUM CHLORIDE 1.5 G/1.58G
40 POWDER, FOR SOLUTION ORAL ONCE
Status: COMPLETED | OUTPATIENT
Start: 2022-04-18 | End: 2022-04-18

## 2022-04-18 RX ORDER — ASPIRIN 81 MG/1
81 TABLET ORAL DAILY
Status: DISCONTINUED | OUTPATIENT
Start: 2022-04-19 | End: 2022-04-20

## 2022-04-18 RX ORDER — TRIAMTERENE/HYDROCHLOROTHIAZID 37.5-25 MG
1 TABLET ORAL EVERY MORNING
Status: DISCONTINUED | OUTPATIENT
Start: 2022-04-19 | End: 2022-04-20

## 2022-04-18 RX ORDER — ATORVASTATIN CALCIUM 40 MG/1
40 TABLET, FILM COATED ORAL AT BEDTIME
Status: DISCONTINUED | OUTPATIENT
Start: 2022-04-19 | End: 2022-04-20

## 2022-04-18 RX ORDER — TRIAMTERENE/HYDROCHLOROTHIAZID 37.5-25 MG
1 TABLET ORAL EVERY MORNING
Qty: 90 TABLET | Refills: 1 | Status: SHIPPED | OUTPATIENT
Start: 2022-04-18 | End: 2022-10-13

## 2022-04-18 RX ORDER — PYRIDOSTIGMINE BROMIDE 60 MG/1
60 TABLET ORAL 2 TIMES DAILY
Status: DISCONTINUED | OUTPATIENT
Start: 2022-04-19 | End: 2022-04-20

## 2022-04-18 RX ORDER — AMLODIPINE BESYLATE 5 MG/1
5 TABLET ORAL 2 TIMES DAILY
Status: DISCONTINUED | OUTPATIENT
Start: 2022-04-19 | End: 2022-04-20

## 2022-04-18 RX ORDER — MAGNESIUM HYDROXIDE/ALUMINUM HYDROXICE/SIMETHICONE 120; 1200; 1200 MG/30ML; MG/30ML; MG/30ML
30 SUSPENSION ORAL EVERY 4 HOURS PRN
Status: DISCONTINUED | OUTPATIENT
Start: 2022-04-18 | End: 2022-04-20

## 2022-04-18 RX ORDER — NITROGLYCERIN 0.4 MG/1
0.4 TABLET SUBLINGUAL EVERY 5 MIN PRN
Status: DISCONTINUED | OUTPATIENT
Start: 2022-04-18 | End: 2022-04-20

## 2022-04-18 RX ORDER — IOPAMIDOL 755 MG/ML
500 INJECTION, SOLUTION INTRAVASCULAR ONCE
Status: COMPLETED | OUTPATIENT
Start: 2022-04-18 | End: 2022-04-18

## 2022-04-18 RX ORDER — ACETAMINOPHEN 325 MG/1
650 TABLET ORAL EVERY 6 HOURS PRN
Status: DISCONTINUED | OUTPATIENT
Start: 2022-04-18 | End: 2022-04-20

## 2022-04-18 RX ORDER — LIDOCAINE 40 MG/G
CREAM TOPICAL
Status: DISCONTINUED | OUTPATIENT
Start: 2022-04-18 | End: 2022-04-20

## 2022-04-18 RX ADMIN — POTASSIUM CHLORIDE 40 MEQ: 1.5 POWDER, FOR SOLUTION ORAL at 20:36

## 2022-04-18 RX ADMIN — NITROGLYCERIN 0.4 MG: 0.4 TABLET SUBLINGUAL at 17:10

## 2022-04-18 RX ADMIN — SODIUM CHLORIDE 60 ML: 9 INJECTION, SOLUTION INTRAVENOUS at 17:53

## 2022-04-18 RX ADMIN — IOPAMIDOL 80 ML: 755 INJECTION, SOLUTION INTRAVENOUS at 17:53

## 2022-04-18 ASSESSMENT — ENCOUNTER SYMPTOMS
PALPITATIONS: 1
COUGH: 0
ABDOMINAL PAIN: 0
VOMITING: 0
FATIGUE: 0
SHORTNESS OF BREATH: 0

## 2022-04-18 NOTE — Clinical Note
The DP pulses are 2+ bilaterally. The PT pulses are 1+ bilaterally. The right radial pulse is 2+. The right ulnar pulse is 1+.

## 2022-04-18 NOTE — ED PROVIDER NOTES
History     Chief Complaint:    Chest Pain      HPI   Amparo Mccray is a 66 year old female who presents with chest pain.  She has a history of atrial fibrillation status post ablation.  She continues to take warfarin.  She also has known coronary artery disease.  She follows with cardiology and had a stress test September 2021.  This showed an area of ischemia in the inferior portion of the left ventricle.  Medical management was pursued.  The patient said she had been doing well until about 5 weeks ago when she began to have increasing frequency of her episodes of chest discomfort.  She describes a heaviness in her chest.  She is unable to describe any consistent provoking factors such as exertion.  There is no pleuritic component.  She does not feel that her pain is related to meals and infection has tried Maalox and Tums and antacids without much relief.  More recently her symptoms have been occurring at rest.  She was advised by her cardiology clinic to come to the ED.  No fever or coughing.  No trauma to the chest.  No rash noted.  She has been compliant with her medication regimen.  In the last days she has begun to note pain radiating between the shoulder blades as well as into the left neck.  No syncopal episodes.    Review of Systems   Constitutional: Negative for fatigue.   HENT: Negative for congestion.    Respiratory: Negative for cough and shortness of breath.    Cardiovascular: Positive for chest pain and palpitations.   Gastrointestinal: Negative for abdominal pain and vomiting.   All other systems reviewed and are negative.        Allergies:      Celebrex [Celecoxib]  Ibuprofen  Ibuprofen      Medications:      atorvastatin (LIPITOR) 40 MG tablet  triamterene-HCTZ (MAXZIDE-25) 37.5-25 MG tablet  amLODIPine (NORVASC) 5 MG tablet  metoprolol tartrate (LOPRESSOR) 50 MG tablet  pyridostigmine (MESTINON) 60 MG tablet  warfarin ANTICOAGULANT (COUMADIN) 2.5 MG tablet        Past Medical History:         Past Medical History:   Diagnosis Date     Arthritis      Chest pain, unspecified      Essential hypertension, benign      Obesity (BMI 30-39.9) 7/1/13     Paroxysmal atrial fibrillation (H) 03/2010     Superficial thrombophlebitis      Thrombosis of leg      Ventricular bigeminy      Patient Active Problem List    Diagnosis Date Noted     Atrial fibrillation (H)      Priority: High     questionable TIA- now on coumadin       Essential hypertension, benign 03/20/2006     Priority: High     Unstable angina (H) 04/18/2022     Priority: Medium     Hypokalemia 04/18/2022     Priority: Medium     Thoracic aortic aneurysm without rupture (H) 04/18/2022     Priority: Medium     Coronary artery disease of native heart with stable angina pectoris, unspecified vessel or lesion type (H) 10/25/2021     Priority: Medium     Atrial fibrillation, unspecified type (H) 10/12/2020     Priority: Medium     Long term current use of anticoagulants with INR goal of 2.0-3.0 11/01/2018     Priority: Medium     Long-term (current) use of anticoagulants [Z79.01] 08/23/2017     Priority: Medium     Health Care Home 03/06/2015     Priority: Medium     Status:  Declined    See Letters for Emergency  Care Plan  Date:  March 6, 2015         Knee joint replacement status 03/02/2015     Priority: Medium     Morbid obesity (H) 07/01/2013     Priority: Medium     BMI 33.6       Paroxysmal A-fib (H) 12/05/2012     Priority: Medium     ACP (advance care planning) 08/23/2012     Priority: Medium     Discussed advance care planning with patient; information given to patient to review. 8/23/2012          Aftercare following joint replacement 06/08/2011     Priority: Medium     Knee joint replacement by other means 06/08/2011     Priority: Medium     Ventricular bigeminy      Priority: Medium     bigeminy- nrml LV fxn       Chest pain      Priority: Medium     neg stress test, put on PPI- no help  Problem list name updated by automated process.  Provider to review       CARDIOVASCULAR SCREENING; LDL GOAL LESS THAN 160 10/31/2010     Priority: Medium        Past Surgical History:      Past Surgical History:   Procedure Laterality Date     ABDOMEN SURGERY       ARTHROPLASTY KNEE Right 3/2/2015    Procedure: ARTHROPLASTY KNEE;  Surgeon: Cuco Baugh MD;  Location: [facility] OR     [facility] AFF SURGICAL PATHOLOGY  1976    HGSIL     ESOPHAGOSCOPY, GASTROSCOPY, DUODENOSCOPY (EGD), COMBINED N/A 5/16/2019    Procedure: ESOPHAGOGASTRODUODENOSCOPY, WITH BIOPSY;  Surgeon: Liliane Montalvo MD;  Location: [facility]     H ABLATION FOCAL AFIB  11/02/2017     H ABLATION FOCAL AFIB  01/15/2013     ORTHOPEDIC SURGERY      l tka  and wrist      TUBAL LIGATION      Uterine fibroids removed     ZZC NONSPECIFIC PROCEDURE      left hand        Family History:        Family History   Problem Relation Age of Onset     C.A.D. Paternal Grandmother      Hypertension Paternal Grandmother      C.A.D. Paternal Grandfather      Connective Tissue Disorder Paternal Grandfather      Hypertension Paternal Grandfather      C.A.D. Father      Hypertension Father      Cancer Maternal Grandmother         uterine     Colon Cancer Maternal Grandmother      Cancer Maternal Grandfather         leukemia     Other Cancer Maternal Grandfather      Cancer Mother         uterine     Gastrointestinal Disease Mother         ulcers     Hypertension Mother      Other - See Comments Sister         essential tremors     C.A.D. Brother         stent- 47     Hypertension Brother      Depression Daughter      Eye Disorder Son      Hypertension Brother      Depression Daughter      Asthma Son        Social History:  No tobacco.  No alcohol.    Physical Exam     Patient Vitals for the past 24 hrs:   BP Temp Temp src Pulse Resp SpO2 Weight   04/18/22 1745 135/78 -- -- 71 14 97 % --   04/18/22 1730 127/82 -- -- 73 15 96 % --   04/18/22 1715 (!) 141/75 -- -- 84 10 95 % --   04/18/22 1710 (!) 148/84 -- -- 74 15  98 % --   04/18/22 1600 (!) 177/100 98.1  F (36.7  C) Temporal 90 18 98 % 108.9 kg (240 lb)       Physical Exam  Constitutional:       General: She is not in acute distress.     Appearance: She is not diaphoretic.   HENT:      Head: Atraumatic.      Mouth/Throat:      Pharynx: No oropharyngeal exudate.   Eyes:      General: No scleral icterus.     Pupils: Pupils are equal, round, and reactive to light.   Neck:      Comments: No thyromegaly or neck mass.  No tenderness.  Cardiovascular:      Rate and Rhythm: Normal rate and regular rhythm.      Heart sounds: Normal heart sounds. No murmur heard.  Pulmonary:      Effort: Pulmonary effort is normal. No respiratory distress.      Breath sounds: Normal breath sounds.   Chest:      Comments: No deformity to chest wall or tenderness.  Abdominal:      General: Bowel sounds are normal.      Palpations: Abdomen is soft.      Tenderness: There is no abdominal tenderness.   Musculoskeletal:         General: No tenderness.   Skin:     General: Skin is warm.      Capillary Refill: Capillary refill takes less than 2 seconds.      Findings: No rash.   Neurological:      General: No focal deficit present.      Mental Status: She is oriented to person, place, and time.   Psychiatric:         Mood and Affect: Mood normal.         Behavior: Behavior normal.           Emergency Department Course   ECG  Rate: 85.  Normal sinus rhythm.  Nonspecific ST depression in the inferior and precordial leads especially anterior and lateral.  This was present September 22, 2021 although to a lesser degree.  Imaging:  CT Aortic Survey w Contrast   Final Result   IMPRESSION:   1.  Study negative for acute aortic pathology. Mild dilation ascending aorta measuring 38 mm.   2.  Indeterminate 6 mm low-density lesion right hepatic lobe.   3.  Bilateral inguinal and prepubic venous varicosities.   4.  Advanced disc degenerative changes lower lumbar spine.             Laboratory:  Labs Ordered and Resulted  from Time of ED Arrival to Time of ED Departure   COMPREHENSIVE METABOLIC PANEL - Abnormal       Result Value    Sodium 139      Potassium 3.1 (*)     Chloride 103      Carbon Dioxide (CO2) 33 (*)     Anion Gap 3      Urea Nitrogen 18      Creatinine 0.82      Calcium 9.2      Glucose 102 (*)     Alkaline Phosphatase 105      AST 22      ALT 31      Protein Total 8.2      Albumin 4.0      Bilirubin Total 0.4      GFR Estimate 79     INR - Abnormal    INR 2.15 (*)    PARTIAL THROMBOPLASTIN TIME - Abnormal    aPTT 40 (*)    TROPONIN I - Normal    Troponin I High Sensitivity 6     MAGNESIUM - Normal    Magnesium 2.2     CBC WITH PLATELETS AND DIFFERENTIAL    WBC Count 6.5      RBC Count 4.82      Hemoglobin 13.9      Hematocrit 42.2      MCV 88      MCH 28.8      MCHC 32.9      RDW 12.9      Platelet Count 265      % Neutrophils 68      % Lymphocytes 24      % Monocytes 7      % Eosinophils 1      % Basophils 0      % Immature Granulocytes 0      NRBCs per 100 WBC 0      Absolute Neutrophils 4.4      Absolute Lymphocytes 1.6      Absolute Monocytes 0.5      Absolute Eosinophils 0.1      Absolute Basophils 0.0      Absolute Immature Granulocytes 0.0      Absolute NRBCs 0.0     ROUTINE UA WITH MICROSCOPIC REFLEX TO CULTURE   TROPONIN I   COVID-19 VIRUS (CORONAVIRUS) BY PCR       Medications   nitroGLYcerin (NITROSTAT) sublingual tablet 0.4 mg (0.4 mg Sublingual Given 4/18/22 1710)   0.9% sodium chloride BOLUS (60 mLs Intravenous New Bag 4/18/22 1753)   iopamidol (ISOVUE-370) solution 500 mL (80 mLs Intravenous Given 4/18/22 1753)   potassium chloride (KLOR-CON) Packet 40 mEq (40 mEq Oral Given 4/18/22 2036)       Disposition:  The patient was admitted to the hospital under the care of Dr. Jacques.    Impression & Plan      Medical Decision Making:  This patient is a 66-year-old woman who presents to the ED with chest pain.  She has follow-up with cardiology for coronary artery disease.  She is managed medically.  She had  an abnormal stress test about 7 months ago.  In the course of the last 1 months she has had increasing frequency of chest discomfort to the point that it is now occurring at rest.  EKG with nonspecific changes but her troponin is negative.  Considered aortic dissection given her pain radiation into the back.  However, CT scan is negative for dissection.  She does have mild aneurysmal dilatation of the aorta which I discussed with her.  This will need surveillance.    Given that she has already had a stress test that was abnormal I spoke with cardiology, Dr. Huynh, and our plan will be to admit the patient.  She will be admitted to medicine.  Cardiology will consult.  Likely the patient will need cardiac catheterization.  The patient is already anticoagulated so we will hold off on aspirin or any further anticoagulation.      Covid-19  Amparo Mccray was evaluated during a global COVID-19 pandemic, which necessitated consideration that the patient might be at risk for infection with the SARS-CoV-2 virus that causes COVID-19.   Applicable protocols for evaluation were followed during the patient's care.   COVID-19 was considered as part of the patient's evaluation. The plan for testing is:  a test was obtained during this visit.    Diagnosis:    ICD-10-CM    1. Unstable angina (H)  I20.0    2. Hypokalemia  E87.6    3. Thoracic aortic aneurysm without rupture (H)  I71.2        Discharge Medications:  New Prescriptions    No medications on file          Cem Pop MD  04/18/22 2036

## 2022-04-18 NOTE — ED TRIAGE NOTES
Pt reports that she has been having intermittent CP for the past 4 wks, today pt noted that she was sitting and started having CP that started 2 hours ago that radiates to the left side of neck. PT reports that she called her cardiologist and they told pt to come to ED. PT denies any other precipitating factors. PT VSS and ABC's intact

## 2022-04-19 ENCOUNTER — APPOINTMENT (OUTPATIENT)
Dept: CARDIOLOGY | Facility: CLINIC | Age: 67
DRG: 287 | End: 2022-04-19
Attending: PHYSICIAN ASSISTANT
Payer: MEDICARE

## 2022-04-19 LAB
ATRIAL RATE - MUSE: 85 BPM
CHOLEST SERPL-MCNC: 154 MG/DL
DIASTOLIC BLOOD PRESSURE - MUSE: NORMAL MMHG
HDLC SERPL-MCNC: 45 MG/DL
INR PPP: 1.82 (ref 0.85–1.15)
INTERPRETATION ECG - MUSE: NORMAL
LDLC SERPL CALC-MCNC: 86 MG/DL
LVEF ECHO: NORMAL
NONHDLC SERPL-MCNC: 109 MG/DL
P AXIS - MUSE: 72 DEGREES
POTASSIUM BLD-SCNC: 3.4 MMOL/L (ref 3.4–5.3)
POTASSIUM BLD-SCNC: 3.8 MMOL/L (ref 3.4–5.3)
PR INTERVAL - MUSE: 166 MS
QRS DURATION - MUSE: 98 MS
QT - MUSE: 376 MS
QTC - MUSE: 447 MS
R AXIS - MUSE: 24 DEGREES
SYSTOLIC BLOOD PRESSURE - MUSE: NORMAL MMHG
T AXIS - MUSE: 26 DEGREES
TRIGL SERPL-MCNC: 113 MG/DL
TROPONIN I SERPL HS-MCNC: 6 NG/L
TROPONIN I SERPL HS-MCNC: 7 NG/L
VENTRICULAR RATE- MUSE: 85 BPM

## 2022-04-19 PROCEDURE — 250N000013 HC RX MED GY IP 250 OP 250 PS 637: Performed by: NURSE PRACTITIONER

## 2022-04-19 PROCEDURE — 36415 COLL VENOUS BLD VENIPUNCTURE: CPT | Performed by: PHYSICIAN ASSISTANT

## 2022-04-19 PROCEDURE — 250N000013 HC RX MED GY IP 250 OP 250 PS 637: Performed by: PHYSICIAN ASSISTANT

## 2022-04-19 PROCEDURE — 84132 ASSAY OF SERUM POTASSIUM: CPT | Performed by: PHYSICIAN ASSISTANT

## 2022-04-19 PROCEDURE — 80061 LIPID PANEL: CPT | Performed by: INTERNAL MEDICINE

## 2022-04-19 PROCEDURE — 85610 PROTHROMBIN TIME: CPT | Performed by: NURSE PRACTITIONER

## 2022-04-19 PROCEDURE — 93306 TTE W/DOPPLER COMPLETE: CPT

## 2022-04-19 PROCEDURE — G0378 HOSPITAL OBSERVATION PER HR: HCPCS

## 2022-04-19 PROCEDURE — 93306 TTE W/DOPPLER COMPLETE: CPT | Mod: 26 | Performed by: INTERNAL MEDICINE

## 2022-04-19 PROCEDURE — 84484 ASSAY OF TROPONIN QUANT: CPT | Performed by: PHYSICIAN ASSISTANT

## 2022-04-19 PROCEDURE — 36415 COLL VENOUS BLD VENIPUNCTURE: CPT | Performed by: NURSE PRACTITIONER

## 2022-04-19 PROCEDURE — 99221 1ST HOSP IP/OBS SF/LOW 40: CPT | Mod: 25 | Performed by: INTERNAL MEDICINE

## 2022-04-19 PROCEDURE — 84132 ASSAY OF SERUM POTASSIUM: CPT | Performed by: NURSE PRACTITIONER

## 2022-04-19 RX ORDER — POTASSIUM CHLORIDE 1500 MG/1
40 TABLET, EXTENDED RELEASE ORAL ONCE
Status: COMPLETED | OUTPATIENT
Start: 2022-04-19 | End: 2022-04-19

## 2022-04-19 RX ADMIN — PYRIDOSTIGMINE BROMIDE 60 MG: 60 TABLET ORAL at 08:51

## 2022-04-19 RX ADMIN — ACETAMINOPHEN 650 MG: 325 TABLET, FILM COATED ORAL at 00:17

## 2022-04-19 RX ADMIN — NITROGLYCERIN 0.4 MG: 0.4 TABLET SUBLINGUAL at 12:38

## 2022-04-19 RX ADMIN — ATORVASTATIN CALCIUM 40 MG: 40 TABLET, FILM COATED ORAL at 00:17

## 2022-04-19 RX ADMIN — NITROGLYCERIN 0.4 MG: 0.4 TABLET SUBLINGUAL at 00:25

## 2022-04-19 RX ADMIN — ACETAMINOPHEN 650 MG: 325 TABLET, FILM COATED ORAL at 21:09

## 2022-04-19 RX ADMIN — ATORVASTATIN CALCIUM 40 MG: 40 TABLET, FILM COATED ORAL at 21:09

## 2022-04-19 RX ADMIN — ASPIRIN 81 MG: 81 TABLET, COATED ORAL at 08:54

## 2022-04-19 RX ADMIN — AMLODIPINE BESYLATE 5 MG: 5 TABLET ORAL at 08:51

## 2022-04-19 RX ADMIN — POTASSIUM CHLORIDE 40 MEQ: 1500 TABLET, EXTENDED RELEASE ORAL at 02:26

## 2022-04-19 RX ADMIN — AMLODIPINE BESYLATE 5 MG: 5 TABLET ORAL at 20:04

## 2022-04-19 RX ADMIN — PYRIDOSTIGMINE BROMIDE 60 MG: 60 TABLET ORAL at 12:35

## 2022-04-19 RX ADMIN — TRIAMTERENE AND HYDROCHLOROTHIAZIDE 1 TABLET: 37.5; 25 TABLET ORAL at 08:54

## 2022-04-19 NOTE — PLAN OF CARE
PRIMARY DIAGNOSIS: CHEST PAIN  OUTPATIENT/OBSERVATION GOALS TO BE MET BEFORE DISCHARGE:  1. Ruled out acute coronary syndrome (negative or stable Troponin):  Yes  2. Pain Status: Improved  with nitroglycerin.  3. Appropriate provocative testing performed: No- scheduled.  - Stress Test Procedure: Echo  - Interpretation of cardiac rhythm per telemetry tech: sinus rhythm 90    4. Cleared by Consultants (if applicable):No  5. Return to near baseline physical activity: No  Vitals are Temp: 97.6  F (36.4  C) Temp src: Oral BP: 126/65 Pulse: 67   Resp: 16 SpO2: 96 %.  Patient is Alert and Oriented x4. They are independent with no assistive devices .  Pt is a NPO diet.  Pt denying pain after getting nitroglycerine.  Patient is [place] locked.      Discharge Planner Nurse   Safe discharge environment identified: Yes  Barriers to discharge: Yes       Entered by: Yolie Caceres 04/19/2022     Please review provider order for any additional goals.   Nurse to notify provider when observation goals have been met and patient is ready for discharge.  Goal Outcome Evaluation:

## 2022-04-19 NOTE — PLAN OF CARE
ROOM # 212-02    Living Situation (if not independent, order SW consult):Home with son  Facility name:[facility]  :SonChapo Chung-[phone]    Activity level at baseline: Independent  Activity level on admit: Independent    Who will be transporting you at discharge: Son    Patient registered to observation; given Patient Bill of Rights; given the opportunity to ask questions about observation status and their plan of care.  Patient has been oriented to the observation room, bathroom and call light is in place.    Discussed discharge goals and expectations with patient/family.

## 2022-04-19 NOTE — CONSULTS
[facility]  Cardiology Consultation     Date of Admission:  4/18/2022    Primary Care Physician   Alan Almeida    Reason for Consult   Reason for consult: I was asked to evaluate this patient for chest discomfort probable unstable angina.    History of Present Illness   Amparo Mccray is a 66 year old woman who follows with my partner Dr. Thomas for paroxysmal atrial fibrillation, hypertension, hyperlipidemia, coronary artery disease with abnormal stress test, obesity, strong family history of coronary disease on his father side with paternal uncle dying at age 50 paternal aunt dying at 42 and her father dying at 70 of a myocardial infarction.  She does not have diabetes mellitus and does not smoke.    She is undergone A. fib ablations by Dr. Carlisle 2013 and again in 2017.  She remains on warfarin but does not think she has had a recurrence of her A. fib.  She does report possible TIA in the past.    Coronary history dates back to last July when she experienced a severe squeezing chest pain accompanied by nausea.  She was vacationing.  When she returned she mentioned at her follow-up appointment a Lexiscan in September 2021 demonstrate a small area of inferior mid and basal ischemia.  Note her stress test was symptomatic.  They decided to treat her medically.  Patient states she did well until the last month when she started experiencing a upper sternal chest discomfort radiating into her neck and back.  Occasionally accompanied by nausea.  She notes no pattern closely related to exercise.  She does think they are coming on more more frequently and lasting longer and longer.    Troponins are negative.  EKG demonstrates a normal sinus rhythm and a normal EKG.  Echocardiogram also appears to be normal.  Most recent fasting lipid profile done prior to starting on a statin was from October with total cholesterol 266, HDL 47,  and triglycerides of 147.  INR today is  "2.15.    Assessment & Plan   Amparo Mccray is a 66 year old female who was admitted on 4/18/2022 with what appears to be unstable angina.  She already has a positive asymptomatic stress test.  I have recommended we proceed with coronary angiography.  I discussed risk, benefits and alternatives with the patient.  She is on chronic warfarin therapy which I will continue at this time.  I would defer to Dr. Thomas as to whether this can be discontinued.  She has been started on aspirin.  If she were to require a stent I would use clopidogrel and discontinue aspirin at discharge in order to avoid triple therapy.  As INR is over 2 we will have to wait for this to drop below 2 before we proceed with angiography.    I will check a fasting lipid profile.  I will empirically increase atorvastatin to 80 mg daily.    Blood pressure is not well controlled.  Potassium is low on Maxide.  I will start lisinopril.  If her potassium stays low I may recommend switching Maxide to spironolactone.  Potassium may go up with initiation of lisinopril.    She does have ankle edema thought to be secondary to her amlodipine.  She states is responded well to the Maxide.    She needs to eat a predominantly plant-based diet, exercise regularly and lose weight.    Paroxysmal atrial fibrillation Will observe on telemetry.    Clinically Significant Risk Factors Present on Admission              # Coagulation Defect: home medication list includes an anticoagulant medication    # Obesity: Estimated body mass index is 35.82 kg/m  as calculated from the following:    Height as of this encounter: 1.727 m (5' 8\").    Weight as of this encounter: 106.9 kg (235 lb 9.6 oz).        Fluid & Electrolyte Disorders: Hypokalemia                                     Leonardo Lam MD    Patient Active Problem List   Diagnosis     Essential hypertension, benign     CARDIOVASCULAR SCREENING; LDL GOAL LESS THAN 160     Atrial fibrillation (H)     Ventricular " bigeminy     Acute chest pain     Aftercare following joint replacement     Knee joint replacement by other means     ACP (advance care planning)     Paroxysmal A-fib (H)     Morbid obesity (H)     Knee joint replacement status     Health Care Home     Long-term (current) use of anticoagulants [Z79.01]     Long term current use of anticoagulants with INR goal of 2.0-3.0     Atrial fibrillation, unspecified type (H)     Coronary artery disease of native heart with stable angina pectoris, unspecified vessel or lesion type (H)     Unstable angina (H)     Hypokalemia     Thoracic aortic aneurysm without rupture (H)       Past Medical History   I have reviewed this patient's medical history and updated it with pertinent information if needed.   Past Medical History:   Diagnosis Date     Arthritis      Chest pain, unspecified     neg stress test, put on PPI- no help     Essential hypertension, benign     Hypertension, Benign     Obesity (BMI 30-39.9) 7/1/13    BMI 33.6     Paroxysmal atrial fibrillation (H) 03/2010     Superficial thrombophlebitis      Thrombosis of leg     superficial not dvt's     Ventricular bigeminy     bigeminy- nrml LV fxn       Past Surgical History   I have reviewed this patient's surgical history and updated it with pertinent information if needed.  Past Surgical History:   Procedure Laterality Date     ABDOMEN SURGERY       ARTHROPLASTY KNEE Right 3/2/2015    Procedure: ARTHROPLASTY KNEE;  Surgeon: Cuco Baugh MD;  Location:  OR     [facility] SURGICAL PATHOLOGY  1976    HGSIL     ESOPHAGOSCOPY, GASTROSCOPY, DUODENOSCOPY (EGD), COMBINED N/A 5/16/2019    Procedure: ESOPHAGOGASTRODUODENOSCOPY, WITH BIOPSY;  Surgeon: Liliane Montalvo MD;  Location: [facility]     H ABLATION FOCAL AFIB  11/02/2017     H ABLATION FOCAL AFIB  01/15/2013     ORTHOPEDIC SURGERY      l tka  and wrist      TUBAL LIGATION      Uterine fibroids removed     ZZC NONSPECIFIC PROCEDURE      left hand         Prior to Admission Medications   Prior to Admission Medications   Prescriptions Last Dose Informant Patient Reported? Taking?   amLODIPine (NORVASC) 5 MG tablet 2022 at x1  No Yes   Sig: Take 1 tablet (5 mg) by mouth 2 times daily   atorvastatin (LIPITOR) 40 MG tablet 2022 at Unknown time  No Yes   Sig: TAKE 1 TABLET(40 MG) BY MOUTH AT BEDTIME   metoprolol tartrate (LOPRESSOR) 50 MG tablet 2022 at x1  No Yes   Sig: TAKE 1 AND 1/2 TABLETS(75 MG) BY MOUTH TWICE DAILY   pyridostigmine (MESTINON) 60 MG tablet 2022 at x2  No Yes   Si tablet by mouth three times a day   Patient taking differently: 1 tablet by mouth two times a day with breakfast and lunch   triamterene-HCTZ (MAXZIDE-25) 37.5-25 MG tablet 2022 at Unknown time  No Yes   Sig: TAKE 1 TABLET BY MOUTH EVERY MORNING   warfarin ANTICOAGULANT (COUMADIN) 2.5 MG tablet 2022 at Unknown time  No Yes   Sig: TAKE 1/2 TABLET(1.25 MG) ON , ,  AND TAKE 1 TABLET(2.5 MG) ON ALL OTHER DAYS OR AS DIRECTED BY THE INR CLINIC.      Facility-Administered Medications: None     Current Facility-Administered Medications   Medication Dose Route Frequency     amLODIPine  5 mg Oral BID     aspirin  81 mg Oral Daily     atorvastatin  40 mg Oral At Bedtime     pyridostigmine  60 mg Oral BID 09 12     sodium chloride (PF)  3 mL Intracatheter Q8H     triamterene-HCTZ  1 tablet Oral QAM     Current Facility-Administered Medications   Medication Last Rate     Allergies   Allergies   Allergen Reactions     Celebrex [Celecoxib] Hives     Hands itching then hives     Ibuprofen Swelling     Face swelling     Ibuprofen Hives       Social History    reports that she has never smoked. She has never used smokeless tobacco. She reports current alcohol use. She reports that she does not use drugs.    Family History   Family History   Problem Relation Age of Onset     C.A.D. Paternal Grandmother      Hypertension Paternal Grandmother       "C.A.D. Paternal Grandfather      Connective Tissue Disorder Paternal Grandfather      Hypertension Paternal Grandfather      C.A.D. Father      Hypertension Father      Cancer Maternal Grandmother         uterine     Colon Cancer Maternal Grandmother      Cancer Maternal Grandfather         leukemia     Other Cancer Maternal Grandfather      Cancer Mother         uterine     Gastrointestinal Disease Mother         ulcers     Hypertension Mother      Other - See Comments Sister         essential tremors     C.A.D. Brother         stent- 47     Hypertension Brother      Depression Daughter      Eye Disorder Son      Hypertension Brother      Depression Daughter      Asthma Son        Review of Systems   The comprehensive 10 point Review of Systems is negative other than noted in the HPI or here.     Physical Exam   Vital Signs with Ranges  Temp:  [97.6  F (36.4  C)-98.1  F (36.7  C)] 97.9  F (36.6  C)  Pulse:  [67-90] 85  Resp:  [10-18] 12  BP: (126-177)/() 142/92  SpO2:  [95 %-99 %] 99 %  Wt Readings from Last 4 Encounters:   04/18/22 106.9 kg (235 lb 9.6 oz)   10/22/21 110.7 kg (244 lb)   09/23/21 110.8 kg (244 lb 3.2 oz)   09/22/21 111.5 kg (245 lb 12.8 oz)     No intake/output data recorded.      Vitals: BP (!) 142/92 (BP Location: Right arm)   Pulse 85   Temp 97.9  F (36.6  C) (Oral)   Resp 12   Ht 1.727 m (5' 8\")   Wt 106.9 kg (235 lb 9.6 oz)   LMP 12/31/2012   SpO2 99%   BMI 35.82 kg/m      Patient is comfortable and in no apparent distress. Awake, alert and oriented ×3.  She is obese with body mass index of 35.8  Pupils are equal round and reactive.  Sclerae anicteric conjunctiva is noninjected  Neck is supple there are no carotid bruits or jugular venous distention.  Chest is clear to auscultation.  There is no kyphosis or scoliosis  Cardiac exam reveals a regular rate and rhythm I hear no murmur, rub or gallop.  Abdomen is soft nontender with normoactive bowel sounds.  Extremities are without " edema.  There are 2+ pulses.  Neurologic exam is nonfocal.  Skin is warm and dry.      No lab results found in last 7 days.    Invalid input(s): TROPONINIES    Recent Labs   Lab 04/19/22  0644 04/19/22  0037 04/18/22  1703   WBC  --   --  6.5   HGB  --   --  13.9   MCV  --   --  88   PLT  --   --  265   INR  --   --  2.15*   NA  --   --  139   POTASSIUM 3.8 3.4 3.1*   CHLORIDE  --   --  103   CO2  --   --  33*   BUN  --   --  18   CR  --   --  0.82   GFRESTIMATED  --   --  79   ANIONGAP  --   --  3   AVILA  --   --  9.2   GLC  --   --  102*   ALBUMIN  --   --  4.0   PROTTOTAL  --   --  8.2   BILITOTAL  --   --  0.4   ALKPHOS  --   --  105   ALT  --   --  31   AST  --   --  22     Recent Labs   Lab Test 10/22/21  1511 06/02/20  0944 05/16/16  1240 07/24/14  0546   CHOL 266* 260*   < > 234*   HDL 47* 47*   < > 48*   * 178*   < > 149*   TRIG 147 176*   < > 187*   CHOLHDLRATIO  --   --   --  4.9    < > = values in this interval not displayed.     Recent Labs   Lab 04/18/22  1703   WBC 6.5   HGB 13.9   HCT 42.2   MCV 88        No results for input(s): PH, PHV, PO2, PO2V, SAT, PCO2, PCO2V, HCO3, HCO3V in the last 168 hours.  No results for input(s): NTBNPI, NTBNP in the last 168 hours.  No results for input(s): DD in the last 168 hours.  No results for input(s): SED, CRP in the last 168 hours.  Recent Labs   Lab 04/18/22  1703        No results for input(s): TSH in the last 168 hours.  Recent Labs   Lab 04/18/22 2016   COLOR Straw   APPEARANCE Clear   URINEGLC Negative   URINEBILI Negative   URINEKETONE Negative   SG 1.005   UBLD Negative   URINEPH 5.5   PROTEIN Negative   NITRITE Negative   LEUKEST Negative   RBCU 1   WBCU 2       Imaging:  Recent Results (from the past 48 hour(s))   CT Aortic Survey w Contrast    Narrative    EXAM: CT AORTIC SURVEY W CONTRAST  LOCATION: [facility]  DATE/TIME: 4/18/2022    INDICATION: Thoracic aortic aneurysm (TAA) suspected. Chest pain. Atrial  fibrillation.  COMPARISON: None.  TECHNIQUE: CT angiogram chest abdomen pelvis during arterial phase of injection of IV contrast. 2D and 3D MIP reconstructions were performed by the CT technologist. Dose reduction techniques were used.   CONTRAST: 80 mL Isovue 370    FINDINGS:   CT ANGIOGRAM CHEST, ABDOMEN, AND PELVIS: Normal caliber thoracic aorta without dissection, aneurysm or intramural hematoma. The great arch vessels arise in a normal fashion. The mid ascending aorta measures 38 mm. Central pulmonary arteries are   unremarkable. Normal caliber abdominal aorta without aneurysm or dissection. The celiac trunk, SMA, EDNA and bilateral renal arteries are widely patent. The common, internal and external iliac arteries as well as both common femoral arteries are normal.    LUNGS AND PLEURA: Normal.    MEDIASTINUM/AXILLAE: No mass or adenopathy    CORONARY ARTERY CALCIFICATION: Mild.    HEPATOBILIARY: 6 mm low-density lesion right hepatic lobe, indeterminate. Too small accurately characterize.    PANCREAS: Normal.    SPLEEN: Normal.    ADRENAL GLANDS: Normal.    KIDNEYS/BLADDER: Normal.    BOWEL: Normal.    LYMPH NODES: Normal.    PELVIC ORGANS: Venous varicosities both inguinal and prepubic region.    MUSCULOSKELETAL: Advanced disc degenerative changes lower lumbar spine.      Impression    IMPRESSION:  1.  Study negative for acute aortic pathology. Mild dilation ascending aorta measuring 38 mm.  2.  Indeterminate 6 mm low-density lesion right hepatic lobe.  3.  Bilateral inguinal and prepubic venous varicosities.  4.  Advanced disc degenerative changes lower lumbar spine.     Echocardiogram Complete   Result Value    LVEF  55-60%    Narrative    [id number]  [id number]  [id number]  [id number]^CHARLETTE^SANJEEV^[facility]  Echocardiography Laboratory  [address] [place] [address]     Name: DANY SHELBY  MRN: [id number]  : 1955  Study Date: 2022 08:02 AM  Age: 66  yrs  Gender: Female  Patient Location: [facility]  Reason For Study: Chest Pain, Chest Pressure, Chest Tightness  Ordering Physician: SANJEEV OVALLES  Referring Physician: Alan Almeida MD  Performed By: Gabrielle [facility]     BSA: 2.2 m2  Height: 68 in  Weight: 235 lb  HR: 75  BP: 158/73 mmHg  ______________________________________________________________________________  Procedure  Complete Portable Echo Adult.  ______________________________________________________________________________  Interpretation Summary     The left atrium is mildly dilated.  The visual ejection fraction is 55-60%.  ______________________________________________________________________________  Left Ventricle  The left ventricle is normal in size. There is normal left ventricular wall  thickness. The visual ejection fraction is 55-60%. Left ventricular diastolic  function is normal. Normal left ventricular wall motion.     Right Ventricle  The right ventricle is normal in structure, function and size.     Atria  The left atrium is mildly dilated. The right atrium is borderline dilated.     Mitral Valve  There is mild mitral annular calcification. The mitral valve leaflets are  mildly thickened.     Tricuspid Valve  Normal tricuspid valve.     Aortic Valve  The aortic valve is normal in structure and function.     Pulmonic Valve  The pulmonic valve is not well seen, but is grossly normal.     Vessels  The aortic root is normal size. The ascending aorta is Mildly dilated. The  inferior vena cava is normal.     Pericardium  There is no pericardial effusion.     Rhythm  Sinus rhythm was noted.  ______________________________________________________________________________  MMode/2D Measurements & Calculations  IVSd: 0.93 cm     LVIDd: 5.1 cm  LVIDs: 3.2 cm  LVPWd: 0.89 cm  FS: 37.9 %  LV mass(C)d: 168.1 grams  LV mass(C)dI: 76.8 grams/m2  Ao root diam: 3.3 cm  LA dimension: 4.0 cm  asc Aorta Diam: 3.9 cm  LA/Ao: 1.2  LVOT diam: 2.2  cm  LVOT area: 3.8 cm2  LA Volume (BP): 70.3 ml  LA Volume Index (BP): 32.1 ml/m2  RWT: 0.35     Doppler Measurements & Calculations  MV E max ashli: 71.0 cm/sec  MV A max ashli: 78.8 cm/sec  MV E/A: 0.90  MV dec time: 0.18 sec  Ao V2 max: 153.0 cm/sec  Ao max P.0 mmHg  PA acc time: 0.15 sec  E/E' av.5  Lateral E/e': 8.3  Medial E/e': 10.7     ______________________________________________________________________________  Report approved by: Ivy Pantoja 2022 10:01 AM             Echo:  No results found for this or any previous visit (from the past 4320 hour(s)).

## 2022-04-19 NOTE — H&P
[facility]    History and Physical - Hospitalist Service       Date of Admission:  4/18/2022    Assessment & Plan      Amparo Mccray is a 66 year old female with past medical history significant for hypertension and PAF s/p ablation in 2013 and 2017, who was admitted on 4/18/2022 with accelerating anginal pain.     1. Chest pain  Stable vs unstable angina  Episode of chest pain this past fall, saw Dr. Thomas of cardiology in September, underwent NM Lexiscan which did show a small area of ischemia in the mid to basal inferior segments of the left ventricle.  She also had a Ziopatch monitor which did not show any afib.  These results were reviewed with cardiology and they recommended medical management; amlodipine twice daily was started, lisinopril was stopped and her metoprolol was continued and her dose was increased.  If this was not effective, recommended coronary angiogram.  For the past 5 weeks, notes decreasing exercise tolerance with chest pain and shortness of breath.  Significant fatigue.  Hardly able to walk across room without symptoms.  She describes the chest pain as a heavy squeezing in the center of her chest denies pleuritic chest pain denies cough.  Notes pain radiating into her neck this past week and associated nausea today. CT chest negative, troponin negative. EKG SR with nonspecific ST and T wave abnormalities   - admit to OBS  - Monitor on telemetry  - Serial troponins  - Echocardiogram  - Cardiology consult  - NPO after midnight  - hold BB in case stress testing is indicated  - resume BB and statin  - hold coumadin for probable angiogram  - nitroglycerin PRN for chest pain  - of note, pt has a watch that can monitor HR and SpO2? She notes mild hypoxia at times at home    2. Hypokalemia, mild 3.1  Replace per protocol  3. HTN  Resume home Maxzide and amlodipine with parameters. Hold BB per above  4. PAF  S/p ablation x2. Pt notes palpitations recently, EKG here SR.  Hold BB and coumadin per above.  - monitor on tele     Diet:   regular diet, NPO after midnight  DVT Prophylaxis: Ambulate every shift  Lyons Catheter: Not present  Central Lines: None  Cardiac Monitoring: None  Code Status:   full code    Clinically Significant Risk Factors Present on Admission               # Coagulation Defect: home medication list includes an anticoagulant medication        Disposition Plan   Expected Discharge:  1-2 days   Anticipated discharge location:  Awaiting care coordination huddle  Delays:     cardiology plan        The patient's care was discussed with the Patient and ED provider, Dr. Pop.    Alisia oB PA-C  Hospitalist Service  [facility]  Securely message with the Vocera Web Console (learn more here)  Text page via [employer] Paging/Directory         ______________________________________________________________________    Chief Complaint   Chest pain    History is obtained from the patient    History of Present Illness   Amparo Mccray is a 66 year old female with past medical history significant for hypertension and PAF s/p ablation in 2013 and 2017, who presents with chest pain.  Patient notes she had episode of chest pain last fall she was seen by cardiology.  They recommended a stress test and a Zio patch.  She underwent a nuclear medicine Lexiscan but did show a small area of ischemia in the mid and basal inferior segments.  ZIO Patch did not show any A. fib, did show PACs.  At that time, cardiology recommended medical management, she was started on amlodipine twice daily, her lisinopril was stopped and her metoprolol was increased.  She was continued on Coumadin and statin.  She notes for the past 5 weeks she has had progressively worsening chest pain.  She notes a squeezing in the center of her chest.  She now notes this pain with any exertion and is now associated with shortness of breath.  The pain can even occur at rest.  She notes  increased fatigue as well.  Within the last week, she has noted radiation of her pain to her left side of her neck.  Today she noted nausea.  Her symptoms have been at least daily for the past 5 weeks and have become more frequent.  She denies fever, chills, cough, pleuritic chest pain, abdominal pain, vomiting, diarrhea or dysuria.  She notes a strong family history of coronary artery disease.    Review of Systems    The 10 point Review of Systems is negative other than noted in the HPI or here.     Past Medical History    I have reviewed this patient's medical history and updated it with pertinent information if needed.   Past Medical History:   Diagnosis Date     Arthritis      Chest pain, unspecified     neg stress test, put on PPI- no help     Essential hypertension, benign     Hypertension, Benign     Obesity (BMI 30-39.9) 7/1/13    BMI 33.6     Paroxysmal atrial fibrillation (H) 03/2010     Superficial thrombophlebitis      Thrombosis of leg     superficial not dvt's     Ventricular bigeminy     bigeminy- nrml LV fxn       Past Surgical History   I have reviewed this patient's surgical history and updated it with pertinent information if needed.  Past Surgical History:   Procedure Laterality Date     ABDOMEN SURGERY       ARTHROPLASTY KNEE Right 3/2/2015    Procedure: ARTHROPLASTY KNEE;  Surgeon: Cuco Baugh MD;  Location:  OR     [facility] SURGICAL PATHOLOGY  1976    HGSIL     ESOPHAGOSCOPY, GASTROSCOPY, DUODENOSCOPY (EGD), COMBINED N/A 5/16/2019    Procedure: ESOPHAGOGASTRODUODENOSCOPY, WITH BIOPSY;  Surgeon: Liliane Montalvo MD;  Location: [facility]     H ABLATION FOCAL AFIB  11/02/2017     H ABLATION FOCAL AFIB  01/15/2013     ORTHOPEDIC SURGERY      l tka  and wrist      TUBAL LIGATION      Uterine fibroids removed     ZZC NONSPECIFIC PROCEDURE      left hand        Social History   I have reviewed this patient's social history and updated it with pertinent information if  needed.  Social History     Tobacco Use     Smoking status: Never Smoker     Smokeless tobacco: Never Used   Substance Use Topics     Alcohol use: Yes     Comment: once a year     Drug use: No       Family History   I have reviewed this patient's family history and updated it with pertinent information if needed.  Family History   Problem Relation Age of Onset     C.A.D. Paternal Grandmother      Hypertension Paternal Grandmother      C.A.D. Paternal Grandfather      Connective Tissue Disorder Paternal Grandfather      Hypertension Paternal Grandfather      C.A.D. Father      Hypertension Father      Cancer Maternal Grandmother         uterine     Colon Cancer Maternal Grandmother      Cancer Maternal Grandfather         leukemia     Other Cancer Maternal Grandfather      Cancer Mother         uterine     Gastrointestinal Disease Mother         ulcers     Hypertension Mother      Other - See Comments Sister         essential tremors     C.A.D. Brother         stent- 47     Hypertension Brother      Depression Daughter      Eye Disorder Son      Hypertension Brother      Depression Daughter      Asthma Son        Prior to Admission Medications   Prior to Admission Medications   Prescriptions Last Dose Informant Patient Reported? Taking?   amLODIPine (NORVASC) 5 MG tablet 2022 at x1  No Yes   Sig: Take 1 tablet (5 mg) by mouth 2 times daily   atorvastatin (LIPITOR) 40 MG tablet 2022 at Unknown time  No Yes   Sig: TAKE 1 TABLET(40 MG) BY MOUTH AT BEDTIME   metoprolol tartrate (LOPRESSOR) 50 MG tablet 2022 at x1  No Yes   Sig: TAKE 1 AND 1/2 TABLETS(75 MG) BY MOUTH TWICE DAILY   pyridostigmine (MESTINON) 60 MG tablet 2022 at x2  No Yes   Si tablet by mouth three times a day   Patient taking differently: 1 tablet by mouth two times a day with breakfast and lunch   triamterene-HCTZ (MAXZIDE-25) 37.5-25 MG tablet 2022 at Unknown time  No Yes   Sig: TAKE 1 TABLET BY MOUTH EVERY MORNING    warfarin ANTICOAGULANT (COUMADIN) 2.5 MG tablet 4/18/2022 at Unknown time  No Yes   Sig: TAKE 1/2 TABLET(1.25 MG) ON MONDAYS, WEDNESDAYS, SATURDAYS AND TAKE 1 TABLET(2.5 MG) ON ALL OTHER DAYS OR AS DIRECTED BY THE INR CLINIC.      Facility-Administered Medications: None     Allergies   Allergies   Allergen Reactions     Celebrex [Celecoxib] Hives     Hands itching then hives     Ibuprofen Swelling     Face swelling     Ibuprofen Hives       Physical Exam   Vital Signs: Temp: 98.1  F (36.7  C) Temp src: Temporal BP: (!) 156/85 Pulse: 77   Resp: 13 SpO2: 96 % O2 Device: None (Room air)    Weight: 240 lbs 0 oz    GENERAL:  Comfortable.  PSYCH: pleasant, oriented, No acute distress.  HEENT:  Atraumatic, normocephalic. Normal conjunctiva, normal hearing, and oropharynx is normal.  NECK:  Supple, no neck vein distention  HEART:  Normal S1, S2 with no murmur, no pericardial rub, gallops or S3 or S4.  LUNGS:  Clear to auscultation, normal Respiratory effort. No wheezing, rales or ronchi.  GI:  Soft, normal bowel sounds. Non-tender, non distended.   EXTREMITIES:  No pedal edema, +2 pulses bilateral and equal.  SKIN:  Dry to touch, No rash, wound or ulcerations.  NEUROLOGIC:  CN 2-12 intact, BL 5/5 symmetric upper and lower extremity strength, sensation is intact with no focal deficits.      Data   Data reviewed today: I reviewed all medications, new labs and imaging results over the last 24 hours. I personally reviewed the EKG tracing showing SR with PACs, nonspecific ST and T wave abnormalities and the chest CT image(s) showing Negative for acute process.    Most Recent 3 CBC's:Recent Labs   Lab Test 04/18/22  1703 06/13/19  1136 11/02/17  1111   WBC 6.5 6.4 4.9   HGB 13.9 13.0 13.7   MCV 88 93 88    208 222     Most Recent 3 BMP's:Recent Labs   Lab Test 04/18/22  1703 10/22/21  1511 06/02/20  0944    137 138   POTASSIUM 3.1* 3.4 3.8   CHLORIDE 103 103 106   CO2 33* 28 28   BUN 18 16 20   CR 0.82 0.92 0.84    ANIONGAP 3 6 4   AVILA 9.2 9.0 8.7   * 92 105*     Most Recent 2 LFT's:Recent Labs   Lab Test 04/18/22  1703 06/13/19  1136   AST 22 19   ALT 31 25   ALKPHOS 105 84   BILITOTAL 0.4 0.3     Most Recent 3 INR's:Recent Labs   Lab Test 04/18/22  1703 03/08/22  1323 01/25/22  1300   INR 2.15* 2.2* 2.4*     Most Recent 3 Troponin's:Recent Labs   Lab Test 06/13/19  1136 07/23/14  2055 07/23/14  1632 07/23/14  1223   TROPI <0.015 <0.012 <0.012  --    TROPONIN  --   --   --  0.01     Most Recent 6 Bacteria Isolates From Any Culture (See EPIC Reports for Culture Details):No lab results found.  Most Recent Urinalysis:Recent Labs   Lab Test 04/18/22 2016   COLOR Straw   APPEARANCE Clear   URINEGLC Negative   URINEBILI Negative   URINEKETONE Negative   SG 1.005   UBLD Negative   URINEPH 5.5   PROTEIN Negative   NITRITE Negative   LEUKEST Negative   RBCU 1   WBCU 2     Recent Results (from the past 24 hour(s))   CT Aortic Survey w Contrast    Narrative    EXAM: CT AORTIC SURVEY W CONTRAST  LOCATION: [facility]  DATE/TIME: 4/18/2022    INDICATION: Thoracic aortic aneurysm (TAA) suspected. Chest pain. Atrial fibrillation.  COMPARISON: None.  TECHNIQUE: CT angiogram chest abdomen pelvis during arterial phase of injection of IV contrast. 2D and 3D MIP reconstructions were performed by the CT technologist. Dose reduction techniques were used.   CONTRAST: 80 mL Isovue 370    FINDINGS:   CT ANGIOGRAM CHEST, ABDOMEN, AND PELVIS: Normal caliber thoracic aorta without dissection, aneurysm or intramural hematoma. The great arch vessels arise in a normal fashion. The mid ascending aorta measures 38 mm. Central pulmonary arteries are   unremarkable. Normal caliber abdominal aorta without aneurysm or dissection. The celiac trunk, SMA, EDNA and bilateral renal arteries are widely patent. The common, internal and external iliac arteries as well as both common femoral arteries are normal.    LUNGS AND PLEURA:  Normal.    MEDIASTINUM/AXILLAE: No mass or adenopathy    CORONARY ARTERY CALCIFICATION: Mild.    HEPATOBILIARY: 6 mm low-density lesion right hepatic lobe, indeterminate. Too small accurately characterize.    PANCREAS: Normal.    SPLEEN: Normal.    ADRENAL GLANDS: Normal.    KIDNEYS/BLADDER: Normal.    BOWEL: Normal.    LYMPH NODES: Normal.    PELVIC ORGANS: Venous varicosities both inguinal and prepubic region.    MUSCULOSKELETAL: Advanced disc degenerative changes lower lumbar spine.      Impression    IMPRESSION:  1.  Study negative for acute aortic pathology. Mild dilation ascending aorta measuring 38 mm.  2.  Indeterminate 6 mm low-density lesion right hepatic lobe.  3.  Bilateral inguinal and prepubic venous varicosities.  4.  Advanced disc degenerative changes lower lumbar spine.

## 2022-04-19 NOTE — PROVIDER NOTIFICATION
Paged obs hospitalist [phone]  Pt having anxiety regarding HR, can she get an order for something for anxiety?

## 2022-04-19 NOTE — PROGRESS NOTES
[facility]  Hospitalist Progress Note  Flavio Deni Marlow, APRN CNP 2022    Reason for Stay (Diagnosis): Chest pain         Assessment and Plan:      Amparo Mccray is a 66 year old female with past medical history significant for hypertension and PAF s/p ablation in  and , who was admitted on 2022 with accelerating anginal pain.      1. Chest pain  Stable vs unstable angina.  Strong family history of early cardiac disease -- paternal uncle  at 50, paternal aunt at age 42, and father at 70  due to MI.  No history of T2DM nor tobacco use.  Does have a history of afib for which she underwent ablation in  and . Is on VKA.     Episode of chest pain this past fall, saw Dr. Thomas of cardiology in September, underwent NM Lexiscan which did show a small area of ischemia in the mid to basal inferior segments of the left ventricle.  She also had a Ziopatch monitor which did not show any afib.  These results were reviewed with cardiology and they recommended medical management; amlodipine twice daily was started, lisinopril was stopped and her metoprolol was continued and her dose was increased.  If this was not effective, recommended coronary angiogram.  For the past 5 weeks, notes decreasing exercise tolerance with chest pain and shortness of breath.  Significant fatigue.  Hardly able to walk across room without symptoms.  She describes the chest pain as a heavy squeezing in the center of her chest denies pleuritic chest pain denies cough.      CT chest negative, troponin negative. EKG SR with nonspecific ST and T wave abnormalities.    - admit to OBS  - Monitor on telemetry -- telemetry reassuring  - Serial troponins -- negative.  - Echocardiogram -- mildly dilated LA and EF 55-60%.   - Cardiology consult -- planning for cath when INR < 2.  - NPO after midnight for possible cath in the AM  - hold coumadin for probable angiogram  - continue BB and ASA.  If stent will  "transition to clopidogrel  - nitroglycerin PRN for chest pain  - of note, pt has a watch that can monitor HR and SpO2- She notes mild hypoxia at times at home.  Answered STOP-BANG with low probability of ORLIN.  However, question if possible central sleep apnea.      2. Hypokalemia, mild 3.1  Replace per protocol    3. HTN  Resume home Maxzide and amlodipine with parameters. Hold BB per above.    4. PAF  S/p ablation x2. Pt notes palpitations recently, EKG here SR. Hold BB and coumadin per above.  - monitor on tele        Diet:   Cardiac diet, NPO after midnight  DVT Prophylaxis: Ambulate every shift  Lyons Catheter: Not present  Central Lines: None  Cardiac Monitoring: None  Code Status:   full code        Interval History (Subjective):      Ms. Mccray was seen and examined. VSS.  One episode of CP earlier today resolved with SL NTG.  INR pending with anticipation of cath in the AM.  Need INR to be less than 2.                   Physical Exam:      Last Vital Signs:  BP (!) 122/90 (BP Location: Right arm)   Pulse 84   Temp 98.1  F (36.7  C) (Oral)   Resp 16   Ht 1.727 m (5' 8\")   Wt 106.9 kg (235 lb 9.6 oz)   LMP 12/31/2012   SpO2 97%   BMI 35.82 kg/m      No intake or output data in the 24 hours ending 04/19/22 1442    Constitutional: Awake, alert, cooperative, no apparent distress     Respiratory: Clear to auscultation bilaterally, no crackles or wheezing   Cardiovascular: Regular rate and rhythm, normal S1 and S2, and no murmur noted   Abdomen: Normal bowel sounds, soft, non-distended, non-tender   Skin: No rashes, no cyanosis, dry to touch   Neuro: Alert and oriented x3, no weakness, numbness, memory loss   Extremities: No edema, normal range of motion   Other(s):        All other systems: Negative          Medications:      All current medications were reviewed with changes reflected in problem list.         Data:      All new lab and imaging data was reviewed.   Labs:  Recent Labs   Lab 04/19/22  0644 " 04/19/22  0037 04/18/22  1703   NA  --   --  139   POTASSIUM 3.8   < > 3.1*   CHLORIDE  --   --  103   CO2  --   --  33*   ANIONGAP  --   --  3   GLC  --   --  102*   BUN  --   --  18   CR  --   --  0.82   GFRESTIMATED  --   --  79   AVILA  --   --  9.2    < > = values in this interval not displayed.     Recent Labs   Lab 04/18/22  1703   WBC 6.5   HGB 13.9   HCT 42.2   MCV 88        Recent Labs   Lab 04/18/22  1703   *     Troponin (high-sensitivity) 6-7 (detectable but within normal limit)  FLP: Cholesterol 154, triglycerides 113, HDL of 45, LDL 86    Recent Labs   Lab 04/18/22 2016   COLOR Straw   APPEARANCE Clear   URINEGLC Negative   URINEBILI Negative   URINEKETONE Negative   SG 1.005   UBLD Negative   URINEPH 5.5   PROTEIN Negative   NITRITE Negative   LEUKEST Negative   RBCU 1   WBCU 2     EKG 4/18/22 sinus rhythm with PACs.  Nonspecific ST and T wave changes.  Recent labs and studies reviewed.   Imaging:   Results for orders placed or performed during the hospital encounter of 04/18/22   CT Aortic Survey w Contrast    Narrative    EXAM: CT AORTIC SURVEY W CONTRAST  LOCATION: [facility]  DATE/TIME: 4/18/2022    INDICATION: Thoracic aortic aneurysm (TAA) suspected. Chest pain. Atrial fibrillation.  COMPARISON: None.  TECHNIQUE: CT angiogram chest abdomen pelvis during arterial phase of injection of IV contrast. 2D and 3D MIP reconstructions were performed by the CT technologist. Dose reduction techniques were used.   CONTRAST: 80 mL Isovue 370    FINDINGS:   CT ANGIOGRAM CHEST, ABDOMEN, AND PELVIS: Normal caliber thoracic aorta without dissection, aneurysm or intramural hematoma. The great arch vessels arise in a normal fashion. The mid ascending aorta measures 38 mm. Central pulmonary arteries are   unremarkable. Normal caliber abdominal aorta without aneurysm or dissection. The celiac trunk, SMA, EDNA and bilateral renal arteries are widely patent. The common, internal and  external iliac arteries as well as both common femoral arteries are normal.    LUNGS AND PLEURA: Normal.    MEDIASTINUM/AXILLAE: No mass or adenopathy    CORONARY ARTERY CALCIFICATION: Mild.    HEPATOBILIARY: 6 mm low-density lesion right hepatic lobe, indeterminate. Too small accurately characterize.    PANCREAS: Normal.    SPLEEN: Normal.    ADRENAL GLANDS: Normal.    KIDNEYS/BLADDER: Normal.    BOWEL: Normal.    LYMPH NODES: Normal.    PELVIC ORGANS: Venous varicosities both inguinal and prepubic region.    MUSCULOSKELETAL: Advanced disc degenerative changes lower lumbar spine.      Impression    IMPRESSION:  1.  Study negative for acute aortic pathology. Mild dilation ascending aorta measuring 38 mm.  2.  Indeterminate 6 mm low-density lesion right hepatic lobe.  3.  Bilateral inguinal and prepubic venous varicosities.  4.  Advanced disc degenerative changes lower lumbar spine.     Echocardiogram Complete     Value    LVEF  55-60%    Narrative    [id number]  [id number]  [id number]  [id number]^CHARLETTE^SANJEEV^CAITLIN     [facility]  Echocardiography Laboratory  [address] [place] [address]     Name: DANY SHELBY  MRN: [id number]  : 1955  Study Date: 2022 08:02 AM  Age: 66 yrs  Gender: Female  Patient Location: [facility]  Reason For Study: Chest Pain, Chest Pressure, Chest Tightness  Ordering Physician: SANJEEV OVALLES  Referring Physician: Alan Almeida MD  Performed By: Gabrielle [facility]     BSA: 2.2 m2  Height: 68 in  Weight: 235 lb  HR: 75  BP: 158/73 mmHg  ______________________________________________________________________________  Procedure  Complete Portable Echo Adult.  ______________________________________________________________________________  Interpretation Summary     The left atrium is mildly dilated.  The visual ejection fraction is 55-60%.  ______________________________________________________________________________  Left Ventricle  The left  ventricle is normal in size. There is normal left ventricular wall  thickness. The visual ejection fraction is 55-60%. Left ventricular diastolic  function is normal. Normal left ventricular wall motion.     Right Ventricle  The right ventricle is normal in structure, function and size.     Atria  The left atrium is mildly dilated. The right atrium is borderline dilated.     Mitral Valve  There is mild mitral annular calcification. The mitral valve leaflets are  mildly thickened.     Tricuspid Valve  Normal tricuspid valve.     Aortic Valve  The aortic valve is normal in structure and function.     Pulmonic Valve  The pulmonic valve is not well seen, but is grossly normal.     Vessels  The aortic root is normal size. The ascending aorta is Mildly dilated. The  inferior vena cava is normal.     Pericardium  There is no pericardial effusion.     Rhythm  Sinus rhythm was noted.  ______________________________________________________________________________  MMode/2D Measurements & Calculations  IVSd: 0.93 cm     LVIDd: 5.1 cm  LVIDs: 3.2 cm  LVPWd: 0.89 cm  FS: 37.9 %  LV mass(C)d: 168.1 grams  LV mass(C)dI: 76.8 grams/m2  Ao root diam: 3.3 cm  LA dimension: 4.0 cm  asc Aorta Diam: 3.9 cm  LA/Ao: 1.2  LVOT diam: 2.2 cm  LVOT area: 3.8 cm2  LA Volume (BP): 70.3 ml  LA Volume Index (BP): 32.1 ml/m2  RWT: 0.35     Doppler Measurements & Calculations  MV E max ashli: 71.0 cm/sec  MV A max ashli: 78.8 cm/sec  MV E/A: 0.90  MV dec time: 0.18 sec  Ao V2 max: 153.0 cm/sec  Ao max P.0 mmHg  PA acc time: 0.15 sec  E/E' av.5  Lateral E/e': 8.3  Medial E/e': 10.7     ______________________________________________________________________________  Report approved by: Ivy Pantoja 2022 10:01 ARMIN Portillo CNP  2022

## 2022-04-19 NOTE — PLAN OF CARE
"PRIMARY DIAGNOSIS: CHEST PAIN  OUTPATIENT/OBSERVATION GOALS TO BE MET BEFORE DISCHARGE:  1. Ruled out acute coronary syndrome (negative or stable Troponin):  Yes  2. Pain Status: one episode of 2/10 chest pain.  3. Appropriate provocative testing performed: No- scheduled.  - Stress Test Procedure: Echo  - Interpretation of cardiac rhythm per telemetry tech: sinus rhythm 90    4. Cleared by Consultants (if applicable):No  5. Return to near baseline physical activity: No  Vitals are BP (!) 122/90 (BP Location: Right arm)   Pulse 84   Temp 98.1  F (36.7  C) (Oral)   Resp 16   Ht 1.727 m (5' 8\")   Wt 106.9 kg (235 lb 9.6 oz)   LMP 12/31/2012   SpO2 97%   BMI 35.82 kg/m      Patient is alert and oriented x 4, she is independent with no assistive devices. She is back on a regular diet, with a possible angiogram tomorrow pending her INR results. She had a brief episode of chest pain at 1238 that was resolved with one dose of nitroglycerin    Discharge Planner Nurse   Safe discharge environment identified: Yes  Barriers to discharge: Yes       Entered by: Valeria Ness 04/19/2022     Please review provider order for any additional goals.   Nurse to notify provider when observation goals have been met and patient is ready for discharge.  Goal Outcome Evaluation:                      "

## 2022-04-19 NOTE — PHARMACY-ADMISSION MEDICATION HISTORY
Admission medication history interview status for this patient is complete. See [facility] admission navigator for allergy information, prior to admission medications and immunization status.     Medication history interview done, indicate source(s): Patient  Medication history resources (including written lists, pill bottles, clinic record):None  Pharmacy: -    Changes made to PTA medication list:  Added: -  Changed: mestinon to BID  Reported as Not Taking: -  Removed: -    Actions taken by pharmacist (provider contacted, etc):None     Additional medication history information:None    Medication reconciliation/reorder completed by provider prior to medication history?  no   (Y/N)     For patients on insulin therapy:   Do you use sliding scale insulin based on blood sugars?   What is your pre-meal insulin coverage?    Do you typically eat three meals a day?   How many times do you check your blood glucose per day?   How many episodes of hypoglycemia do you typically have per month?   Do you have a Continuous Glucose Monitor (CGM)?      Prior to Admission medications    Medication Sig Last Dose Taking? Auth Provider   amLODIPine (NORVASC) 5 MG tablet Take 1 tablet (5 mg) by mouth 2 times daily 4/18/2022 at x1 Yes Wilbert Thomas MD   atorvastatin (LIPITOR) 40 MG tablet TAKE 1 TABLET(40 MG) BY MOUTH AT BEDTIME 4/17/2022 at Unknown time Yes Alan Almeida MD   metoprolol tartrate (LOPRESSOR) 50 MG tablet TAKE 1 AND 1/2 TABLETS(75 MG) BY MOUTH TWICE DAILY 4/18/2022 at x1 Yes Alan Almeida MD   pyridostigmine (MESTINON) 60 MG tablet 1 tablet by mouth three times a day  Patient taking differently: 1 tablet by mouth two times a day with breakfast and lunch 4/18/2022 at x2 Yes Nikole Miner MD   triamterene-HCTZ (MAXZIDE-25) 37.5-25 MG tablet TAKE 1 TABLET BY MOUTH EVERY MORNING 4/18/2022 at Unknown time Yes Alan Almeida MD   warfarin ANTICOAGULANT (COUMADIN) 2.5 MG tablet TAKE 1/2 TABLET(1.25 MG)  ON MONDAYS, WEDNESDAYS, SATURDAYS AND TAKE 1 TABLET(2.5 MG) ON ALL OTHER DAYS OR AS DIRECTED BY THE INR CLINIC. 4/18/2022 at Unknown time Yes Alan Almeida MD

## 2022-04-19 NOTE — PLAN OF CARE
Goal Outcome Evaluation:    PRIMARY DIAGNOSIS: CHEST PAIN  OUTPATIENT/OBSERVATION GOALS TO BE MET BEFORE DISCHARGE:  1. Ruled out acute coronary syndrome (negative or stable Troponin):  Yes  2. Pain Status: Pain free.  3. Appropriate provocative testing performed: Yes  - Stress Test Procedure: ECOE  - Interpretation of cardiac rhythm per telemetry tech: possible Angio 4/20/22 pending INR.     4. Cleared by Consultants (if applicable):Yes  5. Return to near baseline physical activity: Yes  Discharge Planner Nurse   Safe discharge environment identified: Yes  Barriers to discharge: No       Entered by: Margie Pritchard 04/19/2022 4:14 PM    Pt A & O, ambulate independently in the room, no use of device. VSS, denies chest pain. HR reported from Tele 120. Noted pt was walking in the room, and reported her grandson is coming to see her.        Please review provider order for any additional goals.   Nurse to notify provider when observation goals have been met and patient is ready for discharge.

## 2022-04-19 NOTE — ED NOTES
[facility]  ED Nurse Handoff Report    Amparo Mccray is a 66 year old female   ED Chief complaint: Chest Pain  . ED Diagnosis:   Final diagnoses:   Unstable angina (H)   Hypokalemia   Thoracic aortic aneurysm without rupture (H)     Allergies:   Allergies   Allergen Reactions     Celebrex [Celecoxib] Hives     Hands itching then hives     Ibuprofen Swelling     Face swelling     Ibuprofen Hives       Code Status: Full Code  Activity level - Baseline/Home:  Independent. Activity Level - Current:   Independent. Lift room needed: No. Bariatric: No   Needed: No   Isolation: No. Infection: Not Applicable.     Vital Signs:   Vitals:    04/18/22 1710 04/18/22 1715 04/18/22 1730 04/18/22 1745   BP: (!) 148/84 (!) 141/75 127/82 135/78   Pulse: 74 84 73 71   Resp: 15 10 15 14   Temp:       TempSrc:       SpO2: 98% 95% 96% 97%   Weight:           Cardiac Rhythm:  ,   Cardiac  Cardiac Rhythm: Normal sinus rhythm  Pain level:    Patient confused: No. Patient Falls Risk: No.   Elimination Status: Has voided   Patient Report - Initial Complaint: Chest pain. Focused Assessment: Amparo Mccray is a 66 year old female who presents with chest pain.  She has a history of atrial fibrillation status post ablation.  She continues to take warfarin.  She also has known coronary artery disease.  She follows with cardiology and had a stress test September 2021.  This showed an area of ischemia in the inferior portion of the left ventricle.  Medical management was pursued.  The patient said she had been doing well until about 5 weeks ago when she began to have increasing frequency of her episodes of chest discomfort.  She describes a heaviness in her chest.  She is unable to describe any consistent provoking factors such as exertion.  There is no pleuritic component.  She does not feel that her pain is related to meals and infection has tried Maalox and Tums and antacids without much relief.  More recently  her symptoms have been occurring at rest.  She was advised by her cardiology clinic to come to the ED.  No fever or coughing.  No trauma to the chest.  No rash noted.  She has been compliant with her medication regimen.  In the last days she has begun to note pain radiating between the shoulder blades as well as into the left neck.  No syncopal episodes.     Review of Systems   Constitutional: Negative for fatigue.   HENT: Negative for congestion.    Respiratory: Negative for cough and shortness of breath.    Cardiovascular: Positive for chest pain and palpitations.   Gastrointestinal: Negative for abdominal pain and vomiting.   All other systems reviewed and are negative.   Tests Performed: labs, CT. Abnormal Results:   CT Aortic Survey w Contrast   Final Result   IMPRESSION:   1.  Study negative for acute aortic pathology. Mild dilation ascending aorta measuring 38 mm.   2.  Indeterminate 6 mm low-density lesion right hepatic lobe.   3.  Bilateral inguinal and prepubic venous varicosities.   4.  Advanced disc degenerative changes lower lumbar spine.           .   Labs Ordered and Resulted from Time of ED Arrival to Time of ED Departure   COMPREHENSIVE METABOLIC PANEL - Abnormal       Result Value    Sodium 139      Potassium 3.1 (*)     Chloride 103      Carbon Dioxide (CO2) 33 (*)     Anion Gap 3      Urea Nitrogen 18      Creatinine 0.82      Calcium 9.2      Glucose 102 (*)     Alkaline Phosphatase 105      AST 22      ALT 31      Protein Total 8.2      Albumin 4.0      Bilirubin Total 0.4      GFR Estimate 79     INR - Abnormal    INR 2.15 (*)    PARTIAL THROMBOPLASTIN TIME - Abnormal    aPTT 40 (*)    TROPONIN I - Normal    Troponin I High Sensitivity 6     MAGNESIUM - Normal    Magnesium 2.2     CBC WITH PLATELETS AND DIFFERENTIAL    WBC Count 6.5      RBC Count 4.82      Hemoglobin 13.9      Hematocrit 42.2      MCV 88      MCH 28.8      MCHC 32.9      RDW 12.9      Platelet Count 265      % Neutrophils 68       % Lymphocytes 24      % Monocytes 7      % Eosinophils 1      % Basophils 0      % Immature Granulocytes 0      NRBCs per 100 WBC 0      Absolute Neutrophils 4.4      Absolute Lymphocytes 1.6      Absolute Monocytes 0.5      Absolute Eosinophils 0.1      Absolute Basophils 0.0      Absolute Immature Granulocytes 0.0      Absolute NRBCs 0.0     ROUTINE UA WITH MICROSCOPIC REFLEX TO CULTURE   TROPONIN I   COVID-19 VIRUS (CORONAVIRUS) BY PCR       Treatments provided: see MAR  Family Comments: N/a  OBS brochure/video discussed/provided to patient:  Yes  ED Medications:   Medications   nitroGLYcerin (NITROSTAT) sublingual tablet 0.4 mg (0.4 mg Sublingual Given 4/18/22 1710)   0.9% sodium chloride BOLUS (60 mLs Intravenous New Bag 4/18/22 1753)   iopamidol (ISOVUE-370) solution 500 mL (80 mLs Intravenous Given 4/18/22 1753)   potassium chloride (KLOR-CON) Packet 40 mEq (40 mEq Oral Given 4/18/22 2036)     Drips infusing:  No  For the majority of the shift, the patient's behavior Green. Interventions performed were N/a.    Sepsis treatment initiated: No     Patient tested for COVID 19 prior to admission: YES, pending    ED Nurse Name/Phone Number: Joan Tijerina RN,   8:50 PM  RECEIVING UNIT ED HANDOFF REVIEW    Above ED Nurse Handoff Report was reviewed: Yes  Reviewed by: Yolie Caceres RN on April 18, 2022 at 10:36 PM

## 2022-04-19 NOTE — PROGRESS NOTES
Care Management Note    Discharge Date: 04/19/2022     Discharge Disposition:  Home    Discharge Transportation:  Family or friend will provide    Handoff Referral Completed: Yes    Additional Information:  Pt identified as a Service Bundle #2. No needs or assessment needed at this time. Please consult CM/SW  if discharge needs should arise.    JANINE Treadwell

## 2022-04-19 NOTE — PLAN OF CARE
"PRIMARY DIAGNOSIS: CHEST PAIN  OUTPATIENT/OBSERVATION GOALS TO BE MET BEFORE DISCHARGE:  1. Ruled out acute coronary syndrome (negative or stable Troponin):  Yes  2. Pain Status: denying pain  3. Appropriate provocative testing performed: No- scheduled.  - Stress Test Procedure: Echo  - Interpretation of cardiac rhythm per telemetry tech: sinus rhythm 90    4. Cleared by Consultants (if applicable):No  5. Return to near baseline physical activity: No  Vitals are BP (!) 122/90 (BP Location: Right arm)   Pulse 84   Temp 98.1  F (36.7  C) (Oral)   Resp 16   Ht 1.727 m (5' 8\")   Wt 106.9 kg (235 lb 9.6 oz)   LMP 12/31/2012   SpO2 97%   BMI 35.82 kg/m      Patient is alert and oriented x 4. They are independent with no assistive devices. Pt is currently NPO until cardiology sees her this morning. No complaints of chest pain, IV is saline locked.         Discharge Planner Nurse   Safe discharge environment identified: Yes  Barriers to discharge: Yes       Entered by: Valeria Ness 04/19/2022     Please review provider order for any additional goals.   Nurse to notify provider when observation goals have been met and patient is ready for discharge.  Goal Outcome Evaluation:                      "

## 2022-04-19 NOTE — PLAN OF CARE
Addended by: CAREN VENTURA on: 7/12/2021 01:26 PM     Modules accepted: Brandan     PRIMARY DIAGNOSIS: CHEST PAIN  OUTPATIENT/OBSERVATION GOALS TO BE MET BEFORE DISCHARGE:  1. Ruled out acute coronary syndrome (negative or stable Troponin):  Yes  2. Pain Status: Improved  with nitroglycerin.  3. Appropriate provocative testing performed: No  - Stress Test Procedure: Echo  - Interpretation of cardiac rhythm per telemetry tech: sinus rhythm 90    4. Cleared by Consultants (if applicable):No  5. Return to near baseline physical activity: No  Vitals are Temp: 97.6  F (36.4  C) Temp src: Oral BP: 126/65 Pulse: 67   Resp: 16 SpO2: 96 %.  Patient is Alert and Oriented x4. They are independent with no assistive devices .  Pt is a NPO diet.  They are complaining of 1/10 pain in their chest  nitroglycerin  given for pain.  Medicatons relieved pain.  Patient is Saline locked.       Discharge Planner Nurse   Safe discharge environment identified: Yes  Barriers to discharge: Yes       Entered by: Yolie Caceres 04/19/2022     Please review provider order for any additional goals.   Nurse to notify provider when observation goals have been met and patient is ready for discharge.  Goal Outcome Evaluation:

## 2022-04-20 VITALS
HEIGHT: 68 IN | RESPIRATION RATE: 16 BRPM | DIASTOLIC BLOOD PRESSURE: 88 MMHG | HEART RATE: 65 BPM | WEIGHT: 235.6 LBS | OXYGEN SATURATION: 98 % | BODY MASS INDEX: 35.71 KG/M2 | SYSTOLIC BLOOD PRESSURE: 159 MMHG | TEMPERATURE: 97.6 F

## 2022-04-20 PROBLEM — I48.91 ATRIAL FIBRILLATION, UNSPECIFIED TYPE (H): Status: ACTIVE | Noted: 2020-10-12

## 2022-04-20 PROBLEM — I25.118 CORONARY ARTERY DISEASE OF NATIVE HEART WITH STABLE ANGINA PECTORIS, UNSPECIFIED VESSEL OR LESION TYPE (H): Status: ACTIVE | Noted: 2021-10-25

## 2022-04-20 PROBLEM — Z79.01 LONG TERM CURRENT USE OF ANTICOAGULANTS WITH INR GOAL OF 2.0-3.0: Status: ACTIVE | Noted: 2018-11-01

## 2022-04-20 LAB
ANION GAP SERPL CALCULATED.3IONS-SCNC: 6 MMOL/L (ref 3–14)
APTT PPP: 36 SECONDS (ref 22–38)
BUN SERPL-MCNC: 18 MG/DL (ref 7–30)
CALCIUM SERPL-MCNC: 9.2 MG/DL (ref 8.5–10.1)
CHLORIDE BLD-SCNC: 104 MMOL/L (ref 94–109)
CO2 SERPL-SCNC: 29 MMOL/L (ref 20–32)
CREAT SERPL-MCNC: 0.76 MG/DL (ref 0.52–1.04)
ERYTHROCYTE [DISTWIDTH] IN BLOOD BY AUTOMATED COUNT: 12.9 % (ref 10–15)
ERYTHROCYTE [DISTWIDTH] IN BLOOD BY AUTOMATED COUNT: 13 % (ref 10–15)
GFR SERPL CREATININE-BSD FRML MDRD: 86 ML/MIN/1.73M2
GLUCOSE BLD-MCNC: 111 MG/DL (ref 70–99)
HCT VFR BLD AUTO: 41.3 % (ref 35–47)
HCT VFR BLD AUTO: 44.5 % (ref 35–47)
HGB BLD-MCNC: 13.3 G/DL (ref 11.7–15.7)
HGB BLD-MCNC: 14.2 G/DL (ref 11.7–15.7)
INR PPP: 1.54 (ref 0.85–1.15)
INR PPP: 1.57 (ref 0.85–1.15)
MCH RBC QN AUTO: 29 PG (ref 26.5–33)
MCH RBC QN AUTO: 29 PG (ref 26.5–33)
MCHC RBC AUTO-ENTMCNC: 31.9 G/DL (ref 31.5–36.5)
MCHC RBC AUTO-ENTMCNC: 32.2 G/DL (ref 31.5–36.5)
MCV RBC AUTO: 90 FL (ref 78–100)
MCV RBC AUTO: 91 FL (ref 78–100)
PLATELET # BLD AUTO: 240 10E3/UL (ref 150–450)
PLATELET # BLD AUTO: 280 10E3/UL (ref 150–450)
POTASSIUM BLD-SCNC: 3.4 MMOL/L (ref 3.4–5.3)
POTASSIUM BLD-SCNC: 3.8 MMOL/L (ref 3.4–5.3)
RBC # BLD AUTO: 4.58 10E6/UL (ref 3.8–5.2)
RBC # BLD AUTO: 4.9 10E6/UL (ref 3.8–5.2)
SODIUM SERPL-SCNC: 139 MMOL/L (ref 133–144)
WBC # BLD AUTO: 4.7 10E3/UL (ref 4–11)
WBC # BLD AUTO: 5.9 10E3/UL (ref 4–11)

## 2022-04-20 PROCEDURE — 85027 COMPLETE CBC AUTOMATED: CPT | Performed by: NURSE PRACTITIONER

## 2022-04-20 PROCEDURE — 84132 ASSAY OF SERUM POTASSIUM: CPT | Performed by: NURSE PRACTITIONER

## 2022-04-20 PROCEDURE — 93454 CORONARY ARTERY ANGIO S&I: CPT | Performed by: INTERNAL MEDICINE

## 2022-04-20 PROCEDURE — 250N000013 HC RX MED GY IP 250 OP 250 PS 637: Performed by: NURSE PRACTITIONER

## 2022-04-20 PROCEDURE — C1769 GUIDE WIRE: HCPCS | Performed by: INTERNAL MEDICINE

## 2022-04-20 PROCEDURE — 99233 SBSQ HOSP IP/OBS HIGH 50: CPT | Mod: 25 | Performed by: NURSE PRACTITIONER

## 2022-04-20 PROCEDURE — 85041 AUTOMATED RBC COUNT: CPT | Performed by: NURSE PRACTITIONER

## 2022-04-20 PROCEDURE — G0378 HOSPITAL OBSERVATION PER HR: HCPCS

## 2022-04-20 PROCEDURE — 36415 COLL VENOUS BLD VENIPUNCTURE: CPT | Performed by: NURSE PRACTITIONER

## 2022-04-20 PROCEDURE — 80048 BASIC METABOLIC PNL TOTAL CA: CPT | Performed by: NURSE PRACTITIONER

## 2022-04-20 PROCEDURE — 99152 MOD SED SAME PHYS/QHP 5/>YRS: CPT | Performed by: INTERNAL MEDICINE

## 2022-04-20 PROCEDURE — 250N000013 HC RX MED GY IP 250 OP 250 PS 637

## 2022-04-20 PROCEDURE — 120N000004 HC R&B MS OVERFLOW

## 2022-04-20 PROCEDURE — 85610 PROTHROMBIN TIME: CPT | Performed by: NURSE PRACTITIONER

## 2022-04-20 PROCEDURE — 250N000009 HC RX 250: Performed by: INTERNAL MEDICINE

## 2022-04-20 PROCEDURE — 99239 HOSP IP/OBS DSCHRG MGMT >30: CPT | Performed by: NURSE PRACTITIONER

## 2022-04-20 PROCEDURE — 258N000003 HC RX IP 258 OP 636: Performed by: NURSE PRACTITIONER

## 2022-04-20 PROCEDURE — C1887 CATHETER, GUIDING: HCPCS | Performed by: INTERNAL MEDICINE

## 2022-04-20 PROCEDURE — 85730 THROMBOPLASTIN TIME PARTIAL: CPT | Performed by: NURSE PRACTITIONER

## 2022-04-20 PROCEDURE — C1894 INTRO/SHEATH, NON-LASER: HCPCS | Performed by: INTERNAL MEDICINE

## 2022-04-20 PROCEDURE — B2111ZZ FLUOROSCOPY OF MULTIPLE CORONARY ARTERIES USING LOW OSMOLAR CONTRAST: ICD-10-PCS | Performed by: INTERNAL MEDICINE

## 2022-04-20 PROCEDURE — 250N000011 HC RX IP 250 OP 636: Performed by: INTERNAL MEDICINE

## 2022-04-20 PROCEDURE — 250N000013 HC RX MED GY IP 250 OP 250 PS 637: Performed by: PHYSICIAN ASSISTANT

## 2022-04-20 PROCEDURE — 258N000003 HC RX IP 258 OP 636: Performed by: INTERNAL MEDICINE

## 2022-04-20 PROCEDURE — 272N000001 HC OR GENERAL SUPPLY STERILE: Performed by: INTERNAL MEDICINE

## 2022-04-20 PROCEDURE — 93454 CORONARY ARTERY ANGIO S&I: CPT | Mod: 26 | Performed by: INTERNAL MEDICINE

## 2022-04-20 RX ORDER — FENTANYL CITRATE 50 UG/ML
25 INJECTION, SOLUTION INTRAMUSCULAR; INTRAVENOUS
Status: DISCONTINUED | OUTPATIENT
Start: 2022-04-20 | End: 2022-04-20 | Stop reason: HOSPADM

## 2022-04-20 RX ORDER — AMLODIPINE BESYLATE 5 MG/1
5 TABLET ORAL 2 TIMES DAILY
Status: DISCONTINUED | OUTPATIENT
Start: 2022-04-20 | End: 2022-04-20 | Stop reason: HOSPADM

## 2022-04-20 RX ORDER — ASPIRIN 81 MG/1
243 TABLET, CHEWABLE ORAL ONCE
Status: DISCONTINUED | OUTPATIENT
Start: 2022-04-20 | End: 2022-04-20

## 2022-04-20 RX ORDER — IOPAMIDOL 755 MG/ML
INJECTION, SOLUTION INTRAVASCULAR
Status: DISCONTINUED | OUTPATIENT
Start: 2022-04-20 | End: 2022-04-20 | Stop reason: HOSPADM

## 2022-04-20 RX ORDER — POTASSIUM CHLORIDE 1500 MG/1
20 TABLET, EXTENDED RELEASE ORAL
Status: DISCONTINUED | OUTPATIENT
Start: 2022-04-20 | End: 2022-04-20

## 2022-04-20 RX ORDER — NITROGLYCERIN 5 MG/ML
VIAL (ML) INTRAVENOUS
Status: DISCONTINUED | OUTPATIENT
Start: 2022-04-20 | End: 2022-04-20

## 2022-04-20 RX ORDER — ATROPINE SULFATE 0.1 MG/ML
0.5 INJECTION INTRAVENOUS
Status: DISCONTINUED | OUTPATIENT
Start: 2022-04-20 | End: 2022-04-20 | Stop reason: HOSPADM

## 2022-04-20 RX ORDER — PYRIDOSTIGMINE BROMIDE 60 MG/1
60 TABLET ORAL 2 TIMES DAILY
Status: DISCONTINUED | OUTPATIENT
Start: 2022-04-20 | End: 2022-04-20 | Stop reason: HOSPADM

## 2022-04-20 RX ORDER — NALOXONE HYDROCHLORIDE 0.4 MG/ML
0.2 INJECTION, SOLUTION INTRAMUSCULAR; INTRAVENOUS; SUBCUTANEOUS
Status: DISCONTINUED | OUTPATIENT
Start: 2022-04-20 | End: 2022-04-20 | Stop reason: HOSPADM

## 2022-04-20 RX ORDER — POTASSIUM CHLORIDE 1500 MG/1
20 TABLET, EXTENDED RELEASE ORAL 2 TIMES DAILY
Qty: 60 TABLET | Refills: 3 | Status: SHIPPED | OUTPATIENT
Start: 2022-04-20 | End: 2022-04-20

## 2022-04-20 RX ORDER — ATORVASTATIN CALCIUM 40 MG/1
40 TABLET, FILM COATED ORAL AT BEDTIME
Status: DISCONTINUED | OUTPATIENT
Start: 2022-04-20 | End: 2022-04-20 | Stop reason: HOSPADM

## 2022-04-20 RX ORDER — ACETAMINOPHEN 325 MG/1
650 TABLET ORAL EVERY 4 HOURS PRN
Status: DISCONTINUED | OUTPATIENT
Start: 2022-04-20 | End: 2022-04-20 | Stop reason: HOSPADM

## 2022-04-20 RX ORDER — HEPARIN SODIUM 1000 [USP'U]/ML
INJECTION, SOLUTION INTRAVENOUS; SUBCUTANEOUS
Status: DISCONTINUED
Start: 2022-04-20 | End: 2022-04-20 | Stop reason: HOSPADM

## 2022-04-20 RX ORDER — ASPIRIN 325 MG
325 TABLET ORAL ONCE
Status: DISCONTINUED | OUTPATIENT
Start: 2022-04-20 | End: 2022-04-20

## 2022-04-20 RX ORDER — NALOXONE HYDROCHLORIDE 0.4 MG/ML
0.4 INJECTION, SOLUTION INTRAMUSCULAR; INTRAVENOUS; SUBCUTANEOUS
Status: DISCONTINUED | OUTPATIENT
Start: 2022-04-20 | End: 2022-04-20 | Stop reason: HOSPADM

## 2022-04-20 RX ORDER — OXYCODONE HYDROCHLORIDE 5 MG/1
5 TABLET ORAL EVERY 4 HOURS PRN
Status: DISCONTINUED | OUTPATIENT
Start: 2022-04-20 | End: 2022-04-20 | Stop reason: HOSPADM

## 2022-04-20 RX ORDER — LIDOCAINE HYDROCHLORIDE 10 MG/ML
INJECTION, SOLUTION EPIDURAL; INFILTRATION; INTRACAUDAL; PERINEURAL
Status: DISCONTINUED
Start: 2022-04-20 | End: 2022-04-20 | Stop reason: HOSPADM

## 2022-04-20 RX ORDER — SODIUM CHLORIDE 9 MG/ML
75 INJECTION, SOLUTION INTRAVENOUS CONTINUOUS
Status: DISCONTINUED | OUTPATIENT
Start: 2022-04-20 | End: 2022-04-20 | Stop reason: HOSPADM

## 2022-04-20 RX ORDER — LIDOCAINE 40 MG/G
CREAM TOPICAL
Status: DISCONTINUED | OUTPATIENT
Start: 2022-04-20 | End: 2022-04-20

## 2022-04-20 RX ORDER — ISOSORBIDE MONONITRATE 30 MG/1
30 TABLET, EXTENDED RELEASE ORAL DAILY
Qty: 30 TABLET | Refills: 3 | Status: SHIPPED | OUTPATIENT
Start: 2022-04-20 | End: 2022-04-20

## 2022-04-20 RX ORDER — ISOSORBIDE MONONITRATE 30 MG/1
30 TABLET, EXTENDED RELEASE ORAL DAILY
Qty: 30 TABLET | Refills: 3 | Status: SHIPPED | OUTPATIENT
Start: 2022-04-20 | End: 2023-03-16

## 2022-04-20 RX ORDER — HEPARIN SODIUM 1000 [USP'U]/ML
INJECTION, SOLUTION INTRAVENOUS; SUBCUTANEOUS
Status: DISCONTINUED | OUTPATIENT
Start: 2022-04-20 | End: 2022-04-20

## 2022-04-20 RX ORDER — ASPIRIN 81 MG/1
TABLET, CHEWABLE ORAL
Status: DISCONTINUED
Start: 2022-04-20 | End: 2022-04-20 | Stop reason: HOSPADM

## 2022-04-20 RX ORDER — FLUMAZENIL 0.1 MG/ML
0.2 INJECTION, SOLUTION INTRAVENOUS
Status: DISCONTINUED | OUTPATIENT
Start: 2022-04-20 | End: 2022-04-20 | Stop reason: HOSPADM

## 2022-04-20 RX ORDER — NITROGLYCERIN 5 MG/ML
VIAL (ML) INTRAVENOUS
Status: DISCONTINUED
Start: 2022-04-20 | End: 2022-04-20 | Stop reason: HOSPADM

## 2022-04-20 RX ORDER — POTASSIUM CHLORIDE 1500 MG/1
40 TABLET, EXTENDED RELEASE ORAL ONCE
Status: COMPLETED | OUTPATIENT
Start: 2022-04-20 | End: 2022-04-20

## 2022-04-20 RX ORDER — VERAPAMIL HYDROCHLORIDE 2.5 MG/ML
INJECTION, SOLUTION INTRAVENOUS
Status: DISCONTINUED | OUTPATIENT
Start: 2022-04-20 | End: 2022-04-20

## 2022-04-20 RX ORDER — SODIUM CHLORIDE 9 MG/ML
INJECTION, SOLUTION INTRAVENOUS CONTINUOUS
Status: DISCONTINUED | OUTPATIENT
Start: 2022-04-20 | End: 2022-04-20

## 2022-04-20 RX ORDER — POTASSIUM CHLORIDE 1500 MG/1
20 TABLET, EXTENDED RELEASE ORAL 2 TIMES DAILY
Qty: 60 TABLET | Refills: 3 | Status: SHIPPED | OUTPATIENT
Start: 2022-04-20 | End: 2022-05-12

## 2022-04-20 RX ORDER — FENTANYL CITRATE 50 UG/ML
INJECTION, SOLUTION INTRAMUSCULAR; INTRAVENOUS
Status: DISCONTINUED
Start: 2022-04-20 | End: 2022-04-20 | Stop reason: HOSPADM

## 2022-04-20 RX ORDER — OXYCODONE HYDROCHLORIDE 5 MG/1
10 TABLET ORAL EVERY 4 HOURS PRN
Status: DISCONTINUED | OUTPATIENT
Start: 2022-04-20 | End: 2022-04-20 | Stop reason: HOSPADM

## 2022-04-20 RX ORDER — VERAPAMIL HYDROCHLORIDE 2.5 MG/ML
INJECTION, SOLUTION INTRAVENOUS
Status: DISCONTINUED
Start: 2022-04-20 | End: 2022-04-20 | Stop reason: HOSPADM

## 2022-04-20 RX ORDER — FENTANYL CITRATE 50 UG/ML
INJECTION, SOLUTION INTRAMUSCULAR; INTRAVENOUS
Status: DISCONTINUED | OUTPATIENT
Start: 2022-04-20 | End: 2022-04-20

## 2022-04-20 RX ADMIN — ASPIRIN 243 MG: 81 TABLET, CHEWABLE ORAL at 13:02

## 2022-04-20 RX ADMIN — AMLODIPINE BESYLATE 5 MG: 5 TABLET ORAL at 08:26

## 2022-04-20 RX ADMIN — TRIAMTERENE AND HYDROCHLOROTHIAZIDE 1 TABLET: 37.5; 25 TABLET ORAL at 08:26

## 2022-04-20 RX ADMIN — PYRIDOSTIGMINE BROMIDE 60 MG: 60 TABLET ORAL at 15:10

## 2022-04-20 RX ADMIN — POTASSIUM CHLORIDE 40 MEQ: 1500 TABLET, EXTENDED RELEASE ORAL at 08:56

## 2022-04-20 RX ADMIN — SODIUM CHLORIDE: 9 INJECTION, SOLUTION INTRAVENOUS at 11:07

## 2022-04-20 RX ADMIN — ASPIRIN 81 MG CHEWABLE TABLET 243 MG: 81 TABLET CHEWABLE at 13:02

## 2022-04-20 RX ADMIN — ACETAMINOPHEN 650 MG: 325 TABLET, FILM COATED ORAL at 10:24

## 2022-04-20 RX ADMIN — SODIUM CHLORIDE 75 ML/HR: 9 INJECTION, SOLUTION INTRAVENOUS at 14:28

## 2022-04-20 RX ADMIN — ASPIRIN 81 MG: 81 TABLET, COATED ORAL at 08:26

## 2022-04-20 RX ADMIN — PYRIDOSTIGMINE BROMIDE 60 MG: 60 TABLET ORAL at 08:26

## 2022-04-20 RX ADMIN — METOPROLOL TARTRATE 75 MG: 50 TABLET, FILM COATED ORAL at 10:21

## 2022-04-20 ASSESSMENT — ACTIVITIES OF DAILY LIVING (ADL)
DOING_ERRANDS_INDEPENDENTLY_DIFFICULTY: NO
DRESSING/BATHING_DIFFICULTY: NO
WALKING_OR_CLIMBING_STAIRS_DIFFICULTY: NO
TOILETING_ISSUES: NO
FALL_HISTORY_WITHIN_LAST_SIX_MONTHS: NO
ADLS_ACUITY_SCORE: 11
WEAR_GLASSES_OR_BLIND: NO
CHANGE_IN_FUNCTIONAL_STATUS_SINCE_ONSET_OF_CURRENT_ILLNESS/INJURY: NO
ADLS_ACUITY_SCORE: 3
DIFFICULTY_EATING/SWALLOWING: NO
CONCENTRATING,_REMEMBERING_OR_MAKING_DECISIONS_DIFFICULTY: NO
ADLS_ACUITY_SCORE: 3
ADLS_ACUITY_SCORE: 3

## 2022-04-20 NOTE — PLAN OF CARE
"PRIMARY DIAGNOSIS: CHEST PAIN  OUTPATIENT/OBSERVATION GOALS TO BE MET BEFORE DISCHARGE:  1. Ruled out acute coronary syndrome (negative or stable Troponin):  Yes  2. Pain Status: Pain free.  3. Appropriate provocative testing performed: Yes  - Stress Test Procedure: yes angio pending   - Interpretation of cardiac rhythm per telemetry tech: Sinus V-tach     4. Cleared by Consultants (if applicable):No  5. Return to near baseline physical activity: Yes  Discharge Planner Nurse   Safe discharge environment identified: Yes  Barriers to discharge: Yes       Entered by: Valeria Ness 04/20/2022 11:47 AM     BP (!) 155/97 (BP Location: Right arm)   Pulse 88   Temp 98.1  F (36.7  C) (Oral)   Resp 16   Ht 1.727 m (5' 8\")   Wt 106.9 kg (235 lb 9.6 oz)   LMP 12/31/2012   SpO2 97%   BMI 35.82 kg/m      Patient is AOx4, VSS, denies chest pain, and is independent in room. We are waiting for a angio time this morning. Patient is anxious to go home but understands why she needs to be in the hospital. Cardiology will be coming back up for a consult this morning.     Please review provider order for any additional goals.   Nurse to notify provider when observation goals have been met and patient is ready for discharge.  Goal Outcome Evaluation:                 "

## 2022-04-20 NOTE — PLAN OF CARE
PRIMARY DIAGNOSIS: CHEST PAIN  OUTPATIENT/OBSERVATION GOALS TO BE MET BEFORE DISCHARGE:  1. Ruled out acute coronary syndrome (negative or stable Troponin):  Yes  2. Pain Status: Pain free.  3. Appropriate provocative testing performed: Yes  - Stress Test Procedure: yes angio pending   - Interpretation of cardiac rhythm per telemetry tech: SR/ST    4. Cleared by Consultants (if applicable):No  5. Return to near baseline physical activity: Yes  Discharge Planner Nurse   Safe discharge environment identified: Yes  Barriers to discharge: No       Entered by: Srini Preciado 04/20/2022 4:03 AM     Please review provider order for any additional goals.   Nurse to notify provider when observation goals have been met and patient is ready for discharge.  Goal Outcome Evaluation:        Patient admitted for CP-patient denies pain, alert and oriented.  Patient to have angio today will cont to monitor no acute needs this shift

## 2022-04-20 NOTE — PROCEDURES
[facility]    Procedure: *Cath without PCI    Date/Time: 4/20/2022 1:43 PM  Performed by: Khang Thomas MD  Authorized by: Khang Thomas MD       UNIVERSAL PROTOCOL   Site Marked: Yes  Prior Images Obtained and Reviewed:  Yes  Required items: Required blood products, implants, devices and special equipment available    Patient identity confirmed:  Verbally with patient  Patient was reevaluated immediately before administering moderate or deep sedation or anesthesia  Confirmation Checklist:  Patient's identity using two indicators  Time out: Immediately prior to the procedure a time out was called    Universal Protocol: the Joint Commission Universal Protocol was followed       ANESTHESIA    Local Anesthetic: Lidocaine 1% without epinephrine      SEDATION  Patient Sedated: Yes    Sedation:  Fentanyl and midazolam  Vital signs: Vital signs monitored during sedation      PROCEDURE    Patient Tolerance:  Patient tolerated the procedure well with no immediate complications  Length of time physician/provider present for 1:1 monitoring during sedation: 15    Procedure  1) CAG  Approach RTR  Complications none  Indication chest pain abnormal nuclear stress test PAf    Findings    NORMAL CORONARIES RCA dominant    William

## 2022-04-20 NOTE — PROGRESS NOTES
[facility]    Cardiology Progress Note    Date of Admission: 04/18/2022  Service Date: 04/20/2022    Summary:  Ms. Amparo Mccray is a very pleasant 66 year old female with a past medical history of paroxysmal atrial fibrillation, hypertension, hyperlipidemia, coronary artery disease with abnormal stress test, obesity, strong family history of coronary disease on his father side with paternal uncle dying at age 50 paternal aunt dying at 42 and her father dying at 70 of a myocardial infarction. She was admitted on 4/18/2022 after presenting with about 4-5 weeks of increasing chest pain.    Interval History   Patient is resting comfortably. She denies recurrence of chest pain overnight or this morning. She reports mild intermittent palpitations or feeling like her heart is racing.  Blood pressure has been mildly elevated.  She otherwise denies shortness of breath, dizziness, or lightheadedness.    The plan of care as outlined below was reviewed with the patient. Risks and benefits of coronary angiogram discussed today including, bleeding, bruising, infection, allergic reaction, kidney damage (including need for dialysis), stroke, heart attack, vascular damage, emergency open heart surgery, up to and including death. Patient indicates understanding and is agreeable to proceed. She denies any history of an allergy to contrast dye.    Telemetry: NSR with a 13 beat run of NSVT    Assessment & Plan   1. Unstable angina  - Lexiscan nuclear stress test in September 2021 demonstrated a small area of inferior mid and basal ischemia. She was asymptomatic at that time so medical management was recommended.  - Now presenting with increasing chest pain for several weeks.   - Troponin negative.   - Echocardiogram 4/19/2022 showing normal LV function with EF 55-60%.    2. Paroxsymal atrial fibrillation s/p ablations in 2013 and 2017  - Anticoagulated on warfarin. Currently on hold with plan for coronary  angiogram. INR down to 1.54 today.  Continue on metoprolol tartrate 75 mg twice daily. Beta blocker has been on hold here for in anticipation that she might be undergoing a repeat stress test.  - Follows with follow-up with EP as an outpatient.    3. NSVT  - Noted on telemetry up to 14 beats overnight. Recommend restarting beta blocker as noted below.     4. Hypertension    5. Dyslipidemia    Plan:   1. Coronary angiogram and possible PCI today.   2. Continue aspirin 81 mg daily, atorvastatin 40 mg daily, amlodipine 5 mg daily, and maxzide 37.5-25 mg daily.   3. Will restart her PTA metoprolol tartrate at 75 mg BID.  4. Cardiology will continue to follow along. Anticipate likely discharge home tomorrow morning pending the angiogram findings. Will arrange follow up with a cardiology CANDACE in 1-2 weeks post discharge.     I spent 40 minutes face-to-face or coordinating care of Amparo Mccray. Over 50% of our time on the unit was spent counseling the patient and/or coordinating care.    Thank you for the opportunity to participate in this pleasant patient's care.     ARMIN Maloney, CNP   Nurse Practitioner  [facility]  Pager: [phone]  Text Page  (8am - 5pm, M-F)    Patient Active Problem List   Diagnosis     Essential hypertension, benign     CARDIOVASCULAR SCREENING; LDL GOAL LESS THAN 160     Atrial fibrillation (H)     Ventricular bigeminy     Acute chest pain     Aftercare following joint replacement     Knee joint replacement by other means     ACP (advance care planning)     Paroxysmal A-fib (H)     Morbid obesity (H)     Knee joint replacement status     Health Care Home     Long-term (current) use of anticoagulants [Z79.01]     Long term current use of anticoagulants with INR goal of 2.0-3.0     Atrial fibrillation, unspecified type (H)     Coronary artery disease of native heart with stable angina pectoris, unspecified vessel or lesion type (H)     Unstable angina (H)      Hypokalemia     Thoracic aortic aneurysm without rupture (H)     Physical Exam   Temp: 97.8  F (36.6  C) Temp src: Oral BP: (!) 152/85 Pulse: 83   Resp: 16 SpO2: 97 % O2 Device: None (Room air)    Vitals:    04/18/22 1600 04/18/22 2300   Weight: 108.9 kg (240 lb) 106.9 kg (235 lb 9.6 oz)     Vital Signs with Ranges  Temp:  [97.7  F (36.5  C)-98.3  F (36.8  C)] 97.8  F (36.6  C)  Pulse:  [73-85] 83  Resp:  [12-18] 16  BP: (122-192)/() 152/85  SpO2:  [95 %-99 %] 97 %  No intake/output data recorded.    Constitutional:Appears her stated age, well nourished, and in no acute distress.  Eyes: Pupils equal, round. Sclerae anicteric.   HEENT: Normocephalic, atraumatic.   Respiratory: Breathing non-labored. Lungs clear to auscultation bilaterally. No crackles, wheezes, rhonchi, or rales.  Cardiovascular: Regular rate and rhythm, normal S1 and S2. No murmur, rub, or gallop. Radial pulses full and equal bilaterally.  Skin: Warm, dry.   Musculoskeletal/Extremities: Moves all extremities well and symmetrically. No LE edema.  Neurologic: No gross focal deficits. Alert, awake, and oriented to person, place and time.  Psychiatric: Affect appropriate. Mentation normal.    Medications       amLODIPine  5 mg Oral BID     aspirin  81 mg Oral Daily     atorvastatin  40 mg Oral At Bedtime     potassium chloride  40 mEq Oral Once     pyridostigmine  60 mg Oral BID 09 12     sodium chloride (PF)  3 mL Intracatheter Q8H     triamterene-HCTZ  1 tablet Oral QAM     Data   Recent Labs   Lab 04/20/22  0616 04/19/22  1531 04/19/22  0644 04/19/22  0037 04/18/22  1703   WBC 5.9  --   --   --  6.5   HGB 14.2  --   --   --  13.9   MCV 91  --   --   --  88     --   --   --  265   INR 1.54* 1.82*  --   --  2.15*     --   --   --  139   POTASSIUM 3.4  --  3.8 3.4 3.1*   CHLORIDE 104  --   --   --  103   CO2 29  --   --   --  33*   BUN 18  --   --   --  18   CR 0.76  --   --   --  0.82   ANIONGAP 6  --   --   --  3   AVILA 9.2  --   --    --  9.2   *  --   --   --  102*   ALBUMIN  --   --   --   --  4.0   PROTTOTAL  --   --   --   --  8.2   BILITOTAL  --   --   --   --  0.4   ALKPHOS  --   --   --   --  105   ALT  --   --   --   --  31   AST  --   --   --   --  22     This note was completed in part using Dragon voice recognition software. Although reviewed after completion, some word and grammatical errors may occur.

## 2022-04-20 NOTE — DISCHARGE SUMMARY
[facility]  Hospitalist Discharge Summary      Date of Admission:  2022  Date of Discharge:  2022  Discharging Provider: ARMIN Hollingsworth CNP  Discharge Service: Hospitalist Service    Discharge Diagnoses    Unstable angina  Nonsustained ventricular tachycardia  Hypokalemia  Hypertension  Incidental hepatic lesion     Follow-ups Needed After Discharge   Follow-up Appointments     Follow-up and recommended labs and tests       Follow up with primary care provider, Alan Almeida, within 7 days   for hospital follow- up.  The following labs/tests are recommended: CBC,   BMP, BNP.         Recent medication change -- adding potassium replacement and imdur.    Unresulted Labs Ordered in the Past 30 Days of this Admission     No orders found from 3/19/2022 to 2022.      These results will be followed up by NA    Discharge Disposition   Discharged to home  Condition at discharge: Stable      Hospital Course   Amparo Mccray is a 66 year old female with past medical history significant for hypertension and PAF s/p ablation in  and , who was admitted on 2022 with accelerating anginal pain.      1. Unstable angina  1b. NSVT   Stable vs unstable angina.  Strong family history of early cardiac disease -- paternal uncle  at 50, paternal aunt at age 42, and father at 70  due to MI.  No history of T2DM nor tobacco use.  Does have a history of afib for which she underwent ablation in  and . Is on VKA.      Episode of chest pain this past fall, saw Dr. Thomas of cardiology in September, underwent NM Lexiscan which did show a small area of ischemia in the mid to basal inferior segments of the left ventricle.  She also had a Ziopatch monitor which did not show any afib.  These results were reviewed with cardiology and they recommended medical management; amlodipine twice daily was started, lisinopril was stopped and her metoprolol was continued and her  dose was increased.  If this was not effective, recommended coronary angiogram.  For the past 5 weeks, notes decreasing exercise tolerance with chest pain and shortness of breath.  Significant fatigue.  Hardly able to walk across room without symptoms.  She describes the chest pain as a heavy squeezing in the center of her chest denies pleuritic chest pain denies cough.       CT chest negative, troponin negative. Anticoagulated with therapeutic INR. EKG SR with nonspecific ST and T wave abnormalities.  Cardiac catheterization with normal findings including RCA normal dominant.  No significant disease.  Resume VKA and BB.  Trial low dose Imdur to see if this helps with chest pain.  History of mild hypoxia reported on smart watch, more at night but occasionally during the day with a SpO2 in the mid 80s but quickly returns to 90s.  No symptoms of ORLIN and low score on STOP-BANG.  Echo reassuring.  Consider sleep study if ongoing issues.  Follow up with PCP and Cardiology. Refer to cardiac rehab at discharge.      2. Hypokalemia, mild 3.4-3.8. Goal K >/= 4.   Has required replacement during hospitalization.  Will discharge on PO Kdur.  Recheck BMP in 1 week.      3. HTN  Maxzide and amlodipine with parameters.      4. PAF s/p ablations in 2013 and 2017.  NSVT episode last evening.   S/p ablation x2. Pt notes palpitations recently, EKG here SR. Hold BB and coumadin per above. INR today 1.54.  Resume metoprolol tartrate 75 mg PO BID.  Follow up with EP as outpatient if ongoing issues.    5. Incidental hepatic lesion.  6 mm low-density lesion right hepatic lobe, indeterminate on imaging. Outpatient surveillance.     6.  Myasthenia gravis no acute issues.  -Continued pyridostigmine    Consultations This Hospital Stay   CARDIOLOGY IP CONSULT  PHARMACY IP CONSULT  PHARMACY IP CONSULT  SMOKING CESSATION PROGRAM IP CONSULT    Code Status   Full Code    Time Spent on this Encounter   I, Flavio Marlow, ARMIN CNP, personally saw  the patient today and spent greater than 30 minutes discharging this patient.       ARMIN Hollingsworth CNP  [facility] OBSERVATION DEPT  [address]  [facility] [address]  Phone: [phone]  ______________________________________________________________________    Physical Exam   Vital Signs: Temp: 97.6  F (36.4  C) Temp src: Oral BP: (!) 159/88 Pulse: 65   Resp: 16 SpO2: 98 % O2 Device: None (Room air) Oxygen Delivery: 3 LPM  Weight: 235 lbs 9.6 oz         Constitutional: Awake, alert, cooperative, no apparent distress      Respiratory: Clear to auscultation bilaterally, no crackles or wheezing   Cardiovascular: Regular rate and rhythm, normal S1 and S2, and no murmur noted   Abdomen: Normal bowel sounds, soft, non-distended, non-tender   Skin: No rashes, no cyanosis, dry to touch   Neuro: Alert and oriented x3, no weakness, numbness, memory loss   Extremities: No edema, normal range of motion   Other(s):           All other systems: Negative             Primary Care Physician   Alan Almeida    Discharge Orders      Lipid Profile     ALT     Follow-Up with Cardiology CANDACE      Follow-Up with Cardiology      Medication Instructions - Anticoagulants    Do NOT stop your aspirin or platelet inhibitor unless directed by your Cardiologist.  These medications help to prevent platelets in your blood from sticking together and forming a clot.  Examples of these medications are:  Ticagrelor (Brilinta), Clopidigrel (Plavix), Prasugrel (Effient)     When to call - Contact the Heart Clinic    You may experience symptoms that require follow-up before your scheduled appointment. Contact the Heart Clinic if you develop: Fever over 100.4o Fahrenheit, that lasts more than one day; Redness, heat, or pus at the puncture site; Change in color or temperature in your hand or arm.     When to call - Reasons to Call 911    If your wrist puncture site starts bleeding after discharge, sit down and apply  firm pressure with your thumb against the puncture site and fingers against the back of the wrist for 10 minutes. If the bleeding stops, continue to rest, keeping your wrist still for 2 hours. Notify your doctor as soon as possible.  IF BLEEDING DOES NOT STOP OR THERE IS A LARGER AMOUNT OF BLEEDING OR SPURTING CALL 9-1-1 immediately.DO NOT drive yourself to the hospital.     Precautions - Lifting    DO NOT lift more than 5 pounds with affected arm for 48 hours     Precautions - Household Activities    Avoid excessive bending or movement of your wrist for 72 hours.  Do not subject hand/arm to any forceful movements for 24 hours, such as supporting weight when rising from a chair or bed.     Remove the band-aid on the puncture site after 24 hours and leave open to air. If minor oozing, you may apply a band-aid and remove after 12 hours.     Precautions - Active Sports Activities    DO NOT engage in vigorous exercise using your affected arm for 3 days after discharge.  This includes golf, tennis or swimming.     Precautions - Operating yard equipment or vehicles    Do not operate a chainsaw, lawnmower, motorcycle, or all-terrain vehicle for 48 hours after the procedure.     Precautions - Elective Dental Work    NO elective dental work for 6 weeks after receiving a stent.     Comfort and Pain Management - Bruising after Surgery    Expect mild tingling of hand and tenderness at the wrist puncture site for up to 3 days. You may take Tylenol or a pain medicine recommended by your doctor.     Activity - Cardiac Rehab    You are encouraged to enroll in an Outpatient Cardiac Rehab program after discharge from the hospital.  Our Cardiac Rehab staff may visit briefly with you while you're in the hospital.  If they miss you, someone will contact you after you are home.     Return to Driving    Driving is NOT permitted for 24 hours after surgery     Return to work    You may return to work after 72 hours if you are feeling well  and your job does not involve heavy lifting.     Shower / Bathing    You may shower on the day after your procedure.  DO NOT soak of wrist with the puncture site in water for 3 days to prevent infection. DO NOT take a tub bath or wash dishes for 3 days after the procedure     Dressing Removal    Remove the band-aid on the puncture site after 24 hours and leave open to air. If minor oozing, you may apply a band-aid and remove after 12 hours     Activity    Your activity upon discharge: activity as tolerated     Follow-up and recommended labs and tests     Follow up with primary care provider, Alan Almeida, within 7 days for hospital follow- up.  The following labs/tests are recommended: CBC, BMP, BNP.     Reason for your hospital stay    Unstable angina, atrial fibrillation, hypokalemia, hypertension, incidental hepatic lobe lesion (monitor as outpatient).     Diet    Follow this diet upon discharge: Orders Placed This Encounter      Advance Diet as Tolerated: Regular diet       Significant Results and Procedures   Most Recent 3 BMP's:  Recent Labs   Lab Test 04/20/22  1001 04/20/22  0616 04/19/22  0644 04/19/22  0037 04/18/22  1703 10/22/21  1511   NA  --  139  --   --  139 137   POTASSIUM 3.8 3.4 3.8   < > 3.1* 3.4   CHLORIDE  --  104  --   --  103 103   CO2  --  29  --   --  33* 28   BUN  --  18  --   --  18 16   CR  --  0.76  --   --  0.82 0.92   ANIONGAP  --  6  --   --  3 6   AVILA  --  9.2  --   --  9.2 9.0   GLC  --  111*  --   --  102* 92    < > = values in this interval not displayed.     Most Recent 2 LFT's:  Recent Labs   Lab Test 04/18/22  1703 06/13/19  1136   AST 22 19   ALT 31 25   ALKPHOS 105 84   BILITOTAL 0.4 0.3     Most Recent Urinalysis:  Recent Labs   Lab Test 04/18/22 2016   COLOR Straw   APPEARANCE Clear   URINEGLC Negative   URINEBILI Negative   URINEKETONE Negative   SG 1.005   UBLD Negative   URINEPH 5.5   PROTEIN Negative   NITRITE Negative   LEUKEST Negative   RBCU 1   WBCU 2      Most Recent Anemia Panel:  Recent Labs   Lab Test 04/20/22  1050 06/13/19  1136 03/19/19  1321   WBC 4.7   < >  --    HGB 13.3   < >  --    HCT 41.3   < >  --    MCV 90   < >  --       < >  --    B12  --   --  385    < > = values in this interval not displayed.     Troponin detectable but normal. 6-7 range.     Results for orders placed or performed during the hospital encounter of 04/18/22   CT Aortic Survey w Contrast    Narrative    EXAM: CT AORTIC SURVEY W CONTRAST  LOCATION: [facility]  DATE/TIME: 4/18/2022    INDICATION: Thoracic aortic aneurysm (TAA) suspected. Chest pain. Atrial fibrillation.  COMPARISON: None.  TECHNIQUE: CT angiogram chest abdomen pelvis during arterial phase of injection of IV contrast. 2D and 3D MIP reconstructions were performed by the CT technologist. Dose reduction techniques were used.   CONTRAST: 80 mL Isovue 370    FINDINGS:   CT ANGIOGRAM CHEST, ABDOMEN, AND PELVIS: Normal caliber thoracic aorta without dissection, aneurysm or intramural hematoma. The great arch vessels arise in a normal fashion. The mid ascending aorta measures 38 mm. Central pulmonary arteries are   unremarkable. Normal caliber abdominal aorta without aneurysm or dissection. The celiac trunk, SMA, EDNA and bilateral renal arteries are widely patent. The common, internal and external iliac arteries as well as both common femoral arteries are normal.    LUNGS AND PLEURA: Normal.    MEDIASTINUM/AXILLAE: No mass or adenopathy    CORONARY ARTERY CALCIFICATION: Mild.    HEPATOBILIARY: 6 mm low-density lesion right hepatic lobe, indeterminate. Too small accurately characterize.    PANCREAS: Normal.    SPLEEN: Normal.    ADRENAL GLANDS: Normal.    KIDNEYS/BLADDER: Normal.    BOWEL: Normal.    LYMPH NODES: Normal.    PELVIC ORGANS: Venous varicosities both inguinal and prepubic region.    MUSCULOSKELETAL: Advanced disc degenerative changes lower lumbar spine.      Impression     IMPRESSION:  1.  Study negative for acute aortic pathology. Mild dilation ascending aorta measuring 38 mm.  2.  Indeterminate 6 mm low-density lesion right hepatic lobe.  3.  Bilateral inguinal and prepubic venous varicosities.  4.  Advanced disc degenerative changes lower lumbar spine.     Echocardiogram Complete     Value    LVEF  55-60%    [facility]    [id number]  [id number]  [id number]  [id number]^CHARLETTE^SANJEEV^CAITLIN     [facility]  Echocardiography Laboratory  [address] [place] [address]     Name: DANY SHELBY  MRN: [id number]  : 1955  Study Date: 2022 08:02 AM  Age: 66 yrs  Gender: Female  Patient [facility]  Reason For Study: Chest Pain, Chest Pressure, Chest Tightness  Ordering Physician: SANJEEV OVALLES  Referring Physician: Alan Almeida MD  Performed By: Gabrielle Mario RDCS     BSA: 2.2 m2  Height: 68 in  Weight: 235 lb  HR: 75  BP: 158/73 mmHg  ______________________________________________________________________________  Procedure  Complete Portable Echo Adult.  ______________________________________________________________________________  Interpretation Summary     The left atrium is mildly dilated.  The visual ejection fraction is 55-60%.  ______________________________________________________________________________  Left Ventricle  The left ventricle is normal in size. There is normal left ventricular wall  thickness. The visual ejection fraction is 55-60%. Left ventricular diastolic  function is normal. Normal left ventricular wall motion.     Right Ventricle  The right ventricle is normal in structure, function and size.     Atria  The left atrium is mildly dilated. The right atrium is borderline dilated.     Mitral Valve  There is mild mitral annular calcification. The mitral valve leaflets are  mildly thickened.     Tricuspid Valve  Normal tricuspid valve.     Aortic Valve  The aortic valve is normal in structure and function.      Pulmonic Valve  The pulmonic valve is not well seen, but is grossly normal.     Vessels  The aortic root is normal size. The ascending aorta is Mildly dilated. The  inferior vena cava is normal.     Pericardium  There is no pericardial effusion.     Rhythm  Sinus rhythm was noted.  ______________________________________________________________________________  MMode/2D Measurements & Calculations  IVSd: 0.93 cm     LVIDd: 5.1 cm  LVIDs: 3.2 cm  LVPWd: 0.89 cm  FS: 37.9 %  LV mass(C)d: 168.1 grams  LV mass(C)dI: 76.8 grams/m2  Ao root diam: 3.3 cm  LA dimension: 4.0 cm  asc Aorta Diam: 3.9 cm  LA/Ao: 1.2  LVOT diam: 2.2 cm  LVOT area: 3.8 cm2  LA Volume (BP): 70.3 ml  LA Volume Index (BP): 32.1 ml/m2  RWT: 0.35     Doppler Measurements & Calculations  MV E max ashli: 71.0 cm/sec  MV A max ashli: 78.8 cm/sec  MV E/A: 0.90  MV dec time: 0.18 sec  Ao V2 max: 153.0 cm/sec  Ao max P.0 mmHg  PA acc time: 0.15 sec  E/E' av.5  Lateral E/e': 8.3  Medial E/e': 10.7     ______________________________________________________________________________  Report approved by: Ivy Pantoja 2022 10:01 AM         Cardiac Catheterization    Narrative    Atypical chest pain : Multiple CAD risk factors, PAF, abnormal nuclear   stress test    Findings  LMCA normal  LAD normal  CX normal  RCA normal dominant       Discharge Medications   Current Discharge Medication List      START taking these medications    Details   isosorbide mononitrate (IMDUR) 30 MG 24 hr tablet Take 1 tablet (30 mg) by mouth daily  Qty: 30 tablet, Refills: 3    Associated Diagnoses: Unstable angina (H); Chest pain, unspecified type      potassium chloride ER (KLOR-CON M) 20 MEQ CR tablet Take 1 tablet (20 mEq) by mouth 2 times daily  Qty: 60 tablet, Refills: 3    Associated Diagnoses: Hypokalemia         CONTINUE these medications which have NOT CHANGED    Details   amLODIPine (NORVASC) 5 MG tablet Take 1 tablet (5 mg) by mouth 2 times daily  Qty: 180  tablet, Refills: 3    Associated Diagnoses: Abnormal stress test      atorvastatin (LIPITOR) 40 MG tablet TAKE 1 TABLET(40 MG) BY MOUTH AT BEDTIME  Qty: 90 tablet, Refills: 1    Associated Diagnoses: Coronary artery disease of native heart with stable angina pectoris, unspecified vessel or lesion type (H)      metoprolol tartrate (LOPRESSOR) 50 MG tablet TAKE 1 AND 1/2 TABLETS(75 MG) BY MOUTH TWICE DAILY  Qty: 270 tablet, Refills: 2    Associated Diagnoses: Essential hypertension, benign      pyridostigmine (MESTINON) 60 MG tablet 1 tablet by mouth three times a day  Qty: 90 tablet, Refills: 3    Associated Diagnoses: Myasthenia gravis without exacerbation (H)      triamterene-HCTZ (MAXZIDE-25) 37.5-25 MG tablet TAKE 1 TABLET BY MOUTH EVERY MORNING  Qty: 90 tablet, Refills: 1    Associated Diagnoses: Essential hypertension, benign      warfarin ANTICOAGULANT (COUMADIN) 2.5 MG tablet TAKE 1/2 TABLET(1.25 MG) ON MONDAYS, WEDNESDAYS, SATURDAYS AND TAKE 1 TABLET(2.5 MG) ON ALL OTHER DAYS OR AS DIRECTED BY THE INR CLINIC.  Qty: 100 tablet, Refills: 1    Associated Diagnoses: Ventricular bigeminy; Long term current use of anticoagulant therapy; Paroxysmal atrial fibrillation (H)           Allergies   Allergies   Allergen Reactions     Celebrex [Celecoxib] Hives     Hands itching then hives     Ibuprofen Swelling     Face swelling     Ibuprofen Hives

## 2022-04-20 NOTE — PLAN OF CARE
"PRIMARY DIAGNOSIS: CHEST PAIN  OUTPATIENT/OBSERVATION GOALS TO BE MET BEFORE DISCHARGE:  1. Ruled out acute coronary syndrome (negative or stable Troponin):  Yes  2. Pain Status: Pain free.  3. Appropriate provocative testing performed: Yes  - Stress Test Procedure: angio scheduled for today at 1:30pm  - Interpretation of cardiac rhythm per telemetry tech: Sinus Rhythm     4. Cleared by Consultants (if applicable):No  5. Return to near baseline physical activity: Yes  Discharge Planner Nurse   Safe discharge environment identified: Yes  Barriers to discharge: Yes       Entered by: Valeria Ness 04/20/2022 11:54 AM     BP (!) 155/97 (BP Location: Right arm)   Pulse 88   Temp 98.1  F (36.7  C) (Oral)   Resp 16   Ht 1.727 m (5' 8\")   Wt 106.9 kg (235 lb 9.6 oz)   LMP 12/31/2012   SpO2 97%   BMI 35.82 kg/m      Patient is AOx4, VSS, denies chest pain, and is independent in room. Cardiology saw patient and scheduled her angiogram for 1:30 today. Has been NPO since midnight and is now running on NS for before the procedure. She is still anxious to go home but understands why she might have to stay the night.       Please review provider order for any additional goals.   Nurse to notify provider when observation goals have been met and patient is ready for discharge.  Goal Outcome Evaluation:                 "

## 2022-04-20 NOTE — UTILIZATION REVIEW
Admission Status; Secondary Review Determination         Under the authority of the Utilization Management Committee, the utilization review process indicated a secondary review on the above patient.  The review outcome is based on review of the medical records, discussions with staff, and applying clinical experience noted on the date of the review.        (x)      Inpatient Status Appropriate - This patient's medical care is consistent with medical management for inpatient care and reasonable inpatient medical practice.      RATIONALE FOR DETERMINATION   the patient is a 66-year-old female admitted on 4/18/2022.  Patient presented with 4 to 5 weeks of increasing chest pain, worse recently.  This is diagnosed as accelerating anginal pain.  Patient does have a recent nuclear medicine Lexiscan last September which did show a small area of ischemia in the mid to basal inferior segments of the left ventricle.  Cardiology did consult and diagnosed unstable angina and recommended a coronary angiogram and possible PCI today.  Patient was started on metoprolol and will continue aspirin, atorvastatin and amlodipine along with Maxide due to ongoing hypertension control issues.  Patient also has a history of paroxysmal atrial fib with several ablations in the past.  Based on the need for possible intervention with stent placement and more than a 2 midnight stay at this point with a diagnosis of unstable angina, recommend advancing from observation to inpatient status.  Dr. Jacques will be sent to the [employer] text with this recommendation to call me if there are any questions.      The severity of illness, intensity of service provided, expected LOS and risk for adverse outcome make the care complex, high risk and appropriate for hospital admission.        The information on this document is developed by the utilization review team in order for the business office to ensure compliance.  This only denotes the  appropriateness of proper admission status and does not reflect the quality of care rendered.         The definitions of Inpatient Status and Observation Status used in making the determination above are those provided in the CMS Coverage Manual, Chapter 1 and Chapter 6, section 70.4.      Sincerely,     Leno Pleitez MD  Physician Advisor  Utilization Review/ Case Management  [facility].

## 2022-04-20 NOTE — PLAN OF CARE
PRIMARY DIAGNOSIS: CHEST PAIN  OUTPATIENT/OBSERVATION GOALS TO BE MET BEFORE DISCHARGE:  1. Ruled out acute coronary syndrome (negative or stable Troponin):   pending   2. Pain Status: Pain free.  3. Appropriate provocative testing performed: Yes  - Stress Test Procedure: Angio tommorrow  - Interpretation of cardiac rhythm per telemetry tech: SR/ST    4. Cleared by Consultants (if applicable):No  5. Return to near baseline physical activity: Yes  Discharge Planner Nurse   Safe discharge environment identified: Yes  Barriers to discharge: No       Entered by: Srini Preciado 04/19/2022 11:49 PM     Please review provider order for any additional goals.   Nurse to notify provider when observation goals have been met and patient is ready for discharge.  Goal Outcome Evaluation:        Patient admitted for CP, currently denies, per tele tech SR/ST, independent in room and VSS no acute needs will monitor, angio tommorrow

## 2022-04-20 NOTE — PLAN OF CARE
"PRIMARY DIAGNOSIS: CHEST PAIN  OUTPATIENT/OBSERVATION GOALS TO BE MET BEFORE DISCHARGE:  1. Ruled out acute coronary syndrome (negative or stable Troponin):  Yes  2. Pain Status: Pain free.  3. Appropriate provocative testing performed: Yes  - Stress Test Procedure: angio scheduled for today at 1:30pm  - Interpretation of cardiac rhythm per telemetry tech: Sinus Rhythm     4. Cleared by Consultants (if applicable):No  5. Return to near baseline physical activity: Yes  Discharge Planner Nurse   Safe discharge environment identified: Yes  Barriers to discharge: Yes       Entered by: Valeria Ness 04/20/2022 5:13 PM     BP (!) 159/88 (BP Location: Left arm, Patient Position: Semi-Almonte's, Cuff Size: Adult Regular)   Pulse 65   Temp 97.6  F (36.4  C) (Oral)   Resp 16   Ht 1.727 m (5' 8\")   Wt 106.9 kg (235 lb 9.6 oz)   LMP 12/31/2012   SpO2 98%   BMI 35.82 kg/m      Patient is A)x4, VSS, denies chest pain, and is independent in room. Patient had a TR band placed and it was removed. Patient developed a small hematoma after TR band was removed, provider was notified. Hematoma was circled and writer is watching to see if it spreads anymore. The patient has no bleeding and has the board in place to prevent patient from bending wrist. Patient voided and ambulated independently. Will be discharging today at 5:30pm with family.         Please review provider order for any additional goals.   Nurse to notify provider when observation goals have been met and patient is ready for discharge.  Goal Outcome Evaluation:                 "

## 2022-04-20 NOTE — PROGRESS NOTES
RN giving report:  Casie Daniels RN    RN receiving report:  ____ ____ nurse at bedside upon return to room 212-2    S:  Procedure performed:  Angiogram.  NO INTERVENTION.    B:  Why procedure needed:  Chest pain on exertion    A:  Assessment of area:  Right radial site.  TR band on at 1345 with 11mL air.    R:  Recommendations:  Bedrest per protocol.  See orders for TR band removal.    Family updated:  Patient declined - will update family herself

## 2022-04-20 NOTE — PLAN OF CARE
"PRIMARY DIAGNOSIS: CHEST PAIN  OUTPATIENT/OBSERVATION GOALS TO BE MET BEFORE DISCHARGE:  1. Ruled out acute coronary syndrome (negative or stable Troponin):  Yes  2. Pain Status: Pain free.  3. Appropriate provocative testing performed: Yes  - Stress Test Procedure: angio scheduled for today at 1:30pm  - Interpretation of cardiac rhythm per telemetry tech: Sinus Rhythm    4. Cleared by Consultants (if applicable):No  5. Return to near baseline physical activity: Yes  Discharge Planner Nurse   Safe discharge environment identified: Yes  Barriers to discharge: Yes       Entered by: Valeria Ness 04/20/2022 11:51 AM     BP (!) 155/97 (BP Location: Right arm)   Pulse 88   Temp 98.1  F (36.7  C) (Oral)   Resp 16   Ht 1.727 m (5' 8\")   Wt 106.9 kg (235 lb 9.6 oz)   LMP 12/31/2012   SpO2 97%   BMI 35.82 kg/m      Patient is AOx4, VSS, denies chest pain, and is independent in room. We are waiting for a angio time this morning. Patient is anxious to go home but understands why she needs to be in the hospital.Patient will be heading down for an angiogram soon    Please review provider order for any additional goals.   Nurse to notify provider when observation goals have been met and patient is ready for discharge.  Goal Outcome Evaluation:                 "

## 2022-04-20 NOTE — PLAN OF CARE
Patient's After Visit Summary was reviewed with patient   Patient verbalized understanding of After Visit Summary, recommended follow up and was given an opportunity to ask questions. Yes  Discharge medications sent home with patient/family: Not applicable   Discharged with son      OBSERVATION patient END time: 5:36pm

## 2022-04-20 NOTE — PLAN OF CARE
"PRIMARY DIAGNOSIS: CHEST PAIN  OUTPATIENT/OBSERVATION GOALS TO BE MET BEFORE DISCHARGE:  1. Ruled out acute coronary syndrome (negative or stable Troponin):  Yes  2. Pain Status: Pain free.  3. Appropriate provocative testing performed: Yes  - Stress Test Procedure: angio scheduled for today at 1:30pm  - Interpretation of cardiac rhythm per telemetry tech: Sinus Rhythm     4. Cleared by Consultants (if applicable):No  5. Return to near baseline physical activity: Yes  Discharge Planner Nurse   Safe discharge environment identified: Yes  Barriers to discharge: Yes       Entered by: Valeria Ness 04/20/2022 5:03 PM     BP (!) 159/88 (BP Location: Left arm, Patient Position: Semi-Almonte's, Cuff Size: Adult Regular)   Pulse 65   Temp 97.6  F (36.4  C) (Oral)   Resp 16   Ht 1.727 m (5' 8\")   Wt 106.9 kg (235 lb 9.6 oz)   LMP 12/31/2012   SpO2 98%   BMI 35.82 kg/m      Patient is A)x4, VSS, denies chest pain, and is independent in room. Patient had a TR band placed and it was removed. Patient developed a small hematoma after TR band was removed, provider was notified. Hematoma was circled and writer is watching to see if it spreads anymore. The patient has no bleeding and has the board in place to prevent patient from bending wrist. Will be discharging today at 5:30pm with family.       Please review provider order for any additional goals.   Nurse to notify provider when observation goals have been met and patient is ready for discharge.  Goal Outcome Evaluation:                 "

## 2022-04-20 NOTE — PROGRESS NOTES
[facility]  Hospitalist Progress Note  Flavio Marlow, APRN CNP 2022    Reason for Stay (Diagnosis): Chest pain         Assessment and Plan:      Amparo Mccray is a 66 year old female with past medical history significant for hypertension and PAF s/p ablation in  and , who was admitted on 2022 with accelerating anginal pain.      1. Unstable angina  1b. NSVT   Stable vs unstable angina.  Strong family history of early cardiac disease -- paternal uncle  at 50, paternal aunt at age 42, and father at 70  due to MI.  No history of T2DM nor tobacco use.  Does have a history of afib for which she underwent ablation in  and . Is on VKA.     Episode of chest pain this past fall, saw Dr. Thomas of cardiology in September, underwent NM Lexiscan which did show a small area of ischemia in the mid to basal inferior segments of the left ventricle.  She also had a Ziopatch monitor which did not show any afib.  These results were reviewed with cardiology and they recommended medical management; amlodipine twice daily was started, lisinopril was stopped and her metoprolol was continued and her dose was increased.  If this was not effective, recommended coronary angiogram.  For the past 5 weeks, notes decreasing exercise tolerance with chest pain and shortness of breath.  Significant fatigue.  Hardly able to walk across room without symptoms.  She describes the chest pain as a heavy squeezing in the center of her chest denies pleuritic chest pain denies cough.      CT chest negative, troponin negative. EKG SR with nonspecific ST and T wave abnormalities.    -- NPO for cath today.  -- Further recommendations pending cath.   -- Resume beta blocker today.     2. Hypokalemia, mild 3.4  Replace per protocol    3. HTN  Resume home Maxzide and amlodipine with parameters.     4. PAF s/p ablations in  and .    S/p ablation x2. Pt notes palpitations recently, EKG here SR. Hold BB  "and coumadin per above. INR today 1.54.  Resume metoprolol tartrate 75 mg PO BID.  BB had been on hold in anticipation of possible stress test.    - monitor on tele  - replete K  - follow up with EP as OP.         Diet:   Currently NPO for now.    DVT Prophylaxis: Ambulate every shift  Lyons Catheter: Not present  Central Lines: None  Cardiac Monitoring: None  Code Status:   full code        Interval History (Subjective):      Ms. Mccray was seen and examined. VSS.  No acute issues.  On call for cath                  Physical Exam:      Last Vital Signs:  BP (!) 155/97 (BP Location: Right arm)   Pulse 88   Temp 98.1  F (36.7  C) (Oral)   Resp 16   Ht 1.727 m (5' 8\")   Wt 106.9 kg (235 lb 9.6 oz)   LMP 12/31/2012   SpO2 97%   BMI 35.82 kg/m          Constitutional: Awake, alert, cooperative, no apparent distress     Respiratory: Clear to auscultation bilaterally, no crackles or wheezing   Cardiovascular: Regular rate and rhythm, normal S1 and S2, and no murmur noted   Abdomen: Normal bowel sounds, soft, non-distended, non-tender   Skin: No rashes, no cyanosis, dry to touch   Neuro: Alert and oriented x3, no weakness, numbness, memory loss   Extremities: No edema, normal range of motion   Other(s):        All other systems: Negative          Medications:      All current medications were reviewed with changes reflected in problem list.         Data:      All new lab and imaging data was reviewed.   Labs:  Recent Labs   Lab 04/20/22  1001 04/20/22  0616   NA  --  139   POTASSIUM 3.8 3.4   CHLORIDE  --  104   CO2  --  29   ANIONGAP  --  6   GLC  --  111*   BUN  --  18   CR  --  0.76   GFRESTIMATED  --  86   AVILA  --  9.2     Recent Labs   Lab 04/20/22  1050   WBC 4.7   HGB 13.3   HCT 41.3   MCV 90        Recent Labs   Lab 04/20/22  0616 04/18/22  1703   * 102*     Troponin (high-sensitivity) 6-7 (detectable but within normal limit)  FLP: Cholesterol 154, triglycerides 113, HDL of 45, LDL " 86    Recent Labs   Lab 04/18/22 2016   COLOR Straw   APPEARANCE Clear   URINEGLC Negative   URINEBILI Negative   URINEKETONE Negative   SG 1.005   UBLD Negative   URINEPH 5.5   PROTEIN Negative   NITRITE Negative   LEUKEST Negative   RBCU 1   WBCU 2     EKG 4/18/22 sinus rhythm with PACs.  Nonspecific ST and T wave changes.  Recent labs and studies reviewed.   Imaging:   Results for orders placed or performed during the hospital encounter of 04/18/22   CT Aortic Survey w Contrast    Narrative    EXAM: CT AORTIC SURVEY W CONTRAST  LOCATION: [facility]  DATE/TIME: 4/18/2022    INDICATION: Thoracic aortic aneurysm (TAA) suspected. Chest pain. Atrial fibrillation.  COMPARISON: None.  TECHNIQUE: CT angiogram chest abdomen pelvis during arterial phase of injection of IV contrast. 2D and 3D MIP reconstructions were performed by the CT technologist. Dose reduction techniques were used.   CONTRAST: 80 mL Isovue 370    FINDINGS:   CT ANGIOGRAM CHEST, ABDOMEN, AND PELVIS: Normal caliber thoracic aorta without dissection, aneurysm or intramural hematoma. The great arch vessels arise in a normal fashion. The mid ascending aorta measures 38 mm. Central pulmonary arteries are   unremarkable. Normal caliber abdominal aorta without aneurysm or dissection. The celiac trunk, SMA, EDNA and bilateral renal arteries are widely patent. The common, internal and external iliac arteries as well as both common femoral arteries are normal.    LUNGS AND PLEURA: Normal.    MEDIASTINUM/AXILLAE: No mass or adenopathy    CORONARY ARTERY CALCIFICATION: Mild.    HEPATOBILIARY: 6 mm low-density lesion right hepatic lobe, indeterminate. Too small accurately characterize.    PANCREAS: Normal.    SPLEEN: Normal.    ADRENAL GLANDS: Normal.    KIDNEYS/BLADDER: Normal.    BOWEL: Normal.    LYMPH NODES: Normal.    PELVIC ORGANS: Venous varicosities both inguinal and prepubic region.    MUSCULOSKELETAL: Advanced disc degenerative  changes lower lumbar spine.      Impression    IMPRESSION:  1.  Study negative for acute aortic pathology. Mild dilation ascending aorta measuring 38 mm.  2.  Indeterminate 6 mm low-density lesion right hepatic lobe.  3.  Bilateral inguinal and prepubic venous varicosities.  4.  Advanced disc degenerative changes lower lumbar spine.     Echocardiogram Complete     Value    LVEF  55-60%    [facility]    [id number]  [id number]  [id number]  [id number]^CHARLETTE^SANJEEV^CAITLIN     [facility]  Echocardiography Laboratory  [address] [place] [address]     Name: DANY SHELBY  MRN: [id number]  : 1955  Study Date: 2022 08:02 AM  Age: 66 yrs  Gender: Female  Patient Location: [facility], [place]  Reason For Study: Chest Pain, Chest Pressure, Chest Tightness  Ordering Physician: SANJEEV OVALLES  Referring Physician: Alan Almeida MD  Performed By: Gabrielle Mario RDCS     BSA: 2.2 m2  Height: 68 in  Weight: 235 lb  HR: 75  BP: 158/73 mmHg  ______________________________________________________________________________  Procedure  Complete Portable Echo Adult.  ______________________________________________________________________________  Interpretation Summary     The left atrium is mildly dilated.  The visual ejection fraction is 55-60%.  ______________________________________________________________________________  Left Ventricle  The left ventricle is normal in size. There is normal left ventricular wall  thickness. The visual ejection fraction is 55-60%. Left ventricular diastolic  function is normal. Normal left ventricular wall motion.     Right Ventricle  The right ventricle is normal in structure, function and size.     Atria  The left atrium is mildly dilated. The right atrium is borderline dilated.     Mitral Valve  There is mild mitral annular calcification. The mitral valve leaflets are  mildly thickened.     Tricuspid Valve  Normal tricuspid valve.     Aortic Valve  The aortic valve  is normal in structure and function.     Pulmonic Valve  The pulmonic valve is not well seen, but is grossly normal.     Vessels  The aortic root is normal size. The ascending aorta is Mildly dilated. The  inferior vena cava is normal.     Pericardium  There is no pericardial effusion.     Rhythm  Sinus rhythm was noted.  ______________________________________________________________________________  MMode/2D Measurements & Calculations  IVSd: 0.93 cm     LVIDd: 5.1 cm  LVIDs: 3.2 cm  LVPWd: 0.89 cm  FS: 37.9 %  LV mass(C)d: 168.1 grams  LV mass(C)dI: 76.8 grams/m2  Ao root diam: 3.3 cm  LA dimension: 4.0 cm  asc Aorta Diam: 3.9 cm  LA/Ao: 1.2  LVOT diam: 2.2 cm  LVOT area: 3.8 cm2  LA Volume (BP): 70.3 ml  LA Volume Index (BP): 32.1 ml/m2  RWT: 0.35     Doppler Measurements & Calculations  MV E max ashli: 71.0 cm/sec  MV A max ashli: 78.8 cm/sec  MV E/A: 0.90  MV dec time: 0.18 sec  Ao V2 max: 153.0 cm/sec  Ao max P.0 mmHg  PA acc time: 0.15 sec  E/E' av.5  Lateral E/e': 8.3  Medial E/e': 10.7     ______________________________________________________________________________  Report approved by: Ivy Pantoja 2022 10:01 AM               ARMIN Hollingsworth CNP  2022

## 2022-04-21 ENCOUNTER — PATIENT OUTREACH (OUTPATIENT)
Dept: INTERNAL MEDICINE | Facility: CLINIC | Age: 67
End: 2022-04-21
Payer: COMMERCIAL

## 2022-04-21 ENCOUNTER — TELEPHONE (OUTPATIENT)
Dept: ANTICOAGULATION | Facility: CLINIC | Age: 67
End: 2022-04-21
Payer: COMMERCIAL

## 2022-04-21 DIAGNOSIS — I48.91 ATRIAL FIBRILLATION, UNSPECIFIED TYPE (H): ICD-10-CM

## 2022-04-21 DIAGNOSIS — I48.91 ATRIAL FIBRILLATION (H): Primary | ICD-10-CM

## 2022-04-21 DIAGNOSIS — I49.8 VENTRICULAR BIGEMINY: ICD-10-CM

## 2022-04-21 DIAGNOSIS — Z79.01 LONG TERM CURRENT USE OF ANTICOAGULANTS WITH INR GOAL OF 2.0-3.0: ICD-10-CM

## 2022-04-21 NOTE — TELEPHONE ENCOUNTER
What type of discharge? Inpatient  Risk of Hospital admission or ED visit: 80%  Is a TCM episode required? Yes  When should the patient follow up with PCP? 7 days of discharge.         Kiesha Grijalva RN  [facility]

## 2022-04-21 NOTE — PHARMACY-ANTICOAGULATION SERVICE
Clinical Pharmacy- Warfarin Discharge Note  This patient is currently on warfarin for the treatment of Atrial fibrillation and ventricular bigeminy.  INR Goal= 2-3  Expected length of therapy undetermined.     Warfarin was on hold while in the hospital for procedure.       Anticoagulation Dose History     Recent Dosing and Labs Latest Ref Rng & Units 12/3/2021 1/25/2022 3/8/2022 4/18/2022 4/19/2022 4/20/2022 4/20/2022    INR 0.85 - 1.15 2.3(H) 2.4(H) 2.2(H) 2.15(H) 1.82(H) 1.54(H) 1.57(H)          Vitamin K doses administered during the last 7 days: none  Discharge orders are to continue home regimen for warfarin and to have INR checked on 4/28/2022.

## 2022-04-21 NOTE — TELEPHONE ENCOUNTER
ANTICOAGULATION  MANAGEMENT: Discharge Review    Amparo Chan Shazia chart reviewed for anticoagulation continuity of care    Hospital Admission on 4/18/22 for angina.    Discharge disposition: Home    Results:    Recent labs: (last 7 days)     04/18/22  1703 04/19/22  1531 04/20/22  0616 04/20/22  1050   INR 2.15* 1.82* 1.54* 1.57*     Anticoagulation inpatient management:     held warfarin due to chest pain (poss surg), did cardiac cath yesterday.  and resumed on 4/21/22 and heparin yesterday    Anticoagulation discharge instructions:     Warfarin dosing: home regimen continued   Bridging: No   INR goal change: No      Medication changes affecting anticoagulation: No    Additional factors affecting anticoagulation: Yes: they had a problem stopping bleeding yesterday, there is a large hematoma at cath site with a pressure bandage she was advised not to remove x48 hours.      Plan     No adjustment to anticoagulation plan needed    Spoke with Amparo, d/t hematoma, no boost today, just continue/start back on home dose and recheck INR in 1 week.    Anticoagulation Calendar updated    Melodie Rose RN

## 2022-04-22 ENCOUNTER — TELEPHONE (OUTPATIENT)
Dept: CARDIOLOGY | Facility: CLINIC | Age: 67
End: 2022-04-22
Payer: COMMERCIAL

## 2022-04-22 NOTE — TELEPHONE ENCOUNTER
"ED for acute condition Discharge Protocol    \"Hi, my name is Dali Parada RN, a registered nurse, and I am calling from [facility].  I am calling to follow up and see how things are going for you after your recent emergency visit.\"    Tell me how you are doing now that you are home?\"   Feel ok, having a little bleeding present from the insertion site      Discharge Instructions    \"Let's review your discharge instructions.  What is/are the follow-up recommendations?  Pt. Response: Follow-up with cardiologist (no appt scheduled yet), patient going to reach out on Monday and get appointment scheduled    \"Has an appointment with your primary care provider been scheduled?\"  Yes. (confirm and remind to bring meds) Appointment scheduled with Dr. Almeida May 12, 2022    Medications    \"Tell me what changed about your medicines when you discharged?\"  Started on potassium supplements and Isosorbide Mononitrate (30 mg daily)    \"What questions do you have about your medications?\"   None    On warfarin: \"Were you given any recommendations for follow-up with the anticoagulation clinic?\" Yes - Anticoagulation clinic appointment is already scheduled at appropriate interval   INR RN told patient to restart coumadin yesterday (4/21/22)    Call Summary    \"What questions or concerns do you have about your recent visit and your follow-up care?\"     none    \"If you have questions or things don't continue to improve, we encourage you contact us through the main clinic number (give number).  Even if the clinic is not open, triage nurses are available 24/7 to help you.     We would like you to know that our clinic has extended hours (provide information).  We also have urgent care (provide details on closest location and hours/contact info)\"    \"Thank you for your time and take care!\"    Routing to PCP as recommendation was for patient to see PCP within 7 days. Earliest available appt May 12, 2022. Patient is " hopefully to see cardiology within that time frame. Do you want to see patient sooner or May 12 ok?    Dali aPrada RN

## 2022-04-22 NOTE — TELEPHONE ENCOUNTER
Patient was evaluated by cardiology while inpatient for increasing chest pain, abnormal stress test. Negative troponin. PMH: PAF-Warfarin with hx ablations 2013 and 2017, Lexiscan nuclear stress test in September 2021 demonstrated a small area of inferior mid and basal ischemia, HTN, HLD. Echo showed EF of 55% with normal LV function. 4/20/22: Coronary angiogram via RRA showed normal coronary arteries. 14 beat episode of NSVT-BB restarted. Pt was started on Imdur and KCL at time of discharge. Called patient to discuss any post hospital d/c questions she may have, review medication changes, and confirm f/u appts. Patient denied any questions regarding new medications or changes with their PTA medications. Patient denied any SOB, chest pain, fever or light headedness. RRA cardiac cath site is without bleeding, swelling, redness or signs of infection. RN confirmed with patient that she needs to be scheduled for f/u labs, cardiology MARAH and cardiologist appt's as ordered-central scheduling phone number provided. Patient advised to call clinic with any cardiac related questions or concerns prior to this marah't. Patient verbalized understanding and agreed with plan. JEWELL Sands RN.

## 2022-04-27 ENCOUNTER — OFFICE VISIT (OUTPATIENT)
Dept: CARDIOLOGY | Facility: CLINIC | Age: 67
End: 2022-04-27
Attending: NURSE PRACTITIONER
Payer: MEDICARE

## 2022-04-27 ENCOUNTER — LAB (OUTPATIENT)
Dept: LAB | Facility: CLINIC | Age: 67
End: 2022-04-27
Payer: MEDICARE

## 2022-04-27 VITALS
WEIGHT: 239.5 LBS | HEART RATE: 83 BPM | BODY MASS INDEX: 35.47 KG/M2 | DIASTOLIC BLOOD PRESSURE: 84 MMHG | SYSTOLIC BLOOD PRESSURE: 147 MMHG | HEIGHT: 69 IN

## 2022-04-27 DIAGNOSIS — Z79.01 LONG TERM CURRENT USE OF ANTICOAGULANTS WITH INR GOAL OF 2.0-3.0: ICD-10-CM

## 2022-04-27 DIAGNOSIS — I25.118 CORONARY ARTERY DISEASE OF NATIVE HEART WITH STABLE ANGINA PECTORIS, UNSPECIFIED VESSEL OR LESION TYPE (H): Primary | ICD-10-CM

## 2022-04-27 DIAGNOSIS — I48.91 ATRIAL FIBRILLATION, UNSPECIFIED TYPE (H): ICD-10-CM

## 2022-04-27 DIAGNOSIS — I25.118 CORONARY ARTERY DISEASE OF NATIVE HEART WITH STABLE ANGINA PECTORIS, UNSPECIFIED VESSEL OR LESION TYPE (H): ICD-10-CM

## 2022-04-27 DIAGNOSIS — R06.02 SHORTNESS OF BREATH: ICD-10-CM

## 2022-04-27 LAB
ANION GAP SERPL CALCULATED.3IONS-SCNC: 4 MMOL/L (ref 3–14)
BUN SERPL-MCNC: 18 MG/DL (ref 7–30)
CALCIUM SERPL-MCNC: 10.1 MG/DL (ref 8.5–10.1)
CHLORIDE BLD-SCNC: 104 MMOL/L (ref 94–109)
CO2 SERPL-SCNC: 30 MMOL/L (ref 20–32)
CREAT SERPL-MCNC: 0.93 MG/DL (ref 0.52–1.04)
GFR SERPL CREATININE-BSD FRML MDRD: 68 ML/MIN/1.73M2
GLUCOSE BLD-MCNC: 90 MG/DL (ref 70–99)
POTASSIUM BLD-SCNC: 3.5 MMOL/L (ref 3.4–5.3)
SODIUM SERPL-SCNC: 138 MMOL/L (ref 133–144)

## 2022-04-27 PROCEDURE — 99215 OFFICE O/P EST HI 40 MIN: CPT | Performed by: NURSE PRACTITIONER

## 2022-04-27 PROCEDURE — 36415 COLL VENOUS BLD VENIPUNCTURE: CPT | Performed by: INTERNAL MEDICINE

## 2022-04-27 PROCEDURE — 80048 BASIC METABOLIC PNL TOTAL CA: CPT | Performed by: INTERNAL MEDICINE

## 2022-04-27 NOTE — PATIENT INSTRUCTIONS
Call nurse for any questions or concerns Mon-Fri 8am-4pm                                       [phone]: Nurse number                                        After Hours Phone Number:  [phone]    Medication changes:     Plan from today:   14 day monitor  See Dr. Thomas   Sleep referral     Lab results: see attached:  Potassium normal

## 2022-04-27 NOTE — LETTER
2022    Alan Almeida MD  [address]  [facility] [address]    RE: Amparo Mccray       Dear Colleague,     I had the pleasure of seeing Amparo Mccray in the [facility].  CARDIOLOGY CLINIC   DOS: 22    Amparo Mccray  : 1955  MRN: [id number]    PRIMARY CARDIOLOGIST: Dr. Thomas     REASON FOR VISIT: Post hospital     HISTORY OF PRESENT ILLNESS:   Ms. Amparo Mccray is a very pleasant 66 year old female with a past medical history of paroxysmal atrial fibrillation, hypertension, hyperlipidemia, coronary artery disease with abnormal stress test, obesity, strong family history of coronary disease on his father side with paternal uncle dying at age 50 paternal aunt dying at 42 and her father dying at 70 of a myocardial infarction. She was admitted on 2022 after presenting with about 4-5 weeks of increasing chest pain.  Coronary angiogram showed no CAD.  She was started on Imdur as a trial to see if it helped the chest pain.  She was also found to be hypercalcemic and started on 20 mEq of KCl twice daily with a basic metabolic panel to be planned 1 week after discharge however it was scheduled 21 days post hospitalization.  She also had a 13 beat run of an NSVT.  Underlying rhythm is normal sinus rhythm.  O2 Sat's at night went down as low as 80%    22 she returns in follow-up.  She has a smart phone and her data shows O2 sats at night 80s to 90 percent.  She has documentation of atrial fibrillation on her smart phone.  Currently she is in normal sinus rhythm.  She states that when she lies down she can feel her heart beating faster.  She states it gets into the 100s.  She complains of shortness of breath when walking to the mailbox.  Her average heart rate when resting is 65 to 78 bpm.  She has just chest tightness when she feels her heart rhythm increasing.  She has taken Imdur since leaving the hospital and she is not quite sure  if it is helping her.  Her potassium today is 3.5.  She is currently on potassium chloride 20 mEq twice daily      PREVIOUS STUDIES (personally reviewed):  BMP RESULTS: Sodium 138, potassium 3.5, BUN 18, creatinine 0.93  Lipid panel 4/19/2022 cholesterol 154, HDL 45, LDL 86, triglycerides 113    Echocardiography: 4/19/2022  The left atrium is mildly dilated.  The visual ejection fraction is 55-60%.    Coronary angiogram: 4/2022 no disease cMRI:        ASSESSMENT AND PLAN:   1. Unstable angina/ chest pain  - Lexiscan nuclear stress test in September 2021 demonstrated a small area of inferior mid and basal ischemia. She was asymptomatic at that time so medical management was recommended.  - Recently presented to ED with increasing chest pain for several weeks.   - Troponin negative.   - Echocardiogram 4/19/2022 showing normal LV function with EF 55-60%.  - Coronary angiogram showed no disease.   -Imdur started in the hospital.  She does not know if it really has helped her.  She now feels paroxysmal atrial fibrillation when she lies down to sleep currently in normal sinus rhythm.  Continue Imdur.     2. Paroxsymal atrial fibrillation s/p ablations in 2013 and 2017  - Anticoagulated on warfarin.   - Continue on metoprolol tartrate 75 mg twice daily.   - Follows with follow-up with EP as an outpatient.  -Ordered 14-day cardiac monitor.  -Instructed her to call if her heart rate consistently is above 100 bpm.     3. NSVT  - Noted on telemetry up to 14 beats overnight.   - beta blocker was restarted (had been on hold prior to testing)  - NSVT may be aggravated by possible ORLIN    4. Hypertension  -Blood pressure is elevated today because she was very upset about her symptoms of oxygen saturation in the 80 percent range at night.     5. Dyslipidemia  4/19/2022 cholesterol 154, HDL 45, LDL 86, triglycerides 113  Currently on atorvastatin 40 mg daily    6.  Hypokalemia in the hospital  -She was started on potassium chloride  20 mEq twice daily  Potassium today is 3.5  -Instructed her to continue potassium chloride 20 mEq twice daily    7.  Possible obstructive sleep apnea  Had episodes of hypoxia at night with an O2 sat of the 80% in the hospital  She is having episodes of low O2 sats.  At home is well.  She has documentation of 80% O2 sats.  She is now having paroxysmal atrial fibrillation.  Her last monitor showed no A. fib  Referred her for a sleep evaluation.  There is concern with hypoxia and 13 beat run of NSVT for sudden death.  I placed a call to a sleep doctor to review her case       Thank you for the opportunity to be involved in this very pleasant patient's care please feel to contact me with any questions.    Total time: 52 minutes was spent today reviewing the chart, visiting the patient, and documenting the visit.    Esme FUNEZ CNP   Nurse Practitioner  [facility]        CURRENT MEDICATIONS:  Current Outpatient Medications   Medication Sig Dispense Refill     amLODIPine (NORVASC) 5 MG tablet Take 1 tablet (5 mg) by mouth 2 times daily 180 tablet 3     atorvastatin (LIPITOR) 40 MG tablet TAKE 1 TABLET(40 MG) BY MOUTH AT BEDTIME 90 tablet 1     isosorbide mononitrate (IMDUR) 30 MG 24 hr tablet Take 1 tablet (30 mg) by mouth daily 30 tablet 3     metoprolol tartrate (LOPRESSOR) 50 MG tablet TAKE 1 AND 1/2 TABLETS(75 MG) BY MOUTH TWICE DAILY 270 tablet 2     potassium chloride ER (KLOR-CON M) 20 MEQ CR tablet Take 1 tablet (20 mEq) by mouth 2 times daily 60 tablet 3     pyridostigmine (MESTINON) 60 MG tablet 1 tablet by mouth three times a day (Patient taking differently: 1 tablet by mouth two times a day with breakfast and lunch) 90 tablet 3     triamterene-HCTZ (MAXZIDE-25) 37.5-25 MG tablet TAKE 1 TABLET BY MOUTH EVERY MORNING 90 tablet 1     warfarin ANTICOAGULANT (COUMADIN) 2.5 MG tablet TAKE 1/2 TABLET(1.25 MG) ON MONDAYS, WEDNESDAYS, SATURDAYS AND TAKE 1 TABLET(2.5 MG) ON ALL OTHER DAYS  OR AS DIRECTED BY THE INR CLINIC. 100 tablet 1       ALLERGIES  Allergies   Allergen Reactions     Celebrex [Celecoxib] Hives     Hands itching then hives     Ibuprofen Swelling     Face swelling     Ibuprofen Hives       PAST MEDICAL, SURGICAL, FAMILY HISTORY:  History was reviewed and updated as needed, see medical record.      ROS:  12-pt ROS is negative except for as noted above.     PHYSICAL EXAMINATION:  Vitals: Blood pressure 147/84, pulse is 83, weight is 239 pounds  Constitutional:  Patient is pleasant, alert, cooperative, and in NAD.   HEENT:  NCAT. PERRLA. EOM's intact.   Neck:  JVP 6 cm  Pulmonary: clear  Cardiac: regular rhythm, normal S1/S2, no S3 or S4, apical impulse not displaced, no murmurs, gallops or rubs                Abdomen:  Soft, non-tender abdomen, no hepatosplenomegaly appreciated.   Extremities:  No edema  Neurological:  No gross motor or sensory deficits.   Psych: Appropriate affect    Past Medical History:   Diagnosis Date     Arthritis      Chest pain, unspecified     neg stress test, put on PPI- no help     Essential hypertension, benign     Hypertension, Benign     Obesity (BMI 30-39.9) 7/1/13    BMI 33.6     Paroxysmal atrial fibrillation (H) 03/2010     Superficial thrombophlebitis      Thrombosis of leg     superficial not dvt's     Ventricular bigeminy     bigeminy- nrml LV fxn              Past Surgical History:     Past Surgical History:   Procedure Laterality Date     ABDOMEN SURGERY       ARTHROPLASTY KNEE Right 3/2/2015    Procedure: ARTHROPLASTY KNEE;  Surgeon: Cuco Baugh MD;  Location:  OR     [facility] SURGICAL PATHOLOGY  1976    HGSIL     CV CORONARY ANGIOGRAM N/A 4/20/2022    Procedure: Coronary Angiogram;  Surgeon: Khang Thomas MD;  Location:  HEART CARDIAC CATH LAB     CV CORONARY ANGIOGRAM  4/20/2022    Procedure: ;  Surgeon: Khang Thomas MD;  Location:  HEART CARDIAC CATH LAB     ESOPHAGOSCOPY, GASTROSCOPY, DUODENOSCOPY (EGD),  COMBINED N/A 5/16/2019    Procedure: ESOPHAGOGASTRODUODENOSCOPY, WITH BIOPSY;  Surgeon: Liliane Montalvo MD;  Location: [facility]     H ABLATION FOCAL AFIB  11/02/2017     H ABLATION FOCAL AFIB  01/15/2013     ORTHOPEDIC SURGERY      l tka  and wrist      TUBAL LIGATION      Uterine fibroids removed     ZZC NONSPECIFIC PROCEDURE      left hand         Recent Lab Results:  LIPID RESULTS:  Lab Results   Component Value Date    CHOL 154 04/19/2022    CHOL 260 (H) 06/02/2020    HDL 45 (L) 04/19/2022    HDL 47 (L) 06/02/2020    LDL 86 04/19/2022     (H) 06/02/2020    TRIG 113 04/19/2022    TRIG 176 (H) 06/02/2020    CHOLHDLRATIO 4.9 07/24/2014     LIVER ENZYME RESULTS:  Lab Results   Component Value Date    AST 22 04/18/2022    AST 19 06/13/2019    ALT 31 04/18/2022    ALT 25 06/13/2019     CBC RESULTS:  Lab Results   Component Value Date    WBC 4.7 04/20/2022    WBC 6.4 06/13/2019    RBC 4.58 04/20/2022    RBC 4.24 06/13/2019    HGB 13.3 04/20/2022    HGB 13.0 06/13/2019    HCT 41.3 04/20/2022    HCT 39.4 06/13/2019    MCV 90 04/20/2022    MCV 93 06/13/2019    MCH 29.0 04/20/2022    MCH 30.7 06/13/2019    MCHC 32.2 04/20/2022    MCHC 33.0 06/13/2019    RDW 13.0 04/20/2022    RDW 12.7 06/13/2019     04/20/2022     06/13/2019     BMP RESULTS:  Lab Results   Component Value Date     04/20/2022     06/02/2020    POTASSIUM 3.8 04/20/2022    POTASSIUM 3.8 06/02/2020    CHLORIDE 104 04/20/2022    CHLORIDE 106 06/02/2020    CO2 29 04/20/2022    CO2 28 06/02/2020    ANIONGAP 6 04/20/2022    ANIONGAP 4 06/02/2020     (H) 04/20/2022     (H) 06/02/2020    BUN 18 04/20/2022    BUN 20 06/02/2020    CR 0.76 04/20/2022    CR 0.84 06/02/2020    GFRESTIMATED 86 04/20/2022    GFRESTIMATED 74 06/02/2020    GFRESTBLACK 86 06/02/2020    AVILA 9.2 04/20/2022    AVILA 8.7 06/02/2020      A1C RESULTS:  Lab Results   Component Value Date    A1C 5.5 08/08/2006     INR RESULTS:  Lab Results    Component Value Date    INR 1.57 (H) 04/20/2022    INR 1.54 (H) 04/20/2022    INR 3.40 (H) 06/22/2021    INR 2.60 (H) 05/11/2021       This note was completed in part using Dragon voice recognition software. Although reviewed after completion, some word and grammatical errors may occur.      Thank you for allowing me to participate in the care of your patient.      Sincerely,     ARMIN Elias [facility]  cc:   Sina Krishnan NP  [id number] [id number]

## 2022-04-27 NOTE — PROGRESS NOTES
CARDIOLOGY CLINIC   DOS: 22    Amparo Mccray  : 1955  MRN: [id number]    PRIMARY CARDIOLOGIST: Dr. Thomas     REASON FOR VISIT: Post hospital     HISTORY OF PRESENT ILLNESS:   Ms. Amparo Mccray is a very pleasant 66 year old female with a past medical history of paroxysmal atrial fibrillation, hypertension, hyperlipidemia, coronary artery disease with abnormal stress test, obesity, strong family history of coronary disease on his father side with paternal uncle dying at age 50 paternal aunt dying at 42 and her father dying at 70 of a myocardial infarction. She was admitted on 2022 after presenting with about 4-5 weeks of increasing chest pain.  Coronary angiogram showed no CAD.  She was started on Imdur as a trial to see if it helped the chest pain.  She was also found to be hypercalcemic and started on 20 mEq of KCl twice daily with a basic metabolic panel to be planned 1 week after discharge however it was scheduled 21 days post hospitalization.  She also had a 13 beat run of an NSVT.  Underlying rhythm is normal sinus rhythm.  O2 Sat's at night went down as low as 80%    22 she returns in follow-up.  She has a smart phone and her data shows O2 sats at night 80s to 90 percent.  She has documentation of atrial fibrillation on her smart phone.  Currently she is in normal sinus rhythm.  She states that when she lies down she can feel her heart beating faster.  She states it gets into the 100s.  She complains of shortness of breath when walking to the mailbox.  Her average heart rate when resting is 65 to 78 bpm.  She has just chest tightness when she feels her heart rhythm increasing.  She has taken Imdur since leaving the hospital and she is not quite sure if it is helping her.  Her potassium today is 3.5.  She is currently on potassium chloride 20 mEq twice daily      PREVIOUS STUDIES (personally reviewed):  BMP RESULTS: Sodium 138, potassium 3.5, BUN 18, creatinine 0.93  Lipid  panel 4/19/2022 cholesterol 154, HDL 45, LDL 86, triglycerides 113    Echocardiography: 4/19/2022  The left atrium is mildly dilated.  The visual ejection fraction is 55-60%.    Coronary angiogram: 4/2022 no disease cMRI:        ASSESSMENT AND PLAN:   1. Unstable angina/ chest pain  - Lexiscan nuclear stress test in September 2021 demonstrated a small area of inferior mid and basal ischemia. She was asymptomatic at that time so medical management was recommended.  - Recently presented to ED with increasing chest pain for several weeks.   - Troponin negative.   - Echocardiogram 4/19/2022 showing normal LV function with EF 55-60%.  - Coronary angiogram showed no disease.   -Imdur started in the hospital.  She does not know if it really has helped her.  She now feels paroxysmal atrial fibrillation when she lies down to sleep currently in normal sinus rhythm.  Continue Imdur.     2. Paroxsymal atrial fibrillation s/p ablations in 2013 and 2017  - Anticoagulated on warfarin.   - Continue on metoprolol tartrate 75 mg twice daily.   - Follows with follow-up with EP as an outpatient.  -Ordered 14-day cardiac monitor.  -Instructed her to call if her heart rate consistently is above 100 bpm.     3. NSVT  - Noted on telemetry up to 14 beats overnight.   - beta blocker was restarted (had been on hold prior to testing)  - NSVT may be aggravated by possible ORLIN    4. Hypertension  -Blood pressure is elevated today because she was very upset about her symptoms of oxygen saturation in the 80 percent range at night.     5. Dyslipidemia  4/19/2022 cholesterol 154, HDL 45, LDL 86, triglycerides 113  Currently on atorvastatin 40 mg daily    6.  Hypokalemia in the hospital  -She was started on potassium chloride 20 mEq twice daily  Potassium today is 3.5  -Instructed her to continue potassium chloride 20 mEq twice daily    7.  Possible obstructive sleep apnea  Had episodes of hypoxia at night with an O2 sat of the 80% in the  hospital  She is having episodes of low O2 sats.  At home is well.  She has documentation of 80% O2 sats.  She is now having paroxysmal atrial fibrillation.  Her last monitor showed no A. fib  Referred her for a sleep evaluation.  There is concern with hypoxia and 13 beat run of NSVT for sudden death.  I placed a call to a sleep doctor to review her case       Thank you for the opportunity to be involved in this very pleasant patient's care please feel to contact me with any questions.    Total time: 52 minutes was spent today reviewing the chart, visiting the patient, and documenting the visit.    Esme FUNEZ, CNP   Nurse Practitioner  [facility]        CURRENT MEDICATIONS:  Current Outpatient Medications   Medication Sig Dispense Refill     amLODIPine (NORVASC) 5 MG tablet Take 1 tablet (5 mg) by mouth 2 times daily 180 tablet 3     atorvastatin (LIPITOR) 40 MG tablet TAKE 1 TABLET(40 MG) BY MOUTH AT BEDTIME 90 tablet 1     isosorbide mononitrate (IMDUR) 30 MG 24 hr tablet Take 1 tablet (30 mg) by mouth daily 30 tablet 3     metoprolol tartrate (LOPRESSOR) 50 MG tablet TAKE 1 AND 1/2 TABLETS(75 MG) BY MOUTH TWICE DAILY 270 tablet 2     potassium chloride ER (KLOR-CON M) 20 MEQ CR tablet Take 1 tablet (20 mEq) by mouth 2 times daily 60 tablet 3     pyridostigmine (MESTINON) 60 MG tablet 1 tablet by mouth three times a day (Patient taking differently: 1 tablet by mouth two times a day with breakfast and lunch) 90 tablet 3     triamterene-HCTZ (MAXZIDE-25) 37.5-25 MG tablet TAKE 1 TABLET BY MOUTH EVERY MORNING 90 tablet 1     warfarin ANTICOAGULANT (COUMADIN) 2.5 MG tablet TAKE 1/2 TABLET(1.25 MG) ON MONDAYS, WEDNESDAYS, SATURDAYS AND TAKE 1 TABLET(2.5 MG) ON ALL OTHER DAYS OR AS DIRECTED BY THE INR CLINIC. 100 tablet 1       ALLERGIES  Allergies   Allergen Reactions     Celebrex [Celecoxib] Hives     Hands itching then hives     Ibuprofen Swelling     Face swelling     Ibuprofen Hives        PAST MEDICAL, SURGICAL, FAMILY HISTORY:  History was reviewed and updated as needed, see medical record.      ROS:  12-pt ROS is negative except for as noted above.     PHYSICAL EXAMINATION:  Vitals: Blood pressure 147/84, pulse is 83, weight is 239 pounds  Constitutional:  Patient is pleasant, alert, cooperative, and in NAD.   HEENT:  NCAT. PERRLA. EOM's intact.   Neck:  JVP 6 cm  Pulmonary: clear  Cardiac: regular rhythm, normal S1/S2, no S3 or S4, apical impulse not displaced, no murmurs, gallops or rubs                Abdomen:  Soft, non-tender abdomen, no hepatosplenomegaly appreciated.   Extremities:  No edema  Neurological:  No gross motor or sensory deficits.   Psych: Appropriate affect    Past Medical History:   Diagnosis Date     Arthritis      Chest pain, unspecified     neg stress test, put on PPI- no help     Essential hypertension, benign     Hypertension, Benign     Obesity (BMI 30-39.9) 7/1/13    BMI 33.6     Paroxysmal atrial fibrillation (H) 03/2010     Superficial thrombophlebitis      Thrombosis of leg     superficial not dvt's     Ventricular bigeminy     bigeminy- nrml LV fxn              Past Surgical History:     Past Surgical History:   Procedure Laterality Date     ABDOMEN SURGERY       ARTHROPLASTY KNEE Right 3/2/2015    Procedure: ARTHROPLASTY KNEE;  Surgeon: Cuco Baugh MD;  Location:  OR      [facility] SURGICAL PATHOLOGY  1976    HGSIL     CV CORONARY ANGIOGRAM N/A 4/20/2022    Procedure: Coronary Angiogram;  Surgeon: Khang Thomas MD;  Location:  HEART CARDIAC CATH LAB     CV CORONARY ANGIOGRAM  4/20/2022    Procedure: ;  Surgeon: Khang Thomas MD;  Location:  HEART CARDIAC CATH LAB     ESOPHAGOSCOPY, GASTROSCOPY, DUODENOSCOPY (EGD), COMBINED N/A 5/16/2019    Procedure: ESOPHAGOGASTRODUODENOSCOPY, WITH BIOPSY;  Surgeon: Liliane Montalvo MD;  Location: [facility]     H ABLATION FOCAL AFIB  11/02/2017     H ABLATION FOCAL AFIB  01/15/2013      ORTHOPEDIC SURGERY      l tka  and wrist      TUBAL LIGATION      Uterine fibroids removed     ZZC NONSPECIFIC PROCEDURE      left hand         Recent Lab Results:  LIPID RESULTS:  Lab Results   Component Value Date    CHOL 154 04/19/2022    CHOL 260 (H) 06/02/2020    HDL 45 (L) 04/19/2022    HDL 47 (L) 06/02/2020    LDL 86 04/19/2022     (H) 06/02/2020    TRIG 113 04/19/2022    TRIG 176 (H) 06/02/2020    CHOLHDLRATIO 4.9 07/24/2014     LIVER ENZYME RESULTS:  Lab Results   Component Value Date    AST 22 04/18/2022    AST 19 06/13/2019    ALT 31 04/18/2022    ALT 25 06/13/2019     CBC RESULTS:  Lab Results   Component Value Date    WBC 4.7 04/20/2022    WBC 6.4 06/13/2019    RBC 4.58 04/20/2022    RBC 4.24 06/13/2019    HGB 13.3 04/20/2022    HGB 13.0 06/13/2019    HCT 41.3 04/20/2022    HCT 39.4 06/13/2019    MCV 90 04/20/2022    MCV 93 06/13/2019    MCH 29.0 04/20/2022    MCH 30.7 06/13/2019    MCHC 32.2 04/20/2022    MCHC 33.0 06/13/2019    RDW 13.0 04/20/2022    RDW 12.7 06/13/2019     04/20/2022     06/13/2019     BMP RESULTS:  Lab Results   Component Value Date     04/20/2022     06/02/2020    POTASSIUM 3.8 04/20/2022    POTASSIUM 3.8 06/02/2020    CHLORIDE 104 04/20/2022    CHLORIDE 106 06/02/2020    CO2 29 04/20/2022    CO2 28 06/02/2020    ANIONGAP 6 04/20/2022    ANIONGAP 4 06/02/2020     (H) 04/20/2022     (H) 06/02/2020    BUN 18 04/20/2022    BUN 20 06/02/2020    CR 0.76 04/20/2022    CR 0.84 06/02/2020    GFRESTIMATED 86 04/20/2022    GFRESTIMATED 74 06/02/2020    GFRESTBLACK 86 06/02/2020    AVILA 9.2 04/20/2022    AVILA 8.7 06/02/2020      A1C RESULTS:  Lab Results   Component Value Date    A1C 5.5 08/08/2006     INR RESULTS:  Lab Results   Component Value Date    INR 1.57 (H) 04/20/2022    INR 1.54 (H) 04/20/2022    INR 3.40 (H) 06/22/2021    INR 2.60 (H) 05/11/2021       This note was completed in part using Dragon voice recognition software. Although  reviewed after completion, some word and grammatical errors may occur.

## 2022-04-28 ENCOUNTER — LAB (OUTPATIENT)
Dept: LAB | Facility: CLINIC | Age: 67
End: 2022-04-28
Payer: MEDICARE

## 2022-04-28 ENCOUNTER — HOSPITAL ENCOUNTER (OUTPATIENT)
Dept: CARDIOLOGY | Facility: CLINIC | Age: 67
Discharge: HOME | End: 2022-04-28
Attending: NURSE PRACTITIONER | Admitting: NURSE PRACTITIONER
Payer: MEDICARE

## 2022-04-28 ENCOUNTER — ANTICOAGULATION THERAPY VISIT (OUTPATIENT)
Dept: ANTICOAGULATION | Facility: CLINIC | Age: 67
End: 2022-04-28

## 2022-04-28 ENCOUNTER — TELEPHONE (OUTPATIENT)
Dept: SLEEP MEDICINE | Facility: CLINIC | Age: 67
End: 2022-04-28

## 2022-04-28 DIAGNOSIS — I48.21 PERMANENT ATRIAL FIBRILLATION (H): ICD-10-CM

## 2022-04-28 DIAGNOSIS — Z79.01 LONG TERM CURRENT USE OF ANTICOAGULANTS WITH INR GOAL OF 2.0-3.0: ICD-10-CM

## 2022-04-28 DIAGNOSIS — I48.91 ATRIAL FIBRILLATION, UNSPECIFIED TYPE (H): ICD-10-CM

## 2022-04-28 DIAGNOSIS — I49.8 VENTRICULAR BIGEMINY: ICD-10-CM

## 2022-04-28 DIAGNOSIS — G47.33 OSA (OBSTRUCTIVE SLEEP APNEA): Primary | ICD-10-CM

## 2022-04-28 DIAGNOSIS — I25.118 CORONARY ARTERY DISEASE OF NATIVE HEART WITH STABLE ANGINA PECTORIS, UNSPECIFIED VESSEL OR LESION TYPE (H): ICD-10-CM

## 2022-04-28 DIAGNOSIS — I48.91 ATRIAL FIBRILLATION (H): Primary | ICD-10-CM

## 2022-04-28 LAB — INR BLD: 1.3 (ref 0.9–1.1)

## 2022-04-28 PROCEDURE — 93246 EXT ECG>7D<15D RECORDING: CPT

## 2022-04-28 PROCEDURE — 93248 EXT ECG>7D<15D REV&INTERPJ: CPT | Performed by: INTERNAL MEDICINE

## 2022-04-28 PROCEDURE — 36416 COLLJ CAPILLARY BLOOD SPEC: CPT

## 2022-04-28 PROCEDURE — 85610 PROTHROMBIN TIME: CPT

## 2022-04-28 NOTE — TELEPHONE ENCOUNTER
----- Message from ARMIN Glass CNP sent at 4/27/2022  4:40 PM CDT -----  Regarding: RE: question  Sorry she had NSVT, no syncope.  Leonor sierra       ----- Message -----  From: Esme Dueñas APRN CNP  Sent: 4/27/2022   4:12 PM CDT  To: Khang Hernandez MD, #  Subject: question                                         Dr. Hernandez,   Pt has history of AF.   Recently in the hospital for chest pain. Coronary angiogram showed no disease.   Rhythm NSR, during a night 13 run SVT, O2 sats low as 80%  I saw her today and smart phone data shows low O2 sats at night.   I referred her for a sleep evaluation. Now in PAF. She states higher rates at night.     I wanted to review it with you. Not sure when she will get an appt but wanted to give you a heads up on concerns for sudden death.   Thanks   Valerio Dueñas

## 2022-04-28 NOTE — PROGRESS NOTES
ANTICOAGULATION MANAGEMENT     Amparo Mccray 66 year old female is on warfarin with subtherapeutic INR result. (Goal INR 2.0-3.0)    Recent labs: (last 7 days)     04/28/22  1326   INR 1.3*       ASSESSMENT       Source(s): Chart Review and Patient/Caregiver Call       Warfarin doses taken: Less warfarin taken than planned which may be affecting INR    Diet: No new diet changes identified    New illness, injury, or hospitalization: Yes: angiogram 4/18      Medication/supplement changes: started on atorvastatin 40 mg QD, Imdur 30 mg QD & Klor Con 2o meq QD    Signs or symptoms of bleeding or clotting: Yes: had bleeding underthe skin post angiogram so Amparo held warfarin thru 24th, he restarted mon, tues and wed with 2.5 mg      Previous INR: Subtherapeutic    Additional findings: see above       PLAN     Recommended plan for temporary change(s) affecting INR     Dosing Instructions: 2.5 mg  with next INR in 4 days   2.5  Mg thur, fri and sun and 1.25 on sat    Summary  As of 4/28/2022    Full warfarin instructions:  1.25 mg every Mon, Wed, Sat; 2.5 mg all other days   Next INR check:  5/2/2022             Telephone call with Amparo who verbalizes understanding and agrees to plan    Lab visit scheduled    Education provided: Please call back if any changes to your diet, medications or how you've been taking warfarin    Plan made per [facility] anticoagulation protocol    Natalya Watson, RN  Anticoagulation Clinic  4/28/2022    _______________________________________________________________________     Anticoagulation Episode Summary     Current INR goal:  2.0-3.0   TTR:  72.8 % (1 y)   Target end date:  Indefinite   Send INR reminders to:  JOSEP MARK    Indications    Atrial fibrillation (H) [I48.91]  Ventricular bigeminy [I49.8]  Long term current use of anticoagulants with INR goal of 2.0-3.0 [Z79.01]  Atrial fibrillation  unspecified type (H) [I48.91]           Comments:           Anticoagulation  Care Providers     Provider Role Specialty Phone number    Alan Almeida MD Referring Internal Medicine [phone]

## 2022-04-28 NOTE — TELEPHONE ENCOUNTER
Order placed for urgent PSG to evaluate sleep disordered breathing in setting of a patient with atrial fibrillation and hypoxemia.      I called and let her know.  Advised she sleep non-supine, lateral and/or partially upright.

## 2022-05-02 ENCOUNTER — MYC MEDICAL ADVICE (OUTPATIENT)
Dept: CARDIOLOGY | Facility: CLINIC | Age: 67
End: 2022-05-02

## 2022-05-02 ENCOUNTER — ANTICOAGULATION THERAPY VISIT (OUTPATIENT)
Dept: ANTICOAGULATION | Facility: CLINIC | Age: 67
End: 2022-05-02

## 2022-05-02 ENCOUNTER — TELEPHONE (OUTPATIENT)
Dept: SLEEP MEDICINE | Facility: CLINIC | Age: 67
End: 2022-05-02

## 2022-05-02 ENCOUNTER — LAB (OUTPATIENT)
Dept: LAB | Facility: CLINIC | Age: 67
End: 2022-05-02
Payer: MEDICARE

## 2022-05-02 DIAGNOSIS — I49.8 VENTRICULAR BIGEMINY: ICD-10-CM

## 2022-05-02 DIAGNOSIS — I48.91 ATRIAL FIBRILLATION, UNSPECIFIED TYPE (H): ICD-10-CM

## 2022-05-02 DIAGNOSIS — Z79.01 LONG TERM CURRENT USE OF ANTICOAGULANTS WITH INR GOAL OF 2.0-3.0: ICD-10-CM

## 2022-05-02 DIAGNOSIS — I48.91 ATRIAL FIBRILLATION (H): Primary | ICD-10-CM

## 2022-05-02 LAB — INR BLD: 1.8 (ref 0.9–1.1)

## 2022-05-02 PROCEDURE — 85610 PROTHROMBIN TIME: CPT

## 2022-05-02 PROCEDURE — 36416 COLLJ CAPILLARY BLOOD SPEC: CPT

## 2022-05-02 NOTE — TELEPHONE ENCOUNTER
Attempted to call the Sleep Clinic in [place] and was directed to the central scheduling line. Unable to get a hold of RN nurse line. I then attemptted Sleep Clinic in [place] and sent to . Plan to forward [employer] message to Dr. Larson team to coordinate earliest urgent appointment for pt.    Patricia Shaffer, RN, BSN  [place], [place]  [facility] Care Coordinator  May 2, 2022 4:39 PM

## 2022-05-02 NOTE — TELEPHONE ENCOUNTER
Reason for call:  Other   Patient called regarding (reason for call): call back  Additional comments: Called patient to schedule sleep study but patient stated that she is unable to wait until 6/29 to have sleep study done. Patient states this will be detrimental to her health and needs study done as soon as possible. Patient would like to get in soon as urgent sleep study was requested by Dr Hernandez. Patient also needs COVID test order for split night sleep study. Please call patient.    Phone number to reach patient:  Home number on file [phone] (home)    Best Time:  Any time    Can we leave a detailed message on this number?  YES    Travel screening: Not Applicable

## 2022-05-02 NOTE — PROGRESS NOTES
ANTICOAGULATION MANAGEMENT     Amparo Chan Shazia 66 year old female is on warfarin with subtherapeutic INR result. (Goal INR 2.0-3.0)    Recent labs: (last 7 days)     05/02/22  1412   INR 1.8*       ASSESSMENT       Source(s): Chart Review and Patient/Caregiver Call       Warfarin doses taken: More warfarin taken than planned which may be affecting INR and Warfarin recently held for 7 days which may be affecting INR    Diet: No new diet changes identified    New illness, injury, or hospitalization: Hospitalization 4/18/22- 4/20/22 for unstable angina, atrial fibrillation, hypokalemia, hypertension, & incidental hepatic lobe lesion.    Medication/supplement changes: Imdur started on 4/20/22 No interaction anticipated    Potassium started on 4/20/22 No interaction anticipated    Signs or symptoms of bleeding or clotting: Yes: excessive bruising at right arm post Angiogram    Previous INR: Subtherapeutic    Additional findings: Good INR climb from 1.3 --> 1.8 in 4 days       PLAN     Recommended plan for temporary change(s) and ongoing change(s) affecting INR     Dosing Instructions: continue your current warfarin dose with next INR in 10 days      Summary  As of 5/2/2022    Full warfarin instructions:  1.25 mg every Mon, Wed, Sat; 2.5 mg all other days   Next INR check:  5/12/2022             Telephone call with Amparo who verbalizes understanding and agrees to plan    Check at provider office visit    Education provided: Please call back if any changes to your diet, medications or how you've been taking warfarin, Monitoring for bleeding signs and symptoms, Monitoring for clotting signs and symptoms and Importance of notifying clinic for changes in medications; a sooner lab recheck maybe needed.    Plan made per [facility] anticoagulation protocol    Jamee Suh, RN  Anticoagulation Clinic  5/2/2022    _______________________________________________________________________     Anticoagulation Episode Summary      Current INR goal:  2.0-3.0   TTR:  71.7 % (1 y)   Target end date:  Indefinite   [facility]    Indications    Atrial fibrillation (H) [I48.91]  Ventricular bigeminy [I49.8]  Long term current use of anticoagulants with INR goal of 2.0-3.0 [Z79.01]  Atrial fibrillation  unspecified type (H) [I48.91]           Comments:           Anticoagulation Care Providers     Provider Role Specialty Phone number    Alan Almeida MD Referring Internal Medicine [phone]

## 2022-05-08 ENCOUNTER — LAB (OUTPATIENT)
Dept: LAB | Facility: CLINIC | Age: 67
End: 2022-05-08
Attending: PSYCHIATRY & NEUROLOGY
Payer: MEDICARE

## 2022-05-08 DIAGNOSIS — G47.33 OSA (OBSTRUCTIVE SLEEP APNEA): ICD-10-CM

## 2022-05-08 DIAGNOSIS — I48.21 PERMANENT ATRIAL FIBRILLATION (H): ICD-10-CM

## 2022-05-08 PROCEDURE — U0005 INFEC AGEN DETEC AMPLI PROBE: HCPCS

## 2022-05-09 LAB — SARS-COV-2 RNA RESP QL NAA+PROBE: NEGATIVE

## 2022-05-12 ENCOUNTER — ANTICOAGULATION THERAPY VISIT (OUTPATIENT)
Dept: ANTICOAGULATION | Facility: CLINIC | Age: 67
End: 2022-05-12

## 2022-05-12 ENCOUNTER — LAB (OUTPATIENT)
Dept: LAB | Facility: CLINIC | Age: 67
End: 2022-05-12
Payer: MEDICARE

## 2022-05-12 ENCOUNTER — OFFICE VISIT (OUTPATIENT)
Dept: INTERNAL MEDICINE | Facility: CLINIC | Age: 67
End: 2022-05-12

## 2022-05-12 ENCOUNTER — THERAPY VISIT (OUTPATIENT)
Dept: SLEEP MEDICINE | Facility: CLINIC | Age: 67
End: 2022-05-12
Payer: MEDICARE

## 2022-05-12 VITALS
OXYGEN SATURATION: 97 % | TEMPERATURE: 97.3 F | BODY MASS INDEX: 35.24 KG/M2 | HEIGHT: 69 IN | DIASTOLIC BLOOD PRESSURE: 76 MMHG | HEART RATE: 71 BPM | SYSTOLIC BLOOD PRESSURE: 132 MMHG | WEIGHT: 237.9 LBS

## 2022-05-12 DIAGNOSIS — I48.91 ATRIAL FIBRILLATION, UNSPECIFIED TYPE (H): ICD-10-CM

## 2022-05-12 DIAGNOSIS — Z23 HIGH PRIORITY FOR 2019-NCOV VACCINE: ICD-10-CM

## 2022-05-12 DIAGNOSIS — I48.91 ATRIAL FIBRILLATION (H): Primary | ICD-10-CM

## 2022-05-12 DIAGNOSIS — I48.21 PERMANENT ATRIAL FIBRILLATION (H): ICD-10-CM

## 2022-05-12 DIAGNOSIS — R06.09 DOE (DYSPNEA ON EXERTION): Primary | ICD-10-CM

## 2022-05-12 DIAGNOSIS — E87.6 HYPOKALEMIA: ICD-10-CM

## 2022-05-12 DIAGNOSIS — I49.8 VENTRICULAR BIGEMINY: ICD-10-CM

## 2022-05-12 DIAGNOSIS — G47.33 OSA (OBSTRUCTIVE SLEEP APNEA): Primary | ICD-10-CM

## 2022-05-12 DIAGNOSIS — Z79.01 LONG TERM CURRENT USE OF ANTICOAGULANTS WITH INR GOAL OF 2.0-3.0: ICD-10-CM

## 2022-05-12 DIAGNOSIS — I48.0 PAROXYSMAL A-FIB (H): ICD-10-CM

## 2022-05-12 LAB — INR BLD: 2 (ref 0.9–1.1)

## 2022-05-12 PROCEDURE — 36416 COLLJ CAPILLARY BLOOD SPEC: CPT

## 2022-05-12 PROCEDURE — 91306 COVID-19,PF,MODERNA (18+ YRS BOOSTER .25ML): CPT | Performed by: INTERNAL MEDICINE

## 2022-05-12 PROCEDURE — 85610 PROTHROMBIN TIME: CPT

## 2022-05-12 PROCEDURE — 95810 POLYSOM 6/> YRS 4/> PARAM: CPT | Performed by: PSYCHIATRY & NEUROLOGY

## 2022-05-12 PROCEDURE — 99214 OFFICE O/P EST MOD 30 MIN: CPT | Mod: 25 | Performed by: INTERNAL MEDICINE

## 2022-05-12 PROCEDURE — 0064A COVID-19,PF,MODERNA (18+ YRS BOOSTER .25ML): CPT | Performed by: INTERNAL MEDICINE

## 2022-05-12 RX ORDER — POTASSIUM CHLORIDE 1500 MG/1
20 TABLET, EXTENDED RELEASE ORAL DAILY
Start: 2022-05-12 | End: 2023-03-16

## 2022-05-12 NOTE — PROGRESS NOTES
No answer. Left vm to return call to [facility] RN. Taking a bit of time to come back to therapeutic after a holding warfarin x7 days.   Patient had office visit for hospital f/u today, note incomplete. This writer will try calling once more before end of day if patient does not return call.  Melodie RIVERA RN  Anticoagulation Team

## 2022-05-12 NOTE — PROGRESS NOTES
"  Assessment & Plan     GIANG (dyspnea on exertion)  W/u ongoing. Sleep study tonight.  If no etiology noted and EP eval doesn't find contribution of her afib- could consider pulm eval starting with PFTs    Hypokalemia  Reduce to once day dosing. Repeat K+   - Potassium; Future    Paroxysmal A-fib (H)  Q is whether presence correlates to sx   Await ziopatch results  Has upcoming EP visit    High priority for 2019-nCoV vaccine  - COVID-19,PF,MODERNA (18+ Yrs BOOSTER .25mL)             BMI:   Estimated body mass index is 35.65 kg/m  as calculated from the following:    Height as of this encounter: 1.74 m (5' 8.5\").    Weight as of this encounter: 107.9 kg (237 lb 14.4 oz).   Weight management plan: Discussed healthy diet and exercise guidelines    See Patient Instructions    Return in about 1 month (around 6/12/2022) for Non-fasting lab only visit.    Alan Almeida MD  [facility]    Momo Christensen is a 66 year old who presents for the following health issues     History of Present Illness       Reason for visit:  Follow up from hospital stay  Symptom onset:  More than a monthShe consumes 0 sweetened beverage(s) daily.She exercises with enough effort to increase her heart rate 9 or less minutes per day.  She exercises with enough effort to increase her heart rate 3 or less days per week.   She is taking medications regularly.         Hospital Follow-up Visit:    Hospital/Nursing Home/IP Rehab Facility: [facility]  Date of Admission: 04/18/2022  Date of Discharge: 04/20/2022  Reason(s) for Admission: Unstable angina  Nonsustained ventricular tachycardia  Hypokalemia  Hypertension  Incidental hepatic lesion       Was your hospitalization related to COVID-19? No   Problems taking medications regularly:  None  Medication changes since discharge: None  Problems adhering to non-medication therapy:  None    Summary of hospitalization:  [facility] " "discharge summary reviewed  Diagnostic Tests/Treatments reviewed.  Follow up needed: ziopatch results  Other Healthcare Providers Involved in Patient s Care:         Specialist appointment - CV, sleep medicine  Update since discharge: stable.       Post Discharge Medication Reconciliation: discharge medications reconciled and changed, per note/orders.  Plan of care communicated with patient              Review of Systems         Objective    /76   Pulse 71   Temp 97.3  F (36.3  C) (Temporal)   Ht 1.74 m (5' 8.5\")   Wt 107.9 kg (237 lb 14.4 oz)   LMP 12/31/2012   SpO2 97%   BMI 35.65 kg/m    Body mass index is 35.65 kg/m .  Physical Exam   GENERAL: alert, no distress and over weight  RESP: lungs clear to auscultation - no rales, rhonchi or wheezes  CV: regular rates and rhythm                "

## 2022-05-12 NOTE — PROGRESS NOTES
ANTICOAGULATION MANAGEMENT     Amparo Fishmicki Shazia 66 year old female is on warfarin with therapeutic INR result. (Goal INR 2.0-3.0)    Recent labs: (last 7 days)     05/12/22  1119   INR 2.0*       ASSESSMENT       Source(s): Chart Review and Patient/Caregiver Call       Warfarin doses taken: Warfarin taken as instructed    Diet: No new diet changes identified    New illness, injury, or hospitalization: No    Medication/supplement changes: None noted    Signs or symptoms of bleeding or clotting: No    Previous INR: Subtherapeutic    Additional findings: None       PLAN     Recommended plan for no diet, medication or health factor changes affecting INR     Dosing Instructions: Increase your warfarin dose (9% change) with next INR in 2 weeks       Summary  As of 5/12/2022    Full warfarin instructions:  1.25 mg every Mon, Fri; 2.5 mg all other days   Next INR check:  5/26/2022             Telephone call with Amparo who verbalizes understanding and agrees to plan    Lab visit scheduled    Education provided: Please call back if any changes to your diet, medications or how you've been taking warfarin    Plan made per [facility] anticoagulation protocol    Natalya Watson RN  Anticoagulation Clinic  5/12/2022    _______________________________________________________________________     Anticoagulation Episode Summary     Current INR goal:  2.0-3.0   TTR:  68.9 % (1 y)   Target end date:  Indefinite   Send INR reminders to:  [facility]    Indications    Atrial fibrillation (H) [I48.91]  Ventricular bigeminy [I49.8]  Long term current use of anticoagulants with INR goal of 2.0-3.0 [Z79.01]  Atrial fibrillation  unspecified type (H) [I48.91]           Comments:           Anticoagulation Care Providers     Provider Role Specialty Phone number    Alan Almeida MD Referring Internal Medicine [phone]

## 2022-05-14 NOTE — PROCEDURES
" SLEEP STUDY INTERPRETATION  DIAGNOSTIC POLYSOMNOGRAPHY REPORT      Patient: DANY SHELBY  YOB: 1955  Study Date: 5/12/2022  MRN: [id number]  Referring Provider: -  Ordering Provider: MD Hernandez Michael    Indications for Polysomnography: The patient is a 66 year old Female who is 5' 8\" and weighs 237.0 lbs. Her BMI is 36.3, [place] sleepiness scale - and neck circumference is - cm. Relevant medical history includes heart disease, paroxysmal a-fib, nocturnal hypoxmemia. A diagnostic polysomnogram was performed to evaluate for sleep apnea/CSA/hypoxemia.    Polysomnogram Data: A full night polysomnogram recorded the standard physiologic parameters including EEG, EOG, EMG, ECG, nasal and oral airflow. Respiratory parameters of chest and abdominal movements were recorded with respiratory inductance plethysmography. Oxygen saturation was recorded by pulse oximetry. Hypopnea scoring rule used: 1B 4%.    Sleep Architecture: Sleep fragmentation  The total recording time of the polysomnogram was 467.9 minutes. The total sleep time was 371.5 minutes. Sleep latency was 1.6 minutes. REM latency was 65.5 minutes. Arousal index was 27.6 arousals per hour. Sleep efficiency was 79.4%. Wake after sleep onset was 94.5 minutes. The patient spent 16.7% of total sleep time in Stage N1, 42.0% in Stage N2, 27.7% in Stage N3, and 13.6% in REM. Time in REM supine was 0 minutes.    Respiration: Moderate ORLIN with hypoxemia    Events ? The polysomnogram revealed a presence of - obstructive, 7 central, and 1 mixed apneas resulting in an apnea index of 1.3 events per hour. There were 123 obstructive hypopneas and - central hypopneas resulting in an obstructive hypopnea index of 19.9 and central hypopnea index of - events per hour. The combined apnea/hypopnea index was 21.2 events per hour (central apnea/hypopnea index was 1.1 events per hour). The REM AHI was 51.1 events per hour. The supine AHI was 33.7 events per hour. The " RERA index was 1.5 events per hour.  The RDI was 22.6 events per hour.    Snoring - was reported as moderate-loud.    Respiratory rate and pattern - was notable for normal respiratory rate and pattern.    Sustained Sleep Associated Hypoventilation - Transcutaneous carbon dioxide monitoring was not used.    Sleep Associated Hypoxemia - (Greater than 5 minutes O2 sat at or below 88%) was present. Baseline oxygen saturation was 92.2%. Lowest oxygen saturation was 80.0%. Time spent less than or equal to 88% was 16.3 minutes. Time spent less than or equal to 89% was 38.1 minutes.    Movement Activity:     Periodic Limb Activity - There were 7 PLMs during the entire study. The PLM index was 1.1 movements per hour. The PLM Arousal Index was 0.2 per hour.    REM EMG Activity - Excessive muscle activity was not present.    Nocturnal Behavior - Abnormal sleep related behaviors were not noted.    Bruxism - None apparent.    Cardiac Summary: Limited 1 lead EKG study whose signal was frequently obscured by artifact. Regardless, the study predominantly demonstrated narrow QRS complexes preceded by P waves suggestive of sinus rhythm.    The average pulse rate was 60.7 bpm. The minimum pulse rate was 54.0 bpm while the maximum pulse rate was 94.0 bpm.      Assessment:     Moderate ORLIN with hypoxemia    Recommendations:    ORLIN can be treated with: autotitration CPAP, oral appliance, upper airway surgery, and/or positional therapy.     Advice regarding the risks of drowsy driving.    Suggest optimizing sleep schedule and avoiding sleep deprivation.    Weight management     Treatment of PLMs (alpha 2 delta ligands, dopaminergics) should be focused upon symptoms of motor restlessness, a high suspicion for PLM disorder and not for PLMs alone.    Diagnostic Codes: G47.33, G47.9      Khang Hernandez MD 5-14-22  Diplomate, ABPN Sleep Medicine

## 2022-05-14 NOTE — PROGRESS NOTES
I briefly called the patient to discuss the results of her PSG which demonstrated moderate ORLIN and hypoxemia.     Discussed this significance on her heart history as well as benefits of treating.  She is willing to give low dose autoCPAP a try and I will put in an urgent order (although it will likely still be several weeks due to the world wide shortage in CPAP machines right now). She understands.  If it is going to be a long wait we can try a dental appliance in the meanwhile.  She is going to see Gabino Stafford in a couple of weeks as she has not yet formally seen anyone in our sleep center.      In the meanwhile advise her to sleep lateral and/or propped up.  She understands.

## 2022-05-17 ENCOUNTER — TELEPHONE (OUTPATIENT)
Dept: SLEEP MEDICINE | Facility: CLINIC | Age: 67
End: 2022-05-17
Payer: MEDICARE

## 2022-05-17 LAB — SLPCOMP: NORMAL

## 2022-05-21 ASSESSMENT — SLEEP AND FATIGUE QUESTIONNAIRES
HOW LIKELY ARE YOU TO NOD OFF OR FALL ASLEEP IN A CAR, WHILE STOPPED FOR A FEW MINUTES IN TRAFFIC: WOULD NEVER DOZE
HOW LIKELY ARE YOU TO NOD OFF OR FALL ASLEEP WHILE LYING DOWN TO REST IN THE AFTERNOON WHEN CIRCUMSTANCES PERMIT: SLIGHT CHANCE OF DOZING
HOW LIKELY ARE YOU TO NOD OFF OR FALL ASLEEP WHILE SITTING AND READING: SLIGHT CHANCE OF DOZING
HOW LIKELY ARE YOU TO NOD OFF OR FALL ASLEEP WHILE SITTING INACTIVE IN A PUBLIC PLACE: WOULD NEVER DOZE
HOW LIKELY ARE YOU TO NOD OFF OR FALL ASLEEP WHILE SITTING QUIETLY AFTER LUNCH WITHOUT ALCOHOL: SLIGHT CHANCE OF DOZING
HOW LIKELY ARE YOU TO NOD OFF OR FALL ASLEEP WHILE WATCHING TV: SLIGHT CHANCE OF DOZING
HOW LIKELY ARE YOU TO NOD OFF OR FALL ASLEEP WHILE SITTING AND TALKING TO SOMEONE: WOULD NEVER DOZE
HOW LIKELY ARE YOU TO NOD OFF OR FALL ASLEEP WHEN YOU ARE A PASSENGER IN A CAR FOR AN HOUR WITHOUT A BREAK: WOULD NEVER DOZE

## 2022-05-24 NOTE — PROGRESS NOTES
SUBJECTIVE:   Amparo Mccray is a 61 year old female who presents for Preventive Visit.    Are you in the first 12 months of your Medicare Part B coverage?  Yes,  Visual Acuity:  Right Eye: 20/25   Left Eye: 20/20  Both Eyes: 20/25    Healthy Habits:    Do you get at least three servings of calcium containing foods daily (dairy, green leafy vegetables, etc.)? yes    Amount of exercise or daily activities, outside of work: none    Problems taking medications regularly No    Medication side effects: No    Have you had an eye exam in the past two years? yes    Do you see a dentist twice per year? yes    Do you have sleep apnea, excessive snoring or daytime drowsiness?no    COGNITIVE SCREEN  1) Repeat 3 items (Banana, Sunrise, Chair)    2) Clock draw: NORMAL  3) 3 item recall: Recalls 1 object   Results: NORMAL clock, 1-2 items recalled: COGNITIVE IMPAIRMENT LESS LIKELY    Mini-CogTM Copyright S Rosa Elena. Licensed by the author for use in [facility]; reprinted with permission (helio@[facility]). All rights reserved.      Atrial Fib  Hx of atrial fib s/p ablation  Has noticed more freq palpitations that she feels are likely short runs of atrial fib.   She has an marah on her iphone that allows her to record short 1 lead ekgs and has some of these documented.  She is no longer on any anticoagulation - having stopped this on her own a few yrs ago.       Hypertension Follow-up      Outpatient blood pressures are not being checked.    Low Salt Diet: no added salt    Reviewed and updated as needed this visit by clinical staffTobacco  Allergies         Reviewed and updated as needed this visit by Provider        Social History   Substance Use Topics     Smoking status: Never Smoker     Smokeless tobacco: Never Used     Alcohol use Yes      Comment: 1-2 per month       The patient does not drink >3 drinks per day nor >7 drinks per week.    Today's PHQ-2 Score:   PHQ-2 ( 1999 Pfizer) 8/16/2017 6/24/2016   Q1:  "Little interest or pleasure in doing things 0 0   Q2: Feeling down, depressed or hopeless 0 0   PHQ-2 Score 0 0   Q1: Little interest or pleasure in doing things - -   Q2: Feeling down, depressed or hopeless - -   PHQ-2 Score - -         Do you feel safe in your environment - Yes    Do you have a Health Care Directive?: Yes: Advance Directive has been received and scanned.    Current providers sharing in care for this patient include: Patient Care Team:  Alan Almeida MD as PCP - General      Hearing impairment: No    Ability to successfully perform activities of daily living: Yes, no assistance needed     Fall risk:         Home safety:  none identified      The following health maintenance items are reviewed in Epic and correct as of today:  Health Maintenance   Topic Date Due     HEPATITIS C SCREENING  11/16/1974     COLONOSCOPY Q10 YR  11/16/1976     PAP Q1 YR  01/04/2013     MAMMO Q1 YR  04/12/2013     OP ANNUAL INR REFERRAL  06/04/2015     FIT Q1 YR  05/19/2017     ADVANCE DIRECTIVE PLANNING Q5 YRS  08/23/2017     INFLUENZA VACCINE (SYSTEM ASSIGNED)  09/01/2017     LIPID SCREEN Q5 YR FEMALE (SYSTEM ASSIGNED)  05/16/2021     TETANUS Q10 YR  05/26/2021     Labs reviewed in EPIC    ROS:  Constitutional, HEENT, cardiovascular, pulmonary, GI, , musculoskeletal, neuro, skin, endocrine and psych systems are negative, except as otherwise noted.      OBJECTIVE:   /84  Pulse 78  Temp 98.2  F (36.8  C) (Oral)  Ht 5' 8.5\" (1.74 m)  Wt 245 lb (111.1 kg)  LMP 12/31/2012  SpO2 98%  BMI 36.71 kg/m2 Estimated body mass index is 36.71 kg/(m^2) as calculated from the following:    Height as of this encounter: 5' 8.5\" (1.74 m).    Weight as of this encounter: 245 lb (111.1 kg).  EXAM:   GENERAL APPEARANCE: alert, no distress and over weight  EYES: Eyes grossly normal to inspection, PERRL and conjunctivae and sclerae normal  HENT: ear canals and TM's normal, nose and mouth without ulcers or lesions, oropharynx " clear and oral mucous membranes moist  NECK: no adenopathy, no asymmetry, masses, or scars and thyroid normal to palpation  RESP: lungs clear to auscultation - no rales, rhonchi or wheezes  BREAST: normal without masses, tenderness or nipple discharge and no palpable axillary masses or adenopathy  CV: regular rate and rhythm, normal S1 S2, no S3 or S4, no murmur, click or rub, no peripheral edema and peripheral pulses strong  ABDOMEN: soft, nontender, no hepatosplenomegaly, no masses and bowel sounds normal  MS: no musculoskeletal defects are noted and gait is age appropriate without ataxia  SKIN: no suspicious lesions or rashes  NEURO: Normal strength and tone, sensory exam grossly normal, mentation intact and speech normal  PSYCH: mentation appears normal and affect normal/bright    ASSESSMENT / PLAN:   1. Welcome to Medicare preventive visit  See AVS for medicare requirements - uptodate on screening other than mammo- refuses any breast ca screening  FIt for colon ca screening    2. Essential hypertension, benign  - triamterene-hydrochlorothiazide (MAXZIDE-25) 37.5-25 MG per tablet; TAKE 1 TABLET BY MOUTH EVERY MORNING.  Dispense: 90 tablet; Refill: 3  - metoprolol (LOPRESSOR) 50 MG tablet; Take 1 tablet (50 mg) by mouth 2 times daily  Dispense: 180 tablet; Refill: 2  - OFFICE/OUTPT VISIT,EST,LEVL III    3. Morbid obesity, unspecified obesity type (H)  Weight loss    4. CARDIOVASCULAR SCREENING; LDL GOAL LESS THAN 160  - Lipid panel reflex to direct LDL  - Basic metabolic panel    5. Screening for colon cancer  - Fecal colorectal cancer screen FIT; Future    6. Screening for cervical cancer  States will be seeing gyn in the near future for this.     7. Visit for screening mammogram  As above she refuses this - but ordered in case she changes her mind  - Mammo Screening digital (bilat); Future    8. Paroxysmal atrial fibrillation (H)  [id number] score of 2- would recommend resumption of anticoagulation   See cards  "again - there was discussion of looking into another ablation procedure if she noticed more recurrence as it appears she has  - INR CLINIC REFERRAL  - INR  - OFFICE/OUTPT VISIT,EST,LEVL III    End of Life Planning:  Patient currently has an advanced directive: Yes.  Practitioner is supportive of decision.    COUNSELING:  Reviewed preventive health counseling, as reflected in patient instructions        Estimated body mass index is 36.71 kg/(m^2) as calculated from the following:    Height as of this encounter: 5' 8.5\" (1.74 m).    Weight as of this encounter: 245 lb (111.1 kg).  Weight management plan: Discussed healthy diet and exercise guidelines and patient will follow up in 6 months in clinic to re-evaluate.   reports that she has never smoked. She has never used smokeless tobacco.        Appropriate preventive services were discussed with this patient, including applicable screening as appropriate for cardiovascular disease, diabetes, osteopenia/osteoporosis, and glaucoma.  As appropriate for age/gender, discussed screening for colorectal cancer, prostate cancer, breast cancer, and cervical cancer. Checklist reviewing preventive services available has been given to the patient.    Reviewed patients plan of care and provided an AVS. The Intermediate Care Plan ( asthma action plan, low back pain action plan, and migraine action plan) for Amparo meets the Care Plan requirement. This Care Plan has been established and reviewed with the Patient.    Counseling Resources:  ATP IV Guidelines  Pooled Cohorts Equation Calculator  Breast Cancer Risk Calculator  FRAX Risk Assessment  ICSI Preventive Guidelines  Dietary Guidelines for Americans, 2010  USDA's MyPlate  ASA Prophylaxis  Lung CA Screening    Alan Almeida MD  [facility]  " Rifampin Counseling: I discussed with the patient the risks of rifampin including but not limited to liver damage, kidney damage, red-orange body fluids, nausea/vomiting and severe allergy.

## 2022-05-26 ENCOUNTER — APPOINTMENT (OUTPATIENT)
Dept: SLEEP MEDICINE | Facility: CLINIC | Age: 67
End: 2022-05-26
Payer: MEDICARE

## 2022-05-26 ENCOUNTER — DOCUMENTATION ONLY (OUTPATIENT)
Dept: SLEEP MEDICINE | Facility: CLINIC | Age: 67
End: 2022-05-26
Payer: MEDICARE

## 2022-05-26 DIAGNOSIS — G47.33 OBSTRUCTIVE SLEEP APNEA (ADULT) (PEDIATRIC): Primary | ICD-10-CM

## 2022-05-26 NOTE — PROGRESS NOTES
Patient was offered choice of vendor and chose [facility].  Patient Amparo Mccray was set up at [facility]  on May 26, 2022. Patient received a Resmed Airsense 11 Pressures were set at 5-15 cm H2O.   Patient s ramp is 5 cm H2O for Auto and FLEX/EPR is EPR, 2.  Patient received a Resmed Mask name: Airfit N30i Nasal mask size Small, heated tubing and heated humidifier.  Patient does need to meet compliance. Patient has a follow up on 9/7/2022 with Dr. Hernandez.    Nishi Collazo

## 2022-05-27 ENCOUNTER — VIRTUAL VISIT (OUTPATIENT)
Dept: SLEEP MEDICINE | Facility: CLINIC | Age: 67
End: 2022-05-27
Payer: MEDICARE

## 2022-05-27 VITALS — HEIGHT: 69 IN | BODY MASS INDEX: 35.1 KG/M2 | WEIGHT: 237 LBS

## 2022-05-27 DIAGNOSIS — I48.91 ATRIAL FIBRILLATION, UNSPECIFIED TYPE (H): ICD-10-CM

## 2022-05-27 DIAGNOSIS — G47.33 OSA (OBSTRUCTIVE SLEEP APNEA): Primary | ICD-10-CM

## 2022-05-27 DIAGNOSIS — I25.118 CORONARY ARTERY DISEASE OF NATIVE HEART WITH STABLE ANGINA PECTORIS, UNSPECIFIED VESSEL OR LESION TYPE (H): ICD-10-CM

## 2022-05-27 DIAGNOSIS — Z79.01 LONG TERM CURRENT USE OF ANTICOAGULANTS WITH INR GOAL OF 2.0-3.0: ICD-10-CM

## 2022-05-27 DIAGNOSIS — E66.9 OBESITY (BMI 30-39.9): ICD-10-CM

## 2022-05-27 DIAGNOSIS — G47.36 HYPOXEMIA ASSOCIATED WITH SLEEP: ICD-10-CM

## 2022-05-27 PROCEDURE — 99204 OFFICE O/P NEW MOD 45 MIN: CPT | Mod: 95 | Performed by: NURSE PRACTITIONER

## 2022-05-27 NOTE — PROGRESS NOTES
"Amparo is a 66 year old who is being evaluated via a billable video visit.      How would you like to obtain your AVS? MyChart  If the video visit is dropped, the invitation should be resent by: Text to cell phone: [phone]  Will anyone else be joining your video visit? No  {If patient encounters technical issues they should call [phone] :[id number]}Ada Ellison      Video Start Time: {video visit start/end time for provider to select:152948}  Video-Visit Details    Type of service:  Video Visit    Video End Time:{video visit start/end time for provider to select:152948}    Originating Location (pt. Location): {video visit patient location:[id number]::\"Home\"}    Distant Location (provider location):  [facility]     Platform used for Video Visit: {Virtual Visit Platforms:[id number]::\""[employer]\"}  "

## 2022-05-27 NOTE — PATIENT INSTRUCTIONS
"MY INFORMATION ON SLEEP APNEA-  Amparo Mccray    DOCTOR : ARMIN Lentz CNP  SLEEP CENTER :      MY CONTACT NUMBER:   [facility]  [phone]  [facility]   [phone]  [facility]   [phone]      [facility]  [phone]  [facility]   [phone]    [facility] [place] [address], [address]  [place] [address]  Phone: [phone]    Hours:  Mon - Fri: 8:00 a.m. - 4:30 p.m.  Sat: Closed  Sun: Closed      Key Points:  1. What is Obstructive Sleep Apnea (ORLIN)? ORLIN is the most common type of sleep apnea. Apnea literally means, \"without breath.\" It is characterized by repetitive pauses in breathing, despite continued effort to breathe, and is usually associated with a reduction in blood oxygen saturation. Apneas can last 10 to over 60 seconds. It is caused by narrowing or collapse of the upper airway as muscles relax during sleep.   2. What are the consequences of ORLIN? Symptoms include: daytime sleepiness- possibly increasing the risk of falling asleep while driving, unrefreshing/restless sleep, snoring, insomnia, waking frequently to urinate, waking with heartburn or reflux, reduced concentration and memory, and morning headaches. Other health consequences may include development of high blood pressure. Untreated ORLIN also can contribute to heart disease, stroke and diabetes.   3. What are the treatment options? In most situations, sleep apnea is a lifelong disease that must be managed with daily therapy. Continuous Positive Airway (CPAP) is the most reliable treatment. A mouthguard to hold your jaw forward is usually the next most reliable option. Other options include postioning devices (to keep you off your back), nasal valves, tongue retaining device, weight loss, surgery. There is more detail about these options toward the end of this " document.  4. What are the most important things to remember about using CPAP?     WHERE CAN I FIND MORE INFORMATION?    American Academy of Sleep Medicine Patient information on sleep disorders:  http://yoursleep.aasmnet.org    CPAP -  WHY AND HOW?                 Continuous positive airway pressure, or CPAP, is the most effective treatment for obstructive sleep apnea. It works by blowing room air, through a mask, to hold your throat open. A decision to use CPAP is a major step forward in the pursuit of a healthier life. The successful use of CPAP will help you breathe easier, sleep better and live healthier. Using CPAP can be a positive experience if you keep these [place] points in mind:  Commitment  CPAP is not a quick fix for your problem. It involves a long-term commitment to improve your sleep and your health.    Communication  Stay in close communication with both your sleep doctor and your CPAP supplier. Ask lots of questions and seek help when you need it.    Consistency  Use CPAP all night, every night and for every nap. You will receive the maximum health benefits from CPAP when you use it every time that you sleep. This will also make it easier for your body to adjust to the treatment.    Correction  The first machine and mask that you try may not be the best ones for you. Work with your sleep doctor and your CPAP supplier to make corrections to your equipment selection. Ask about trying a different type of machine or mask if you have ongoing problems. Make sure that your mask is a good fit and learn to use your equipment properly.    Challenge  Tell a family member or close friend to ask you each morning if you used your CPAP the previous night. Have someone to challenge you to give it your best effort.    Connection   Your adjustment to CPAP will be easier if you are able to connect with others who use the same treatment. Ask your sleep doctor if there is a support group in your area for people who have  "sleep apnea, or look for one on the Internet.  Comfort   Increase your level of comfort by using a saline spray, decongestant or heated humidifier if CPAP irritates your nose, mouth or throat. Use your unit's \"ramp\" setting to slowly get used to the air pressure level. There may be soft pads you can buy that will fit over your mask straps. Look on www.CPAP.com for accessories that can help make CPAP use more comfortable.  Cleaning   Clean your mask, tubing and headgear on a regular basis. Put this time in your schedule so that you don't forget to do it. Check and replace the filters for your CPAP unit and humidifier.    Completion   Although you are never finished with CPAP therapy, you should reward yourself by celebrating the completion of your first month of treatment. Expect this first month to be your hardest period of adjustment. It will involve some trial and error as you find the machine, mask and pressure settings that are right for you.    Continuation  After your first month of treatment, continue to make a daily commitment to use your CPAP all night, every night and for every nap.    CPAP-Tips to starting with success:  Begin using your CPAP for short periods of time during the day while you watch TV or read.    Use CPAP every night and for every nap. Using it less often reduces the health benefits and makes it harder for your body to get used to it.    Newer CPAP models are virtually silent; however, if you find the sound of your CPAP machine to be bothersome, place the unit under your bed to dampen the sound.     Make small adjustments to your mask, tubing, straps and headgear until you get the right fit. Tightening the mask may actually worsen the leak.  If it leaves significant marks on your face or irritates the bridge of your nose, it may not be the best mask for you.  Speak with the person who supplied the mask and consider trying other masks. Insurances will allow you to try different masks " during the first month of starting CPAP.  Insurance also covers a new mask, hose and filter about every 6 months.    Use a saline nasal spray to ease mild nasal congestion. Neti-Pot or saline nasal rinses may also help. Nasal gel sprays can help reduce nasal dryness.  Biotene mouthwash can be helpful to protect your teeth if you experience frequent dry mouth.  Dry mouth may be a sign of air escaping out of your mouth or out of the mask in the case of a full face mask.  Speak with your provider if you expect that is the case.     Take a nasal decongestant to relieve more severe nasal or sinus congestion.  Do not use Afrin (oxymetazoline) nasal spray more than 3 days in a row.  Speak with your sleep doctor if your nasal congestion is chronic.    Use a heated humidifier that fits your CPAP model to enhance your breathing comfort. Adjust the heat setting up if you get a dry nose or throat, down if you get condensation in the hose or mask.  Position the CPAP lower than you so that any condensation in the hose drains back into the machine rather than towards the mask.    Try a system that uses nasal pillows if traditional masks give you problems.    Clean your mask, tubing and headgear once a week. Make sure the equipment dries fully.    Regularly check and replace the filters for your CPAP unit and humidifier.    Work closely with your sleep provider and your CPAP supplier to make sure that you have the machine, mask and air pressure setting that works best for you. It is better to stop using it and call your provider to solve problems than to lay awake all night frustrated with the device.  Weight Loss:    Weight loss decreases severity of sleep apnea in most people with obesity. For those with mild obesity who have developed snoring with weight gain, even 15-30 pound weight loss can improve and occasionally eliminate sleep apnea.  Structured and life-long dietary and health habits are necessary to lose weight and keep  healthier weight levels.     Though there are significant health benefits from weight loss, long-term weight loss is very difficult to achieve- studies show success with dietary management in less than 10% of people. In addition, substantial weight loss may require years of dietary control and may be difficult if patients have severe obesity. In these cases, surgical management may be considered.    If you are interested in methods for weight loss, you should review the options discussed at the National Institutes of Health patient information sites:     http://win.niddk.nih.gov/publications/index.htm  http:/www.health.nih.gov/topic/WeightLossDieting    Bariatric programs offer counseling in all methods of weight loss:    Http:/www.[facility].org/Specialties/WeightLossSurgeryandMedicalMgmt/htm    Your BMI is Body mass index is 35.51 kg/m .    Weight management plan: Patient was referred to their PCP to discuss a diet and exercise plan.    Body mass index (BMI) is one way to tell whether you are at a healthy weight, overweight, or obese. It measures your weight in relation to your height.  A BMI of 18.5 to 24.9 is in the healthy range. A person with a BMI of 25 to 29.9 is considered overweight, and someone with a BMI of 30 or greater is considered obese.  Another way to find out if you are at risk for health problems caused by overweight and obesity is to measure your waist. If you are a woman and your waist is more than 35 inches, or if you are a man and your waist is more than 40 inches, your risk of disease may be higher.  More than two-thirds of American adults are considered overweight or obese. Being overweight or obese increases the risk for further weight gain.  Excess weight may lead to heart disease and diabetes. Creating and following plans for healthy eating and physical activity may help you improve your health.    Methods for maintaining or losing weight.    Weight control is part of healthy  lifestyle and includes exercise, emotional health, and healthy eating habits.  Careful eating habits lifelong is the mainstay of weight control.  Though there are significant health benefits from weight loss, long-term weight loss with diet alone may be very difficult to achieve- studies show long-term success with dietary management in less than 10% of people. Attaining a healthy weight may be especially difficult to achieve in those with severe obesity. In some cases, medications, devices and surgical management might be considered.    What can you do?    If you are overweight or obese and are interested in methods for weight loss, you should discuss this with your provider. In addition, we recommend that you review healthy life styles and methods for weight loss available through the National Institutes of Health patient information sites:     http://win.niddk.nih.gov/publications/index.htm

## 2022-05-31 ENCOUNTER — ANTICOAGULATION THERAPY VISIT (OUTPATIENT)
Dept: ANTICOAGULATION | Facility: CLINIC | Age: 67
End: 2022-05-31

## 2022-05-31 ENCOUNTER — OFFICE VISIT (OUTPATIENT)
Dept: CARDIOLOGY | Facility: CLINIC | Age: 67
End: 2022-05-31
Attending: NURSE PRACTITIONER
Payer: MEDICARE

## 2022-05-31 ENCOUNTER — LAB (OUTPATIENT)
Dept: LAB | Facility: CLINIC | Age: 67
End: 2022-05-31

## 2022-05-31 VITALS
WEIGHT: 242 LBS | DIASTOLIC BLOOD PRESSURE: 77 MMHG | HEIGHT: 69 IN | BODY MASS INDEX: 35.84 KG/M2 | HEART RATE: 85 BPM | SYSTOLIC BLOOD PRESSURE: 131 MMHG

## 2022-05-31 DIAGNOSIS — I49.8 VENTRICULAR BIGEMINY: ICD-10-CM

## 2022-05-31 DIAGNOSIS — I48.91 ATRIAL FIBRILLATION, UNSPECIFIED TYPE (H): ICD-10-CM

## 2022-05-31 DIAGNOSIS — Z79.01 LONG TERM CURRENT USE OF ANTICOAGULANTS WITH INR GOAL OF 2.0-3.0: ICD-10-CM

## 2022-05-31 DIAGNOSIS — I48.91 ATRIAL FIBRILLATION (H): Primary | ICD-10-CM

## 2022-05-31 DIAGNOSIS — I25.118 CORONARY ARTERY DISEASE OF NATIVE HEART WITH STABLE ANGINA PECTORIS, UNSPECIFIED VESSEL OR LESION TYPE (H): ICD-10-CM

## 2022-05-31 LAB — INR BLD: 2.8 (ref 0.9–1.1)

## 2022-05-31 PROCEDURE — 85610 PROTHROMBIN TIME: CPT

## 2022-05-31 PROCEDURE — 36416 COLLJ CAPILLARY BLOOD SPEC: CPT

## 2022-05-31 PROCEDURE — 99214 OFFICE O/P EST MOD 30 MIN: CPT | Performed by: INTERNAL MEDICINE

## 2022-05-31 NOTE — LETTER
5/31/2022    Alan Almeida MD  [address]  [facility] [address]    RE: Amparo Dustin Mccray       Dear Colleague,     I had the pleasure of seeing Amparo Dustin Mccray in the [facility].  HPI and Plan:   See dictation  [id number]  Today's clinic visit entailed:  The following tests were independently interpreted by me as noted in my documentation: ecg, cath, zio  15 minutes spent on the date of the encounter doing chart review   Provider  Link to [facility] Help Grid     The level of medical decision making during this visit was of moderate complexity.      No orders of the defined types were placed in this encounter.      No orders of the defined types were placed in this encounter.      There are no discontinued medications.      Encounter Diagnoses   Name Primary?     Coronary artery disease of native heart with stable angina pectoris, unspecified vessel or lesion type (H)      Atrial fibrillation, unspecified type (H)      Long term current use of anticoagulants with INR goal of 2.0-3.0        CURRENT MEDICATIONS:  Current Outpatient Medications   Medication Sig Dispense Refill     amLODIPine (NORVASC) 5 MG tablet Take 1 tablet (5 mg) by mouth 2 times daily 180 tablet 3     atorvastatin (LIPITOR) 40 MG tablet TAKE 1 TABLET(40 MG) BY MOUTH AT BEDTIME 90 tablet 1     isosorbide mononitrate (IMDUR) 30 MG 24 hr tablet Take 1 tablet (30 mg) by mouth daily 30 tablet 3     metoprolol tartrate (LOPRESSOR) 50 MG tablet TAKE 1 AND 1/2 TABLETS(75 MG) BY MOUTH TWICE DAILY 270 tablet 2     potassium chloride ER (KLOR-CON M) 20 MEQ CR tablet Take 1 tablet (20 mEq) by mouth daily       pyridostigmine (MESTINON) 60 MG tablet 1 tablet by mouth three times a day (Patient taking differently: 1 tablet by mouth two times a day with breakfast and lunch) 90 tablet 3     triamterene-HCTZ (MAXZIDE-25) 37.5-25 MG tablet TAKE 1 TABLET BY MOUTH EVERY MORNING 90 tablet 1     warfarin ANTICOAGULANT (COUMADIN)  2.5 MG tablet TAKE 1/2 TABLET(1.25 MG) ON MONDAYS, WEDNESDAYS, SATURDAYS AND TAKE 1 TABLET(2.5 MG) ON ALL OTHER DAYS OR AS DIRECTED BY THE INR CLINIC. 100 tablet 1       ALLERGIES     Allergies   Allergen Reactions     Celebrex [Celecoxib] Hives     Hands itching then hives     Ibuprofen Swelling     Face swelling     Ibuprofen Hives       PAST MEDICAL HISTORY:  Past Medical History:   Diagnosis Date     Arthritis      Chest pain, unspecified     neg stress test, put on PPI- no help     Essential hypertension, benign     Hypertension, Benign     Obesity (BMI 30-39.9) 7/1/13    BMI 33.6     Paroxysmal atrial fibrillation (H) 03/2010     Superficial thrombophlebitis      Thrombosis of leg     superficial not dvt's     Ventricular bigeminy     bigeminy- nrml LV fxn       PAST SURGICAL HISTORY:  Past Surgical History:   Procedure Laterality Date     ABDOMEN SURGERY       ARTHROPLASTY KNEE Right 3/2/2015    Procedure: ARTHROPLASTY KNEE;  Surgeon: Cuco Baugh MD;  Location:  OR     [facility] SURGICAL PATHOLOGY  1976    HGSIL     CV CORONARY ANGIOGRAM N/A 4/20/2022    Procedure: Coronary Angiogram;  Surgeon: Khang Thomas MD;  Location: [facility] CARDIAC CATH LAB     CV CORONARY ANGIOGRAM  4/20/2022    Procedure: ;  Surgeon: Khang Thomas MD;  Location: [facility] CARDIAC CATH LAB     ESOPHAGOSCOPY, GASTROSCOPY, DUODENOSCOPY (EGD), COMBINED N/A 5/16/2019    Procedure: ESOPHAGOGASTRODUODENOSCOPY, WITH BIOPSY;  Surgeon: Liliane Montalvo MD;  Location:  GI     H ABLATION FOCAL AFIB  11/02/2017     H ABLATION FOCAL AFIB  01/15/2013     ORTHOPEDIC SURGERY      l tka  and wrist      TUBAL LIGATION      Uterine fibroids removed     ZZC NONSPECIFIC PROCEDURE      left hand        FAMILY HISTORY:  Family History   Problem Relation Age of Onset     C.A.D. Paternal Grandmother      Hypertension Paternal Grandmother      C.A.D. Paternal Grandfather      Connective Tissue Disorder Paternal  "Grandfather      Hypertension Paternal Grandfather      C.A.D. Father      Hypertension Father      Cancer Maternal Grandmother         uterine     Colon Cancer Maternal Grandmother      Cancer Maternal Grandfather         leukemia     Other Cancer Maternal Grandfather      Cancer Mother         uterine     Gastrointestinal Disease Mother         ulcers     Hypertension Mother      Other - See Comments Sister         essential tremors     C.A.D. Brother         stent- 47     Hypertension Brother      Depression Daughter      Eye Disorder Son      Hypertension Brother      Depression Daughter      Asthma Son        SOCIAL HISTORY:  Social History     Socioeconomic History     Marital status:      Spouse name: None     Number of children: None     Years of education: None     Highest education level: None   Tobacco Use     Smoking status: Never Smoker     Smokeless tobacco: Never Used   Substance and Sexual Activity     Alcohol use: Yes     Comment: once a year     Drug use: No     Sexual activity: Yes     Partners: Male     Birth control/protection: None   Other Topics Concern     Parent/sibling w/ CABG, MI or angioplasty before 65F 55M? Yes       Review of Systems:  Skin:          Eyes:         ENT:         Respiratory:          Cardiovascular:         Gastroenterology:        Genitourinary:         Musculoskeletal:         Neurologic:         Psychiatric:         Heme/Lymph/Imm:         Endocrine:           Physical Exam:  Vitals: /77 (BP Location: Left arm, Cuff Size: Adult Large)   Pulse 85   Ht 1.74 m (5' 8.5\")   Wt 109.8 kg (242 lb)   LMP 12/31/2012   BMI 36.26 kg/m      Constitutional:  cooperative, alert and oriented, well developed, well nourished, in no acute distress        Skin:  warm and dry to the touch, no apparent skin lesions or masses noted          Head:  normocephalic, no masses or lesions        Eyes:  pupils equal and round, conjunctivae and lids unremarkable, sclera white, no " xanthalasma, EOMS intact, no nystagmus        Lymph:No Cervical lymphadenopathy present     ENT:  no pallor or cyanosis        Neck:  carotid pulses are full and equal bilaterally, JVP normal, no carotid bruit        Respiratory:  normal breath sounds, clear to auscultation, normal A-P diameter, normal symmetry, normal respiratory excursion, no use of accessory muscles         Cardiac: regular rhythm, normal S1/S2, no S3 or S4, apical impulse not displaced, no murmurs, gallops or rubs                pulses full and equal, no bruits auscultated                                        GI:  abdomen soft, non-tender, BS normoactive, no mass, no HSM, no bruits obese      Extremities and Muscular Skeletal:  no deformities, clubbing, cyanosis, erythema observed              Neurological:  no gross motor deficits        Psych:  Alert and Oriented x 3        CC  Esme Dueñas, APRN CNP  [address]  [address]  [place] [address]    Thank you for allowing me to participate in the care of your patient.      Sincerely,   Wilbert Hernandez MD   [facility]

## 2022-05-31 NOTE — PROGRESS NOTES
Service Date: 05/31/2022    HISTORY OF PRESENT ILLNESS:  Thank you for allowing me to participate in the care of your very delightful patient.  As you know, Amparo is a 66-year-old female with a history of symptomatic paroxysmal atrial fibrillation in the background of hypertension and underwent a catheter-based ablation with pulmonary vein isolation and SVC isolation as well in 2013 and had been doing quite well until she had recurrent episodes requiring repeat ablation which was performed in 2017.  At that time, she was found to have reconnected pulmonary vein potentials which was re-isolated.  There was also evidence of accessory pulmonary veins which was re-isolated as well.    I last saw the patient this past September.  Prior to that, she was in [place] and she was having some chest discomfort along with some skipping sensations which I thought were some premature beats.  I had recommended a stress test which showed a small area of ischemia in the mid to basal inferior segments.  We elected to proceed with medical therapy provided the stress was truly positive.    This past April, the patient was having more discomfort.  It was slightly worse than before.  An angiogram was done showing no evidence of coronary disease, however.  The patient was discharged to home on Imdur with the possibility of spasm, either in esophagus or her native coronaries.  She is known to have normal LV function.  The patient was noted to have some nonsustained VT which is not new, as she had had before from previous monitoring.  She also was informed by the nursing staff that she may have atrial fibrillation but on the Cardiology progress note, there was no mention of it except for the nonsustained VT.    The patient was sent home on the 2-week Zio Patch monitor.  Showed no evidence of atrial fibrillation.  She did have few episodes of nonsustained VT, quite slow at 110 beats per minute for which she was asymptomatic.    Amparo did  bring with her the cardiac telemetry recorded on her smart phone, preceded a few days before she wore the monitor.  She was somewhat symptomatic with this episode but cannot recall how long it lasted for.  The tracings which I reviewed showed irregular R wave, although I could not tell of any P waves.  Even in sinus rhythm, it was very difficult to determine P waves.    I cannot say for sure whether this episode is paroxysmal atrial fibrillation or sinus with PACs but it is not unexpected to have AFib again, even after 5 years from the last ablation.  Since that episode, however, the patient has not had anymore.  Given that she is 66 now with CHADS-VASc score of 3, I would have her continue warfarin and if she has more episodes of atrial fibrillation, I would like to see her to go over the options.    Regarding her nonsustained VT, given she has normal cardiac structure and function and the rate is very slow, I am not too worried about it.  She will continue with the beta blocker.  I would like to see Amparo back in 1 year's time.    Wilbert Thomas MD        D: 2022   T: 2022   MT: rico    Name:     AMPARO SHELBY  MRN:      [id number]        Account:      [id number]   :      1955           Service Date: 2022       Document: [id number]

## 2022-05-31 NOTE — PROGRESS NOTES
HPI and Plan:   See dictation  [id number]  Today's clinic visit entailed:  The following tests were independently interpreted by me as noted in my documentation: ecg, cath, zio  15 minutes spent on the date of the encounter doing chart review   Provider  Link to [facility] Help Grid     The level of medical decision making during this visit was of moderate complexity.      No orders of the defined types were placed in this encounter.      No orders of the defined types were placed in this encounter.      There are no discontinued medications.      Encounter Diagnoses   Name Primary?     Coronary artery disease of native heart with stable angina pectoris, unspecified vessel or lesion type (H)      Atrial fibrillation, unspecified type (H)      Long term current use of anticoagulants with INR goal of 2.0-3.0        CURRENT MEDICATIONS:  Current Outpatient Medications   Medication Sig Dispense Refill     amLODIPine (NORVASC) 5 MG tablet Take 1 tablet (5 mg) by mouth 2 times daily 180 tablet 3     atorvastatin (LIPITOR) 40 MG tablet TAKE 1 TABLET(40 MG) BY MOUTH AT BEDTIME 90 tablet 1     isosorbide mononitrate (IMDUR) 30 MG 24 hr tablet Take 1 tablet (30 mg) by mouth daily 30 tablet 3     metoprolol tartrate (LOPRESSOR) 50 MG tablet TAKE 1 AND 1/2 TABLETS(75 MG) BY MOUTH TWICE DAILY 270 tablet 2     potassium chloride ER (KLOR-CON M) 20 MEQ CR tablet Take 1 tablet (20 mEq) by mouth daily       pyridostigmine (MESTINON) 60 MG tablet 1 tablet by mouth three times a day (Patient taking differently: 1 tablet by mouth two times a day with breakfast and lunch) 90 tablet 3     triamterene-HCTZ (MAXZIDE-25) 37.5-25 MG tablet TAKE 1 TABLET BY MOUTH EVERY MORNING 90 tablet 1     warfarin ANTICOAGULANT (COUMADIN) 2.5 MG tablet TAKE 1/2 TABLET(1.25 MG) ON MONDAYS, WEDNESDAYS, SATURDAYS AND TAKE 1 TABLET(2.5 MG) ON ALL OTHER DAYS OR AS DIRECTED BY THE INR CLINIC. 100 tablet 1       ALLERGIES     Allergies   Allergen Reactions     Celebrex  [Celecoxib] Hives     Hands itching then hives     Ibuprofen Swelling     Face swelling     Ibuprofen Hives       PAST MEDICAL HISTORY:  Past Medical History:   Diagnosis Date     Arthritis      Chest pain, unspecified     neg stress test, put on PPI- no help     Essential hypertension, benign     Hypertension, Benign     Obesity (BMI 30-39.9) 7/1/13    BMI 33.6     Paroxysmal atrial fibrillation (H) 03/2010     Superficial thrombophlebitis      Thrombosis of leg     superficial not dvt's     Ventricular bigeminy     bigeminy- nrml LV fxn       PAST SURGICAL HISTORY:  Past Surgical History:   Procedure Laterality Date     ABDOMEN SURGERY       ARTHROPLASTY KNEE Right 3/2/2015    Procedure: ARTHROPLASTY KNEE;  Surgeon: Cuco Baugh MD;  Location: [facility]     [facility] AFF SURGICAL PATHOLOGY  1976    HGSIL     CV CORONARY ANGIOGRAM N/A 4/20/2022    Procedure: Coronary Angiogram;  Surgeon: Khang Thomas MD;  Location:  [facility]     CV CORONARY ANGIOGRAM  4/20/2022    Procedure: ;  Surgeon: Khang Thomas MD;  Location:  [facility]     ESOPHAGOSCOPY, GASTROSCOPY, DUODENOSCOPY (EGD), COMBINED N/A 5/16/2019    Procedure: ESOPHAGOGASTRODUODENOSCOPY, WITH BIOPSY;  Surgeon: Liliane Montalvo MD;  Location: [facility]     H ABLATION FOCAL AFIB  11/02/2017     H ABLATION FOCAL AFIB  01/15/2013     ORTHOPEDIC SURGERY      l tka  and wrist      TUBAL LIGATION      Uterine fibroids removed     ZZC NONSPECIFIC PROCEDURE      left hand        FAMILY HISTORY:  Family History   Problem Relation Age of Onset     C.A.D. Paternal Grandmother      Hypertension Paternal Grandmother      CTeraAMARTELL. Paternal Grandfather      Connective Tissue Disorder Paternal Grandfather      Hypertension Paternal Grandfather      CHELSEAARAKESH Father      Hypertension Father      Cancer Maternal Grandmother         uterine     Colon Cancer Maternal Grandmother      Cancer Maternal Grandfather         leukemia  "    Other Cancer Maternal Grandfather      Cancer Mother         uterine     Gastrointestinal Disease Mother         ulcers     Hypertension Mother      Other - See Comments Sister         essential tremors     C.A.D. Brother         stent- 47     Hypertension Brother      Depression Daughter      Eye Disorder Son      Hypertension Brother      Depression Daughter      Asthma Son        SOCIAL HISTORY:  Social History     Socioeconomic History     Marital status:      Spouse name: None     Number of children: None     Years of education: None     Highest education level: None   Tobacco Use     Smoking status: Never Smoker     Smokeless tobacco: Never Used   Substance and Sexual Activity     Alcohol use: Yes     Comment: once a year     Drug use: No     Sexual activity: Yes     Partners: Male     Birth control/protection: None   Other Topics Concern     Parent/sibling w/ CABG, MI or angioplasty before 65F 55M? Yes       Review of Systems:  Skin:          Eyes:         ENT:         Respiratory:          Cardiovascular:         Gastroenterology:        Genitourinary:         Musculoskeletal:         Neurologic:         Psychiatric:         Heme/Lymph/Imm:         Endocrine:           Physical Exam:  Vitals: /77 (BP Location: Left arm, Cuff Size: Adult Large)   Pulse 85   Ht 1.74 m (5' 8.5\")   Wt 109.8 kg (242 lb)   LMP 12/31/2012   BMI 36.26 kg/m      Constitutional:  cooperative, alert and oriented, well developed, well nourished, in no acute distress        Skin:  warm and dry to the touch, no apparent skin lesions or masses noted          Head:  normocephalic, no masses or lesions        Eyes:  pupils equal and round, conjunctivae and lids unremarkable, sclera white, no xanthalasma, EOMS intact, no nystagmus        Lymph:No Cervical lymphadenopathy present     ENT:  no pallor or cyanosis        Neck:  carotid pulses are full and equal bilaterally, JVP normal, no carotid bruit        Respiratory:  " normal breath sounds, clear to auscultation, normal A-P diameter, normal symmetry, normal respiratory excursion, no use of accessory muscles         Cardiac: regular rhythm, normal S1/S2, no S3 or S4, apical impulse not displaced, no murmurs, gallops or rubs                pulses full and equal, no bruits auscultated                                        GI:  abdomen soft, non-tender, BS normoactive, no mass, no HSM, no bruits obese      Extremities and Muscular Skeletal:  no deformities, clubbing, cyanosis, erythema observed              Neurological:  no gross motor deficits        Psych:  Alert and Oriented x 3        CC  Esme ALEMAN Mannchen, APRN CNP  [address]  [place] [address]

## 2022-05-31 NOTE — PROGRESS NOTES
ANTICOAGULATION MANAGEMENT     Amparo Fishmicki Shazia 66 year old female is on warfarin with therapeutic INR result. (Goal INR 2.0-3.0)    Recent labs: (last 7 days)     05/31/22  1203   INR 2.8*       ASSESSMENT       Source(s): Chart Review and Patient/Caregiver Call       Warfarin doses taken: Warfarin taken as instructed    Diet: No new diet changes identified    New illness, injury, or hospitalization: No    Medication/supplement changes: None noted    Signs or symptoms of bleeding or clotting: No    Previous INR: Therapeutic last visit; previously outside of goal range    Additional findings: None       PLAN     Recommended plan for no diet, medication or health factor changes affecting INR     Dosing Instructions: continue your current warfarin dose with next INR in 4 weeks. Wanted to recheck in 3 weeks but patient declined.       Summary  As of 5/31/2022    Full warfarin instructions:  1.25 mg every Mon, Fri; 2.5 mg all other days   Next INR check:  6/28/2022             Telephone call with Amparo who verbalizes understanding and agrees to plan    Patient elected to schedule next visit 6/28/22    Education provided: Please call back if any changes to your diet, medications or how you've been taking warfarin    Plan made per [facility] anticoagulation protocol    Tha Vincent RN  Anticoagulation Clinic  5/31/2022    _______________________________________________________________________     Anticoagulation Episode Summary     Current INR goal:  2.0-3.0   TTR:  68.9 % (1 y)   Target end date:  Indefinite   Send INR reminders to:  [facility]    Indications    Ventricular bigeminy [I49.8]  Long term current use of anticoagulants with INR goal of 2.0-3.0 [Z79.01]  Atrial fibrillation  unspecified type (H) [I48.91]           Comments:           Anticoagulation Care Providers     Provider Role Specialty Phone number    Alan Almeida MD Referring Internal Medicine [phone]

## 2022-06-02 ENCOUNTER — DOCUMENTATION ONLY (OUTPATIENT)
Dept: SLEEP MEDICINE | Facility: CLINIC | Age: 67
End: 2022-06-02
Payer: MEDICARE

## 2022-06-02 NOTE — PROGRESS NOTES
3 day Sleep therapy management telephone visit    Diagnostic AHI: 21.2  PSG    Confirmed with patient at time of call- N/A Patient is still interested in STM service       Message left for patient to return call.        Objective data     Order Settings for PAP  CPAP min 5    CPAP max 15             Device settings from machine CPAP min 5.0     CPAP max 15.0           EPR Setting TWO    RESMED soft response  OFF     Assessment: Nighty usage most nights over four hours      Action plan: Patient to have 14 day STM visit. Patient has a follow up visit scheduled:   yes within 31-90 days of set up    Replacement device: No  STM ordered by provider: Yes     Total time spent on accessing and  interpreting remote patient PAP therapy data  10 minutes    Total time spent counseling, coaching  and reviewing PAP therapy data with patient  1 minutes    [address] no

## 2022-06-28 ENCOUNTER — ANTICOAGULATION THERAPY VISIT (OUTPATIENT)
Dept: ANTICOAGULATION | Facility: CLINIC | Age: 67
End: 2022-06-28

## 2022-06-28 ENCOUNTER — LAB (OUTPATIENT)
Dept: LAB | Facility: CLINIC | Age: 67
End: 2022-06-28
Payer: MEDICARE

## 2022-06-28 DIAGNOSIS — I48.91 ATRIAL FIBRILLATION, UNSPECIFIED TYPE (H): ICD-10-CM

## 2022-06-28 DIAGNOSIS — Z79.01 LONG TERM CURRENT USE OF ANTICOAGULANTS WITH INR GOAL OF 2.0-3.0: ICD-10-CM

## 2022-06-28 DIAGNOSIS — I49.8 VENTRICULAR BIGEMINY: Primary | ICD-10-CM

## 2022-06-28 LAB — INR BLD: 2.8 (ref 0.9–1.1)

## 2022-06-28 PROCEDURE — 36416 COLLJ CAPILLARY BLOOD SPEC: CPT

## 2022-06-28 PROCEDURE — 85610 PROTHROMBIN TIME: CPT

## 2022-06-28 NOTE — PROGRESS NOTES
ANTICOAGULATION MANAGEMENT     Amparo Mccray 66 year old female is on warfarin with therapeutic INR result. (Goal INR 2.0-3.0)    Recent labs: (last 7 days)     06/28/22  1413   INR 2.8*       ASSESSMENT       Source(s): Chart Review    Previous INR was Therapeutic last 2(+) visits    Medication, diet, health changes since last INR chart reviewed; none identified           PLAN     Unable to reach Amparo today.    Left message to continue current dose of warfarin 2.5 mg tonight. Request call back for assessment.    Follow up required to confirm warfarin dose taken and assess for changes. If no changes or concerns after RN assessment, then next INR in about 5 weeks.    Melodie Rose, RN  Anticoagulation Clinic  6/28/2022

## 2022-06-29 ENCOUNTER — DOCUMENTATION ONLY (OUTPATIENT)
Dept: SLEEP MEDICINE | Facility: CLINIC | Age: 67
End: 2022-06-29

## 2022-06-29 NOTE — PROGRESS NOTES
ANTICOAGULATION MANAGEMENT     Amparo Mccray 66 year old female is on warfarin with therapeutic INR result. (Goal INR 2.0-3.0)    Recent labs: (last 7 days)     06/28/22  1413   INR 2.8*       ASSESSMENT       Source(s): Chart Review    Previous INR was Therapeutic last visit; previously outside of goal range    Medication, diet, health changes since last INR chart reviewed; none identified           PLAN     Recommended plan for no diet, medication or health factor changes affecting INR     Dosing Instructions: continue your current warfarin dose with next INR in 5 weeks       Summary  As of 6/28/2022    Full warfarin instructions:  1.25 mg every Mon, Fri; 2.5 mg all other days   Next INR check:  8/2/2022             Detailed voice message left for Amparo with dosing instructions and follow up date.     Contact [phone]  to schedule and with any changes, questions or concerns.     Education provided: Please call back if any changes to your diet, medications or how you've been taking warfarin    Plan made per [facility] anticoagulation protocol    Tha Vincent RN  Anticoagulation Clinic  6/29/2022    _______________________________________________________________________     Anticoagulation Episode Summary     Current INR goal:  2.0-3.0   TTR:  76.4 % (1 y)   Target end date:  Indefinite   Send INR reminders to:  [facility]    Indications    Ventricular bigeminy [I49.8]  Long term current use of anticoagulants with INR goal of 2.0-3.0 [Z79.01]  Atrial fibrillation  unspecified type (H) [I48.91]           Comments:           Anticoagulation Care Providers     Provider Role Specialty Phone number    Alan Almeida MD Referring Internal Medicine [phone]

## 2022-07-20 DIAGNOSIS — I48.91 ATRIAL FIBRILLATION, UNSPECIFIED TYPE (H): Primary | ICD-10-CM

## 2022-08-09 ENCOUNTER — MYC MEDICAL ADVICE (OUTPATIENT)
Dept: ANTICOAGULATION | Facility: CLINIC | Age: 67
End: 2022-08-09

## 2022-08-09 NOTE — TELEPHONE ENCOUNTER
ANTICOAGULATION     Amparo Mccray is overdue for INR check.     [email or web address] message sent    Tania Damon RN

## 2022-08-18 ENCOUNTER — DOCUMENTATION ONLY (OUTPATIENT)
Dept: INTERNAL MEDICINE | Facility: CLINIC | Age: 67
End: 2022-08-18

## 2022-08-18 NOTE — PROGRESS NOTES
August 18, 2022        Amparo Mccray  [address] [facility]   [place] [address]            Dear Amparo,    You are currently under the care of [facility] Anticoagulation Management Program for your warfarin (Coumadin , Jantoven ) therapy.  We are contacting you because our records show you were due for an INR on 8/2/22    There are potentially serious risks when taking warfarin without careful monitoring and we want to make sure you are safely managed.  Routine lab monitoring is required for warfarin refills.     Please call [phone]  as soon as possible to schedule an appointment.  If there has been a change in your care or other concerns, please let us know so we can help and or update our records.     Sincerely,       [facility] Anticoagulation Management Program

## 2022-08-25 ENCOUNTER — TELEPHONE (OUTPATIENT)
Dept: INTERNAL MEDICINE | Facility: CLINIC | Age: 67
End: 2022-08-25

## 2022-08-25 NOTE — LETTER
[facility]  [address] [address]  Phone: [phone]           August 25, 2022           Amparo Mccray  [address] [place] [address]   [facility] [address]                 Dear Amparo,     You are currently under the care of [facility] Anticoagulation Management Program for your warfarin (Coumadin , Jantoven ) therapy.  We are contacting you because our records show you were due for an INR on 8/2/22     There are potentially serious risks when taking warfarin without careful monitoring and we want to make sure you are safely managed.  Routine lab monitoring is required for warfarin refills.      Please call [phone]  as soon as possible to schedule an appointment.  If there has been a change in your care or other concerns, please let us know so we can help and or update our records.      Sincerely,         [facility]ticoagulation [facility]

## 2022-09-16 ENCOUNTER — DOCUMENTATION ONLY (OUTPATIENT)
Dept: ANTICOAGULATION | Facility: CLINIC | Age: 67
End: 2022-09-16

## 2022-09-16 NOTE — LETTER
September 16, 2022    Amparo Chan [facility]  [address] [facility] [address]  [place] [address]    September 16, 2022        Amparo Chan [facility]  [address]    [facility] [address]            Dear Amparo,    You are currently under the care of [facility] Anticoagulation Management Program for your warfarin (Coumadin , Jantoven ) therapy.  We are contacting you because our records show you were due for an INR on 8/2/22.    There are potentially serious risks when taking warfarin without careful monitoring and we want to make sure you are safely managed.  Routine lab monitoring is required for warfarin refills.     Please call [phone]  as soon as possible to schedule an appointment.  If there has been a change in your care or other concerns, please let us know so we can help and or update our records.     Sincerely,       [facility] Anticoagulation Management Program

## 2022-09-16 NOTE — PROGRESS NOTES
Anticoagulation clinic notificiation    Dr. Almeida,    Your patient, Amparo Mccray, is past due for an INR. Their last result was 2.8 on 6/28/22 and was due to come back on 8/2/22.    Amparo Mccray received phone calls and letters over the last several weeks in attempt to arrange a follow up appointment.  Amparo Mccray will be sent another letter today.     No action is required from you unless you have additional information or if you would like to reach out personally to Amparo Mccray.    Please don t hesitate to contact the Anticoagulation Management Program if you have any questions or concerns.     Sincerely,     [facility] Anticoagulation Management Program

## 2022-09-26 DIAGNOSIS — R94.39 ABNORMAL STRESS TEST: ICD-10-CM

## 2022-09-26 RX ORDER — AMLODIPINE BESYLATE 5 MG/1
5 TABLET ORAL 2 TIMES DAILY
Qty: 180 TABLET | Refills: 2 | Status: SHIPPED | OUTPATIENT
Start: 2022-09-26 | End: 2023-11-05

## 2022-09-26 NOTE — TELEPHONE ENCOUNTER
Received refill request for:  Amloidpine    [facility] Cardiology Refill Guideline reviewed.  Medication meets criteria for refill.    Libby Rodriguez RN, BSN  09/26/22 at 9:27 AM

## 2022-10-13 DIAGNOSIS — I10 ESSENTIAL HYPERTENSION, BENIGN: ICD-10-CM

## 2022-10-13 DIAGNOSIS — I25.118 CORONARY ARTERY DISEASE OF NATIVE HEART WITH STABLE ANGINA PECTORIS, UNSPECIFIED VESSEL OR LESION TYPE (H): ICD-10-CM

## 2022-10-13 RX ORDER — METOPROLOL TARTRATE 50 MG
TABLET ORAL
Qty: 135 TABLET | Refills: 1 | Status: SHIPPED | OUTPATIENT
Start: 2022-10-13 | End: 2022-12-07

## 2022-10-13 RX ORDER — TRIAMTERENE/HYDROCHLOROTHIAZID 37.5-25 MG
1 TABLET ORAL EVERY MORNING
Qty: 90 TABLET | Refills: 1 | Status: SHIPPED | OUTPATIENT
Start: 2022-10-13 | End: 2023-03-16

## 2022-10-14 RX ORDER — ATORVASTATIN CALCIUM 40 MG/1
40 TABLET, FILM COATED ORAL AT BEDTIME
Qty: 90 TABLET | Refills: 1 | Status: SHIPPED | OUTPATIENT
Start: 2022-10-14 | End: 2023-03-16

## 2022-10-14 RX ORDER — METOPROLOL TARTRATE 50 MG
TABLET ORAL
Qty: 135 TABLET | Refills: 1 | OUTPATIENT
Start: 2022-10-14

## 2022-10-18 ENCOUNTER — TELEPHONE (OUTPATIENT)
Dept: INTERNAL MEDICINE | Facility: CLINIC | Age: 67
End: 2022-10-18

## 2022-10-18 NOTE — TELEPHONE ENCOUNTER
ANTICOAGULATION MANAGEMENT PROGRAM    MD Almeida,     Our records indicate that Amparo Mccray remains past due to check INR. Amparo Mccray was contacted multiple times over at least the last 8 weeks to attempt to arrange a follow up appointment.    Amparo Mccray last had an INR checked on 22 and was due for follow up on 22     Called patient,Reminder letter sent     At this time Amparo Mccray will be moved to noncompliant status within the program. Of note, her referral  22 and she will be discharged if she does not schedule by next contact. While in noncompliant status the patient would continue to be contacted every 6 weeks by the anticoagulation program to attempt to schedule patient. You will be notified of each contact attempt to make you aware of patient's ongoing noncompliance.        Thank you,     [facility] Anticoagulation Management Program

## 2022-10-18 NOTE — LETTER
[facility]  [address] [address]  Phone: [phone]         October 18, 2022        Amparo Mccray  [address] [place] [address] [address]  [facility] [address]            Dear Amparo,    You are currently under the care of [facility] Anticoagulation Management Program for your warfarin (Coumadin , Jantoven ) therapy.  We are contacting you because our records show you were due for an INR on 8/2/22.    There are potentially serious risks when taking warfarin without careful monitoring and we want to make sure you are safely managed.  Routine lab monitoring is required for warfarin refills.     Please call [phone]  as soon as possible to schedule an appointment.  If there has been a change in your care or other concerns, please let us know so we can help and or update our records.     Sincerely,       [facility] Anticoagulation Management Program

## 2022-11-08 ENCOUNTER — DOCUMENTATION ONLY (OUTPATIENT)
Dept: ANTICOAGULATION | Facility: CLINIC | Age: 67
End: 2022-11-08

## 2022-11-08 DIAGNOSIS — I48.91 ATRIAL FIBRILLATION (H): ICD-10-CM

## 2022-11-08 DIAGNOSIS — Z79.01 LONG TERM CURRENT USE OF ANTICOAGULANTS WITH INR GOAL OF 2.0-3.0: ICD-10-CM

## 2022-11-08 DIAGNOSIS — I48.91 ATRIAL FIBRILLATION, UNSPECIFIED TYPE (H): ICD-10-CM

## 2022-11-08 DIAGNOSIS — I49.8 VENTRICULAR BIGEMINY: Primary | ICD-10-CM

## 2022-11-08 NOTE — PROGRESS NOTES
ANTICOAGULATION CLINIC REFERRAL RENEWAL REQUEST       An annual renewal order is required for all patients referred to [facility].?  Please review and sign the pended referral order for Amparo Mccray.       ANTICOAGULATION SUMMARY      Warfarin indication(s)   Atrial Fibrillation    Mechanical heart valve present?  NO       Current goal range   INR: 2.0-3.0     Goal appropriate for indication? Goal INR 2-3, standard for indication(s) above     Time in Therapeutic Range (TTR)  (Goal > 60%) 90%       Office visit with referring provider's group within last year yes on 5/12/22       Heather Feliciano RN  [facility]

## 2022-11-09 ENCOUNTER — TELEPHONE (OUTPATIENT)
Dept: INTERNAL MEDICINE | Facility: CLINIC | Age: 67
End: 2022-11-09

## 2022-11-09 DIAGNOSIS — I48.91 ATRIAL FIBRILLATION, UNSPECIFIED TYPE (H): ICD-10-CM

## 2022-11-09 DIAGNOSIS — I49.8 VENTRICULAR BIGEMINY: Primary | ICD-10-CM

## 2022-11-09 DIAGNOSIS — Z79.01 LONG TERM CURRENT USE OF ANTICOAGULANTS WITH INR GOAL OF 2.0-3.0: ICD-10-CM

## 2022-11-09 NOTE — TELEPHONE ENCOUNTER
Anticoagulation Management    Amparo Mccray is being followed by the anticoagulation clinic while in noncompliant status. Amparo Mccray is receiving every 6 week reminder calls.     Last INR checked on 6/28/22    Called and Reminder letter sent       Of note, Dr. Butt - patients [facility] referral was recently renewed which was not appropriate as she was in noncompliance status and due to be discharged from [facility]. At this point, [facility] would like to move forward with discharging patient if you agree to this.    Pebbles Vincent RN   [facility]ticoagulation [facility]

## 2022-11-09 NOTE — LETTER
[facility]  [address] [address]  Phone: [phone]         November 9, 2022        Amparo Mccray  [address] [place] [address] [address]  [facility] [address]            Dear Amparo,    You are currently under the care of [facility] Anticoagulation Management Program for your warfarin (Coumadin , Jantoven ) therapy.  We are contacting you because our records show you were due for an INR on 8/2/22.    There are potentially serious risks when taking warfarin without careful monitoring and we want to make sure you are safely managed.  Routine lab monitoring is required for warfarin refills.     Please call [phone]  as soon as possible to schedule an appointment.  If there has been a change in your care or other concerns, please let us know so we can help and or update our records.     Sincerely,       [facility] Anticoagulation Management Program

## 2022-11-09 NOTE — TELEPHONE ENCOUNTER
ANTICOAGULATION  MANAGEMENT    Amparo Mccray is being discharged from the [facility] Anticoagulation Management Program ([facility]).    Reason for discharge: Patient moved to [facility]    Anticoagulation episode resolved, [facility] referral closed and Standing order discontinued    If patient needs warfarin management in the future, please send a new referral    Tha Vincent RN

## 2022-12-06 DIAGNOSIS — I10 ESSENTIAL HYPERTENSION, BENIGN: ICD-10-CM

## 2022-12-07 DIAGNOSIS — I10 ESSENTIAL HYPERTENSION, BENIGN: ICD-10-CM

## 2022-12-07 RX ORDER — METOPROLOL TARTRATE 50 MG
TABLET ORAL
Qty: 270 TABLET | OUTPATIENT
Start: 2022-12-07

## 2022-12-07 RX ORDER — METOPROLOL TARTRATE 50 MG
TABLET ORAL
Qty: 270 TABLET | Refills: 0 | Status: SHIPPED | OUTPATIENT
Start: 2022-12-07 | End: 2023-01-21

## 2022-12-07 NOTE — TELEPHONE ENCOUNTER
This refill request is a duplicate previously sent to pharmacy or currently in review.  Refused medication to pharmacy as duplicate.   Viviana Martinez RN

## 2022-12-26 ENCOUNTER — HEALTH MAINTENANCE LETTER (OUTPATIENT)
Age: 67
End: 2022-12-26

## 2023-03-09 ASSESSMENT — ENCOUNTER SYMPTOMS
ABDOMINAL PAIN: 0
SHORTNESS OF BREATH: 0
EYE PAIN: 0
DYSURIA: 0
COUGH: 0
NAUSEA: 0
FEVER: 0
JOINT SWELLING: 0
WEAKNESS: 0
HEMATURIA: 0
BREAST MASS: 0
PALPITATIONS: 0
SORE THROAT: 0
CONSTIPATION: 0
FREQUENCY: 0
DIZZINESS: 0
HEARTBURN: 0
HEADACHES: 0
MYALGIAS: 0
PARESTHESIAS: 0
HEMATOCHEZIA: 0
NERVOUS/ANXIOUS: 0
CHILLS: 0
ARTHRALGIAS: 0
DIARRHEA: 0

## 2023-03-09 ASSESSMENT — ACTIVITIES OF DAILY LIVING (ADL): CURRENT_FUNCTION: NO ASSISTANCE NEEDED

## 2023-03-16 ENCOUNTER — OFFICE VISIT (OUTPATIENT)
Dept: INTERNAL MEDICINE | Facility: CLINIC | Age: 68
End: 2023-03-16
Payer: MEDICARE

## 2023-03-16 ENCOUNTER — LAB (OUTPATIENT)
Dept: INTERNAL MEDICINE | Facility: CLINIC | Age: 68
End: 2023-03-16

## 2023-03-16 VITALS
SYSTOLIC BLOOD PRESSURE: 140 MMHG | TEMPERATURE: 98 F | HEART RATE: 76 BPM | OXYGEN SATURATION: 98 % | BODY MASS INDEX: 36.21 KG/M2 | HEIGHT: 69 IN | WEIGHT: 244.5 LBS | DIASTOLIC BLOOD PRESSURE: 82 MMHG

## 2023-03-16 DIAGNOSIS — E66.01 MORBID OBESITY (H): ICD-10-CM

## 2023-03-16 DIAGNOSIS — Z00.00 ENCOUNTER FOR MEDICARE ANNUAL WELLNESS EXAM: Primary | ICD-10-CM

## 2023-03-16 DIAGNOSIS — Z78.0 POSTMENOPAUSAL: ICD-10-CM

## 2023-03-16 DIAGNOSIS — I71.21 ANEURYSM OF ASCENDING AORTA WITHOUT RUPTURE (H): ICD-10-CM

## 2023-03-16 DIAGNOSIS — I25.118 CORONARY ARTERY DISEASE OF NATIVE HEART WITH STABLE ANGINA PECTORIS, UNSPECIFIED VESSEL OR LESION TYPE (H): ICD-10-CM

## 2023-03-16 DIAGNOSIS — G70.00 MYASTHENIA GRAVIS WITHOUT EXACERBATION (H): ICD-10-CM

## 2023-03-16 DIAGNOSIS — Z12.11 SCREEN FOR COLON CANCER: ICD-10-CM

## 2023-03-16 DIAGNOSIS — Z23 HIGH PRIORITY FOR 2019-NCOV VACCINE: ICD-10-CM

## 2023-03-16 DIAGNOSIS — I48.0 PAROXYSMAL A-FIB (H): ICD-10-CM

## 2023-03-16 DIAGNOSIS — E78.2 MIXED HYPERLIPIDEMIA: ICD-10-CM

## 2023-03-16 DIAGNOSIS — I10 ESSENTIAL HYPERTENSION, BENIGN: ICD-10-CM

## 2023-03-16 PROBLEM — I20.0 UNSTABLE ANGINA (H): Status: RESOLVED | Noted: 2022-04-18 | Resolved: 2023-03-16

## 2023-03-16 PROBLEM — Z79.01 LONG TERM CURRENT USE OF ANTICOAGULANTS WITH INR GOAL OF 2.0-3.0: Status: RESOLVED | Noted: 2018-11-01 | Resolved: 2023-03-16

## 2023-03-16 PROBLEM — E87.6 HYPOKALEMIA: Status: RESOLVED | Noted: 2022-04-18 | Resolved: 2023-03-16

## 2023-03-16 LAB
ANION GAP SERPL CALCULATED.3IONS-SCNC: 14 MMOL/L (ref 7–15)
BUN SERPL-MCNC: 18.2 MG/DL (ref 8–23)
CALCIUM SERPL-MCNC: 9.7 MG/DL (ref 8.8–10.2)
CHLORIDE SERPL-SCNC: 102 MMOL/L (ref 98–107)
CHOLEST SERPL-MCNC: 180 MG/DL
CREAT SERPL-MCNC: 0.84 MG/DL (ref 0.51–0.95)
DEPRECATED HCO3 PLAS-SCNC: 25 MMOL/L (ref 22–29)
GFR SERPL CREATININE-BSD FRML MDRD: 76 ML/MIN/1.73M2
GLUCOSE SERPL-MCNC: 120 MG/DL (ref 70–99)
HDLC SERPL-MCNC: 52 MG/DL
LDLC SERPL CALC-MCNC: 104 MG/DL
NONHDLC SERPL-MCNC: 128 MG/DL
POTASSIUM SERPL-SCNC: 3.8 MMOL/L (ref 3.4–5.3)
SODIUM SERPL-SCNC: 141 MMOL/L (ref 136–145)
TRIGL SERPL-MCNC: 119 MG/DL

## 2023-03-16 PROCEDURE — 80061 LIPID PANEL: CPT | Performed by: INTERNAL MEDICINE

## 2023-03-16 PROCEDURE — 0134A COVID-19 VACCINE BIVALENT BOOSTER 18+ (MODERNA): CPT | Performed by: INTERNAL MEDICINE

## 2023-03-16 PROCEDURE — 36415 COLL VENOUS BLD VENIPUNCTURE: CPT | Performed by: INTERNAL MEDICINE

## 2023-03-16 PROCEDURE — G0438 PPPS, INITIAL VISIT: HCPCS | Performed by: INTERNAL MEDICINE

## 2023-03-16 PROCEDURE — 80048 BASIC METABOLIC PNL TOTAL CA: CPT | Performed by: INTERNAL MEDICINE

## 2023-03-16 PROCEDURE — 91313 COVID-19 VACCINE BIVALENT BOOSTER 18+ (MODERNA): CPT | Performed by: INTERNAL MEDICINE

## 2023-03-16 PROCEDURE — 99214 OFFICE O/P EST MOD 30 MIN: CPT | Mod: 25 | Performed by: INTERNAL MEDICINE

## 2023-03-16 PROCEDURE — 90677 PCV20 VACCINE IM: CPT | Performed by: INTERNAL MEDICINE

## 2023-03-16 PROCEDURE — G0009 ADMIN PNEUMOCOCCAL VACCINE: HCPCS | Performed by: INTERNAL MEDICINE

## 2023-03-16 RX ORDER — LOSARTAN POTASSIUM 50 MG/1
50 TABLET ORAL DAILY
Qty: 90 TABLET | Refills: 0 | Status: SHIPPED | OUTPATIENT
Start: 2023-03-16 | End: 2023-04-17

## 2023-03-16 RX ORDER — TRIAMTERENE/HYDROCHLOROTHIAZID 37.5-25 MG
1 TABLET ORAL EVERY MORNING
Qty: 90 TABLET | Refills: 3 | Status: SHIPPED | OUTPATIENT
Start: 2023-03-16 | End: 2024-04-03

## 2023-03-16 RX ORDER — ATORVASTATIN CALCIUM 40 MG/1
40 TABLET, FILM COATED ORAL AT BEDTIME
Qty: 90 TABLET | Refills: 3 | Status: SHIPPED | OUTPATIENT
Start: 2023-03-16 | End: 2024-04-03

## 2023-03-16 RX ORDER — METOPROLOL TARTRATE 50 MG
TABLET ORAL
Qty: 270 TABLET | Refills: 3 | Status: SHIPPED | OUTPATIENT
Start: 2023-03-16 | End: 2024-04-09

## 2023-03-16 ASSESSMENT — ENCOUNTER SYMPTOMS
NAUSEA: 0
PARESTHESIAS: 0
HEMATURIA: 0
COUGH: 0
DYSURIA: 0
PALPITATIONS: 0
FREQUENCY: 0
SHORTNESS OF BREATH: 0
SORE THROAT: 0
HEMATOCHEZIA: 0
WEAKNESS: 0
ABDOMINAL PAIN: 0
HEARTBURN: 0
CHILLS: 0
DIARRHEA: 0
BREAST MASS: 0
CONSTIPATION: 0
DIZZINESS: 0
FEVER: 0
JOINT SWELLING: 0
EYE PAIN: 0
NERVOUS/ANXIOUS: 0
MYALGIAS: 0
ARTHRALGIAS: 0
HEADACHES: 0

## 2023-03-16 ASSESSMENT — ACTIVITIES OF DAILY LIVING (ADL): CURRENT_FUNCTION: NO ASSISTANCE NEEDED

## 2023-03-16 NOTE — PROGRESS NOTES
"SUBJECTIVE:   Amparo is a 67 year old who presents for Preventive Visit.    Patient has been advised of split billing requirements and indicates understanding: Yes  Are you in the first 12 months of your Medicare coverage?  No    Healthy Habits:     In general, how would you rate your overall health?  Good    Frequency of exercise:  None    Do you usually eat at least 4 servings of fruit and vegetables a day, include whole grains    & fiber and avoid regularly eating high fat or \"junk\" foods?  No    Taking medications regularly:  Yes    Barriers to taking medications:  None    Medication side effects:  None    Ability to successfully perform activities of daily living:  No assistance needed    Home Safety:  No safety concerns identified    Hearing Impairment:  Difficulty following a conversation in a noisy restaurant or crowded room    In the past 6 months, have you been bothered by leaking of urine? Yes    In general, how would you rate your overall mental or emotional health?  Good      PHQ-2 Total Score: 0    Additional concerns today:  Yes    Have you ever done Advance Care Planning? (For example, a Health Directive, POLST, or a discussion with a medical provider or your loved ones about your wishes): No, advance care planning information given to patient to review.  Advanced care planning was discussed at today's visit.     Fall risk  Fallen 2 or more times in the past year?: No  Any fall with injury in the past year?: No    Cognitive Screening   1) Repeat 3 items (Leader, Season, Table)    2) Clock draw: NORMAL  3) 3 item recall: Recalls 2 objects   Results: NORMAL clock, 1-2 items recalled: COGNITIVE IMPAIRMENT LESS LIKELY    Mini-CogTM Copyright [employer] [place]. Licensed by the author for use in [facility]; reprinted with permission (helio@.[facility]). All rights reserved.      Do you have sleep apnea, excessive snoring or daytime drowsiness?: no    Reviewed and updated as needed this visit by clinical " staff   Tobacco  Allergies  Meds  Problems             Reviewed and updated as needed this visit by Provider   Tobacco  Allergies  Meds  Problems            Social History     Tobacco Use     Smoking status: Never     Smokeless tobacco: Never   Substance Use Topics     Alcohol use: Yes     Comment: once a year     Alcohol Use 3/9/2023   Prescreen: >3 drinks/day or >7 drinks/week? Not Applicable               Current providers sharing in care for this patient include:   Patient Care Team:  Alan Almeida MD as PCP - General  Alan Almeida MD as Assigned PCP  Nikole Miner MD as MD (Neurology)  Reilly Stafford, ARMIN CNP as Assigned Sleep Provider  Wilbert Thomas MD as Assigned Heart and Vascular Provider    The following health maintenance items are reviewed in Epic and correct as of today:  Health Maintenance   Topic Date Due     DEXA  Never done     ZOSTER IMMUNIZATION (1 of 2) Never done     DTAP/TDAP/TD IMMUNIZATION (2 - Td or Tdap) 05/26/2021     INFLUENZA VACCINE (1) 09/01/2022     COLORECTAL CANCER SCREENING  10/24/2022     MAMMO SCREENING  03/16/2024 (Originally 4/3/2020)     LIPID  04/19/2023     MEDICARE ANNUAL WELLNESS VISIT  03/16/2024     ANNUAL REVIEW OF HM ORDERS  03/16/2024     FALL RISK ASSESSMENT  03/16/2024     ADVANCE CARE PLANNING  03/16/2028     HEPATITIS C SCREENING  Completed     PHQ-2 (once per calendar year)  Completed     Pneumococcal Vaccine: 65+ Years  Completed     COVID-19 Vaccine  Completed     IPV IMMUNIZATION  Aged Out     MENINGITIS IMMUNIZATION  Aged Out     PAP  Discontinued     Labs reviewed in EPIC      FHS-7:   Breast CA Risk Assessment (FHS-7) 3/9/2023   Did any of your first-degree relatives have breast or ovarian cancer? No   Did any of your relatives have bilateral breast cancer? No   Did any man in your family have breast cancer? No   Did any woman in your family have breast and ovarian cancer? No   Did any woman in your family have breast  "cancer before age 50 y? No   Do you have 2 or more relatives with breast and/or ovarian cancer? No   Do you have 2 or more relatives with breast and/or bowel cancer? No       Mammogram Screening: Recommended mammography every 1-2 years with patient discussion and risk factor consideration.  SHE REFUSES   Pertinent mammograms are reviewed under the imaging tab.    Review of Systems   Constitutional: Negative for chills and fever.   HENT: Negative for congestion, ear pain, hearing loss and sore throat.    Eyes: Negative for pain and visual disturbance.   Respiratory: Negative for cough and shortness of breath.    Cardiovascular: Positive for peripheral edema. Negative for chest pain and palpitations.   Gastrointestinal: Negative for abdominal pain, constipation, diarrhea, heartburn, hematochezia and nausea.   Breasts:  Negative for tenderness, breast mass and discharge.   Genitourinary: Negative for dysuria, frequency, genital sores, hematuria, pelvic pain, urgency, vaginal bleeding and vaginal discharge.   Musculoskeletal: Negative for arthralgias, joint swelling and myalgias.   Skin: Negative for rash.   Neurological: Negative for dizziness, weakness, headaches and paresthesias.   Psychiatric/Behavioral: Negative for mood changes. The patient is not nervous/anxious.          OBJECTIVE:   BP (!) 140/82   Pulse 76   Temp 98  F (36.7  C) (Oral)   Ht 1.74 m (5' 8.5\")   Wt 110.9 kg (244 lb 8 oz)   LMP 12/31/2012 (Exact Date)   SpO2 98%   Breastfeeding No   BMI 36.64 kg/m   Estimated body mass index is 36.64 kg/m  as calculated from the following:    Height as of this encounter: 1.74 m (5' 8.5\").    Weight as of this encounter: 110.9 kg (244 lb 8 oz).  Physical Exam  GENERAL APPEARANCE: alert, no distress and over weight  EYES: Eyes grossly normal to inspection, PERRL and conjunctivae and sclerae normal  HENT: ear canals and TM's normal, nose and mouth without ulcers or lesions, oropharynx clear and oral mucous " membranes moist  NECK: no adenopathy, no asymmetry, masses, or scars and thyroid normal to palpation  RESP: lungs clear to auscultation - no rales, rhonchi or wheezes  CV: regular rate and rhythm, normal S1 S2, no S3 or S4, no murmur, click or rub, no peripheral edema and peripheral pulses strong  ABDOMEN: soft, nontender, no hepatosplenomegaly, no masses and bowel sounds normal  MS: no musculoskeletal defects are noted and gait is age appropriate without ataxia  SKIN: no suspicious lesions or rashes  NEURO: Normal strength and tone, sensory exam grossly normal, mentation intact and speech normal  PSYCH: mentation appears normal and affect normal/bright    Diagnostic Test Results:  No results found for this or any previous visit (from the past 24 hour(s)).    ASSESSMENT / PLAN:       ICD-10-CM    1. Encounter for Medicare annual wellness exam  Z00.00       2. Essential hypertension, benign  I10 losartan (COZAAR) 50 MG tablet     Basic metabolic panel  (Ca, Cl, CO2, Creat, Gluc, K, Na, BUN)     Basic metabolic panel  (Ca, Cl, CO2, Creat, Gluc, K, Na, BUN)      3. Mixed hyperlipidemia  E78.2 Lipid panel reflex to direct LDL Non-fasting     Lipid panel reflex to direct LDL Non-fasting      4. Paroxysmal A-fib (H)  I48.0 aspirin (ASA) 81 MG EC tablet      5. Aneurysm of ascending aorta without rupture  I71.21 CT Chest Angio w/o & w Contrast      6. Morbid obesity (H)  E66.01       7. Postmenopausal  Z78.0 DEXA HIP/PELVIS/SPINE - Future      8. High priority for 2019-nCoV vaccine  Z23 COVID-19,PF,MODERNA BIVALENT 18+Yrs      9. Myasthenia gravis without exacerbation (H)  G70.00       10. Screen for colon cancer  Z12.11 [employer])        -Updated screening, immunizations, prevention.  Please see health maintenance list, care gaps  -Refuses anti-coag beyond asa for atrial fib.  Not interested in watchman or any other rx.  -given mild ascend AA get better BP control. Add ARB to regimen. Get BP <120/80 if  "able.   -cont statin. No CAD on angio but given thoracic AA would leave statin on board.  -weight loss     Patient has been advised of split billing requirements and indicates understanding: Yes      COUNSELING:  Reviewed preventive health counseling, as reflected in patient instructions       Regular exercise       Healthy diet/nutrition       Vision screening      BMI:   Estimated body mass index is 36.64 kg/m  as calculated from the following:    Height as of this encounter: 1.74 m (5' 8.5\").    Weight as of this encounter: 110.9 kg (244 lb 8 oz).   Weight management plan: Discussed healthy diet and exercise guidelines      She reports that she has never smoked. She has never used smokeless tobacco.      Appropriate preventive services were discussed with this patient, including applicable screening as appropriate for cardiovascular disease, diabetes, osteopenia/osteoporosis, and glaucoma.  As appropriate for age/gender, discussed screening for colorectal cancer, prostate cancer, breast cancer, and cervical cancer. Checklist reviewing preventive services available has been given to the patient.    Reviewed patients plan of care and provided an AVS. The Basic Care Plan (routine screening as documented in Health Maintenance) for Amparo meets the Care Plan requirement. This Care Plan has been established and reviewed with the .          Alan Almeida MD  [facility]    Identified Health Risks:  "

## 2023-03-16 NOTE — PATIENT INSTRUCTIONS
Patient Education   Personalized Prevention Plan  You are due for the preventive services outlined below.  Your care team is available to assist you in scheduling these services.  If you have already completed any of these items, please share that information with your care team to update in your medical record.  Health Maintenance Due   Topic Date Due     Osteoporosis Screening  Never done     Pneumococcal Vaccine (1 - PCV) Never done     Zoster (Shingles) Vaccine (1 of 2) Never done     Mammogram  04/03/2020     Diptheria Tetanus Pertussis (DTAP/TDAP/TD) Vaccine (2 - Td or Tdap) 05/26/2021     COVID-19 Vaccine (5 - Booster for Moderna series) 07/07/2022     Flu Vaccine (1) 09/01/2022     Colorectal Cancer Screening  10/24/2022       Exercise for a Healthier Heart  You may wonder how you can improve the health of your heart. If you re thinking about exercise, you re on the right track. You don t need to become an athlete. But you do need a certain amount of brisk exercise to help strengthen your heart. If you have been diagnosed with a heart condition, your healthcare provider may advise exercise to help your condition. To help make exercise a habit, choose safe, fun activities.      Exercise with a friend. When activity is fun, you're more likely to stick with it.     Before you start  Check with your healthcare provider before starting an exercise program. This is especially important if you haven't been active for a while. It's also important if you have a long-term (chronic) health problem such as heart disease, diabetes, or obesity. Also check with your provider if you're at high risk for having these problems.   Why exercise?  Exercising regularly offers many healthy rewards. It can help you do all of these:     Improve your blood cholesterol level to help prevent further heart trouble.    Lower your blood pressure to help prevent a stroke or heart attack.    Control diabetes or reduce your risk of getting  this disease.    Improve your heart and lung function.    Reach and stay at a healthy weight.    Make your muscles stronger so you can stay active.    Prevent falls and fractures by slowing the loss of bone mass (osteoporosis).    Manage stress better.    Improve your sense of self and your body image.  Exercise tips      Ease into your routine. Set small goals. Then build on them. Talk with your healthcare provider first before starting an exercise routine if you're not sure what your activity level should be.    Exercise on most days. Aim for a total of at least 150 minutes (2 hours and 30 minutes) or more of moderate-intensity aerobic activity each week. You could also do 75 minutes (1 hour and 15 minutes) or more of vigorous-intensity aerobic activity each week. Or try for a combination of both. Moderate activity means that you breathe heavier and your heart rate increases, but you can still talk. Think about doing at least 30 minutes of moderate exercise, 5 times a week. It's OK to work up to the 30-minute period over time. Examples of moderate-intensity activity are brisk walking, gardening, and water aerobics.    Step up your daily activity level.  Along with your exercise program, try being more active the whole day. Walk instead of drive. Or park further away so that you take more steps each day. Do more household tasks or yard work. You may not be able to meet the advised amount of physical activity. But doing some moderate- or vigorous-intensity aerobic activity can help reduce your risk for heart disease. Your healthcare provider can help you figure out what is best for you.    Choose 1 or more activities you enjoy.  Walking is one of the easiest things you can do. You can also try swimming, riding a bike, dancing, or taking an exercise class.    Call 911  Call 911 right away if any of these occur:     Chest pain that doesn't go away quickly with rest    New burning, tightness, pressure, or heaviness in  your chest, neck, shoulders, back, or arms    Abnormal or severe shortness of breath    A very fast or irregular heartbeat (palpitations)    Fainting  When to call your healthcare provider  Call your healthcare provider if you have any of these:     Dizziness or lightheadedness    Mild shortness of breath or chest pain    Increased or new joint or muscle pain    [employer] last reviewed this educational content on 7/1/2022 2000-2022 [employer]. All rights reserved. This information is not intended as a substitute for professional medical care. Always follow your healthcare professional's instructions.          Understanding USDA MyPlate  The USDA has guidelines to help you make healthy food choices. These are called MyPlate. MyPlate shows the food groups that make up healthy meals using the image of a place setting. Before you eat, think about the healthiest choices for what to put on your plate or in your cup or bowl. To learn more about building a healthy plate, visit www.choosemyplate.gov.     The food groups    Fruits. Any fruit or 100% fruit juice counts as part of the Fruit Group. Fruits may be fresh, canned, frozen, or dried, and may be whole, cut-up, or pureed. Make 1/2 of your plate fruits and vegetables.    Vegetables. Any vegetable or 100% vegetable juice counts as a member of the Vegetable Group. Vegetables may be fresh, frozen, canned, or dried. They can be served raw or cooked and may be whole, cut-up, or mashed. Make 1/2 of your plate fruits and vegetables.    Grains. All foods made from grains are part of the Grains Group. These include wheat, rice, oats, cornmeal, and barley. Grains are often used to make foods such as bread, pasta, oatmeal, cereal, tortillas, and grits. Grains should be no more than 1/4 of your plate. At least half of your grains should be whole grains.    Protein. This group includes meat, poultry, seafood, beans and peas, eggs, processed soy products (such as  tofu), nuts (including nut butters), and seeds. Make protein choices no more than 1/4 of your plate. Meat and poultry choices should be lean or low fat.    Dairy. The Dairy Group includes all fluid milk products and foods made from milk that contain calcium, such as yogurt and cheese. (Foods that have little calcium, such as cream, butter, and cream cheese, are not part of this group.) Most dairy choices should be low-fat or fat-free.    Oils. Oils aren't a food group, but they do contain essential nutrients. However it's important to watch your intake of oils. These are fats that are liquid at room temperature. They include canola, corn, olive, soybean, vegetable, and sunflower oil. Foods that are mainly oil include mayonnaise, certain salad dressings, and soft margarines. You likely already get your daily oil allowance from the foods you eat.  Things to limit  Eating healthy also means limiting these things in your diet:    Salt (sodium). Many processed foods have a lot of sodium. To keep sodium intake down, eat fresh vegetables, meats, poultry, and seafood when possible. Purchase low-sodium, reduced-sodium, or no-salt-added food products at the store. And don't add salt to your meals at home. Instead, season them with herbs and spices such as dill, oregano, cumin, and paprika. Or try adding flavor with lemon or lime zest and juice.    Saturated fat. Saturated fats are most often found in animal products such as beef, pork, and chicken. They are often solid at room temperature, such as butter. To reduce your saturated fat intake, choose leaner cuts of meat and poultry. And try healthier cooking methods such as grilling, broiling, roasting, or baking. For a simple lower-fat swap, use plain nonfat yogurt instead of mayonnaise when making potato salad or macaroni salad.    Added sugars. These are sugars added to foods. They are in foods such as ice cream, candy, soda, fruit drinks, sports drinks, energy drinks,  cookies, pastries, jams, and syrups. Cut down on added sugars by sharing sweet treats with a family member or friend. You can also choose fruit for dessert, and drink water or other unsweetened beverages.  [employer] last reviewed this educational content on 6/1/2020 2000-2022 [employer]. All rights reserved. This information is not intended as a substitute for professional medical care. Always follow your healthcare professional's instructions.          Signs of Hearing Loss  Hearing loss is a problem shared by many people. In fact, it's one of the most common health problems, particularly as people age. Most people aged 65 and older have some hearing loss. By age 80, almost everyone does. Hearing loss often occurs slowly over the years. So, you may not realize your hearing has gotten worse.   When sudden hearing loss occurs, it's important to contact your healthcare provider right away. Your provider will do a medical exam and a hearing exam as soon as possible. This is to help find the cause and type of your sudden hearing loss. Based on your diagnosis, your healthcare provider will discuss possible treatments.      Hearing much better with one ear can be a sign of hearing loss.     Have your hearing checked  Call your healthcare provider if you:     Have to strain to hear normal conversation    Have to watch other people s faces very carefully to follow what they re saying    Need to ask people to repeat what they ve said    Often misunderstand what people are saying    Turn the volume of the television or radio up so high that others complain    Feel that people are mumbling when they re talking to you    Find that the effort to hear leaves you feeling tired and irritated    Notice, when using the phone, that you hear better with one ear than the other  [employer] last reviewed this educational content on 6/1/2022 2000-2022 [employer]. All rights reserved. This information is not  intended as a substitute for professional medical care. Always follow your healthcare professional's instructions.          Urinary Incontinence, Female (Adult)   Urinary incontinence means loss of bladder control. This problem affects many women, especially as they get older. If you have incontinence, you may be embarrassed to ask for help. But know that this problem can be treated.   Types of Incontinence  There are different types of incontinence. Two of the main types are described here. You can have more than one type.     Stress incontinence. With this type, urine leaks when pressure (stress) is put on the bladder. This may happen when you cough, sneeze, or laugh. Stress incontinence most often occurs because the pelvic floor muscles that support the bladder and urethra are weak. This can happen after pregnancy and vaginal childbirth or a hysterectomy. It can also be due to excess body weight or hormone changes.    Urge incontinence (also called overactive bladder). With this type, a sudden urge to urinate is felt often. This may happen even though there may not be much urine in the bladder. The need to urinate often during the night is common. Urge incontinence most often occurs because of bladder spasms. This may be due to bladder irritation or infection. Damage to bladder nerves or pelvic muscles, constipation, and certain medicines can also lead to urge incontinence.  Treatment depends on the cause. Further evaluation is needed to find the type you have. This will likely include an exam and certain tests. Based on the results, you and your healthcare provider can then plan treatment. Until a diagnosis is made, the home care tips below can help ease symptoms.   Home care    Do pelvic floor muscle exercises, if they are prescribed. The pelvic floor muscles help support the bladder and urethra. Many women find that their symptoms improve when doing special exercises that strengthen these muscles. To do the  exercises, contract the muscles you would use to stop your stream of urine. But do this when you re not urinating. Hold for 10 seconds, then relax. Repeat 10 to 20 times in a row, at least 3 times a day. Your healthcare provider may give you other instructions for how to do the exercises and how often.    Keep a bladder diary. This helps track how often and how much you urinate over a set period of time. Bring this diary with you to your next visit with the provider. The information can help your provider learn more about your bladder problem.    Lose weight, if advised to by your provider. Extra weight puts pressure on the bladder. Your provider can help you create a weight-loss plan that s right for you. This may include exercising more and making certain diet changes.    Don't have foods and drinks that may irritate the bladder. These can include alcohol and caffeinated drinks.    Quit smoking. Smoking and other tobacco use can lead to a long-term (chronic) cough that strains the pelvic floor muscles. Smoking may also damage the bladder and urethra. Talk with your provider about treatments or methods you can use to quit smoking.    If drinking large amounts of fluid makes you have symptoms, you may be advised to limit your fluid intake. You may also be advised to drink most of your fluids during the day and to limit fluids at night.    If you re worried about urine leakage or accidents, you may wear absorbent pads to catch urine. Change the pads often. This helps reduce discomfort. It may also reduce the risk of skin or bladder infections.    Follow-up care  Follow up with your healthcare provider, or as directed. It may take some to find the right treatment for your problem. But healthy lifestyle changes can be made right away. These include such things as exercising on a regular basis, eating a healthy diet, losing weight (if needed), and quitting smoking. Your treatment plan may include special therapies or  medicines. Certain procedures or surgery may also be options. Talk about any questions you have with your provider.   When to seek medical advice  Call the healthcare provider right away if any of these occur:    Fever of 100.4 F (38 C) or higher, or as directed by your provider    Bladder pain or fullness    Belly swelling    Nausea or vomiting    Back pain    Weakness, dizziness, or fainting  [employer] last reviewed this educational content on 1/1/2020 2000-2022 [employer]. All rights reserved. This information is not intended as a substitute for professional medical care. Always follow your healthcare professional's instructions.

## 2023-03-24 ENCOUNTER — MYC MEDICAL ADVICE (OUTPATIENT)
Dept: CARDIOLOGY | Facility: CLINIC | Age: 68
End: 2023-03-24
Payer: MEDICARE

## 2023-03-28 NOTE — TELEPHONE ENCOUNTER
Patient returned call and left voicemail message with update blood pressure reading.      Last Blood Pressure: 140/82  Last Heart Rate: 76  Date: 3/16/23  Location: Other Specialty    Today's Blood Pressure: 125/74  Today's Heart Rate:   Location: Home BP    Patient reported blood pressure updated in Epic. Blood pressure falls within [place] Community Measures guidelines.  Patient will follow up as previously advised.   DAMON Ruiz

## 2023-03-30 ENCOUNTER — ANCILLARY PROCEDURE (OUTPATIENT)
Dept: BONE DENSITY | Facility: CLINIC | Age: 68
End: 2023-03-30
Attending: INTERNAL MEDICINE
Payer: MEDICARE

## 2023-03-30 DIAGNOSIS — Z78.0 POSTMENOPAUSAL: ICD-10-CM

## 2023-03-30 PROCEDURE — 77080 DXA BONE DENSITY AXIAL: CPT | Performed by: INTERNAL MEDICINE

## 2023-04-07 ENCOUNTER — HOSPITAL ENCOUNTER (OUTPATIENT)
Dept: CT IMAGING | Facility: CLINIC | Age: 68
Discharge: HOME | End: 2023-04-07
Attending: INTERNAL MEDICINE | Admitting: INTERNAL MEDICINE
Payer: MEDICARE

## 2023-04-07 DIAGNOSIS — I71.21 ANEURYSM OF ASCENDING AORTA WITHOUT RUPTURE (H): ICD-10-CM

## 2023-04-07 LAB — NONINV COLON CA DNA+OCC BLD SCRN STL QL: NEGATIVE

## 2023-04-07 PROCEDURE — 250N000011 HC RX IP 250 OP 636: Performed by: RADIOLOGY

## 2023-04-07 PROCEDURE — 250N000009 HC RX 250: Performed by: RADIOLOGY

## 2023-04-07 PROCEDURE — G1010 CDSM STANSON: HCPCS

## 2023-04-07 RX ORDER — IOPAMIDOL 755 MG/ML
500 INJECTION, SOLUTION INTRAVASCULAR ONCE
Status: COMPLETED | OUTPATIENT
Start: 2023-04-07 | End: 2023-04-07

## 2023-04-07 RX ADMIN — IOPAMIDOL 80 ML: 755 INJECTION, SOLUTION INTRAVENOUS at 08:10

## 2023-04-07 RX ADMIN — SODIUM CHLORIDE 60 ML: 9 INJECTION, SOLUTION INTRAVENOUS at 08:10

## 2023-04-17 DIAGNOSIS — I10 ESSENTIAL HYPERTENSION, BENIGN: ICD-10-CM

## 2023-04-17 RX ORDER — LOSARTAN POTASSIUM 100 MG/1
100 TABLET ORAL DAILY
Qty: 30 TABLET | Refills: 1 | Status: SHIPPED | OUTPATIENT
Start: 2023-04-17 | End: 2023-06-12

## 2023-06-11 DIAGNOSIS — I10 ESSENTIAL HYPERTENSION, BENIGN: ICD-10-CM

## 2023-06-12 RX ORDER — LOSARTAN POTASSIUM 100 MG/1
100 TABLET ORAL DAILY
Qty: 30 TABLET | Refills: 0 | Status: SHIPPED | OUTPATIENT
Start: 2023-06-12 | End: 2023-07-14

## 2023-06-14 ENCOUNTER — LAB (OUTPATIENT)
Dept: LAB | Facility: CLINIC | Age: 68
End: 2023-06-14
Payer: MEDICARE

## 2023-06-14 DIAGNOSIS — Z79.01 LONG TERM CURRENT USE OF ANTICOAGULANTS WITH INR GOAL OF 2.0-3.0: ICD-10-CM

## 2023-06-14 DIAGNOSIS — I48.91 ATRIAL FIBRILLATION, UNSPECIFIED TYPE (H): ICD-10-CM

## 2023-06-14 DIAGNOSIS — I25.118 CORONARY ARTERY DISEASE OF NATIVE HEART WITH STABLE ANGINA PECTORIS, UNSPECIFIED VESSEL OR LESION TYPE (H): ICD-10-CM

## 2023-06-14 DIAGNOSIS — E87.6 HYPOKALEMIA: ICD-10-CM

## 2023-06-14 LAB
ALT SERPL W P-5'-P-CCNC: 20 U/L (ref 0–50)
CHOLEST SERPL-MCNC: 182 MG/DL
HDLC SERPL-MCNC: 46 MG/DL
LDLC SERPL CALC-MCNC: 112 MG/DL
NONHDLC SERPL-MCNC: 136 MG/DL
POTASSIUM SERPL-SCNC: 3.9 MMOL/L (ref 3.4–5.3)
TRIGL SERPL-MCNC: 122 MG/DL

## 2023-06-14 PROCEDURE — 36415 COLL VENOUS BLD VENIPUNCTURE: CPT

## 2023-06-14 PROCEDURE — 80061 LIPID PANEL: CPT

## 2023-06-14 PROCEDURE — 84132 ASSAY OF SERUM POTASSIUM: CPT

## 2023-06-14 PROCEDURE — 84460 ALANINE AMINO (ALT) (SGPT): CPT

## 2023-07-09 ENCOUNTER — HEALTH MAINTENANCE LETTER (OUTPATIENT)
Age: 68
End: 2023-07-09

## 2023-07-14 ENCOUNTER — MYC REFILL (OUTPATIENT)
Dept: INTERNAL MEDICINE | Facility: CLINIC | Age: 68
End: 2023-07-14
Payer: MEDICARE

## 2023-07-14 DIAGNOSIS — I10 ESSENTIAL HYPERTENSION, BENIGN: ICD-10-CM

## 2023-07-14 RX ORDER — LOSARTAN POTASSIUM 100 MG/1
100 TABLET ORAL DAILY
Qty: 90 TABLET | Refills: 0 | Status: SHIPPED | OUTPATIENT
Start: 2023-07-14 | End: 2023-10-06

## 2023-09-16 NOTE — PROGRESS NOTES
Addended by: LESLIE PENNINGTON on: 9/15/2023 05:28 PM     Modules accepted: Orders     Amparo is a 66 year old who is being evaluated via a billable video visit.      How would you like to obtain your AVS? MyChart  If the video visit is dropped, the invitation should be resent by: Text to cell phone: [phone]  Will anyone else be joining your video visit? Maci  Ada Scot        Video-Visit Details    Type of service:  Video Visit  Video Start Time: 2:38 PM  Video End Time:  3:10 PM    Originating Location (pt. Location): Home    Distant Location (provider location):  [facility]     Platform used for Video Visit: [facility]      Outpatient Sleep Medicine Consultation:      Name: Amparo Mccray MRN# [id number]   Age: 66 year old YOB: 1955     Date of Consultation: May 27, 2022  Consultation is requested by: No referring provider defined for this encounter. No ref. provider found  Primary care provider: Alan Almeida       Reason for Sleep Consult:     Amparo Mccray is sent by No ref. provider found for a sleep consultation regarding CAD, atrial fibrillation, hypoxemia, evaluate for ORLIN.    Patient s Reason for visit  Amparo Mccray main reason for visit: Low spo2 at night. Constant waking up  Patient states problem(s) started: Unknown  Amparo Mccray's goals for this visit:             Assessment and Plan:     Summary Sleep Diagnoses:  1. ORLIN (obstructive sleep apnea)  2. Hypoxemia associated with sleep  3. Coronary artery disease of native heart with stable angina pectoris, unspecified vessel or lesion type (H)  4. Atrial fibrillation, unspecified type (H)  5. Long term current use of anticoagulants with INR goal of 2.0-3.0  6. Obesity (BMI 30-39.9)  - Sleep Study Referral      Comorbid Diagnoses:  1.  Paroxysmal atrial fibrillation  2.  Hypertension  3.  CAD  4.  Myasthenia gravis?  5.  Obesity      Summary Recommendations:  This is a pleasant 66-year-old female with a PMH pertinent for paroxysmal atrial fibrillation, HTN, HLD,  CAD with abnormal stress test, and obesity who presents today after completing overnight polysomnography previously ordered by Dr. Khang Hernandez.  In addition, Dr. Hernandez also ordered comprehensive DME with APAP device 5 to 15 cm H2O, nasal mask and supplies sent to [facility].  The patient was referred by cardiology for further evaluation of possible sleep disordered breathing in the setting of atrial fibrillation and hypoxemia.  Her [place] score today is 4 which is consistent with no significant daytime somnolence.  Her insomnia severity index score is 19 today which is consistent with moderate severity clinical insomnia.  Her STOP-BANG score is 4 which suggests an intermediate risk of ORLIN.    The patient has completed overnight polysomnography on 5/12/2022 which showed moderate ORLIN with AHI 21.2/h, RDI 22.6/h, supine AHI 33.7/h, REM AHI 51.1/h with sleep associated hypoxemia and oxygen tyrel to 80% with time spent less than or equal to 88% for 16.3 minutes.  No clinically significant behavioral or movement activity was noted.  Subsequent to these findings, Dr. Khang Hernandez contacted the patient by phone and reviewed the results of her sleep study and placed an order for new APAP device with empiric pressure setting 5-15 cm H2O, nasal mask and supplies sent to [facility].  The patient reports that she received her APAP device yesterday and was able to try her machine last night for approximately 1 to 2 hours.  We discussed that there will likely be an acclamation.,  Most commonly taking approximately 1 to 2 months to acclimate to CPAP therapy adherence.  She denies any difficulty with using her device or mask issues at this time.  I have encouraged continued use every day all night as she is able in order to meet her compliance requirements.      The patient does have a follow-up with Dr. Hernandez on 9/7/2022 for CPAP follow-up visit.    No orders of the defined types were placed in this  encounter.        Summary Counseling:    Sleep Testing Reviewed  Obstructive Sleep Apnea Reviewed  Complications of Untreated Sleep Apnea Reviewed  Previous recent chart notes, lab and imaging results reviewed    Medical Decision-making:   Educational materials provided in instructions    Total time spent reviewing medical records, history and physical examination, review of previous testing and interpretation as well as documentation on this date: 50 minutes    CC: No ref. provider found          History of Present Illness:     Amparo Mccray is a pleasant 66-year-old female with a PMH pertinent for paroxysmal atrial fibrillation, HTN, HLD, CAD with abnormal stress test, and obesity who presents today after completing overnight polysomnography previously ordered by Dr. Khang Hernandez.  In addition, Dr. Hernandez also ordered comprehensive DME with APAP device 5 to 15 cm H2O, nasal mask and supplies sent to [facility].  The patient was referred by cardiology for further evaluation of possible sleep disordered breathing in the setting of atrial fibrillation and hypoxemia.    Past Sleep Evaluations: Yes, pre-consult PSG  Previous Study Results:   5/12/2022 [facility] Sleep Study (237.0 lbs) - AHI 21.2, RDI 22.6, Supine AHI 33.7 REM AHI 51.1 Low O2 80%, Time Spent ?88% 16.3 minutes / Time Spent ?89% 38.1 minutes.      SLEEP-WAKE SCHEDULE:     Work/School Days: Patient goes to school/work: Yes   Usually gets into bed at 10 pm  Takes patient about Depends usually 1-2 hours to fall asleep  Has trouble falling asleep 7 nights per week  Wakes up in the middle of the night 3-4 times.  Wakes up due to Uncertain  She has trouble falling back asleep 5 times a week.   It usually takes 10-20 minutes to get back to sleep  Patient is usually up at 6:30am  Uses alarm: Yes    Weekends/Non-work Days/All Other Days:  Usually gets into bed at 9:30 -10 pm   Takes patient about Anywhere from 30 minutes to 2 hours to  fall asleep  Patient is usually up at 6:30am  Uses alarm: Yes    Sleep Need  Patient gets  7 hours sleep on average   Patient thinks she needs about 8 hours sleep    Amparo Mccray prefers to sleep in this position(s): Side   Patient states they do the following activities in bed: Read, Use phone, computer, or tablet    Naps  Patient takes a purposeful nap 0 times a week and naps are usually   in duration  She feels better after a nap:    She dozes off unintentionally   days per week  Patient has had a driving accident or near-miss due to sleepiness/drowsiness: No      SLEEP DISRUPTIONS:    Breathing/Snoring  Patient snores:No  Other people complain about her snoring: No  Patient has been told she stops breathing in her sleep: No  She has issues with the following: Morning headaches, Morning mouth dryness, Stuffy nose when you wake up    Movement:  Patient gets pain, discomfort, with an urge to move:  Yes  It happens when she is resting:  Yes  It happens more at night:  Yes  Patient has been told she kicks her legs at night:  No     Behaviors in Sleep:  Amparo Mccray has experienced the following behaviors while sleeping:  Denies walking/talking in sleep, hypnagogy, sleep paralysis  She has experienced sudden muscle weakness during the day: No      Is there anything else you would like your sleep provider to know: Patient is currently being treated by her neurologist, Dr. Miner, for myasthenia gravis which has affected her swallowing.  She is currently taking pyridostigmine which has been of benefit.      CAFFEINE AND OTHER SUBSTANCES:    Patient consumes caffeinated beverages per day:  2  Last caffeine use is usually: 2pm  List of any prescribed or over the counter stimulants that patient takes:    List of any prescribed or over the counter sleep medication patient takes: Tylenol sleep and melatonin  List of previous sleep medications that patient has tried:    Patient drinks alcohol to help them sleep:  No  Patient drinks alcohol near bedtime: No    Family History:  Patient has a family member been diagnosed with a sleep disorder: No            SCALES:    EPWORTH SLEEPINESS SCALE      [place] Sleepiness Scale ( VÍCTOR Roland  1990-1997[facility] [place]/English - Final version - 21 Nov 07 - [facility] [place].) 5/21/2022   Sitting and reading Slight chance of dozing   Watching TV Slight chance of dozing   Sitting, inactive in a public place (e.g. a theatre or a meeting) Would never doze   As a passenger in a car for an hour without a break Would never doze   Lying down to rest in the afternoon when circumstances permit Slight chance of dozing   Sitting and talking to someone Would never doze   Sitting quietly after a lunch without alcohol Slight chance of dozing   In a car, while stopped for a few minutes in traffic Would never doze   [place] Score (MC) 0   [place] Score (Sleep) 4         INSOMNIA SEVERITY INDEX (GERRY)      Insomnia Severity Index (GERRY) 5/21/2022   Difficulty falling asleep 2   Difficulty staying asleep 3   Problems waking up too early 2   How SATISFIED/DISSATISFIED are you with your CURRENT sleep pattern? 4   How NOTICEABLE to others do you think your sleep problem is in terms of impairing the quality of your life? 2   How WORRIED/DISTRESSED are you about your current sleep problem? 3   To what extent do you consider your sleep problem to INTERFERE with your daily functioning (e.g. daytime fatigue, mood, ability to function at work/daily chores, concentration, memory, mood, etc.) CURRENTLY? 3   GERRY Total Score 19       Guidelines for Scoring/Interpretation:  Total score categories:  0-7 = No clinically significant insomnia   8-14 = Subthreshold insomnia   15-21 = Clinical insomnia (moderate severity)  22-28 = Clinical insomnia (severe)  Used via courtesy of www.myhealth.[place].gov with permission from Feliciano Tran PhD., [facility]      STOP BANG     STOP BANG Questionnaire (  2008, the American  "Society of Anesthesiologists, Inc. [employer] 5/27/2022   1. Snoring - Do you snore loudly (louder than talking or loud enough to be heard through closed doors)? -   2. Tired - Do you often feel tired, fatigued, or sleepy during daytime? -   3. Observed - Has anyone observed you stop breathing during your sleep? -   4. Blood pressure - Do you have or are you being treated for high blood pressure? -   5. BMI - BMI more than 35 kg/m2? -   6. Age - Age over 50 yr old? -   7. Neck circumference - Neck circumference greater than 40 cm? -   8. Gender - Gender male? -   STOP BANG Score (MC): -   B/P Clinic: -   BMI Clinic: 35.51         GAD7    No flowsheet data found.      CAGE-AID    No flowsheet data found.    CAGE-AID reprinted with permission from the [place] [employer] Journal, UVALDO Overton. and ABDI Last, \"Conjoint screening questionnaires for alcohol and drug abuse\" [place] Medical Journal 94: 135-140, 1995.      PATIENT HEALTH QUESTIONNAIRE-9 (PHQ - 9)    No flowsheet data found.    Developed by Theresa Bahena, Erika Rios, Javier Petersen and colleagues, with an educational darby from [employer] No permission required to reproduce, translate, display or distribute.        Allergies:    Allergies   Allergen Reactions     Celebrex [Celecoxib] Hives     Hands itching then hives     Ibuprofen Swelling     Face swelling     Ibuprofen Hives       Medications:    Current Outpatient Medications   Medication Sig Dispense Refill     amLODIPine (NORVASC) 5 MG tablet Take 1 tablet (5 mg) by mouth 2 times daily 180 tablet 3     atorvastatin (LIPITOR) 40 MG tablet TAKE 1 TABLET(40 MG) BY MOUTH AT BEDTIME 90 tablet 1     isosorbide mononitrate (IMDUR) 30 MG 24 hr tablet Take 1 tablet (30 mg) by mouth daily 30 tablet 3     metoprolol tartrate (LOPRESSOR) 50 MG tablet TAKE 1 AND 1/2 TABLETS(75 MG) BY MOUTH TWICE DAILY 270 tablet 2     potassium chloride ER (KLOR-CON M) 20 MEQ CR tablet Take " 1 tablet (20 mEq) by mouth daily       pyridostigmine (MESTINON) 60 MG tablet 1 tablet by mouth three times a day (Patient taking differently: 1 tablet by mouth two times a day with breakfast and lunch) 90 tablet 3     triamterene-HCTZ (MAXZIDE-25) 37.5-25 MG tablet TAKE 1 TABLET BY MOUTH EVERY MORNING 90 tablet 1     warfarin ANTICOAGULANT (COUMADIN) 2.5 MG tablet TAKE 1/2 TABLET(1.25 MG) ON MONDAYS, WEDNESDAYS, SATURDAYS AND TAKE 1 TABLET(2.5 MG) ON ALL OTHER DAYS OR AS DIRECTED BY THE INR CLINIC. 100 tablet 1       Problem List:  Patient Active Problem List    Diagnosis Date Noted     Atrial fibrillation (H)      Priority: High     questionable TIA- now on coumadin       Essential hypertension, benign 03/20/2006     Priority: High     Unstable angina (H) 04/18/2022     Priority: Medium     Hypokalemia 04/18/2022     Priority: Medium     Thoracic aortic aneurysm without rupture (H) 04/18/2022     Priority: Medium     Coronary artery disease of native heart with stable angina pectoris, unspecified vessel or lesion type (H) 10/25/2021     Priority: Medium     Atrial fibrillation, unspecified type (H) 10/12/2020     Priority: Medium     Long term current use of anticoagulants with INR goal of 2.0-3.0 11/01/2018     Priority: Medium     Knee joint replacement status 03/02/2015     Priority: Medium     Morbid obesity (H) 07/01/2013     Priority: Medium     BMI 33.6       Paroxysmal A-fib (H) 12/05/2012     Priority: Medium     ACP (advance care planning) 08/23/2012     Priority: Medium     Discussed advance care planning with patient; information given to patient to review. 8/23/2012          Knee joint replacement by other means 06/08/2011     Priority: Medium     Ventricular bigeminy      Priority: Medium     bigeminy- nrml LV fxn       Acute chest pain      Priority: Medium     neg stress test, put on PPI- no help  Problem list name updated by automated process. Provider to review       CARDIOVASCULAR SCREENING; LDL  GOAL LESS THAN 160 10/31/2010     Priority: Medium        Past Medical/Surgical History:  Past Medical History:   Diagnosis Date     Arthritis      Chest pain, unspecified     neg stress test, put on PPI- no help     Essential hypertension, benign     Hypertension, Benign     Obesity (BMI 30-39.9) 7/1/13    BMI 33.6     Paroxysmal atrial fibrillation (H) 03/2010     Superficial thrombophlebitis      Thrombosis of leg     superficial not dvt's     Ventricular bigeminy     bigeminy- nrml LV fxn     Past Surgical History:   Procedure Laterality Date     ABDOMEN SURGERY       ARTHROPLASTY KNEE Right 3/2/2015    Procedure: ARTHROPLASTY KNEE;  Surgeon: Cuco Baugh MD;  Location: [facility] OR     CL AFF SURGICAL PATHOLOGY  1976    HGSIL     CV CORONARY ANGIOGRAM N/A 4/20/2022    Procedure: Coronary Angiogram;  Surgeon: Khang Thomas MD;  Location:  [facility] CARDIAC CATH LAB     CV CORONARY ANGIOGRAM  4/20/2022    Procedure: ;  Surgeon: Khang Thomas MD;  Location: [facility] CARDIAC CATH LAB     ESOPHAGOSCOPY, GASTROSCOPY, DUODENOSCOPY (EGD), COMBINED N/A 5/16/2019    Procedure: ESOPHAGOGASTRODUODENOSCOPY, WITH BIOPSY;  Surgeon: Liliane Montalvo MD;  Location: [facility]     H ABLATION FOCAL AFIB  11/02/2017     H ABLATION FOCAL AFIB  01/15/2013     ORTHOPEDIC SURGERY      l tka  and wrist      TUBAL LIGATION      Uterine fibroids removed     ZZC NONSPECIFIC PROCEDURE      left hand        Social History:  Social History     Socioeconomic History     Marital status:      Spouse name: Not on file     Number of children: Not on file     Years of education: Not on file     Highest education level: Not on file   Occupational History     Not on file   Tobacco Use     Smoking status: Never Smoker     Smokeless tobacco: Never Used   Substance and Sexual Activity     Alcohol use: Yes     Comment: once a year     Drug use: No     Sexual activity: Yes     Partners: Male     Birth  control/protection: None   Other Topics Concern     Parent/sibling w/ CABG, MI or angioplasty before 65F 55M? Yes   Social History Narrative     Not on file     Social Determinants of Health     Financial Resource Strain: Not on file   Food Insecurity: Not on file   Transportation Needs: Not on file   Physical Activity: Not on file   Stress: Not on file   Social Connections: Not on file   Intimate Partner Violence: Not on file   Housing Stability: Not on file       Family History:  Family History   Problem Relation Age of Onset     C.A.D. Paternal Grandmother      Hypertension Paternal Grandmother      C.A.D. Paternal Grandfather      Connective Tissue Disorder Paternal Grandfather      Hypertension Paternal Grandfather      C.A.D. Father      Hypertension Father      Cancer Maternal Grandmother         uterine     Colon Cancer Maternal Grandmother      Cancer Maternal Grandfather         leukemia     Other Cancer Maternal Grandfather      Cancer Mother         uterine     Gastrointestinal Disease Mother         ulcers     Hypertension Mother      Other - See Comments Sister         essential tremors     C.A.D. Brother         stent- 47     Hypertension Brother      Depression Daughter      Eye Disorder Son      Hypertension Brother      Depression Daughter      Asthma Son        Review of Systems:  A complete review of systems reviewed by me is negative with the exeption of what has been mentioned in the history of present illness.  In the last TWO WEEKS have you experienced any of the following symptoms?  Fevers: No  Night Sweats: No  Weight Gain: No  Pain at Night: Yes  Double Vision: No  Changes in Vision: No  Difficulty Breathing through Nose: Yes  Sore Throat in Morning: No  Dry Mouth in the Morning: Yes  Shortness of Breath Lying Flat: Yes  Shortness of Breath With Activity: Yes  Awakening with Shortness of Breath: No  Increased Cough: No  Heart Racing at Night: Yes  Swelling in Feet or Legs: Yes  Diarrhea at  "Night: No  Heartburn at Night: No  Urinating More than Once at Night: No  Losing Control of Urine at Night: No  Joint Pains at Night: Yes  Headaches in Morning: Yes  Weakness in Arms or Legs: No  Depressed Mood: No  Anxiety: No     Physical Examination:  Vitals: Ht 1.74 m (5' 8.5\")   Wt 107.5 kg (237 lb)   LMP 12/31/2012   BMI 35.51 kg/m    BMI= Body mass index is 35.51 kg/m .           GENERAL APPEARANCE: healthy, alert, no distress and cooperative  EYES: Eyes grossly normal to inspection  RESP: Unlabored, easy breathing with normal conversational speech  CV: color normal  NEURO: Alert and oriented x3, mentation intact and speech normal  PSYCH: mentation appears normal and affect normal/bright  Mallampati Class: Not examined  Tonsillar Stage: Not examined         Data: All pertinent previous laboratory data reviewed     Recent Labs   Lab Test 04/27/22  1413 04/20/22  1001 04/20/22  0616     --  139   POTASSIUM 3.5 3.8 3.4   CHLORIDE 104  --  104   CO2 30  --  29   ANIONGAP 4  --  6   GLC 90  --  111*   BUN 18  --  18   CR 0.93  --  0.76   AVILA 10.1  --  9.2       Recent Labs   Lab Test 04/20/22  1050   WBC 4.7   RBC 4.58   HGB 13.3   HCT 41.3   MCV 90   MCH 29.0   MCHC 32.2   RDW 13.0          Recent Labs   Lab Test 04/18/22  1703   PROTTOTAL 8.2   ALBUMIN 4.0   BILITOTAL 0.4   ALKPHOS 105   AST 22   ALT 31       TSH (mU/L)   Date Value   03/19/2019 3.58   12/11/2014 2.10       No results found for: UAMP, UBARB, BENZODIAZEUR, UCANN, UCOC, OPIT, UPCP    No results found for: IRONSAT, [id number], BROOKE    No results found for: PH, PHARTERIAL, PO2, [id number], SAT, PCO2, HCO3, BASEEXCESS, SHAHLA, BEB    @LABRCNTIPR(phv:4,pco2v:4,po2v:4,hco3v:4,altagracia:4,o2per:4)@    Echocardiology: No results found for this or any previous visit (from the past 4320 hour(s)).  4/19/2022 Echocardiogram Complete  Interpretation Summary:     The left atrium is mildly dilated.  The visual ejection fraction is " 55-60%.  ______________________________________________________________________________  Left Ventricle  The left ventricle is normal in size. There is normal left ventricular wall  thickness. The visual ejection fraction is 55-60%. Left ventricular diastolic  function is normal. Normal left ventricular wall motion.     Right Ventricle  The right ventricle is normal in structure, function and size.     Atria  The left atrium is mildly dilated. The right atrium is borderline dilated.     Mitral Valve  There is mild mitral annular calcification. The mitral valve leaflets are  mildly thickened.     Tricuspid Valve  Normal tricuspid valve.     Aortic Valve  The aortic valve is normal in structure and function.     Pulmonic Valve  The pulmonic valve is not well seen, but is grossly normal.     Vessels  The aortic root is normal size. The ascending aorta is Mildly dilated. The  inferior vena cava is normal.     Pericardium  There is no pericardial effusion.     Rhythm  Sinus rhythm was noted.      Chest x-ray: No results found for this or any previous visit from the past 365 days.      Chest CT: No results found for this or any previous visit from the past 365 days.      PFT: Most Recent [facility] Pulmonary Function Testing    No results found for: 20001  No results found for: 20002  No results found for: 20003  No results found for: 20015  No results found for: 20016  No results found for: 20027  No results found for: 20028  No results found for: 20029  No results found for: 20079  No results found for: 20080  No results found for: 20081  No results found for: 20335  No results found for: 20105  No results found for: 20053  No results found for: 20054  No results found for: 20055      ARMIN Lentz CNP 5/27/2022   Sleep Medicine      This note was written with the assistance of the Dragon voice-dictation technology software. The final document, although reviewed, may contain errors. For corrections, please contact the  office.

## 2023-10-06 DIAGNOSIS — I10 ESSENTIAL HYPERTENSION, BENIGN: ICD-10-CM

## 2023-10-06 RX ORDER — LOSARTAN POTASSIUM 100 MG/1
100 TABLET ORAL DAILY
Qty: 90 TABLET | Refills: 1 | Status: SHIPPED | OUTPATIENT
Start: 2023-10-06 | End: 2024-04-03

## 2023-11-05 ENCOUNTER — MYC REFILL (OUTPATIENT)
Dept: CARDIOLOGY | Facility: CLINIC | Age: 68
End: 2023-11-05
Payer: MEDICARE

## 2023-11-05 DIAGNOSIS — R94.39 ABNORMAL STRESS TEST: ICD-10-CM

## 2023-11-06 RX ORDER — AMLODIPINE BESYLATE 5 MG/1
5 TABLET ORAL 2 TIMES DAILY
Qty: 180 TABLET | Refills: 0 | Status: SHIPPED | OUTPATIENT
Start: 2023-11-06 | End: 2024-02-12

## 2024-02-08 ENCOUNTER — OFFICE VISIT (OUTPATIENT)
Dept: CARDIOLOGY | Facility: CLINIC | Age: 69
End: 2024-02-08
Payer: COMMERCIAL

## 2024-02-08 VITALS
HEIGHT: 69 IN | DIASTOLIC BLOOD PRESSURE: 70 MMHG | WEIGHT: 245.1 LBS | BODY MASS INDEX: 36.3 KG/M2 | OXYGEN SATURATION: 96 % | HEART RATE: 76 BPM | SYSTOLIC BLOOD PRESSURE: 106 MMHG

## 2024-02-08 DIAGNOSIS — I48.0 PAF (PAROXYSMAL ATRIAL FIBRILLATION) (H): Primary | ICD-10-CM

## 2024-02-08 PROCEDURE — 93000 ELECTROCARDIOGRAM COMPLETE: CPT | Performed by: INTERNAL MEDICINE

## 2024-02-08 PROCEDURE — 99214 OFFICE O/P EST MOD 30 MIN: CPT | Performed by: INTERNAL MEDICINE

## 2024-02-08 NOTE — PROGRESS NOTES
Electrophysiology Clinic Progress Note    Amparo Mccray MRN# [id number]   YOB: 1955 Age: 68 year old     Primary cardiologist:          Assessment and Plan   Thank you for allowing me to participate in the care of your very delightful patient.  As you know, Amparo is a 68-year-old female with a history of symptomatic paroxysmal atrial fibrillation in the background of hypertension and underwent a catheter-based ablation with pulmonary vein isolation and SVC isolation as well in 2013 and had been doing quite well until she had recurrent episodes requiring repeat ablation which was performed in 2017.  At that time, she was found to have reconnected pulmonary vein potentials which was re-isolated.  There was also evidence of accessory pulmonary veins which was re-isolated as well.     Amparo does have recurrent chest pain prompting an angiogram showing no evidence of coronary disease, however. She was d'x  with the possibility of spasm, either in esophagus or her native coronaries.  She is known to have normal LV function.  The patient was noted to have some nonsustained VT which is not new, as she had had before from previous monitoring.       I last saw the patient almost 2 years ago. Over the interim period Amparo has not had documentation of AF from her KARDIA tracings which I reviewed personally. She does noted hr is a bit faster when she exercises or walking. She is concern when rate > 110 bpm with walking or >140 when exercising. I reassured her that as long as the rate comes down after walking or exercising there is no restriction. I encouraged her to exercise at least 30 min 4-5 times a week or more and duration should not based on hr alone. Pt is off warfarin since I last saw her. Cont with current rx and rtc in a year.                Review of Systems     12-pt ROS is negative except for as noted in the HPI.          Physical Exam     Vitals: /70 (BP Location: Right arm, Patient  "Position: Sitting, Cuff Size: Adult Large)   Pulse 76   Ht 1.74 m (5' 8.5\")   Wt 111.2 kg (245 lb 1.6 oz)   LMP 12/31/2012 (Exact Date)   SpO2 96%   BMI 36.73 kg/m    Wt Readings from Last 10 Encounters:   02/08/24 111.2 kg (245 lb 1.6 oz)   03/16/23 110.9 kg (244 lb 8 oz)   05/31/22 109.8 kg (242 lb)   05/27/22 107.5 kg (237 lb)   05/12/22 107.9 kg (237 lb 14.4 oz)   04/27/22 108.6 kg (239 lb 8 oz)   04/18/22 106.9 kg (235 lb 9.6 oz)   10/22/21 110.7 kg (244 lb)   09/23/21 110.8 kg (244 lb 3.2 oz)   09/22/21 111.5 kg (245 lb 12.8 oz)       Constitutional:  Patient is pleasant, alert, cooperative, and in NAD.  HEENT:  NCAT. PERRLA. EOM's intact.   Neck:  CVP appears normal. No carotid bruits.   Pulmonary: Normal respiratory effort. CTAB.   Cardiac: RRR, normal S1/S2, no S3/S4, no murmur or rub.   Abdomen:  Non-tender abdomen, no hepatosplenomegaly appreciated.   Vascular: Pulses in the upper and lower extremities are 2+ and equal bilaterally.  Extremities: No edema, erythema, cyanosis or tenderness appreciated.  Skin:  No rashes or lesions appreciated.   Neurological:  No gross motor or sensory deficits.   Psych: Appropriate affect.          Data   Labs reviewed:  Recent Labs   Lab Test 06/14/23  1415 03/16/23  1448 04/19/22  0644 06/02/20  0944 03/19/19  1321   * 104* 86   < >  --    HDL 46* 52 45*   < >  --    NHDL 136* 128 109   < >  --    CHOL 182 180 154   < >  --    TRIG 122 119 113   < >  --    TSH  --   --   --   --  3.58    < > = values in this interval not displayed.       Lab Results   Component Value Date    WBC 4.7 04/20/2022    WBC 6.4 06/13/2019    RBC 4.58 04/20/2022    RBC 4.24 06/13/2019    HGB 13.3 04/20/2022    HGB 13.0 06/13/2019    HCT 41.3 04/20/2022    HCT 39.4 06/13/2019    MCV 90 04/20/2022    MCV 93 06/13/2019    MCH 29.0 04/20/2022    MCH 30.7 06/13/2019    MCHC 32.2 04/20/2022    MCHC 33.0 06/13/2019    RDW 13.0 04/20/2022    RDW 12.7 06/13/2019     04/20/2022    PLT " 208 06/13/2019       Lab Results   Component Value Date     03/16/2023     06/02/2020    POTASSIUM 3.9 06/14/2023    POTASSIUM 3.5 04/27/2022    POTASSIUM 3.8 06/02/2020    CHLORIDE 102 03/16/2023    CHLORIDE 104 04/27/2022    CHLORIDE 106 06/02/2020    CO2 25 03/16/2023    CO2 30 04/27/2022    CO2 28 06/02/2020    ANIONGAP 14 03/16/2023    ANIONGAP 4 04/27/2022    ANIONGAP 4 06/02/2020     (H) 03/16/2023    GLC 90 04/27/2022     (H) 06/02/2020    BUN 18.2 03/16/2023    BUN 18 04/27/2022    BUN 20 06/02/2020    CR 0.84 03/16/2023    CR 0.84 06/02/2020    GFRESTIMATED 76 03/16/2023    GFRESTIMATED 74 06/02/2020    GFRESTBLACK 86 06/02/2020    AVILA 9.7 03/16/2023    AVILA 8.7 06/02/2020      Lab Results   Component Value Date    AST 22 04/18/2022    AST 19 06/13/2019    ALT 20 06/14/2023    ALT 25 06/13/2019       Lab Results   Component Value Date    A1C 5.5 08/08/2006       Lab Results   Component Value Date    INR 2.8 (H) 06/28/2022    INR 2.8 (H) 05/31/2022    INR 1.57 (H) 04/20/2022    INR 1.54 (H) 04/20/2022    INR 3.40 (H) 06/22/2021    INR 2.60 (H) 05/11/2021            Problem List     Patient Active Problem List   Diagnosis    Essential hypertension, benign    Ventricular bigeminy    ACP (advance care planning)    Morbid obesity (H)    Paroxysmal atrial fibrillation (H)    Thoracic aortic aneurysm without rupture (H24)    Myasthenia gravis without exacerbation (H)    Mixed hyperlipidemia            Medications     Current Outpatient Medications   Medication Sig Dispense Refill    amLODIPine (NORVASC) 5 MG tablet Take 1 tablet (5 mg) by mouth 2 times daily 180 tablet 0    aspirin (ASA) 81 MG EC tablet Take 1 tablet (81 mg) by mouth daily      atorvastatin (LIPITOR) 40 MG tablet Take 1 tablet (40 mg) by mouth At Bedtime 90 tablet 3    losartan (COZAAR) 100 MG tablet Take 1 tablet (100 mg) by mouth daily -LAB WORK NEEDED- 90 tablet 1    metoprolol tartrate (LOPRESSOR) 50 MG tablet TAKE 1  AND 1/2 TABLETS BY MOUTH TWICE DAILY 270 tablet 3    triamterene-HCTZ (MAXZIDE-25) 37.5-25 MG tablet Take 1 tablet by mouth every morning 90 tablet 3            Past Medical History     Past Medical History:   Diagnosis Date    Arthritis     Chest pain, unspecified     neg stress test, put on PPI- no help    Essential hypertension, benign     Hypertension, Benign    Obesity (BMI 30-39.9) 7/1/13    BMI 33.6    Paroxysmal atrial fibrillation (H) 03/2010    Superficial thrombophlebitis     Thrombosis of leg     superficial not dvt's    Ventricular bigeminy     bigeminy- nrml LV fxn     Past Surgical History:   Procedure Laterality Date    ABDOMEN SURGERY      ARTHROPLASTY KNEE Right 3/2/2015    Procedure: ARTHROPLASTY KNEE;  Surgeon: Cuco Baugh MD;  Location:  OR    [facility] SURGICAL PATHOLOGY  1976    HGSIL    CV CORONARY ANGIOGRAM N/A 4/20/2022    Procedure: Coronary Angiogram;  Surgeon: Khang Thomas MD;  Location: [facility] CARDIAC CATH LAB    CV CORONARY ANGIOGRAM  4/20/2022    Procedure: ;  Surgeon: Khang Thomas MD;  Location: [facility] CARDIAC CATH LAB    ESOPHAGOSCOPY, GASTROSCOPY, DUODENOSCOPY (EGD), COMBINED N/A 5/16/2019    Procedure: ESOPHAGOGASTRODUODENOSCOPY, WITH BIOPSY;  Surgeon: Liliane Montalvo MD;  Location:  GI    H ABLATION FOCAL AFIB  11/02/2017    H ABLATION FOCAL AFIB  01/15/2013    ORTHOPEDIC SURGERY      l tka  and wrist     TUBAL LIGATION      Uterine fibroids removed    ZZC NONSPECIFIC PROCEDURE      left hand      Family History   Problem Relation Age of Onset    C.A.D. Paternal Grandmother     Hypertension Paternal Grandmother     C.ATeraD. Paternal Grandfather     Connective Tissue Disorder Paternal Grandfather     Hypertension Paternal Grandfather     C.A.D. Father     Hypertension Father     Cancer Maternal Grandmother         uterine    Colon Cancer Maternal Grandmother     Cancer Maternal Grandfather         leukemia    Other Cancer Maternal  Grandfather     Cancer Mother         uterine    Gastrointestinal Disease Mother         ulcers    Hypertension Mother     Other - See Comments Sister         essential tremors    C.A.D. Brother         stent- 47    Hypertension Brother     Depression Daughter     Eye Disorder Son     Hypertension Brother     Depression Daughter     Asthma Son      Social History     Socioeconomic History    Marital status:      Spouse name: Not on file    Number of children: Not on file    Years of education: Not on file    Highest education level: Not on file   Occupational History    Not on file   Tobacco Use    Smoking status: Never    Smokeless tobacco: Never   Substance and Sexual Activity    Alcohol use: Yes     Comment: once a year    Drug use: No    Sexual activity: Yes     Partners: Male     Birth control/protection: None   Other Topics Concern    Parent/sibling w/ CABG, MI or angioplasty before 65F 55M? Yes   Social History Narrative    Not on file     Social Determinants of Health     Financial Resource Strain: Not on file   Food Insecurity: Not on file   Transportation Needs: Not on file   Physical Activity: Not on file   Stress: Not on file   Social Connections: Not on file   Interpersonal Safety: Not on file   Housing Stability: Not on file            Allergies   Celebrex [celecoxib], Ibuprofen, and Ibuprofen    Today's clinic visit entailed:  The following tests were independently interpreted by me as noted in my documentation: ecg, echo, angio, KARDIA  45 minutes spent by me on the date of the encounter doing chart review, history and exam, documentation and further activities per the note  Provider  Link to MDM Help Grid     The level of medical decision making during this visit was of moderate complexity.

## 2024-02-08 NOTE — LETTER
2/8/2024    Alan Almeida MD  [address]  [facility] [address]    RE: Amparo Mccray       Dear Colleague,     I had the pleasure of seeing Amparo Mccray in the [facility].    Electrophysiology Clinic Progress Note    Amparo Mccray MRN# [id number]   YOB: 1955 Age: 68 year old     Primary cardiologist:          Assessment and Plan   Thank you for allowing me to participate in the care of your very delightful patient.  As you know, Amparo is a 68-year-old female with a history of symptomatic paroxysmal atrial fibrillation in the background of hypertension and underwent a catheter-based ablation with pulmonary vein isolation and SVC isolation as well in 2013 and had been doing quite well until she had recurrent episodes requiring repeat ablation which was performed in 2017.  At that time, she was found to have reconnected pulmonary vein potentials which was re-isolated.  There was also evidence of accessory pulmonary veins which was re-isolated as well.     Amparo does have recurrent chest pain prompting an angiogram showing no evidence of coronary disease, however. She was d'x  with the possibility of spasm, either in esophagus or her native coronaries.  She is known to have normal LV function.  The patient was noted to have some nonsustained VT which is not new, as she had had before from previous monitoring.       I last saw the patient almost 2 years ago. Over the interim period Amparo has not had documentation of AF from her KARDIA tracings which I reviewed personally. She does noted hr is a bit faster when she exercises or walking. She is concern when rate > 110 bpm with walking or >140 when exercising. I reassured her that as long as the rate comes down after walking or exercising there is no restriction. I encouraged her to exercise at least 30 min 4-5 times a week or more and duration should not based on hr alone. Pt is off warfarin since  "I last saw her. Cont with current rx and rtc in a year.                Review of Systems     12-pt ROS is negative except for as noted in the HPI.          Physical Exam     Vitals: /70 (BP Location: Right arm, Patient Position: Sitting, Cuff Size: Adult Large)   Pulse 76   Ht 1.74 m (5' 8.5\")   Wt 111.2 kg (245 lb 1.6 oz)   LMP 12/31/2012 (Exact Date)   SpO2 96%   BMI 36.73 kg/m    Wt Readings from Last 10 Encounters:   02/08/24 111.2 kg (245 lb 1.6 oz)   03/16/23 110.9 kg (244 lb 8 oz)   05/31/22 109.8 kg (242 lb)   05/27/22 107.5 kg (237 lb)   05/12/22 107.9 kg (237 lb 14.4 oz)   04/27/22 108.6 kg (239 lb 8 oz)   04/18/22 106.9 kg (235 lb 9.6 oz)   10/22/21 110.7 kg (244 lb)   09/23/21 110.8 kg (244 lb 3.2 oz)   09/22/21 111.5 kg (245 lb 12.8 oz)       Constitutional:  Patient is pleasant, alert, cooperative, and in NAD.  HEENT:  NCAT. PERRLA. EOM's intact.   Neck:  CVP appears normal. No carotid bruits.   Pulmonary: Normal respiratory effort. CTAB.   Cardiac: RRR, normal S1/S2, no S3/S4, no murmur or rub.   Abdomen:  Non-tender abdomen, no hepatosplenomegaly appreciated.   Vascular: Pulses in the upper and lower extremities are 2+ and equal bilaterally.  Extremities: No edema, erythema, cyanosis or tenderness appreciated.  Skin:  No rashes or lesions appreciated.   Neurological:  No gross motor or sensory deficits.   Psych: Appropriate affect.          Data   Labs reviewed:  Recent Labs   Lab Test 06/14/23  1415 03/16/23  1448 04/19/22  0644 06/02/20  0944 03/19/19  1321   * 104* 86   < >  --    HDL 46* 52 45*   < >  --    NHDL 136* 128 109   < >  --    CHOL 182 180 154   < >  --    TRIG 122 119 113   < >  --    TSH  --   --   --   --  3.58    < > = values in this interval not displayed.       Lab Results   Component Value Date    WBC 4.7 04/20/2022    WBC 6.4 06/13/2019    RBC 4.58 04/20/2022    RBC 4.24 06/13/2019    HGB 13.3 04/20/2022    HGB 13.0 06/13/2019    HCT 41.3 04/20/2022    HCT " 39.4 06/13/2019    MCV 90 04/20/2022    MCV 93 06/13/2019    MCH 29.0 04/20/2022    MCH 30.7 06/13/2019    MCHC 32.2 04/20/2022    MCHC 33.0 06/13/2019    RDW 13.0 04/20/2022    RDW 12.7 06/13/2019     04/20/2022     06/13/2019       Lab Results   Component Value Date     03/16/2023     06/02/2020    POTASSIUM 3.9 06/14/2023    POTASSIUM 3.5 04/27/2022    POTASSIUM 3.8 06/02/2020    CHLORIDE 102 03/16/2023    CHLORIDE 104 04/27/2022    CHLORIDE 106 06/02/2020    CO2 25 03/16/2023    CO2 30 04/27/2022    CO2 28 06/02/2020    ANIONGAP 14 03/16/2023    ANIONGAP 4 04/27/2022    ANIONGAP 4 06/02/2020     (H) 03/16/2023    GLC 90 04/27/2022     (H) 06/02/2020    BUN 18.2 03/16/2023    BUN 18 04/27/2022    BUN 20 06/02/2020    CR 0.84 03/16/2023    CR 0.84 06/02/2020    GFRESTIMATED 76 03/16/2023    GFRESTIMATED 74 06/02/2020    GFRESTBLACK 86 06/02/2020    AVILA 9.7 03/16/2023    AVILA 8.7 06/02/2020      Lab Results   Component Value Date    AST 22 04/18/2022    AST 19 06/13/2019    ALT 20 06/14/2023    ALT 25 06/13/2019       Lab Results   Component Value Date    A1C 5.5 08/08/2006       Lab Results   Component Value Date    INR 2.8 (H) 06/28/2022    INR 2.8 (H) 05/31/2022    INR 1.57 (H) 04/20/2022    INR 1.54 (H) 04/20/2022    INR 3.40 (H) 06/22/2021    INR 2.60 (H) 05/11/2021            Problem List     Patient Active Problem List   Diagnosis    Essential hypertension, benign    Ventricular bigeminy    ACP (advance care planning)    Morbid obesity (H)    Paroxysmal atrial fibrillation (H)    Thoracic aortic aneurysm without rupture (H24)    Myasthenia gravis without exacerbation (H)    Mixed hyperlipidemia            Medications     Current Outpatient Medications   Medication Sig Dispense Refill    amLODIPine (NORVASC) 5 MG tablet Take 1 tablet (5 mg) by mouth 2 times daily 180 tablet 0    aspirin (ASA) 81 MG EC tablet Take 1 tablet (81 mg) by mouth daily      atorvastatin  (LIPITOR) 40 MG tablet Take 1 tablet (40 mg) by mouth At Bedtime 90 tablet 3    losartan (COZAAR) 100 MG tablet Take 1 tablet (100 mg) by mouth daily -LAB WORK NEEDED- 90 tablet 1    metoprolol tartrate (LOPRESSOR) 50 MG tablet TAKE 1 AND 1/2 TABLETS BY MOUTH TWICE DAILY 270 tablet 3    triamterene-HCTZ (MAXZIDE-25) 37.5-25 MG tablet Take 1 tablet by mouth every morning 90 tablet 3            Past Medical History     Past Medical History:   Diagnosis Date    Arthritis     Chest pain, unspecified     neg stress test, put on PPI- no help    Essential hypertension, benign     Hypertension, Benign    Obesity (BMI 30-39.9) 7/1/13    BMI 33.6    Paroxysmal atrial fibrillation (H) 03/2010    Superficial thrombophlebitis     Thrombosis of leg     superficial not dvt's    Ventricular bigeminy     bigeminy- nrml LV fxn     Past Surgical History:   Procedure Laterality Date    ABDOMEN SURGERY      ARTHROPLASTY KNEE Right 3/2/2015    Procedure: ARTHROPLASTY KNEE;  Surgeon: Cuco Baugh MD;  Location:  OR    [facility] SURGICAL PATHOLOGY  1976    HGSIL    CV CORONARY ANGIOGRAM N/A 4/20/2022    Procedure: Coronary Angiogram;  Surgeon: Khang Thomas MD;  Location:  HEART CARDIAC CATH LAB    CV CORONARY ANGIOGRAM  4/20/2022    Procedure: ;  Surgeon: Khang Thomas MD;  Location: [facility] CARDIAC CATH LAB    ESOPHAGOSCOPY, GASTROSCOPY, DUODENOSCOPY (EGD), COMBINED N/A 5/16/2019    Procedure: ESOPHAGOGASTRODUODENOSCOPY, WITH BIOPSY;  Surgeon: Liliane Montalvo MD;  Location:  GI    H ABLATION FOCAL AFIB  11/02/2017    H ABLATION FOCAL AFIB  01/15/2013    ORTHOPEDIC SURGERY      l tka  and wrist     TUBAL LIGATION      Uterine fibroids removed    ZZC NONSPECIFIC PROCEDURE      left hand      Family History   Problem Relation Age of Onset    C.A.D. Paternal Grandmother     Hypertension Paternal Grandmother     C.A.D. Paternal Grandfather     Connective Tissue Disorder Paternal Grandfather      Hypertension Paternal Grandfather     C.A.D. Father     Hypertension Father     Cancer Maternal Grandmother         uterine    Colon Cancer Maternal Grandmother     Cancer Maternal Grandfather         leukemia    Other Cancer Maternal Grandfather     Cancer Mother         uterine    Gastrointestinal Disease Mother         ulcers    Hypertension Mother     Other - See Comments Sister         essential tremors    C.A.D. Brother         stent- 47    Hypertension Brother     Depression Daughter     Eye Disorder Son     Hypertension Brother     Depression Daughter     Asthma Son      Social History     Socioeconomic History    Marital status:      Spouse name: Not on file    Number of children: Not on file    Years of education: Not on file    Highest education level: Not on file   Occupational History    Not on file   Tobacco Use    Smoking status: Never    Smokeless tobacco: Never   Substance and Sexual Activity    Alcohol use: Yes     Comment: once a year    Drug use: No    Sexual activity: Yes     Partners: Male     Birth control/protection: None   Other Topics Concern    Parent/sibling w/ CABG, MI or angioplasty before 65F 55M? Yes   Social History Narrative    Not on file     Social Determinants of Health     Financial Resource Strain: Not on file   Food Insecurity: Not on file   Transportation Needs: Not on file   Physical Activity: Not on file   Stress: Not on file   Social Connections: Not on file   Interpersonal Safety: Not on file   Housing Stability: Not on file            Allergies   Celebrex [celecoxib], Ibuprofen, and Ibuprofen    Today's clinic visit entailed:  The following tests were independently interpreted by me as noted in my documentation: ecg, echo, angio, KARDIA  45 minutes spent by me on the date of the encounter doing chart review, history and exam, documentation and further activities per the note  Provider  Link to [facility] Help Grid     The level of medical decision making during this  visit was of moderate complexity.       Thank you for allowing me to participate in the care of your patient.      Sincerely,     Wilbert Hernandez MD     [facility]  cc:   Wilbert Hernandez MD  [address]  [place] [address]

## 2024-02-12 DIAGNOSIS — R94.39 ABNORMAL STRESS TEST: ICD-10-CM

## 2024-02-12 RX ORDER — AMLODIPINE BESYLATE 5 MG/1
5 TABLET ORAL 2 TIMES DAILY
Qty: 180 TABLET | Refills: 4 | Status: SHIPPED | OUTPATIENT
Start: 2024-02-12 | End: 2024-04-09

## 2024-03-04 ENCOUNTER — NURSE TRIAGE (OUTPATIENT)
Dept: INTERNAL MEDICINE | Facility: CLINIC | Age: 69
End: 2024-03-04
Payer: MEDICARE

## 2024-03-04 NOTE — TELEPHONE ENCOUNTER
"Given what is listed in the request - \"[place] a [place], hit her hip and head on concrete, having vision issues and headaches. Wants to be seen as soon as possible \"    Triage this - how long ago? Was she seen? Any LOC?  What specifically are vision symptoms?   "

## 2024-03-04 NOTE — TELEPHONE ENCOUNTER
Can we work Amparo into your schedule? What would you recommend as an alternative if we cannot work in?

## 2024-03-04 NOTE — TELEPHONE ENCOUNTER
Reason for Call:  Appointment Request    Patient requesting this type of appt: Chronic Diease Management/Medication/Follow-Up    Requested provider: Alan Almeida    Reason patient unable to be scheduled: Not within requested timeframe    When does patient want to be seen/preferred time: Same day    Comments: possible concussion, wants to be seen for head injury due to fall    Could we send this information to you in [facility] or would you prefer to receive a phone call?:   Patient would prefer a phone call   Okay to leave a detailed message?: Yes at Cell number on file:    Telephone Information:   Mobile [phone]       Call taken on 3/4/2024 at 10:37 AM by Meghan Aguilar

## 2024-03-04 NOTE — TELEPHONE ENCOUNTER
Patient Contact    Attempt # 1    Was call answered?  Yes. Writer relayed PCP's message below, patient expressed verbal understanding.    how long ago?   02/27/24 slipped on marble in [place]    Was she seen?   Not seen in [place] as she did not want to go to doctor there    Any LOC?    Short LOC    What specifically are vision symptoms?    Blurry vision in both eyes, comes and goes; patient states that she is also unsteady on her feet but is not sure if it is due to pulling a muscle in her back during the fall or due to acute neuro symptoms.    Disposition  Call  Now. Patient states that she is in [facility] and does not want to be seen in [facility]. Writer advised that patient should be seen as soon as possible in ED, providing protocol rationale, and that  would be the safest way to get to ED.     Patient states that she will try going to ED in [facility]. Writer advised to call  immediately, and if pt chooses not, particularly to call  with any new symptoms, patient expressed verbal understanding/.    Sarihta Baptiste, RN  [facility]    Reason for Disposition   ACUTE NEUROLOGIC SYMPTOM and symptom present now    Protocols used: Head Injury-A-[place]

## 2024-04-03 DIAGNOSIS — I10 ESSENTIAL HYPERTENSION, BENIGN: ICD-10-CM

## 2024-04-03 DIAGNOSIS — I25.118 CORONARY ARTERY DISEASE OF NATIVE HEART WITH STABLE ANGINA PECTORIS, UNSPECIFIED VESSEL OR LESION TYPE (H): ICD-10-CM

## 2024-04-03 RX ORDER — TRIAMTERENE/HYDROCHLOROTHIAZID 37.5-25 MG
1 TABLET ORAL EVERY MORNING
Qty: 90 TABLET | Refills: 0 | Status: SHIPPED | OUTPATIENT
Start: 2024-04-03 | End: 2024-04-09

## 2024-04-03 RX ORDER — ATORVASTATIN CALCIUM 40 MG/1
40 TABLET, FILM COATED ORAL AT BEDTIME
Qty: 90 TABLET | Refills: 0 | Status: SHIPPED | OUTPATIENT
Start: 2024-04-03 | End: 2024-04-09

## 2024-04-03 RX ORDER — LOSARTAN POTASSIUM 100 MG/1
100 TABLET ORAL DAILY
Qty: 90 TABLET | Refills: 0 | Status: SHIPPED | OUTPATIENT
Start: 2024-04-03 | End: 2024-04-09

## 2024-04-03 NOTE — TELEPHONE ENCOUNTER
Medication filled 1 time as pt is due for a follow-up in clinic. Pharmacy has been notified to inform patient to call clinic and schedule appointment.    Prescription approved per [facility] Refill Protocol.  Dali Parada RN  [facility] Triage Nurse

## 2024-04-03 NOTE — LETTER
[facility] [address] [address]  [phone]  April 3, 2024  Amparo Mccray  [address] [place] [id number]    Dear Amparo,    I am contacting you regarding the refill request we received for you. After reviewing your chart it looks like you are overdue for your annual and for a med check. Please call [phone] or schedule this through my chart to continue to receive refills. If you anticipate running out before your appointment let us know and we can send in a naveed refill.       Thank you,     [facility] nursing staff

## 2024-04-09 ENCOUNTER — OFFICE VISIT (OUTPATIENT)
Dept: INTERNAL MEDICINE | Facility: CLINIC | Age: 69
End: 2024-04-09
Payer: MEDICARE

## 2024-04-09 VITALS
BODY MASS INDEX: 36.02 KG/M2 | HEIGHT: 69 IN | HEART RATE: 69 BPM | WEIGHT: 243.2 LBS | TEMPERATURE: 97.5 F | SYSTOLIC BLOOD PRESSURE: 119 MMHG | DIASTOLIC BLOOD PRESSURE: 64 MMHG | OXYGEN SATURATION: 98 %

## 2024-04-09 DIAGNOSIS — Z86.79 HISTORY OF ATRIAL FIBRILLATION: ICD-10-CM

## 2024-04-09 DIAGNOSIS — N95.0 POSTMENOPAUSAL BLEEDING: ICD-10-CM

## 2024-04-09 DIAGNOSIS — I10 ESSENTIAL HYPERTENSION, BENIGN: ICD-10-CM

## 2024-04-09 DIAGNOSIS — I25.118 CORONARY ARTERY DISEASE OF NATIVE HEART WITH STABLE ANGINA PECTORIS, UNSPECIFIED VESSEL OR LESION TYPE (H): ICD-10-CM

## 2024-04-09 DIAGNOSIS — I71.21 ANEURYSM OF ASCENDING AORTA WITHOUT RUPTURE (H): ICD-10-CM

## 2024-04-09 DIAGNOSIS — E66.01 MORBID OBESITY (H): ICD-10-CM

## 2024-04-09 DIAGNOSIS — Z00.00 ENCOUNTER FOR MEDICARE ANNUAL WELLNESS EXAM: Primary | ICD-10-CM

## 2024-04-09 DIAGNOSIS — R73.9 HYPERGLYCEMIA: ICD-10-CM

## 2024-04-09 DIAGNOSIS — I47.10 SVT (SUPRAVENTRICULAR TACHYCARDIA) (H): ICD-10-CM

## 2024-04-09 PROBLEM — G70.00 MYASTHENIA GRAVIS WITHOUT EXACERBATION (H): Status: RESOLVED | Noted: 2023-03-16 | Resolved: 2024-04-09

## 2024-04-09 LAB
ANION GAP SERPL CALCULATED.3IONS-SCNC: 13 MMOL/L (ref 7–15)
BUN SERPL-MCNC: 24.8 MG/DL (ref 8–23)
CALCIUM SERPL-MCNC: 9.5 MG/DL (ref 8.8–10.2)
CHLORIDE SERPL-SCNC: 100 MMOL/L (ref 98–107)
CHOLEST SERPL-MCNC: 193 MG/DL
CREAT SERPL-MCNC: 1.18 MG/DL (ref 0.51–0.95)
DEPRECATED HCO3 PLAS-SCNC: 25 MMOL/L (ref 22–29)
EGFRCR SERPLBLD CKD-EPI 2021: 50 ML/MIN/1.73M2
FASTING STATUS PATIENT QL REPORTED: NO
GLUCOSE SERPL-MCNC: 255 MG/DL (ref 70–99)
HDLC SERPL-MCNC: 48 MG/DL
LDLC SERPL CALC-MCNC: 110 MG/DL
NONHDLC SERPL-MCNC: 145 MG/DL
POTASSIUM SERPL-SCNC: 4.2 MMOL/L (ref 3.4–5.3)
SODIUM SERPL-SCNC: 138 MMOL/L (ref 135–145)
TRIGL SERPL-MCNC: 176 MG/DL

## 2024-04-09 PROCEDURE — 90480 ADMN SARSCOV2 VAC 1/ONLY CMP: CPT | Performed by: INTERNAL MEDICINE

## 2024-04-09 PROCEDURE — 80048 BASIC METABOLIC PNL TOTAL CA: CPT | Performed by: INTERNAL MEDICINE

## 2024-04-09 PROCEDURE — 36415 COLL VENOUS BLD VENIPUNCTURE: CPT | Performed by: INTERNAL MEDICINE

## 2024-04-09 PROCEDURE — 99214 OFFICE O/P EST MOD 30 MIN: CPT | Mod: 25 | Performed by: INTERNAL MEDICINE

## 2024-04-09 PROCEDURE — 80061 LIPID PANEL: CPT | Performed by: INTERNAL MEDICINE

## 2024-04-09 PROCEDURE — 91320 SARSCV2 VAC 30MCG TRS-SUC IM: CPT | Performed by: INTERNAL MEDICINE

## 2024-04-09 PROCEDURE — G0439 PPPS, SUBSEQ VISIT: HCPCS | Performed by: INTERNAL MEDICINE

## 2024-04-09 RX ORDER — TRIAMTERENE/HYDROCHLOROTHIAZID 37.5-25 MG
1 TABLET ORAL EVERY MORNING
Qty: 90 TABLET | Refills: 3 | Status: SHIPPED | OUTPATIENT
Start: 2024-04-09

## 2024-04-09 RX ORDER — ATORVASTATIN CALCIUM 40 MG/1
40 TABLET, FILM COATED ORAL AT BEDTIME
Qty: 90 TABLET | Refills: 3 | Status: SHIPPED | OUTPATIENT
Start: 2024-04-09

## 2024-04-09 RX ORDER — LOSARTAN POTASSIUM 100 MG/1
100 TABLET ORAL DAILY
Qty: 90 TABLET | Refills: 3 | Status: SHIPPED | OUTPATIENT
Start: 2024-04-09

## 2024-04-09 RX ORDER — AMLODIPINE BESYLATE 5 MG/1
5 TABLET ORAL 2 TIMES DAILY
Qty: 180 TABLET | Refills: 3 | Status: SHIPPED | OUTPATIENT
Start: 2024-04-09

## 2024-04-09 RX ORDER — METOPROLOL TARTRATE 50 MG
100 TABLET ORAL 2 TIMES DAILY
Qty: 360 TABLET | Refills: 3 | Status: SHIPPED | OUTPATIENT
Start: 2024-04-09

## 2024-04-09 NOTE — PATIENT INSTRUCTIONS
Preventive Care Advice   This is general advice given by our system to help you stay healthy. However, your care team may have specific advice just for you. Please talk to your care team about your preventive care needs.  Nutrition  Eat 5 or more servings of fruits and vegetables each day.  Try wheat bread, brown rice and whole grain pasta (instead of white bread, rice, and pasta).  Get enough calcium and vitamin D. Check the label on foods and aim for 100% of the RDA (recommended daily allowance).  Lifestyle  Exercise at least 150 minutes each week   (30 minutes a day, 5 days a week).  Do muscle strengthening activities 2 days a week. These help control your weight and prevent disease.  No smoking.  Wear sunscreen to prevent skin cancer.  Have a dental exam and cleaning every 6 months.  Yearly exams  See your health care team every year to talk about:  Any changes in your health.  Any medicines your care team has prescribed.  Preventive care, family planning, and ways to prevent chronic diseases.  Shots (vaccines)   HPV shots (up to age 26), if you've never had them before.  Hepatitis B shots (up to age 59), if you've never had them before.  COVID-19 shot: Get this shot when it's due.  Flu shot: Get a flu shot every year.  Tetanus shot: Get a tetanus shot every 10 years.  Pneumococcal, hepatitis A, and RSV shots: Ask your care team if you need these based on your risk.  Shingles shot (for age 50 and up).  General health tests  Diabetes screening:  Starting at age 35, Get screened for diabetes at least every 3 years.  If you are younger than age 35, ask your care team if you should be screened for diabetes.  Cholesterol test: At age 39, start having a cholesterol test every 5 years, or more often if advised.  Bone density scan (DEXA): At age 50, ask your care team if you should have this scan for osteoporosis (brittle bones).  Hepatitis C: Get tested at least once in your life.  STIs (sexually transmitted  infections)  Before age 24: Ask your care team if you should be screened for STIs.  After age 24: Get screened for STIs if you're at risk. You are at risk for STIs (including HIV) if:  You are sexually active with more than one person.  You don't use condoms every time.  You or a partner was diagnosed with a sexually transmitted infection.  If you are at risk for HIV, ask about PrEP medicine to prevent HIV.  Get tested for HIV at least once in your life, whether you are at risk for HIV or not.  Cancer screening tests  Cervical cancer screening: If you have a cervix, begin getting regular cervical cancer screening tests at age 21. Most people who have regular screenings with normal results can stop after age 65. Talk about this with your provider.  Breast cancer scan (mammogram): If you've ever had breasts, begin having regular mammograms starting at age 40. This is a scan to check for breast cancer.  Colon cancer screening: It is important to start screening for colon cancer at age 45.  Have a colonoscopy test every 10 years (or more often if you're at risk) Or, ask your provider about stool tests like a FIT test every year or Cologuard test every 3 years.  To learn more about your testing options, visit: https://www."[employer]/522636.pdf.  For help making a decision, visit: https://bit.ly/nb20906.  Prostate cancer screening test: If you have a prostate and are age 55 to 69, ask your provider if you would benefit from a yearly prostate cancer screening test.  Lung cancer screening: If you are a current or former smoker age 50 to 80, ask your care team if ongoing lung cancer screenings are right for you.  For informational purposes only. Not to replace the advice of your health care provider. Copyright   2023 [employer]. All rights reserved. Clinically reviewed by the [facility] Transitions Program. [employer] 536027 - REV 01/24.    Preventing Falls: Care Instructions  Injuries and health  problems such as trouble walking or poor eyesight can increase your risk of falling. So can some medicines. But there are things you can do to help prevent falls. You can exercise to get stronger. You can also arrange your home to make it safer.    Talk to your doctor about the medicines you take. Ask if any of them increase the risk of falls and whether they can be changed or stopped.   Try to exercise regularly. It can help improve your strength and balance. This can help lower your risk of falling.     Practice fall safety and prevention.    Wear low-heeled shoes that fit well and give your feet good support. Talk to your doctor if you have foot problems that make this hard.  Carry a cellphone or wear a medical alert device that you can use to call for help.  Use stepladders instead of chairs to reach high objects. Don't climb if you're at risk for falls. Ask for help, if needed.  Wear the correct eyeglasses, if you need them.    Make your home safer.    Remove rugs, cords, clutter, and furniture from walkways.  Keep your house well lit. Use night-lights in hallways and bathrooms.  Install and use sturdy handrails on stairways.  Wear nonskid footwear, even inside. Don't walk barefoot or in socks without shoes.    Be safe outside.    Use handrails, curb cuts, and ramps whenever possible.  Keep your hands free by using a shoulder bag or backpack.  Try to walk in well-lit areas. Watch out for uneven ground, changes in pavement, and debris.  Be careful in the winter. Walk on the grass or gravel when sidewalks are slippery. Use de-icer on steps and walkways. Add non-slip devices to shoes.    Put grab bars and nonskid mats in your shower or tub and near the toilet. Try to use a shower chair or bath bench when bathing.   Get into a tub or shower by putting in your weaker leg first. Get out with your strong side first. Have a phone or medical alert device in the bathroom with you.   Where can you learn more?  Go to  "https://www.[employer].net/patiented  Enter G117 in the search box to learn more about \"Preventing Falls: Care Instructions.\"  Current as of: July 17, 2023               Content Version: 14.0    4031-0213 [employer].   Care instructions adapted under license by your healthcare professional. If you have questions about a medical condition or this instruction, always ask your healthcare professional. [employer] disclaims any warranty or liability for your use of this information.      "

## 2024-04-09 NOTE — PROGRESS NOTES
"Preventive Care Visit  [facility]  Alan Almeida MD, Internal Medicine  Apr 9, 2024      Assessment & Plan     Encounter for Medicare annual wellness exam  Updated screening, immunizations, prevention.  Please see health maintenance list, care gaps   Refuses mammography    Essential hypertension, benign  Increase her metoprolol to 100 mg bid - see if this helps reduce episodes, if not f/u with cardiology  - metoprolol tartrate (LOPRESSOR) 50 MG tablet; Take 2 tablets (100 mg) by mouth 2 times daily  - losartan (COZAAR) 100 MG tablet; Take 1 tablet (100 mg) by mouth daily  - triamterene-HCTZ (MAXZIDE-25) 37.5-25 MG tablet; Take 1 tablet by mouth every morning  - Basic metabolic panel; Future  - Basic metabolic panel    Coronary artery disease of native heart with stable angina pectoris, unspecified vessel or lesion type (H24)  Cont secondary prevention   - atorvastatin (LIPITOR) 40 MG tablet; Take 1 tablet (40 mg) by mouth at bedtime  - Lipid Profile; Future  - Lipid Profile    SVT (supraventricular tachycardia) (H24)  History of atrial fibrillation  S/p ablation for atrial fib. Currently wonder if these episodes are more SVT than true afib- but given freq of occurrence at rest- if cont w/increase in B-B as noted above would recommend f/u with EP    Postmenopausal bleeding  Needs to have this evaluated - gyn referral placed  - Ob/Gyn  Referral; Future    Thoracic AA  Cont surveillance     Review of prior external note(s) from - Outside records from ER  Review of the result(s) of each unique test - labs, cTs  Ordering of each unique test  Prescription drug management        BMI  Estimated body mass index is 36.44 kg/m  as calculated from the following:    Height as of this encounter: 1.74 m (5' 8.5\").    Weight as of this encounter: 110.3 kg (243 lb 3.2 oz).   Weight management plan: Discussed healthy diet and exercise guidelines      Work on weight loss  Regular exercise  See " Patient Instructions    Momo Christensen is a 68 year old, presenting for the following:  Wellness Visit  Covid shot- yes       Health Care Directive  Patient does not have a Health Care Directive or Living Will: Discussed advance care planning with patient; however, patient declined at this time.    HPI  While on vacation in [place] she fell and hit her head.  [place] the [place] she had noticeable increase in pain as well as some visual symptoms.  For this reason evaluated in [facility].  Negative CT of the head.  Symptoms have essentially resolved.  Around the same time she noted some vaginal bleeding.  When she was at the ER they included abdominal pelvic CT which seem to note a increased/thickened endometrial lining.  She has not seen OB/GYN    Chronically, she states she has frequent episodes of tachycardia.  She did see cardiology earlier this year and she notes that these occur at rest not with exertion.  They are typically quite brief but rates in the 120s to 140s.  She feels they occur anywhere from several times a day to several times per week.               4/9/2024   General Health   How would you rate your overall physical health? Good   Feel stress (tense, anxious, or unable to sleep) Not at all         4/9/2024   Nutrition   Diet: I don't know         4/9/2024   Exercise   Days per week of moderate/strenous exercise 2 days   (!) EXERCISE CONCERN      4/9/2024   Social Factors   Frequency of gathering with friends or relatives Three times a week         4/23/2024   Fall Risk   Fallen 2 or more times in the past year? No   Trouble with walking or balance? Yes           3/9/2023   Activities of Daily Living- Home Safety   Needs help with the following daily activites NO assistance is needed   Safety concerns in the home None of the above         4/9/2024   Dental   Dentist two times every year? (!) NO         3/9/2023   Hearing Screening   Hearing concerns? Difficulty following a conversation in a  noisy restaurant or crowded room            No data to display                   Today's PHQ-2 Score:       4/9/2024     8:50 AM   PHQ-2 ( 1999 Pfizer)   Q1: Little interest or pleasure in doing things 0   Q2: Feeling down, depressed or hopeless 0   PHQ-2 Score 0         3/9/2023   Substance Use   Alcohol more than 3/day or more than 7/wk Not Applicable     Social History     Tobacco Use    Smoking status: Never    Smokeless tobacco: Never   Substance Use Topics    Alcohol use: Yes     Comment: once a year    Drug use: No     Do you have a current opioid prescription (for example: hydrocodone, oxycodone, tramadol, or codeine)? No             3/9/2023   LAST FHS-7 RESULTS   1st degree relative breast or ovarian cancer No   Any relative bilateral breast cancer No   Any male have breast cancer No   Any ONE woman have BOTH breast AND ovarian cancer No   Any woman with breast cancer before 50yrs No   2 or more relatives with breast AND/OR ovarian cancer No   2 or more relatives with breast AND/OR bowel cancer No        Mammogram Screening - Mammography discussed and declined    ASCVD Risk   The 10-year ASCVD risk score (Enmanuel BUCIO, et al., 2019) is: 8.2%    Values used to calculate the score:      Age: 68 years      Sex: Female      Is Non- : No      Diabetic: No      Tobacco smoker: No      Systolic Blood Pressure: 119 mmHg      Is BP treated: Yes      HDL Cholesterol: 48 mg/dL      Total Cholesterol: 193 mg/dL              Reviewed and updated as needed this visit by Provider                    Lab work is in process  Labs reviewed in EPIC  Current providers sharing in care for this patient include:  Patient Care Team:  Alan Almeida MD as PCP - General  Alan Almeida MD as Assigned PCP  Nikole Miner MD as MD (Neurology)  Wilbert Thomas MD as Assigned Heart and Vascular Provider  Alejandro Overton MD as Physician (OB/Gyn)    The following health maintenance  "items are reviewed in Epic and correct as of today:  Health Maintenance   Topic Date Due    MAMMO SCREENING  04/03/2020    DTAP/TDAP/TD IMMUNIZATION (2 - Td or Tdap) 05/26/2021    ZOSTER IMMUNIZATION (2 of 2) 11/07/2023    COVID-19 Vaccine (7 - 2023-24 season) 08/09/2024    MEDICARE ANNUAL WELLNESS VISIT  04/09/2025    LIPID  04/09/2025    ANNUAL REVIEW OF HM ORDERS  04/09/2025    FALL RISK ASSESSMENT  04/09/2025    COLORECTAL CANCER SCREENING  03/29/2026    GLUCOSE  04/20/2027    ADVANCE CARE PLANNING  03/16/2028    DEXA  03/30/2038    HEPATITIS C SCREENING  Completed    PHQ-2 (once per calendar year)  Completed    INFLUENZA VACCINE  Completed    Pneumococcal Vaccine: 65+ Years  Completed    RSV VACCINE (Pregnancy & 60+)  Completed    IPV IMMUNIZATION  Aged Out    HPV IMMUNIZATION  Aged Out    MENINGITIS IMMUNIZATION  Aged Out    RSV MONOCLONAL ANTIBODY  Aged Out    PAP  Discontinued            Objective    Exam  /64   Pulse 69   Temp 97.5  F (36.4  C) (Temporal)   Ht 1.74 m (5' 8.5\")   Wt 110.3 kg (243 lb 3.2 oz)   LMP 12/31/2012 (Exact Date)   SpO2 98%   BMI 36.44 kg/m     Estimated body mass index is 36.44 kg/m  as calculated from the following:    Height as of this encounter: 1.74 m (5' 8.5\").    Weight as of this encounter: 110.3 kg (243 lb 3.2 oz).    Physical Exam  GENERAL: alert and no distress  EYES: Eyes grossly normal to inspection, PERRL and conjunctivae and sclerae normal  HENT: ear canals and TM's normal, nose and mouth without ulcers or lesions  NECK: no adenopathy, no asymmetry, masses, or scars  RESP: lungs clear to auscultation - no rales, rhonchi or wheezes  CV: regular rate and rhythm, normal S1 S2, no S3 or S4, no murmur, click or rub, no peripheral edema  ABDOMEN: soft, nontender, no hepatosplenomegaly, no masses and bowel sounds normal  MS: no gross musculoskeletal defects noted, no edema  SKIN: no suspicious lesions or rashes  NEURO: Normal strength and tone, mentation intact " and speech normal  PSYCH: mentation appears normal, affect normal/bright  LYMPH: no cervical adenopathy        4/9/2024   Mini Cog   Clock Draw Score 0 Abnormal   3 Item Recall 3 objects recalled   Mini Cog Total Score 3              Signed Electronically by: Alan Almeida MD

## 2024-04-14 ENCOUNTER — HEALTH MAINTENANCE LETTER (OUTPATIENT)
Age: 69
End: 2024-04-14

## 2024-04-20 ENCOUNTER — LAB (OUTPATIENT)
Dept: LAB | Facility: CLINIC | Age: 69
End: 2024-04-20
Payer: MEDICARE

## 2024-04-20 DIAGNOSIS — R73.9 HYPERGLYCEMIA: ICD-10-CM

## 2024-04-20 LAB — HBA1C MFR BLD: 7.2 % (ref 0–5.6)

## 2024-04-20 PROCEDURE — 83036 HEMOGLOBIN GLYCOSYLATED A1C: CPT

## 2024-04-20 PROCEDURE — 36415 COLL VENOUS BLD VENIPUNCTURE: CPT

## 2024-04-20 PROCEDURE — 82947 ASSAY GLUCOSE BLOOD QUANT: CPT

## 2024-04-21 LAB
FASTING STATUS PATIENT QL REPORTED: YES
GLUCOSE SERPL-MCNC: 131 MG/DL (ref 70–99)

## 2024-04-23 ENCOUNTER — VIRTUAL VISIT (OUTPATIENT)
Dept: INTERNAL MEDICINE | Facility: CLINIC | Age: 69
End: 2024-04-23
Payer: MEDICARE

## 2024-04-23 DIAGNOSIS — E11.65 TYPE 2 DIABETES MELLITUS WITH HYPERGLYCEMIA, WITHOUT LONG-TERM CURRENT USE OF INSULIN (H): Primary | ICD-10-CM

## 2024-04-23 PROCEDURE — 99214 OFFICE O/P EST MOD 30 MIN: CPT | Mod: GT | Performed by: INTERNAL MEDICINE

## 2024-04-23 RX ORDER — METFORMIN HCL 500 MG
500 TABLET, EXTENDED RELEASE 24 HR ORAL
Qty: 90 TABLET | Refills: 1 | Status: SHIPPED | OUTPATIENT
Start: 2024-04-23 | End: 2024-08-01

## 2024-04-23 SDOH — HEALTH STABILITY: PHYSICAL HEALTH: ON AVERAGE, HOW MANY DAYS PER WEEK DO YOU ENGAGE IN MODERATE TO STRENUOUS EXERCISE (LIKE A BRISK WALK)?: 2 DAYS

## 2024-04-23 ASSESSMENT — SOCIAL DETERMINANTS OF HEALTH (SDOH): HOW OFTEN DO YOU GET TOGETHER WITH FRIENDS OR RELATIVES?: THREE TIMES A WEEK

## 2024-04-23 NOTE — PROGRESS NOTES
Amparo is a 68 year old who is being evaluated via a billable video visit.    How would you like to obtain your AVS? MyChart  If the video visit is dropped, the invitation should be resent by: Send to e-mail at: [email or web address]  Will anyone else be joining your video visit? No      Assessment & Plan     Type 2 diabetes mellitus with hyperglycemia, without long-term current use of insulin (H)  New onset DM  Start metformin  Cont statin.  Recheck GFR, MA    CDE- f/u after 3 mo labs, w/virtual visit  - Basic metabolic panel  (Ca, Cl, CO2, Creat, Gluc, K, Na, BUN); Future  - Adult Eye  Referral; Future  - Albumin Random Urine Quantitative with Creat Ratio; Future  - Adult Diabetes Education  Referral; Future  - metFORMIN (GLUCOPHAGE XR) 500 MG 24 hr tablet; Take 1 tablet (500 mg) by mouth daily (with dinner)  - Hemoglobin A1c; Future    Review of the result(s) of each unique test - labs  Ordering of each unique test  Prescription drug management        The longitudinal plan of care for the diagnosis(es)/condition(s) as documented were addressed during this visit. Due to the added complexity in care, I will continue to support Amparo in the subsequent management and with ongoing continuity of care.   See Patient Instructions    Subjective   Amparo is a 68 year old, presenting for the following health issues:  Results      Video Start Time:  301    HPI     Discuss recent A1C/glucose lab results              Objective           Vitals:  No vitals were obtained today due to virtual visit.    Physical Exam   GENERAL: alert and no distress  RESP: No audible wheeze, cough, or visible cyanosis.    SKIN: Visible skin clear. No significant rash, abnormal pigmentation or lesions.  NEURO: Cranial nerves grossly intact.  Mentation and speech appropriate for age.  PSYCH: Appropriate affect, tone, and pace of words          Video-Visit Details    Type of service:  Video Visit   Video End Time: 331  Originating  Location (pt. Location): Home    Distant Location (provider location):  On-site  Platform used for Video Visit: Charly  Signed Electronically by: Alan Almeida MD

## 2024-05-04 ENCOUNTER — LAB (OUTPATIENT)
Dept: LAB | Facility: CLINIC | Age: 69
End: 2024-05-04
Payer: MEDICARE

## 2024-05-04 DIAGNOSIS — E11.65 TYPE 2 DIABETES MELLITUS WITH HYPERGLYCEMIA, WITHOUT LONG-TERM CURRENT USE OF INSULIN (H): ICD-10-CM

## 2024-05-04 LAB
ANION GAP SERPL CALCULATED.3IONS-SCNC: 12 MMOL/L (ref 7–15)
BUN SERPL-MCNC: 20 MG/DL (ref 8–23)
CALCIUM SERPL-MCNC: 9.1 MG/DL (ref 8.8–10.2)
CHLORIDE SERPL-SCNC: 103 MMOL/L (ref 98–107)
CREAT SERPL-MCNC: 1.18 MG/DL (ref 0.51–0.95)
CREAT UR-MCNC: 52.6 MG/DL
DEPRECATED HCO3 PLAS-SCNC: 27 MMOL/L (ref 22–29)
EGFRCR SERPLBLD CKD-EPI 2021: 50 ML/MIN/1.73M2
GLUCOSE SERPL-MCNC: 148 MG/DL (ref 70–99)
MICROALBUMIN UR-MCNC: <12 MG/L
MICROALBUMIN/CREAT UR: NORMAL MG/G{CREAT}
POTASSIUM SERPL-SCNC: 4.8 MMOL/L (ref 3.4–5.3)
SODIUM SERPL-SCNC: 142 MMOL/L (ref 135–145)

## 2024-05-04 PROCEDURE — 82043 UR ALBUMIN QUANTITATIVE: CPT

## 2024-05-04 PROCEDURE — 36415 COLL VENOUS BLD VENIPUNCTURE: CPT

## 2024-05-04 PROCEDURE — 82570 ASSAY OF URINE CREATININE: CPT

## 2024-05-04 PROCEDURE — 80048 BASIC METABOLIC PNL TOTAL CA: CPT

## 2024-05-09 ENCOUNTER — VIRTUAL VISIT (OUTPATIENT)
Dept: INTERNAL MEDICINE | Facility: CLINIC | Age: 69
End: 2024-05-09
Payer: MEDICARE

## 2024-05-09 DIAGNOSIS — N17.9 AKI (ACUTE KIDNEY INJURY) (H): ICD-10-CM

## 2024-05-09 DIAGNOSIS — E11.65 TYPE 2 DIABETES MELLITUS WITH HYPERGLYCEMIA, WITHOUT LONG-TERM CURRENT USE OF INSULIN (H): Primary | ICD-10-CM

## 2024-05-09 DIAGNOSIS — R31.29 MICROSCOPIC HEMATURIA: ICD-10-CM

## 2024-05-09 PROCEDURE — 99213 OFFICE O/P EST LOW 20 MIN: CPT | Mod: GT | Performed by: INTERNAL MEDICINE

## 2024-05-09 RX ORDER — LANCETS
EACH MISCELLANEOUS
Qty: 50 EACH | Refills: 11 | Status: SHIPPED | OUTPATIENT
Start: 2024-05-09 | End: 2024-05-14

## 2024-05-09 NOTE — PROGRESS NOTES
Amparo is a 68 year old who is being evaluated via a billable video visit.    How would you like to obtain your AVS? MyChart  If the video visit is dropped, the invitation should be resent by: Text to cell phone: [phone]  Will anyone else be joining your video visit? No      Assessment & Plan     Type 2 diabetes mellitus with hyperglycemia, without long-term current use of insulin (H)  See CDE. F/u 3 mo A1c.   - blood glucose monitoring (NO BRAND SPECIFIED) meter device kit; Use to test blood sugar 1x times daily. Preferred Blood Glucose Monitor Brands: per insurance.  - blood glucose (NO BRAND SPECIFIED) test strip; Use to test blood sugar 1x times daily. Preferred Blood Glucose Monitor Brands: per insurance.  - thin (NO BRAND SPECIFIED) lancets; Use to test blood sugar 1x times daily. Preferred Blood Glucose Monitor Brands: per insurance.    PATY (acute kidney injury) (H24)  Microscopic hematuria  GFR change first noticed s/p contrast CT.  Maybe this culprit - mild rise so we could be seeing it on the downward trend.  Will have this repeated in another few months. U/a at 3/24 ER visit also notable for microscopic hematuria. Is having some vaginal bleeding so possible some of this is contaminant.   W/negative CT if this is persistent on repeat u/a have her see urology   - UA Macroscopic with reflex to Microscopic and Culture - Lab Collect; Future    Review of prior external note(s) from - Outside records from [facility]  Review of the result(s) of each unique test - labs/CT   Ordering of each unique test      Current Outpatient Medications   Medication Sig Dispense Refill    amLODIPine (NORVASC) 5 MG tablet Take 1 tablet (5 mg) by mouth 2 times daily 180 tablet 3    aspirin (ASA) 81 MG EC tablet Take 1 tablet (81 mg) by mouth daily      atorvastatin (LIPITOR) 40 MG tablet Take 1 tablet (40 mg) by mouth at bedtime 90 tablet 3    blood glucose (NO BRAND SPECIFIED) test strip Use to test blood sugar 1x times daily.  Preferred Blood Glucose Monitor Brands: per insurance. 50 strip 11    blood glucose monitoring (NO BRAND SPECIFIED) meter device kit Use to test blood sugar 1x times daily. Preferred Blood Glucose Monitor Brands: per insurance. 1 kit 0    losartan (COZAAR) 100 MG tablet Take 1 tablet (100 mg) by mouth daily 90 tablet 3    metFORMIN (GLUCOPHAGE XR) 500 MG 24 hr tablet Take 1 tablet (500 mg) by mouth daily (with dinner) 90 tablet 1    metoprolol tartrate (LOPRESSOR) 50 MG tablet Take 2 tablets (100 mg) by mouth 2 times daily 360 tablet 3    thin (NO BRAND SPECIFIED) lancets Use to test blood sugar 1x times daily. Preferred Blood Glucose Monitor Brands: per insurance. 50 each 11    triamterene-HCTZ (MAXZIDE-25) 37.5-25 MG tablet Take 1 tablet by mouth every morning 90 tablet 3              Momo Christensen is a 68 year old, presenting for the following health issues:  Results (Discuss recent lab results)      Video Start Time:  316    HPI   As a follow up to recently dx DM2 - pt had lab work done   Microalbuminuria negative  GFR 50- consistent with prior GFR from April. This is down from 70s.  Did have CT w/contrast at [facility] march 2024- no pre CT GFR was done. Good urine flow. No urinary sx.   U/a + RBC 3/24.               Objective       Vitals:  No vitals were obtained today due to virtual visit.    Physical Exam   GENERAL: alert and no distress  SKIN: Visible skin clear. No significant rash, abnormal pigmentation or lesions.  NEURO: Cranial nerves grossly intact.  Mentation and speech appropriate for age.  PSYCH: Appropriate affect, tone, and pace of words          Video-Visit Details    Type of service:  Video Visit   Video End Time: 332  Originating Location (pt. Location): Home    Distant Location (provider location):  On-site  Platform used for Video Visit: Charly  Signed Electronically by: Alan Almeida MD

## 2024-05-14 ENCOUNTER — TELEPHONE (OUTPATIENT)
Dept: INTERNAL MEDICINE | Facility: CLINIC | Age: 69
End: 2024-05-14
Payer: MEDICARE

## 2024-05-14 DIAGNOSIS — E11.65 TYPE 2 DIABETES MELLITUS WITH HYPERGLYCEMIA, WITHOUT LONG-TERM CURRENT USE OF INSULIN (H): ICD-10-CM

## 2024-05-14 RX ORDER — LANCETS
EACH MISCELLANEOUS
Qty: 100 EACH | Refills: 5 | Status: SHIPPED | OUTPATIENT
Start: 2024-05-14

## 2024-05-14 NOTE — TELEPHONE ENCOUNTER
Updated qty/refills on test strip and lancet Rx's for Medicare compliance.     Leno Edouard, PharmD  [facility]

## 2024-07-19 ENCOUNTER — LAB (OUTPATIENT)
Dept: LAB | Facility: CLINIC | Age: 69
End: 2024-07-19
Payer: MEDICARE

## 2024-07-19 DIAGNOSIS — E11.65 TYPE 2 DIABETES MELLITUS WITH HYPERGLYCEMIA, WITHOUT LONG-TERM CURRENT USE OF INSULIN (H): ICD-10-CM

## 2024-07-19 DIAGNOSIS — N17.9 AKI (ACUTE KIDNEY INJURY) (H): ICD-10-CM

## 2024-07-19 DIAGNOSIS — R31.29 MICROSCOPIC HEMATURIA: ICD-10-CM

## 2024-07-19 LAB
ALBUMIN UR-MCNC: NEGATIVE MG/DL
ANION GAP SERPL CALCULATED.3IONS-SCNC: 10 MMOL/L (ref 7–15)
APPEARANCE UR: CLEAR
BILIRUB UR QL STRIP: NEGATIVE
BUN SERPL-MCNC: 19.9 MG/DL (ref 8–23)
CALCIUM SERPL-MCNC: 9.1 MG/DL (ref 8.8–10.4)
CHLORIDE SERPL-SCNC: 104 MMOL/L (ref 98–107)
COLOR UR AUTO: YELLOW
CREAT SERPL-MCNC: 1.06 MG/DL (ref 0.51–0.95)
EGFRCR SERPLBLD CKD-EPI 2021: 57 ML/MIN/1.73M2
GLUCOSE SERPL-MCNC: 145 MG/DL (ref 70–99)
GLUCOSE UR STRIP-MCNC: NEGATIVE MG/DL
HBA1C MFR BLD: 6.7 % (ref 0–5.6)
HCO3 SERPL-SCNC: 28 MMOL/L (ref 22–29)
HGB UR QL STRIP: NEGATIVE
KETONES UR STRIP-MCNC: NEGATIVE MG/DL
LEUKOCYTE ESTERASE UR QL STRIP: NEGATIVE
NITRATE UR QL: NEGATIVE
PH UR STRIP: 6 [PH] (ref 5–7)
POTASSIUM SERPL-SCNC: 4.8 MMOL/L (ref 3.4–5.3)
SODIUM SERPL-SCNC: 142 MMOL/L (ref 135–145)
SP GR UR STRIP: 1.01 (ref 1–1.03)
UROBILINOGEN UR STRIP-ACNC: 0.2 E.U./DL

## 2024-07-19 PROCEDURE — 81003 URINALYSIS AUTO W/O SCOPE: CPT

## 2024-07-19 PROCEDURE — 83036 HEMOGLOBIN GLYCOSYLATED A1C: CPT

## 2024-07-19 PROCEDURE — 80048 BASIC METABOLIC PNL TOTAL CA: CPT

## 2024-07-19 PROCEDURE — 36415 COLL VENOUS BLD VENIPUNCTURE: CPT

## 2024-08-01 ENCOUNTER — VIRTUAL VISIT (OUTPATIENT)
Dept: INTERNAL MEDICINE | Facility: CLINIC | Age: 69
End: 2024-08-01
Attending: INTERNAL MEDICINE
Payer: MEDICARE

## 2024-08-01 DIAGNOSIS — E11.65 TYPE 2 DIABETES MELLITUS WITH HYPERGLYCEMIA, WITHOUT LONG-TERM CURRENT USE OF INSULIN (H): Primary | ICD-10-CM

## 2024-08-01 PROCEDURE — 99213 OFFICE O/P EST LOW 20 MIN: CPT | Mod: 95 | Performed by: INTERNAL MEDICINE

## 2024-08-01 PROCEDURE — G2211 COMPLEX E/M VISIT ADD ON: HCPCS | Mod: 95 | Performed by: INTERNAL MEDICINE

## 2024-08-01 RX ORDER — METFORMIN HCL 500 MG
1000 TABLET, EXTENDED RELEASE 24 HR ORAL
Qty: 180 TABLET | Refills: 3 | Status: SHIPPED | OUTPATIENT
Start: 2024-08-01

## 2024-08-01 NOTE — PROGRESS NOTES
Amparo is a 68 year old who is being evaluated via a billable video visit.    How would you like to obtain your AVS? MyChart  If the video visit is dropped, the invitation should be resent by: Send to e-mail at: [email or web address]  Will anyone else be joining your video visit? No      Assessment & Plan     Type 2 diabetes mellitus with hyperglycemia, without long-term current use of insulin (H)  Nice improvement - go to 1000 mg metformin  Recheck 3-6 mo a1c  - metFORMIN (GLUCOPHAGE XR) 500 MG 24 hr tablet; Take 2 tablets (1,000 mg) by mouth daily (with dinner)  - Hemoglobin A1c; Future    Work on weight loss  Regular exercise  See Patient Instructions    The longitudinal plan of care for the diagnosis(es)/condition(s) as documented were addressed during this visit. Due to the added complexity in care, I will continue to support Amparo in the subsequent management and with ongoing continuity of care.    Subjective   Amparo is a 68 year old, presenting for the following health issues:  Diabetes      Video Start Time:  334    History of Present Illness       Diabetes:   She presents for follow up of diabetes.  She is checking home blood glucose one time daily.   She checks blood glucose after meals.  Blood glucose is sometimes over 200 and never under 70.  When her blood glucose is low, the patient is asymptomatic for confusion, blurred vision, lethargy and reports not feeling dizzy, shaky, or weak.   She has no concerns regarding her diabetes at this time.   She is not experiencing numbness or burning in feet, excessive thirst, blurry vision, weight changes or redness, sores or blisters on feet. The patient has not had a diabetic eye exam in the last 12 months.                          Objective           Vitals:  No vitals were obtained today due to virtual visit.    Physical Exam   GENERAL: alert and no distress  SKIN: Visible skin clear. No significant rash, abnormal pigmentation or lesions.  NEURO: Cranial nerves  Post Liver Transplant Team Conference  Date: 9/13/2018  Transplant Coordinator: Abigail Parham      Attendees:  [] Dr. Dias [] Dr. Fields []       [x] Dr. Rod [x] Thien Moody LPN []    [] Dr. Rayo [] aGbriela Moreland NP []    [] Dr. Lanza [] Maribel Stringer NP []    [] Dr. Blackwood [] Marifer Leija, BECKI []       [] Dr. Lilly [x] Janae Gallegos RN []       [] Dr. Kapil Gamble [x] Abigail Parham, BECKI []       [] Dr. Staples [x] Leighann Rodriguez RN []       [] Dr. Rowe [] Darlene Dooley, RN []       [] Dr. Garcias [] Tye Early RN []         Verbal Plan Read Back:   -needs ID follow-up due to calcified lymph nodes  -coumadin for a year due to thrombosed PV  -needs local management by pain clinic if still having pain  -decrease cellcept to 750 mg po bid  -watch bumex dosing      Routed to RN Coordinator   Abigail Parham   grossly intact.  Mentation and speech appropriate for age.  PSYCH: Appropriate affect, tone, and pace of words          Video-Visit Details    Type of service:  Video Visit   Video End Time:3:47 PM  Originating Location (pt. Location): Home    Distant Location (provider location):  On-site  Platform used for Video Visit: Charly  Signed Electronically by: Alan Almeida MD

## 2024-11-10 ENCOUNTER — HEALTH MAINTENANCE LETTER (OUTPATIENT)
Age: 69
End: 2024-11-10

## 2025-03-02 ENCOUNTER — HEALTH MAINTENANCE LETTER (OUTPATIENT)
Age: 70
End: 2025-03-02

## 2025-03-21 DIAGNOSIS — I10 ESSENTIAL HYPERTENSION, BENIGN: ICD-10-CM

## 2025-03-22 ENCOUNTER — LAB (OUTPATIENT)
Dept: LAB | Facility: CLINIC | Age: 70
End: 2025-03-22
Payer: MEDICARE

## 2025-03-22 DIAGNOSIS — E11.65 TYPE 2 DIABETES MELLITUS WITH HYPERGLYCEMIA, WITHOUT LONG-TERM CURRENT USE OF INSULIN (H): Primary | ICD-10-CM

## 2025-03-22 LAB
CHOLEST SERPL-MCNC: 173 MG/DL
EST. AVERAGE GLUCOSE BLD GHB EST-MCNC: 128 MG/DL
FASTING STATUS PATIENT QL REPORTED: YES
HBA1C MFR BLD: 6.1 % (ref 0–5.6)
HDLC SERPL-MCNC: 50 MG/DL
LDLC SERPL CALC-MCNC: 102 MG/DL
NONHDLC SERPL-MCNC: 123 MG/DL
TRIGL SERPL-MCNC: 106 MG/DL

## 2025-03-22 PROCEDURE — 80061 LIPID PANEL: CPT

## 2025-03-22 PROCEDURE — 36415 COLL VENOUS BLD VENIPUNCTURE: CPT

## 2025-03-22 PROCEDURE — 83036 HEMOGLOBIN GLYCOSYLATED A1C: CPT

## 2025-03-24 RX ORDER — METOPROLOL TARTRATE 50 MG
100 TABLET ORAL 2 TIMES DAILY
Qty: 360 TABLET | Refills: 0 | Status: SHIPPED | OUTPATIENT
Start: 2025-03-24

## 2025-04-12 SDOH — HEALTH STABILITY: PHYSICAL HEALTH: ON AVERAGE, HOW MANY MINUTES DO YOU ENGAGE IN EXERCISE AT THIS LEVEL?: 20 MIN

## 2025-04-12 SDOH — HEALTH STABILITY: PHYSICAL HEALTH: ON AVERAGE, HOW MANY DAYS PER WEEK DO YOU ENGAGE IN MODERATE TO STRENUOUS EXERCISE (LIKE A BRISK WALK)?: 1 DAY

## 2025-04-12 ASSESSMENT — SOCIAL DETERMINANTS OF HEALTH (SDOH): HOW OFTEN DO YOU GET TOGETHER WITH FRIENDS OR RELATIVES?: MORE THAN THREE TIMES A WEEK

## 2025-04-17 ENCOUNTER — OFFICE VISIT (OUTPATIENT)
Dept: INTERNAL MEDICINE | Facility: CLINIC | Age: 70
End: 2025-04-17
Attending: INTERNAL MEDICINE
Payer: MEDICARE

## 2025-04-17 VITALS
HEIGHT: 69 IN | DIASTOLIC BLOOD PRESSURE: 70 MMHG | OXYGEN SATURATION: 98 % | BODY MASS INDEX: 35.53 KG/M2 | HEART RATE: 78 BPM | SYSTOLIC BLOOD PRESSURE: 122 MMHG | WEIGHT: 239.9 LBS | RESPIRATION RATE: 16 BRPM | TEMPERATURE: 97.7 F

## 2025-04-17 DIAGNOSIS — N18.31 CHRONIC KIDNEY DISEASE, STAGE 3A (H): ICD-10-CM

## 2025-04-17 DIAGNOSIS — E11.9 TYPE 2 DIABETES MELLITUS WITHOUT COMPLICATION, WITHOUT LONG-TERM CURRENT USE OF INSULIN (H): ICD-10-CM

## 2025-04-17 DIAGNOSIS — I10 ESSENTIAL HYPERTENSION, BENIGN: ICD-10-CM

## 2025-04-17 DIAGNOSIS — I25.118 CORONARY ARTERY DISEASE OF NATIVE HEART WITH STABLE ANGINA PECTORIS, UNSPECIFIED VESSEL OR LESION TYPE: ICD-10-CM

## 2025-04-17 DIAGNOSIS — Z12.31 VISIT FOR SCREENING MAMMOGRAM: ICD-10-CM

## 2025-04-17 DIAGNOSIS — Z00.00 ENCOUNTER FOR MEDICARE ANNUAL WELLNESS EXAM: Primary | ICD-10-CM

## 2025-04-17 DIAGNOSIS — M72.0 DUPUYTREN'S CONTRACTURE: ICD-10-CM

## 2025-04-17 DIAGNOSIS — E66.01 MORBID OBESITY (H): ICD-10-CM

## 2025-04-17 PROBLEM — Z86.79 HISTORY OF ATRIAL FIBRILLATION: Status: ACTIVE | Noted: 2020-10-12

## 2025-04-17 RX ORDER — TRIAMTERENE AND HYDROCHLOROTHIAZIDE 37.5; 25 MG/1; MG/1
1 TABLET ORAL EVERY MORNING
Qty: 90 TABLET | Refills: 3 | Status: SHIPPED | OUTPATIENT
Start: 2025-04-17

## 2025-04-17 RX ORDER — METOPROLOL TARTRATE 50 MG
100 TABLET ORAL 2 TIMES DAILY
Qty: 360 TABLET | Refills: 3 | Status: SHIPPED | OUTPATIENT
Start: 2025-04-17

## 2025-04-17 RX ORDER — LOSARTAN POTASSIUM 100 MG/1
100 TABLET ORAL DAILY
Qty: 90 TABLET | Refills: 3 | Status: SHIPPED | OUTPATIENT
Start: 2025-04-17

## 2025-04-17 RX ORDER — ATORVASTATIN CALCIUM 80 MG/1
80 TABLET, FILM COATED ORAL AT BEDTIME
Qty: 90 TABLET | Refills: 3 | Status: SHIPPED | OUTPATIENT
Start: 2025-04-17

## 2025-04-17 RX ORDER — AMLODIPINE BESYLATE 5 MG/1
5 TABLET ORAL 2 TIMES DAILY
Qty: 180 TABLET | Refills: 3 | Status: SHIPPED | OUTPATIENT
Start: 2025-04-17

## 2025-04-17 RX ORDER — METFORMIN HYDROCHLORIDE 500 MG/1
1000 TABLET, EXTENDED RELEASE ORAL
Qty: 180 TABLET | Refills: 3 | Status: SHIPPED | OUTPATIENT
Start: 2025-04-17

## 2025-04-17 NOTE — PATIENT INSTRUCTIONS
Patient Education   Preventive Care Advice   This is general advice given by our system to help you stay healthy. However, your care team may have specific advice just for you. Please talk to your care team about your preventive care needs.  Nutrition  Eat 5 or more servings of fruits and vegetables each day.  Try wheat bread, brown rice and whole grain pasta (instead of white bread, rice, and pasta).  Get enough calcium and vitamin D. Check the label on foods and aim for 100% of the RDA (recommended daily allowance).  Lifestyle  Exercise at least 150 minutes each week  (30 minutes a day, 5 days a week).  Do muscle strengthening activities 2 days a week. These help control your weight and prevent disease.  No smoking.  Wear sunscreen to prevent skin cancer.  Have a dental exam and cleaning every 6 months.  Yearly exams  See your health care team every year to talk about:  Any changes in your health.  Any medicines your care team has prescribed.  Preventive care, family planning, and ways to prevent chronic diseases.  Shots (vaccines)   HPV shots (up to age 26), if you've never had them before.  Hepatitis B shots (up to age 59), if you've never had them before.  COVID-19 shot: Get this shot when it's due.  Flu shot: Get a flu shot every year.  Tetanus shot: Get a tetanus shot every 10 years.  Pneumococcal, hepatitis A, and RSV shots: Ask your care team if you need these based on your risk.  Shingles shot (for age 50 and up)  General health tests  Diabetes screening:  Starting at age 35, Get screened for diabetes at least every 3 years.  If you are younger than age 35, ask your care team if you should be screened for diabetes.  Cholesterol test: At age 39, start having a cholesterol test every 5 years, or more often if advised.  Bone density scan (DEXA): At age 50, ask your care team if you should have this scan for osteoporosis (brittle bones).  Hepatitis C: Get tested at least once in your life.  STIs (sexually  transmitted infections)  Before age 24: Ask your care team if you should be screened for STIs.  After age 24: Get screened for STIs if you're at risk. You are at risk for STIs (including HIV) if:  You are sexually active with more than one person.  You don't use condoms every time.  You or a partner was diagnosed with a sexually transmitted infection.  If you are at risk for HIV, ask about PrEP medicine to prevent HIV.  Get tested for HIV at least once in your life, whether you are at risk for HIV or not.  Cancer screening tests  Cervical cancer screening: If you have a cervix, begin getting regular cervical cancer screening tests starting at age 21.  Breast cancer scan (mammogram): If you've ever had breasts, begin having regular mammograms starting at age 40. This is a scan to check for breast cancer.  Colon cancer screening: It is important to start screening for colon cancer at age 45.  Have a colonoscopy test every 10 years (or more often if you're at risk) Or, ask your provider about stool tests like a FIT test every year or Cologuard test every 3 years.  To learn more about your testing options, visit:   .  For help making a decision, visit:   https://bit.ly/bz11847.  Prostate cancer screening test: If you have a prostate, ask your care team if a prostate cancer screening test (PSA) at age 55 is right for you.  Lung cancer screening: If you are a current or former smoker ages 50 to 80, ask your care team if ongoing lung cancer screenings are right for you.  For informational purposes only. Not to replace the advice of your health care provider. Copyright   2023 [facility]. All rights reserved. Clinically reviewed by the [facility]. [employer] [id number] - REV 01/24.  Preventing Falls: Care Instructions  Injuries and health problems such as trouble walking or poor eyesight can increase your risk of falling. So can some medicines. But there are things you can do to help  "prevent falls. You can exercise to get stronger. You can also arrange your home to make it safer.    Talk to your doctor about the medicines you take. Ask if any of them increase the risk of falls and whether they can be changed or stopped.   Try to exercise regularly. It can help improve your strength and balance. This can help lower your risk of falling.         Practice fall safety and prevention.   Wear low-heeled shoes that fit well and give your feet good support. Talk to your doctor if you have foot problems that make this hard.  Carry a cellphone or wear a medical alert device that you can use to call for help.  Use stepladders instead of chairs to reach high objects. Don't climb if you're at risk for falls. Ask for help, if needed.  Wear the correct eyeglasses, if you need them.        Make your home safer.   Remove rugs, cords, clutter, and furniture from walkways.  Keep your house well lit. Use night-lights in hallways and bathrooms.  Install and use sturdy handrails on stairways.  Wear nonskid footwear, even inside. Don't walk barefoot or in socks without shoes.        Be safe outside.   Use handrails, curb cuts, and ramps whenever possible.  Keep your hands free by using a shoulder bag or backpack.  Try to walk in well-lit areas. Watch out for uneven ground, changes in pavement, and debris.  Be careful in the winter. Walk on the grass or gravel when sidewalks are slippery. Use de-icer on steps and walkways. Add non-slip devices to shoes.    Put grab bars and nonskid mats in your shower or tub and near the toilet. Try to use a shower chair or bath bench when bathing.   Get into a tub or shower by putting in your weaker leg first. Get out with your strong side first. Have a phone or medical alert device in the bathroom with you.   Where can you learn more?  Go to https://www.[employer].net/patiented  Enter G117 in the search box to learn more about \"Preventing Falls: Care Instructions.\"  Current as of: " July 31, 2024  Content Version: 14.4    2414-1515 [employer].   Care instructions adapted under license by your healthcare professional. If you have questions about a medical condition or this instruction, always ask your healthcare professional. [employer] disclaims any warranty or liability for your use of this information.    Hearing Loss: Care Instructions  Overview     Hearing loss is a sudden or slow decrease in how well you hear. It can range from slight to profound. Permanent hearing loss can occur with aging. It also can happen when you are exposed long-term to loud noise. Examples include listening to loud music, riding motorcycles, or being around other loud machines.  Hearing loss can affect your work and home life. It can make you feel lonely or depressed. You may feel that you have lost your independence. But hearing aids and other devices can help you hear better and feel connected to others.  Follow-up care is a key part of your treatment and safety. Be sure to make and go to all appointments, and call your doctor if you are having problems. It's also a good idea to know your test results and keep a list of the medicines you take.  How can you care for yourself at home?  Avoid loud noises whenever possible. This helps keep your hearing from getting worse.  Always wear hearing protection around loud noises.  Wear a hearing aid as directed.  A professional can help you pick a hearing aid that will work best for you.  You can also get hearing aids over the counter for mild to moderate hearing loss.  Have hearing tests as your doctor suggests. They can show whether your hearing has changed. Your hearing aid may need to be adjusted.  Use other devices as needed. These may include:  Telephone amplifiers and hearing aids that can connect to a television, stereo, radio, or microphone.  Devices that use lights or vibrations. These alert you to the doorbell, a ringing telephone, or a  "baby monitor.  Television closed-captioning. This shows the words at the bottom of the screen. Most new TVs can do this.  TTY (text telephone). This lets you type messages back and forth on the telephone instead of talking or listening. These devices are also called TDD. When messages are typed on the keyboard, they are sent over the phone line to a receiving TTY. The message is shown on a monitor.  Use text messaging, social media, and email if it is hard for you to communicate by telephone.  Try to learn a listening technique called speechreading. It is not lipreading. You pay attention to people's gestures, expressions, posture, and tone of voice. These clues can help you understand what a person is saying. Face the person you are talking to, and have them face you. Make sure the lighting is good. You need to see the other person's face clearly.  Think about counseling if you need help to adjust to your hearing loss.  When should you call for help?  Watch closely for changes in your health, and be sure to contact your doctor if:    You think your hearing is getting worse.     You have new symptoms, such as dizziness or nausea.   Where can you learn more?  Go to https://www.[employer].net/patiented  Enter R798 in the search box to learn more about \"Hearing Loss: Care Instructions.\"  Current as of: October 27, 2024  Content Version: 14.4 2024-2025 [employer].   Care instructions adapted under license by your healthcare professional. If you have questions about a medical condition or this instruction, always ask your healthcare professional. [employer] disclaims any warranty or liability for your use of this information.    Bladder Training: Care Instructions  Your Care Instructions     Bladder training is used to treat urge incontinence and stress incontinence. Urge incontinence means that the need to urinate comes on so fast that you can't get to a toilet in time. Stress incontinence " means that you leak urine because of pressure on your bladder. For example, it may happen when you laugh, cough, or lift something heavy.  Bladder training can increase how long you can wait before you have to urinate. It can also help your bladder hold more urine. And it can give you better control over the urge to urinate.  It is important to remember that bladder training takes a few weeks to a few months to make a difference. You may not see results right away, but don't give up.  Follow-up care is a key part of your treatment and safety. Be sure to make and go to all appointments, and call your doctor if you are having problems. It's also a good idea to know your test results and keep a list of the medicines you take.  How can you care for yourself at home?  Work with your doctor to come up with a bladder training program that is right for you. You may use one or more of the following methods.  Delayed urination  In the beginning, try to keep from urinating for 5 minutes after you first feel the need to go.  While you wait, take deep, slow breaths to relax. Kegel exercises can also help you delay the need to go to the bathroom.  After some practice, when you can easily wait 5 minutes to urinate, try to wait 10 minutes before you urinate.  Slowly increase the waiting period until you are able to control when you have to urinate.  Scheduled urination  Empty your bladder when you first wake up in the morning.  Schedule times throughout the day when you will urinate.  Start by going to the bathroom every hour, even if you don't need to go.  Slowly increase the time between trips to the bathroom.  When you have found a schedule that works well for you, keep doing it.  If you wake up during the night and have to urinate, do it. Apply your schedule to waking hours only.  Kegel exercises  These tighten and strengthen pelvic muscles, which can help you control the flow of urine. (If doing these exercises causes pain,  "stop doing them and talk with your doctor.) To do Kegel exercises:  Squeeze your muscles as if you were trying not to pass gas. Or squeeze your muscles as if you were stopping the flow of urine. Your belly, legs, and buttocks shouldn't move.  Hold the squeeze for 3 seconds, then relax for 5 to 10 seconds.  Start with 3 seconds, then add 1 second each week until you are able to squeeze for 10 seconds.  Repeat the exercise 10 times a session. Do 3 to 8 sessions a day.  When should you call for help?  Watch closely for changes in your health, and be sure to contact your doctor if:    Your incontinence is getting worse.     You do not get better as expected.   Where can you learn more?  Go to https://www.[employer].net/patiented  Enter V684 in the search box to learn more about \"Bladder Training: Care Instructions.\"  Current as of: April 30, 2024  Content Version: 14.4    0298-6141 [employer].   Care instructions adapted under license by your healthcare professional. If you have questions about a medical condition or this instruction, always ask your healthcare professional. [employer] disclaims any warranty or liability for your use of this information.       "

## 2025-04-17 NOTE — PROGRESS NOTES
Preventive Care Visit  [facility]  Alan Almeida MD, Internal Medicine  Apr 17, 2025      Assessment & Plan     Encounter for Medicare annual wellness exam  Updated screening, immunizations, prevention.  Please see health maintenance list, care gaps     Type 2 diabetes mellitus without complication, without long-term current use of insulin (H)  Morbid obesity  Given her BMI, hx CAD + DM- add GLP-1a to regimen  Cont metformin at 1000 mg /d but could look at reducing to 500 mg daily.   Recheck labs in 3 mo   - semaglutide (OZEMPIC) 2 MG/3ML pen; Inject 0.25 mg subcutaneously every 7 days for 28 days, THEN 0.5 mg every 7 days for 14 days. Continue at 0.5 mg   - metFORMIN (GLUCOPHAGE XR) 500 MG 24 hr tablet; Take 2 tablets (1,000 mg) by mouth daily (with dinner).  - Albumin Random Urine Quantitative with Creat Ratio; Future  - Basic metabolic panel  (Ca, Cl, CO2, Creat, Gluc, K, Na, BUN); Future  - Hemoglobin A1c; Future  - Lipid panel reflex to direct LDL Fasting; Future    Coronary artery disease of native heart with stable angina pectoris, unspecified vessel or lesion type  - atorvastatin (LIPITOR) 80 MG tablet; Take 1 tablet (80 mg) by mouth at bedtime.  - Lipid panel reflex to direct LDL Fasting; Future    Essential hypertension, benign  CKD 3a  - losartan (COZAAR) 100 MG tablet; Take 1 tablet (100 mg) by mouth daily.  - metoprolol tartrate (LOPRESSOR) 50 MG tablet; Take 2 tablets (100 mg) by mouth 2 times daily.  - triamterene-HCTZ (MAXZIDE-25) 37.5-25 MG tablet; Take 1 tablet by mouth every morning.  - amLODIPine (NORVASC) 5 MG tablet; Take 1 tablet (5 mg) by mouth 2 times daily.  No microalb- BP well controlled. Cont meds    Dupuytren's contracture  See ortho   - Orthopedic  Referral; Future    Visit for screening mammogram  Refuses mammo    Patient has been advised of split billing requirements and indicates understanding: Yes    The longitudinal plan of care for the  "diagnosis(es)/condition(s) as documented were addressed during this visit. Due to the added complexity in care, I will continue to support Amparo in the subsequent management and with ongoing continuity of care.    BMI  Estimated body mass index is 35.95 kg/m  as calculated from the following:    Height as of this encounter: 1.74 m (5' 8.5\").    Weight as of this encounter: 108.8 kg (239 lb 14.4 oz).       Counseling  Appropriate preventive services were addressed with this patient via screening, questionnaire, or discussion as appropriate for fall prevention, nutrition, physical activity, Tobacco-use cessation, social engagement, weight loss and cognition.  Checklist reviewing preventive services available has been given to the patient.  Reviewed patient's diet, addressing concerns and/or questions.   She is at risk for lack of exercise and has been provided with information to increase physical activity for the benefit of her well-being.   The patient was instructed to see the dentist every 6 months.   The patient was provided with written information regarding signs of hearing loss.   Information on urinary incontinence and treatment options given to patient.           Subjective   Amparo is a 69 year old, presenting for the following:  Medicare Visit          HPI  C/o R palmar contracture          Advance Care Planning    Discussed advance care planning with patient; informed AVS has link to [facility] [employer].        4/12/2025   General Health   How would you rate your overall physical health? Good   Feel stress (tense, anxious, or unable to sleep) Not at all         4/12/2025   Nutrition   Diet: Regular (no restrictions)         4/12/2025   Exercise   Days per week of moderate/strenous exercise 1 day   Average minutes spent exercising at this level 20 min   (!) EXERCISE CONCERN      4/12/2025   Social Factors   Frequency of gathering with friends or relatives More than three times a week   Worry food won't last " until get money to buy more No   Food not last or not have enough money for food? No   Do you have housing? (Housing is defined as stable permanent housing and does not include staying ouside in a car, in a tent, in an abandoned building, in an overnight shelter, or couch-surfing.) Yes   Are you worried about losing your housing? No   Lack of transportation? No   Unable to get utilities (heat,electricity)? No         4/17/2025   Fall Risk   Gait Speed Test Interpretation Less than or equal to 5.00 seconds - PASS           4/12/2025   Activities of Daily Living- Home Safety   Needs help with the following daily activites None of the above   Safety concerns in the home None of the above         4/12/2025   Dental   Dentist two times every year? (!) NO         4/12/2025   Hearing Screening   Hearing concerns? (!) TROUBLE UNDERSTANDING SOFT OR WHISPERED SPEECH.         4/12/2025   Driving Risk Screening   Patient/family members have concerns about driving No         4/12/2025   General Alertness/Fatigue Screening   Have you been more tired than usual lately? No         4/12/2025   Urinary Incontinence Screening   Bothered by leaking urine in past 6 months Yes         Today's PHQ-2 Score:       4/16/2025     9:34 PM   PHQ-2 ( 1999 [employer])   Q1: Little interest or pleasure in doing things 0   Q2: Feeling down, depressed or hopeless 0   PHQ-2 Score 0    Q1: Little interest or pleasure in doing things Not at all   Q2: Feeling down, depressed or hopeless Not at all   PHQ-2 Score 0       Patient-reported           4/12/2025   Substance Use   Alcohol more than 3/day or more than 7/wk No   Do you have a current opioid prescription? No   How severe/bad is pain from 1 to 10? 0/10 (No Pain)   Do you use any other substances recreationally? No     Social History     Tobacco Use    Smoking status: Never    Smokeless tobacco: Never   Substance Use Topics    Alcohol use: Not Currently     Comment: once a year    Drug use: No            3/9/2023   LAST FHS-7 RESULTS   1st degree relative breast or ovarian cancer No   Any relative bilateral breast cancer No   Any male have breast cancer No   Any ONE woman have BOTH breast AND ovarian cancer No   Any woman with breast cancer before 50yrs No   2 or more relatives with breast AND/OR ovarian cancer No   2 or more relatives with breast AND/OR bowel cancer No        Mammogram Screening - Mammogram every 1-2 years updated in Health Maintenance based on mutual decision making      History of abnormal Pap smear: No - age 65 or older with adequate negative prior screening test results (3 consecutive negative cytology results, 2 consecutive negative cotesting results, or 2 consecutive negative HrHPV test results within 10 years, with the most recent test occurring within the recommended screening interval for the test used)        1/4/2012     8:51 AM 7/18/2006    12:00 AM   PAP / HPV   PAP (Historical) NIL  NIL      ASCVD Risk   The 10-year ASCVD risk score (Enmanuel BUCIO, et al., 2019) is: 16.8%    Values used to calculate the score:      Age: 69 years      Sex: Female      Is Non- : No      Diabetic: Yes      Tobacco smoker: No      Systolic Blood Pressure: 122 mmHg      Is BP treated: Yes      HDL Cholesterol: 50 mg/dL      Total Cholesterol: 173 mg/dL            Reviewed and updated as needed this visit by Provider                      Current providers sharing in care for this patient include:  Patient Care Team:  Alan Almeida MD as PCP - General  Alan Almeida MD as Assigned PCP  Nikole Miner MD as MD (Neurology)  Wilbert Thomas MD as Assigned Heart and Vascular Provider  Alejandro Overton MD as Physician (OB/Gyn)    The following health maintenance items are reviewed in Epic and correct as of today:  Health Maintenance   Topic Date Due    DIABETIC FOOT EXAM  Never done    EYE EXAM  Never done    MAMMO SCREENING  04/03/2020    DTAP/TDAP/TD  "IMMUNIZATION (2 - Td or Tdap) 05/26/2021    ZOSTER IMMUNIZATION (2 of 2) 11/07/2023    INFLUENZA VACCINE (1) 09/01/2024    COVID-19 Vaccine (7 - 2024-25 season) 09/01/2024    ANNUAL REVIEW OF HM ORDERS  04/09/2025    MEDICARE ANNUAL WELLNESS VISIT  04/09/2025    MICROALBUMIN  05/04/2025    A1C  06/22/2025    BMP  07/19/2025    LIPID  03/22/2026    COLORECTAL CANCER SCREENING  03/29/2026    FALL RISK ASSESSMENT  04/17/2026    ADVANCE CARE PLANNING  04/09/2029    DEXA  03/30/2038    HEPATITIS C SCREENING  Completed    PHQ-2 (once per calendar year)  Completed    Pneumococcal Vaccine: 50+ Years  Completed    RSV VACCINE  Completed    HPV IMMUNIZATION  Aged Out    MENINGITIS IMMUNIZATION  Aged Out    PAP  Discontinued            Objective    Exam  /70   Pulse 78   Temp 97.7  F (36.5  C) (Temporal)   Resp 16   Ht 1.74 m (5' 8.5\")   Wt 108.8 kg (239 lb 14.4 oz)   LMP 12/31/2012 (Exact Date)   SpO2 98%   BMI 35.95 kg/m     Estimated body mass index is 35.95 kg/m  as calculated from the following:    Height as of this encounter: 1.74 m (5' 8.5\").    Weight as of this encounter: 108.8 kg (239 lb 14.4 oz).    Physical Exam  GENERAL: alert and no distress  EYES: Eyes grossly normal to inspection, PERRL and conjunctivae and sclerae normal  HENT: ear canals and TM's normal, nose and mouth without ulcers or lesions  NECK: no adenopathy, no asymmetry, masses, or scars  RESP: lungs clear to auscultation - no rales, rhonchi or wheezes  CV: regular rate and rhythm, normal S1 S2, no S3 or S4, no murmur, click or rub, no peripheral edema  ABDOMEN: soft, nontender, no hepatosplenomegaly, no masses and bowel sounds normal  MS: R palm contractures of flex tendons finger 4-5  SKIN: no suspicious lesions or rashes  NEURO: Normal strength and tone, mentation intact and speech normal  PSYCH: mentation appears normal, affect normal/bright  LYMPH: no cervical adenopathy  Diabetic foot exam: normal DP and PT pulses, no trophic " changes or ulcerative lesions, normal sensory exam, and abnormal monofilament exam- unable to sense monofilament         4/17/2025   Mini Cog   Clock Draw Score 2 Normal   3 Item Recall 3 objects recalled   Mini Cog Total Score 5              Signed Electronically by: Alan Almeida MD

## 2025-04-19 ENCOUNTER — HEALTH MAINTENANCE LETTER (OUTPATIENT)
Age: 70
End: 2025-04-19

## 2025-04-22 NOTE — PROGRESS NOTES
"Orthopaedic Surgery Hand and Upper Extremity New Clinic Note:  Date: Apr 23, 2025     Visit Provider: Solitario Kenyon MD  Patient ID:[id number]    Chief Complaint: Right hand contracture    Chief Complaint    Right Hand - Pain         Dominant Hand: right    Occupation: retired - part time job in accounting    Referring Provider: Alan Almeida    HPI:    Amparo Mccray is a 69 year old  year old female who presents with a cordlike mass in the right hand.  They  state that this is has been present for about 5-6 years but has been worsening in the last 12-18 months in that the fingers have started to be drawn down into the palm.  They have started to note persistent flexion of the ring and small fingers.  This has led to a problem with straightening the hand flat on a table. She also notices problems when attempting to open jars.       Additionally, Amparo reports neuropathy in her feet, which she feels has progressed to her hands over the past three to four months. She experiences tingling in the small and ring fingers of both hands, with some days being worse than others.  This tingling is present not only in the finger and the dorsum of the hand but proximally up the forearm.  She describes the sensation as similar to the tingling felt when a limb \"falls asleep.\" She also mentions a history of myasthenia gravis and expresses concern due to her brother's history of ALS. She has previously undergone EMG testing before the pandemic and has had a CT scan revealing arthritis in her lumbar spine. Amparo suspects that her symptoms may be related to her spine, as she has experienced shooting sensations down her back and leg. She has not seen a spinal specialist since 2012 or 2013.     Symptom Onset: 6 years, but getting worse recently  Trauma/Inciting Event: none  Location of pain/symptoms: right hand, ring finger  Duration (constant/Intermittent, etc): intermittent  Characteristics of pain (sharp, dull, etc): " sharp  Aggravating factors: opening lids, buttoning clothes, she drops thing occasionally  Prior Treatment: none    PMH  Diabetes: yes  Thyroid Problems: no  Smoking Y/N: no        PAST MEDICAL HISTORY:  Past Medical History:   Diagnosis Date    Arthritis     Chest pain, unspecified     neg stress test, put on PPI- no help    Essential hypertension, benign     Hypertension, Benign    Obesity (BMI 30-39.9) 7/1/13    BMI 33.6    Paroxysmal atrial fibrillation (H) 03/2010    Superficial thrombophlebitis     Thrombosis of leg     superficial not dvt's    Type 2 diabetes mellitus with hyperglycemia, without long-term current use of insulin (H) 4/23/2024    Ventricular bigeminy     bigeminy- nrml LV fxn          PAST SURGICAL HISTORY:  Past Surgical History:   Procedure Laterality Date    ABDOMEN SURGERY      ARTHROPLASTY KNEE Right 3/2/2015    Procedure: ARTHROPLASTY KNEE;  Surgeon: Cuco Baugh MD;  Location: [facility] OR    [facility] AFF SURGICAL PATHOLOGY  1976    HGSIL    CV CORONARY ANGIOGRAM N/A 4/20/2022    Procedure: Coronary Angiogram;  Surgeon: Khang Thomas MD;  Location: [facility] CARDIAC CATH LAB    CV CORONARY ANGIOGRAM  4/20/2022    Procedure: ;  Surgeon: Khang Thomas MD;  Location:  HEART CARDIAC CATH LAB    ESOPHAGOSCOPY, GASTROSCOPY, DUODENOSCOPY (EGD), COMBINED N/A 5/16/2019    Procedure: ESOPHAGOGASTRODUODENOSCOPY, WITH BIOPSY;  Surgeon: Liliane Montalvo MD;  [facility]: [facility]    H ABLATION FOCAL AFIB  11/02/2017    H ABLATION FOCAL AFIB  01/15/2013    ORTHOPEDIC SURGERY      l tka  and wrist     TUBAL LIGATION      Uterine fibroids removed    ZZC NONSPECIFIC PROCEDURE      left hand             ALLERGIES:      Allergies   Allergen Reactions    Celebrex [Celecoxib] Hives     Hands itching then hives    Ibuprofen Swelling     Face swelling    Ibuprofen Hives            FAMILY HISTORY:  No pertinent family history     SOCIAL HISTORY:    Social Connections: Unknown (4/12/2025)  "   Social Connection and Isolation Panel [NHANES]     Frequency of Communication with Friends and Family: Not on file     Frequency of Social Gatherings with Friends and Family: More than three times a week     Attends Sikh Services: Not on file     Active Member of Clubs or Organizations: Not on file     Attends Club or Organization Meetings: Not on file     Marital Status: Not on file        The patient's past medical, family, and social history was reviewed and confirmed.     REVIEW OF SYMPTOMS:        General: Negative   Eyes: Negative   Ear, Nose and Throat: Negative   Respiratory: Negative   Cardiovascular: Negative   Gastrointestinal: Negative   Genito-urinary: Negative   Musculoskeletal: see HPI  Neurological: Negative   Psychological: Negative  HEME: Negative   ENDO: Negative   SKIN: Negative     VITALS:  Ht 1.74 m (5' 8.5\")   Wt 108.4 kg (239 lb)   LMP 12/31/2012 (Exact Date)   BMI 35.81 kg/m          EXAM:  General: NAD, A&Ox3  HEENT: NC/AT  CV: RRR by peripheral pulse  Pulmonary: Non-labored breathing on RA     Right Upper Extremity:     Inspection:  There is no evidence of open wounds.   There is no evidence of erythema or infection  There is a visible cord from Gonzales's cardinal line extending to the  palmar digital crease of the ring and small fingers  There are no well-organized cords distal to the palmar digital crease  There is no appreciable intrinsic atrophy  There is well maintained thenar musculature     Palpation:  There is no palpable fluctuance  There is a firm cord over the the ring metacarpal     Motor:  Actively flexing and extending the MCP, PIP, DIP of the small and ring fingers      Sensory:  Intact to the radial and ulnar aspect of the small and ring fingers     ROM:    There is a 50 flexion contracture of the MP joint of the small finger and 30 degree contracture of the ring finger  There are no PIP or DIP joint contractures        Negative flexion compression testing of the " elbow bilaterally for worsening of the tingling  No significant hypothenar or first dorsal atrophy  Able to actively abduct the middle finger radially and ulnarly  Negative Tinel's at the elbow  Ulnar nerve stable during flexion    Imaging  Three-view x-ray of the right hand was obtained in the office and interpreted by me today.  There are no acute fractures or dislocations, there are no obvious bony or soft tissue masses.       IMPRESSION AND RECOMMENDATIONS:    Amparo Mccray is a 69 year old year old female with right ring and small finger Dupuytrens contracture.    I discussed with Amparo Mccraythe natural history and course of this condition.  I explained the operative and nonoperative treatment options including observation, Clostridium injections, and surgical excision. I explained that with any of these options the largest risk is recurrence of the contracture.  I did explain the surgical excision has a lower risk of recurrence compared to injections but this comes at the risk of bleeding, infection, damage to surrounding structures, temporary or permanent numbness to the digit, need for repeat surgery.  The patient was offered the opportunity to ask questions which were answered to their satisfaction.      At this time she does not feel like it is causing significant difficulties in her daily life such that she would be amenable to operative management.  However, she would like to keep an eye on the contractures.  Therefore I recommended she return in 6 months for repeat clinical examination and range of motion testing.  We will then have objective data as to whether her contractures are progressing.  She would like to make the decision for operative management at that time.  I also discussed with her that if she begins to notice that she has persistent flexion contractures of the PIP joints that she should come sooner as those contractures can be more difficult to treat.    Regarding her  bilateral upper extremity tingling I discussed with her that I do not believe that she has a focal compressive neuropathy at the elbow as she has a negative flexion compression test and the distribution of her numbness is more akin to C8 distribution.  I discussed with her that this could potentially be radiculopathy.  She would like to see a spine specialist for this and this referral was placed today.  She will return in 6 months or sooner if she has progression of her contractures.     Problem List Items Addressed This Visit    None  Visit Diagnoses       Dupuytren's contracture    -  Primary    Relevant Orders    XR Hand Right G/E 3 Views    Numbness and tingling of right arm        Relevant Orders    Spine  Referral             Orders Placed During This Visit:    Orders Placed This Encounter   Procedures    XR Hand Right G/E 3 Views    Spine  Referral        Solitario Kenyon MD    [place], Upper Extremity & Microvascular Surgery  Department of Orthopaedic Surgery  [facility]

## 2025-04-23 ENCOUNTER — ANCILLARY PROCEDURE (OUTPATIENT)
Dept: GENERAL RADIOLOGY | Facility: CLINIC | Age: 70
End: 2025-04-23
Attending: STUDENT IN AN ORGANIZED HEALTH CARE EDUCATION/TRAINING PROGRAM
Payer: MEDICARE

## 2025-04-23 ENCOUNTER — OFFICE VISIT (OUTPATIENT)
Dept: ORTHOPEDICS | Facility: CLINIC | Age: 70
End: 2025-04-23
Attending: INTERNAL MEDICINE
Payer: MEDICARE

## 2025-04-23 VITALS — HEIGHT: 69 IN | WEIGHT: 239 LBS | BODY MASS INDEX: 35.4 KG/M2

## 2025-04-23 DIAGNOSIS — M72.0 DUPUYTREN'S CONTRACTURE: Primary | ICD-10-CM

## 2025-04-23 DIAGNOSIS — M72.0 DUPUYTREN'S CONTRACTURE: ICD-10-CM

## 2025-04-23 DIAGNOSIS — R20.2 NUMBNESS AND TINGLING OF RIGHT ARM: ICD-10-CM

## 2025-04-23 DIAGNOSIS — R20.0 NUMBNESS AND TINGLING OF RIGHT ARM: ICD-10-CM

## 2025-04-23 PROCEDURE — 99204 OFFICE O/P NEW MOD 45 MIN: CPT | Performed by: STUDENT IN AN ORGANIZED HEALTH CARE EDUCATION/TRAINING PROGRAM

## 2025-04-23 PROCEDURE — 73130 X-RAY EXAM OF HAND: CPT | Mod: TC | Performed by: RADIOLOGY

## 2025-04-23 NOTE — LETTER
"4/23/2025      Amparo Mccray  [address]  [place] [address]      Dear Colleague,    Thank you for referring your patient, Amparo Mccray, to the [facility] [place]. Please see a copy of my visit note below.    Orthopaedic Surgery Hand and Upper Extremity New Clinic Note:  Date: Apr 23, 2025     Visit Provider: Solitario Kenyon MD  Patient ID:[id number]    Chief Complaint: Right hand contracture    Chief Complaint    Right Hand - Pain         Dominant Hand: right    Occupation: retired - part time job in accounting    Referring Provider: Alan Almeida    HPI:    Amparo Mccray is a 69 year old  year old female who presents with a cordlike mass in the right hand.  They  state that this is has been present for about 5-6 years but has been worsening in the last 12-18 months in that the fingers have started to be drawn down into the palm.  They have started to note persistent flexion of the ring and small fingers.  This has led to a problem with straightening the hand flat on a table. She also notices problems when attempting to open jars.       Additionally, Amparo reports neuropathy in her feet, which she feels has progressed to her hands over the past three to four months. She experiences tingling in the small and ring fingers of both hands, with some days being worse than others.  This tingling is present not only in the finger and the dorsum of the hand but proximally up the forearm.  She describes the sensation as similar to the tingling felt when a limb \"falls asleep.\" She also mentions a history of myasthenia gravis and expresses concern due to her brother's history of ALS. She has previously undergone EMG testing before the pandemic and has had a CT scan revealing arthritis in her lumbar spine. Amparo suspects that her symptoms may be related to her spine, as she has experienced shooting sensations down her back and leg. She has not seen a spinal " specialist since 2012 or 2013.     Symptom Onset: 6 years, but getting worse recently  Trauma/Inciting Event: none  Location of pain/symptoms: right hand, ring finger  Duration (constant/Intermittent, etc): intermittent  Characteristics of pain (sharp, dull, etc): sharp  Aggravating factors: opening lids, buttoning clothes, she drops thing occasionally  Prior Treatment: none    PMH  Diabetes: yes  Thyroid Problems: no  Smoking Y/N: no        PAST MEDICAL HISTORY:  Past Medical History:   Diagnosis Date     Arthritis      Chest pain, unspecified     neg stress test, put on PPI- no help     Essential hypertension, benign     Hypertension, Benign     Obesity (BMI 30-39.9) 7/1/13    BMI 33.6     Paroxysmal atrial fibrillation (H) 03/2010     Superficial thrombophlebitis      Thrombosis of leg     superficial not dvt's     Type 2 diabetes mellitus with hyperglycemia, without long-term current use of insulin (H) 4/23/2024     Ventricular bigeminy     bigeminy- nrml LV fxn          PAST SURGICAL HISTORY:  Past Surgical History:   Procedure Laterality Date     ABDOMEN SURGERY       ARTHROPLASTY KNEE Right 3/2/2015    Procedure: ARTHROPLASTY KNEE;  Surgeon: Cuco Baugh MD;  Location:  OR      AFF SURGICAL PATHOLOGY  1976    HGSIL     CV CORONARY ANGIOGRAM N/A 4/20/2022    Procedure: Coronary Angiogram;  Surgeon: Khang Thomsa MD;  Location:  HEART CARDIAC CATH LAB     CV CORONARY ANGIOGRAM  4/20/2022    Procedure: ;  Surgeon: Khang Thomas MD;  Location: [facility] CARDIAC CATH LAB     ESOPHAGOSCOPY, GASTROSCOPY, DUODENOSCOPY (EGD), COMBINED N/A 5/16/2019    Procedure: ESOPHAGOGASTRODUODENOSCOPY, WITH BIOPSY;  Surgeon: Liliane Montalvo MD;  [facility]: [facility]     H ABLATION FOCAL AFIB  11/02/2017     H ABLATION FOCAL AFIB  01/15/2013     ORTHOPEDIC SURGERY      l tka  and wrist      TUBAL LIGATION      Uterine fibroids removed     ZZC NONSPECIFIC PROCEDURE      left hand            "  ALLERGIES:      Allergies   Allergen Reactions     Celebrex [Celecoxib] Hives     Hands itching then hives     Ibuprofen Swelling     Face swelling     Ibuprofen Hives            FAMILY HISTORY:  No pertinent family history     SOCIAL HISTORY:    Social Connections: Unknown (4/12/2025)    Social Connection and Isolation Panel [NHANES]      Frequency of Communication with Friends and Family: Not on file      Frequency of Social Gatherings with Friends and Family: More than three times a week      Attends Worship Services: Not on file      Active Member of Clubs or Organizations: Not on file      Attends Club or Organization Meetings: Not on file      Marital Status: Not on file        The patient's past medical, family, and social history was reviewed and confirmed.     REVIEW OF SYMPTOMS:        General: Negative   Eyes: Negative   Ear, Nose and Throat: Negative   Respiratory: Negative   Cardiovascular: Negative   Gastrointestinal: Negative   Genito-urinary: Negative   Musculoskeletal: see HPI  Neurological: Negative   Psychological: Negative  HEME: Negative   ENDO: Negative   SKIN: Negative     VITALS:  Ht 1.74 m (5' 8.5\")   Wt 108.4 kg (239 lb)   LMP 12/31/2012 (Exact Date)   BMI 35.81 kg/m          EXAM:  General: NAD, A&Ox3  HEENT: NC/AT  CV: RRR by peripheral pulse  Pulmonary: Non-labored breathing on RA     Right Upper Extremity:     Inspection:  There is no evidence of open wounds.   There is no evidence of erythema or infection  There is a visible cord from Gonzales's cardinal line extending to the  palmar digital crease of the ring and small fingers  There are no well-organized cords distal to the palmar digital crease  There is no appreciable intrinsic atrophy  There is well maintained thenar musculature     Palpation:  There is no palpable fluctuance  There is a firm cord over the the ring metacarpal     Motor:  Actively flexing and extending the MCP, PIP, DIP of the small and ring fingers    "   Sensory:  Intact to the radial and ulnar aspect of the small and ring fingers     ROM:    There is a 50 flexion contracture of the MP joint of the small finger and 30 degree contracture of the ring finger  There are no PIP or DIP joint contractures        Negative flexion compression testing of the elbow bilaterally for worsening of the tingling  No significant hypothenar or first dorsal atrophy  Able to actively abduct the middle finger radially and ulnarly  Negative Tinel's at the elbow  Ulnar nerve stable during flexion    Imaging  Three-view x-ray of the right hand was obtained in the office and interpreted by me today.  There are no acute fractures or dislocations, there are no obvious bony or soft tissue masses.       IMPRESSION AND RECOMMENDATIONS:    Amparo Mccray is a 69 year old year old female with right ring and small finger Dupuytrens contracture.    I discussed with Amparo Mccraythe natural history and course of this condition.  I explained the operative and nonoperative treatment options including observation, Clostridium injections, and surgical excision. I explained that with any of these options the largest risk is recurrence of the contracture.  I did explain the surgical excision has a lower risk of recurrence compared to injections but this comes at the risk of bleeding, infection, damage to surrounding structures, temporary or permanent numbness to the digit, need for repeat surgery.  The patient was offered the opportunity to ask questions which were answered to their satisfaction.      At this time she does not feel like it is causing significant difficulties in her daily life such that she would be amenable to operative management.  However, she would like to keep an eye on the contractures.  Therefore I recommended she return in 6 months for repeat clinical examination and range of motion testing.  We will then have objective data as to whether her contractures are  progressing.  She would like to make the decision for operative management at that time.  I also discussed with her that if she begins to notice that she has persistent flexion contractures of the PIP joints that she should come sooner as those contractures can be more difficult to treat.    Regarding her bilateral upper extremity tingling I discussed with her that I do not believe that she has a focal compressive neuropathy at the elbow as she has a negative flexion compression test and the distribution of her numbness is more akin to C8 distribution.  I discussed with her that this could potentially be radiculopathy.  She would like to see a spine specialist for this and this referral was placed today.  She will return in 6 months or sooner if she has progression of her contractures.     Problem List Items Addressed This Visit    None  Visit Diagnoses       Dupuytren's contracture    -  Primary    Relevant Orders    XR Hand Right G/E 3 Views    Numbness and tingling of right arm        Relevant Orders    Spine  Referral             Orders Placed During This Visit:    Orders Placed This Encounter   Procedures     XR Hand Right G/E 3 Views     Spine  Referral        Solitario Kenyon MD    Hand, Upper Extremity & Microvascular Surgery  Department of Orthopaedic Surgery  [facility]         Again, thank you for allowing me to participate in the care of your patient.        Sincerely,        Solitario Kenyon MD    Electronically signed

## 2025-06-30 DIAGNOSIS — I25.118 CORONARY ARTERY DISEASE OF NATIVE HEART WITH STABLE ANGINA PECTORIS, UNSPECIFIED VESSEL OR LESION TYPE: ICD-10-CM

## 2025-07-01 RX ORDER — ATORVASTATIN CALCIUM 80 MG/1
80 TABLET, FILM COATED ORAL AT BEDTIME
Qty: 90 TABLET | Refills: 3 | OUTPATIENT
Start: 2025-07-01

## 2025-07-12 ENCOUNTER — HEALTH MAINTENANCE LETTER (OUTPATIENT)
Age: 70
End: 2025-07-12

## 2025-09-02 ENCOUNTER — OFFICE VISIT (OUTPATIENT)
Dept: INTERNAL MEDICINE | Facility: CLINIC | Age: 70
End: 2025-09-02
Payer: MEDICARE

## 2025-09-02 ENCOUNTER — ANCILLARY PROCEDURE (OUTPATIENT)
Dept: GENERAL RADIOLOGY | Facility: CLINIC | Age: 70
End: 2025-09-02
Payer: MEDICARE

## 2025-09-02 VITALS
TEMPERATURE: 97.9 F | HEIGHT: 69 IN | HEART RATE: 75 BPM | WEIGHT: 216.9 LBS | SYSTOLIC BLOOD PRESSURE: 102 MMHG | BODY MASS INDEX: 32.12 KG/M2 | DIASTOLIC BLOOD PRESSURE: 64 MMHG | OXYGEN SATURATION: 100 % | RESPIRATION RATE: 16 BRPM

## 2025-09-02 DIAGNOSIS — I10 ESSENTIAL HYPERTENSION, BENIGN: ICD-10-CM

## 2025-09-02 DIAGNOSIS — E66.01 MORBID OBESITY (H): ICD-10-CM

## 2025-09-02 DIAGNOSIS — R06.02 SHORTNESS OF BREATH: Primary | ICD-10-CM

## 2025-09-02 DIAGNOSIS — E11.65 TYPE 2 DIABETES MELLITUS WITH HYPERGLYCEMIA, WITHOUT LONG-TERM CURRENT USE OF INSULIN (H): ICD-10-CM

## 2025-09-02 DIAGNOSIS — Z86.79 HISTORY OF ATRIAL FIBRILLATION: ICD-10-CM

## 2025-09-02 DIAGNOSIS — R06.02 SHORTNESS OF BREATH: ICD-10-CM

## 2025-09-02 DIAGNOSIS — R00.2 PALPITATIONS: ICD-10-CM

## 2025-09-02 LAB
ALBUMIN SERPL BCG-MCNC: 4.6 G/DL (ref 3.5–5.2)
ALBUMIN UR-MCNC: NEGATIVE MG/DL
ALP SERPL-CCNC: 84 U/L (ref 40–150)
ALT SERPL W P-5'-P-CCNC: 16 U/L (ref 0–50)
ANION GAP SERPL CALCULATED.3IONS-SCNC: 14 MMOL/L (ref 7–15)
APPEARANCE UR: CLEAR
AST SERPL W P-5'-P-CCNC: 22 U/L (ref 0–45)
BASOPHILS # BLD AUTO: <0.04 10E3/UL (ref 0–0.2)
BASOPHILS NFR BLD AUTO: 0.4 %
BILIRUB SERPL-MCNC: 0.5 MG/DL
BILIRUB UR QL STRIP: NEGATIVE
BUN SERPL-MCNC: 22.7 MG/DL (ref 8–23)
CALCIUM SERPL-MCNC: 9.9 MG/DL (ref 8.8–10.4)
CHLORIDE SERPL-SCNC: 103 MMOL/L (ref 98–107)
CHOLEST SERPL-MCNC: 208 MG/DL
COLOR UR AUTO: YELLOW
CREAT SERPL-MCNC: 1.44 MG/DL (ref 0.51–0.95)
D DIMER PPP FEU-MCNC: 0.74 UG/ML FEU (ref 0–0.5)
EGFRCR SERPLBLD CKD-EPI 2021: 39 ML/MIN/1.73M2
EOSINOPHIL # BLD AUTO: 0.12 10E3/UL (ref 0–0.7)
EOSINOPHIL NFR BLD AUTO: 1.8 %
ERYTHROCYTE [DISTWIDTH] IN BLOOD BY AUTOMATED COUNT: 12.4 % (ref 10–15)
EST. AVERAGE GLUCOSE BLD GHB EST-MCNC: 105 MG/DL
FASTING STATUS PATIENT QL REPORTED: YES
FASTING STATUS PATIENT QL REPORTED: YES
GLUCOSE SERPL-MCNC: 104 MG/DL (ref 70–99)
GLUCOSE UR STRIP-MCNC: NEGATIVE MG/DL
HBA1C MFR BLD: 5.3 % (ref 0–5.6)
HCO3 SERPL-SCNC: 24 MMOL/L (ref 22–29)
HCT VFR BLD AUTO: 36.3 % (ref 35–47)
HDLC SERPL-MCNC: 46 MG/DL
HGB BLD-MCNC: 12.5 G/DL (ref 11.7–15.7)
HGB UR QL STRIP: NEGATIVE
IMM GRANULOCYTES # BLD: <0.04 10E3/UL
IMM GRANULOCYTES NFR BLD: 0.1 %
KETONES UR STRIP-MCNC: NEGATIVE MG/DL
LDLC SERPL CALC-MCNC: 127 MG/DL
LEUKOCYTE ESTERASE UR QL STRIP: NEGATIVE
LYMPHOCYTES # BLD AUTO: 1.43 10E3/UL (ref 0.8–5.3)
LYMPHOCYTES NFR BLD AUTO: 21 %
MCH RBC QN AUTO: 30.8 PG (ref 26.5–33)
MCHC RBC AUTO-ENTMCNC: 34.4 G/DL (ref 31.5–36.5)
MCV RBC AUTO: 89.4 FL (ref 78–100)
MONOCYTES # BLD AUTO: 0.43 10E3/UL (ref 0–1.3)
MONOCYTES NFR BLD AUTO: 6.3 %
NEUTROPHILS # BLD AUTO: 4.8 10E3/UL (ref 1.6–8.3)
NEUTROPHILS NFR BLD AUTO: 70.4 %
NITRATE UR QL: NEGATIVE
NONHDLC SERPL-MCNC: 162 MG/DL
NT-PROBNP SERPL-MCNC: 115 PG/ML (ref 0–353)
PH UR STRIP: 5.5 [PH] (ref 5–7)
PLATELET # BLD AUTO: 288 10E3/UL (ref 150–450)
POTASSIUM SERPL-SCNC: 4.5 MMOL/L (ref 3.4–5.3)
PROT SERPL-MCNC: 7.7 G/DL (ref 6.4–8.3)
RBC # BLD AUTO: 4.06 10E6/UL (ref 3.8–5.2)
SODIUM SERPL-SCNC: 141 MMOL/L (ref 135–145)
SP GR UR STRIP: 1.01 (ref 1–1.03)
TRIGL SERPL-MCNC: 173 MG/DL
TROPONIN T SERPL HS-MCNC: 15 NG/L
TSH SERPL DL<=0.005 MIU/L-ACNC: 2.68 UIU/ML (ref 0.3–4.2)
UROBILINOGEN UR STRIP-ACNC: 0.2 E.U./DL
WBC # BLD AUTO: 6.82 10E3/UL (ref 4–11)

## 2025-09-02 PROCEDURE — 3074F SYST BP LT 130 MM HG: CPT

## 2025-09-02 PROCEDURE — 71046 X-RAY EXAM CHEST 2 VIEWS: CPT | Mod: TC | Performed by: RADIOLOGY

## 2025-09-02 PROCEDURE — 3078F DIAST BP <80 MM HG: CPT

## 2025-09-02 PROCEDURE — G2211 COMPLEX E/M VISIT ADD ON: HCPCS

## 2025-09-02 PROCEDURE — 81003 URINALYSIS AUTO W/O SCOPE: CPT

## 2025-09-02 PROCEDURE — 84484 ASSAY OF TROPONIN QUANT: CPT

## 2025-09-02 PROCEDURE — 83880 ASSAY OF NATRIURETIC PEPTIDE: CPT

## 2025-09-02 PROCEDURE — 80061 LIPID PANEL: CPT

## 2025-09-02 PROCEDURE — 85025 COMPLETE CBC W/AUTO DIFF WBC: CPT

## 2025-09-02 PROCEDURE — 85379 FIBRIN DEGRADATION QUANT: CPT

## 2025-09-02 PROCEDURE — 80053 COMPREHEN METABOLIC PANEL: CPT

## 2025-09-02 PROCEDURE — 36415 COLL VENOUS BLD VENIPUNCTURE: CPT

## 2025-09-02 PROCEDURE — 93000 ELECTROCARDIOGRAM COMPLETE: CPT

## 2025-09-02 PROCEDURE — 84443 ASSAY THYROID STIM HORMONE: CPT

## 2025-09-02 PROCEDURE — 3044F HG A1C LEVEL LT 7.0%: CPT

## 2025-09-02 PROCEDURE — 83036 HEMOGLOBIN GLYCOSYLATED A1C: CPT

## 2025-09-02 PROCEDURE — 99214 OFFICE O/P EST MOD 30 MIN: CPT

## 2025-09-03 ENCOUNTER — HOSPITAL ENCOUNTER (OUTPATIENT)
Dept: CT IMAGING | Facility: CLINIC | Age: 70
Discharge: HOME | End: 2025-09-03
Payer: MEDICARE

## 2025-09-03 DIAGNOSIS — R06.02 SHORTNESS OF BREATH: ICD-10-CM

## 2025-09-03 PROCEDURE — 255N000002 HC RX 255 OP 636

## 2025-09-03 PROCEDURE — 250N000009 HC RX 250

## 2025-09-03 PROCEDURE — 71275 CT ANGIOGRAPHY CHEST: CPT

## 2025-09-03 RX ADMIN — SODIUM CHLORIDE 100 ML: 9 INJECTION, SOLUTION INTRAVENOUS at 10:28

## 2025-09-03 RX ADMIN — IOHEXOL 81 ML: 350 INJECTION, SOLUTION INTRAVENOUS at 10:28

## (undated) DEVICE — CATH DIAG 4FR AR 1 MOD 538441

## (undated) DEVICE — MANIFOLD KIT ANGIO AUTOMATED 014613

## (undated) DEVICE — WIRE GUIDE 0.035"X260CM SAFE-T-J EXCHANGE G00517

## (undated) DEVICE — CATH JACKY 5FR 3.5 CURVE 40-5023

## (undated) DEVICE — Device

## (undated) DEVICE — CATH ANGIO INFINITI MPA2 4FRX100CM 2 SH 538442

## (undated) DEVICE — DEFIB PRO-PADZ LVP LQD GEL ADULT 8900-2105-01

## (undated) DEVICE — INTRO GLIDESHEATH SLENDER 6FR 10X45CM 60-1060

## (undated) DEVICE — SLEEVE TR BAND RADIAL COMPRESSION DEVICE 24CM TRB24-REG

## (undated) DEVICE — CATH ANGIO JUDKINS R4 6FRX100CM INFINITI 534621T

## (undated) DEVICE — KIT HAND CONTROL ANGIOTOUCH ACIST 65CM AT-P65

## (undated) RX ORDER — REGADENOSON 0.08 MG/ML
INJECTION, SOLUTION INTRAVENOUS
Status: DISPENSED
Start: 2017-11-01

## (undated) RX ORDER — LIDOCAINE HYDROCHLORIDE 10 MG/ML
INJECTION, SOLUTION EPIDURAL; INFILTRATION; INTRACAUDAL; PERINEURAL
Status: DISPENSED
Start: 2017-11-02

## (undated) RX ORDER — SIMETHICONE 20 MG/.3ML
EMULSION ORAL
Status: DISPENSED
Start: 2019-05-16

## (undated) RX ORDER — FENTANYL CITRATE 50 UG/ML
INJECTION, SOLUTION INTRAMUSCULAR; INTRAVENOUS
Status: DISPENSED
Start: 2019-05-16

## (undated) RX ORDER — PROTAMINE SULFATE 10 MG/ML
INJECTION, SOLUTION INTRAVENOUS
Status: DISPENSED
Start: 2017-11-02

## (undated) RX ORDER — AMINOPHYLLINE 25 MG/ML
INJECTION, SOLUTION INTRAVENOUS
Status: DISPENSED
Start: 2017-11-01

## (undated) RX ORDER — HEPARIN SODIUM 1000 [USP'U]/ML
INJECTION, SOLUTION INTRAVENOUS; SUBCUTANEOUS
Status: DISPENSED
Start: 2017-11-02

## (undated) RX ORDER — FENTANYL CITRATE 50 UG/ML
INJECTION, SOLUTION INTRAMUSCULAR; INTRAVENOUS
Status: DISPENSED
Start: 2017-11-02